# Patient Record
Sex: MALE | Race: BLACK OR AFRICAN AMERICAN | Employment: UNEMPLOYED | URBAN - METROPOLITAN AREA
[De-identification: names, ages, dates, MRNs, and addresses within clinical notes are randomized per-mention and may not be internally consistent; named-entity substitution may affect disease eponyms.]

---

## 2019-01-01 ENCOUNTER — APPOINTMENT (OUTPATIENT)
Dept: GENERAL RADIOLOGY | Age: 54
DRG: 201 | End: 2019-01-01
Attending: INTERNAL MEDICINE
Payer: MEDICAID

## 2019-01-01 ENCOUNTER — APPOINTMENT (OUTPATIENT)
Dept: GENERAL RADIOLOGY | Age: 54
DRG: 264 | End: 2019-01-01
Attending: INTERNAL MEDICINE
Payer: MEDICAID

## 2019-01-01 ENCOUNTER — HOSPICE ADMISSION (OUTPATIENT)
Dept: HOSPICE | Facility: HOSPICE | Age: 54
End: 2019-01-01

## 2019-01-01 ENCOUNTER — APPOINTMENT (OUTPATIENT)
Dept: GENERAL RADIOLOGY | Age: 54
DRG: 264 | End: 2019-01-01
Attending: EMERGENCY MEDICINE
Payer: MEDICAID

## 2019-01-01 ENCOUNTER — APPOINTMENT (OUTPATIENT)
Dept: GENERAL RADIOLOGY | Age: 54
End: 2019-01-01
Attending: EMERGENCY MEDICINE
Payer: MEDICAID

## 2019-01-01 ENCOUNTER — HOSPITAL ENCOUNTER (INPATIENT)
Age: 54
LOS: 5 days | Discharge: HOME | DRG: 264 | End: 2019-07-13
Attending: EMERGENCY MEDICINE | Admitting: INTERNAL MEDICINE
Payer: MEDICAID

## 2019-01-01 ENCOUNTER — APPOINTMENT (OUTPATIENT)
Dept: GENERAL RADIOLOGY | Age: 54
DRG: 201 | End: 2019-01-01
Attending: EMERGENCY MEDICINE
Payer: MEDICAID

## 2019-01-01 ENCOUNTER — APPOINTMENT (OUTPATIENT)
Dept: GENERAL RADIOLOGY | Age: 54
DRG: 201 | End: 2019-01-01
Attending: NURSE PRACTITIONER
Payer: MEDICAID

## 2019-01-01 ENCOUNTER — APPOINTMENT (OUTPATIENT)
Dept: CT IMAGING | Age: 54
End: 2019-01-01
Attending: EMERGENCY MEDICINE
Payer: MEDICAID

## 2019-01-01 ENCOUNTER — HOSPITAL ENCOUNTER (INPATIENT)
Age: 54
LOS: 16 days | DRG: 201 | End: 2019-08-06
Attending: EMERGENCY MEDICINE | Admitting: INTERNAL MEDICINE
Payer: MEDICAID

## 2019-01-01 ENCOUNTER — APPOINTMENT (OUTPATIENT)
Dept: CT IMAGING | Age: 54
DRG: 264 | End: 2019-01-01
Attending: EMERGENCY MEDICINE
Payer: MEDICAID

## 2019-01-01 ENCOUNTER — HOSPITAL ENCOUNTER (EMERGENCY)
Age: 54
Discharge: HOME | End: 2019-01-12
Attending: EMERGENCY MEDICINE
Payer: SUBSIDIZED

## 2019-01-01 ENCOUNTER — HOSPITAL ENCOUNTER (EMERGENCY)
Age: 54
Discharge: HOME | End: 2019-06-20
Attending: EMERGENCY MEDICINE
Payer: MEDICAID

## 2019-01-01 ENCOUNTER — APPOINTMENT (OUTPATIENT)
Dept: ULTRASOUND IMAGING | Age: 54
DRG: 201 | End: 2019-01-01
Attending: INTERNAL MEDICINE
Payer: MEDICAID

## 2019-01-01 ENCOUNTER — APPOINTMENT (OUTPATIENT)
Dept: GENERAL RADIOLOGY | Age: 54
End: 2019-01-01
Attending: EMERGENCY MEDICINE
Payer: SUBSIDIZED

## 2019-01-01 ENCOUNTER — ANESTHESIA EVENT (OUTPATIENT)
Dept: ENDOSCOPY | Age: 54
DRG: 264 | End: 2019-01-01
Payer: MEDICAID

## 2019-01-01 ENCOUNTER — ANESTHESIA (OUTPATIENT)
Dept: ENDOSCOPY | Age: 54
DRG: 264 | End: 2019-01-01
Payer: MEDICAID

## 2019-01-01 VITALS
RESPIRATION RATE: 20 BRPM | OXYGEN SATURATION: 82 % | TEMPERATURE: 99.8 F | SYSTOLIC BLOOD PRESSURE: 67 MMHG | HEIGHT: 77 IN | BODY MASS INDEX: 14.26 KG/M2 | WEIGHT: 120.81 LBS | DIASTOLIC BLOOD PRESSURE: 37 MMHG

## 2019-01-01 VITALS
WEIGHT: 170 LBS | SYSTOLIC BLOOD PRESSURE: 113 MMHG | HEIGHT: 74 IN | BODY MASS INDEX: 21.82 KG/M2 | HEART RATE: 62 BPM | DIASTOLIC BLOOD PRESSURE: 75 MMHG | RESPIRATION RATE: 18 BRPM | OXYGEN SATURATION: 97 % | TEMPERATURE: 98.3 F

## 2019-01-01 VITALS
HEIGHT: 77 IN | BODY MASS INDEX: 20.31 KG/M2 | HEART RATE: 78 BPM | SYSTOLIC BLOOD PRESSURE: 138 MMHG | RESPIRATION RATE: 18 BRPM | DIASTOLIC BLOOD PRESSURE: 91 MMHG | OXYGEN SATURATION: 98 % | WEIGHT: 172 LBS | TEMPERATURE: 98.3 F

## 2019-01-01 VITALS
OXYGEN SATURATION: 98 % | HEART RATE: 88 BPM | RESPIRATION RATE: 16 BRPM | HEIGHT: 74 IN | SYSTOLIC BLOOD PRESSURE: 112 MMHG | DIASTOLIC BLOOD PRESSURE: 76 MMHG | TEMPERATURE: 98 F | WEIGHT: 170 LBS | BODY MASS INDEX: 21.82 KG/M2

## 2019-01-01 DIAGNOSIS — I47.1 PAROXYSMAL SVT (SUPRAVENTRICULAR TACHYCARDIA) (HCC): ICD-10-CM

## 2019-01-01 DIAGNOSIS — E87.8 ELECTROLYTE ABNORMALITY: ICD-10-CM

## 2019-01-01 DIAGNOSIS — Z51.5 ENCOUNTER FOR PALLIATIVE CARE: ICD-10-CM

## 2019-01-01 DIAGNOSIS — E46 PROTEIN MALNUTRITION (HCC): Chronic | ICD-10-CM

## 2019-01-01 DIAGNOSIS — R53.83 FATIGUE, UNSPECIFIED TYPE: ICD-10-CM

## 2019-01-01 DIAGNOSIS — R59.0 MEDIASTINAL ADENOPATHY: ICD-10-CM

## 2019-01-01 DIAGNOSIS — F14.10 COCAINE ABUSE (HCC): ICD-10-CM

## 2019-01-01 DIAGNOSIS — R91.8 LUNG MASS: ICD-10-CM

## 2019-01-01 DIAGNOSIS — F14.10 COCAINE ABUSE (HCC): Chronic | ICD-10-CM

## 2019-01-01 DIAGNOSIS — J96.01 ACUTE RESPIRATORY FAILURE WITH HYPOXIA (HCC): ICD-10-CM

## 2019-01-01 DIAGNOSIS — R10.13 PAIN, ABDOMINAL, EPIGASTRIC: ICD-10-CM

## 2019-01-01 DIAGNOSIS — K86.89 PANCREATIC MASS: ICD-10-CM

## 2019-01-01 DIAGNOSIS — J43.2 CENTRILOBULAR EMPHYSEMA (HCC): ICD-10-CM

## 2019-01-01 DIAGNOSIS — R52 PAIN, GENERALIZED: ICD-10-CM

## 2019-01-01 DIAGNOSIS — J96.01 ACUTE RESPIRATORY FAILURE WITH HYPOXEMIA (HCC): ICD-10-CM

## 2019-01-01 DIAGNOSIS — R53.81 DEBILITY: ICD-10-CM

## 2019-01-01 DIAGNOSIS — Z72.0 TOBACCO ABUSE: ICD-10-CM

## 2019-01-01 DIAGNOSIS — I48.91 ATRIAL FIBRILLATION WITH RVR (HCC): ICD-10-CM

## 2019-01-01 DIAGNOSIS — R54 FRAILTY: ICD-10-CM

## 2019-01-01 DIAGNOSIS — R91.8 BILATERAL PULMONARY INFILTRATES ON CHEST X-RAY: Primary | ICD-10-CM

## 2019-01-01 DIAGNOSIS — R59.0 VIRCHOW'S NODE: ICD-10-CM

## 2019-01-01 DIAGNOSIS — I26.99 OTHER ACUTE PULMONARY EMBOLISM WITHOUT ACUTE COR PULMONALE (HCC): ICD-10-CM

## 2019-01-01 DIAGNOSIS — I26.99 ACUTE PULMONARY EMBOLISM WITHOUT ACUTE COR PULMONALE, UNSPECIFIED PULMONARY EMBOLISM TYPE (HCC): Primary | ICD-10-CM

## 2019-01-01 DIAGNOSIS — E46 PROTEIN MALNUTRITION (HCC): ICD-10-CM

## 2019-01-01 DIAGNOSIS — I47.1 SVT (SUPRAVENTRICULAR TACHYCARDIA) (HCC): ICD-10-CM

## 2019-01-01 DIAGNOSIS — C25.9 PRIMARY PANCREATIC CANCER WITH METASTASIS TO OTHER SITE (HCC): ICD-10-CM

## 2019-01-01 DIAGNOSIS — R62.7 FAILURE TO THRIVE IN ADULT: ICD-10-CM

## 2019-01-01 DIAGNOSIS — S83.92XA SPRAIN OF LEFT KNEE, UNSPECIFIED LIGAMENT, INITIAL ENCOUNTER: Primary | ICD-10-CM

## 2019-01-01 DIAGNOSIS — I82.A13 ACUTE DEEP VEIN THROMBOSIS (DVT) OF AXILLARY VEIN OF BOTH UPPER EXTREMITIES (HCC): ICD-10-CM

## 2019-01-01 DIAGNOSIS — R55 SYNCOPE AND COLLAPSE: Primary | ICD-10-CM

## 2019-01-01 DIAGNOSIS — K85.90 ACUTE PANCREATITIS, UNSPECIFIED COMPLICATION STATUS, UNSPECIFIED PANCREATITIS TYPE: ICD-10-CM

## 2019-01-01 DIAGNOSIS — R10.13 EPIGASTRIC PAIN: ICD-10-CM

## 2019-01-01 DIAGNOSIS — R06.6 HICCUPS: ICD-10-CM

## 2019-01-01 DIAGNOSIS — I82.623 ACUTE DEEP VEIN THROMBOSIS (DVT) OF BOTH UPPER EXTREMITIES, UNSPECIFIED VEIN (HCC): ICD-10-CM

## 2019-01-01 DIAGNOSIS — R11.0 NAUSEA: ICD-10-CM

## 2019-01-01 DIAGNOSIS — Z71.89 ADVANCED CARE PLANNING/COUNSELING DISCUSSION: ICD-10-CM

## 2019-01-01 DIAGNOSIS — J43.9 PULMONARY EMPHYSEMA, UNSPECIFIED EMPHYSEMA TYPE (HCC): Chronic | ICD-10-CM

## 2019-01-01 DIAGNOSIS — Z71.0 DISCUSSION ABOUT ADVANCE CARE PLANNING HELD WITH FAMILY MEMBER: ICD-10-CM

## 2019-01-01 LAB
ALBUMIN SERPL-MCNC: 1.6 G/DL (ref 3.5–5)
ALBUMIN SERPL-MCNC: 1.6 G/DL (ref 3.5–5)
ALBUMIN SERPL-MCNC: 1.7 G/DL (ref 3.5–5)
ALBUMIN SERPL-MCNC: 1.8 G/DL (ref 3.5–5)
ALBUMIN SERPL-MCNC: 1.9 G/DL (ref 3.5–5)
ALBUMIN SERPL-MCNC: 2.1 G/DL (ref 3.5–5)
ALBUMIN SERPL-MCNC: 2.3 G/DL (ref 3.5–5)
ALBUMIN SERPL-MCNC: 2.6 G/DL (ref 3.5–5)
ALBUMIN SERPL-MCNC: 2.6 G/DL (ref 3.5–5)
ALBUMIN SERPL-MCNC: 2.8 G/DL (ref 3.5–5)
ALBUMIN SERPL-MCNC: 3.1 G/DL (ref 3.5–5)
ALBUMIN SERPL-MCNC: 3.1 G/DL (ref 3.5–5)
ALBUMIN SERPL-MCNC: 3.4 G/DL (ref 3.5–5)
ALBUMIN SERPL-MCNC: 3.4 G/DL (ref 3.5–5)
ALBUMIN SERPL-MCNC: 3.6 G/DL (ref 3.5–5)
ALBUMIN SERPL-MCNC: 3.6 G/DL (ref 3.5–5)
ALBUMIN/GLOB SERPL: 0.4 {RATIO} (ref 1.2–3.5)
ALBUMIN/GLOB SERPL: 0.5 {RATIO} (ref 1.2–3.5)
ALBUMIN/GLOB SERPL: 0.6 {RATIO} (ref 1.2–3.5)
ALBUMIN/GLOB SERPL: 0.7 {RATIO} (ref 1.2–3.5)
ALBUMIN/GLOB SERPL: 0.8 {RATIO} (ref 1.2–3.5)
ALBUMIN/GLOB SERPL: 0.9 {RATIO} (ref 1.2–3.5)
ALBUMIN/GLOB SERPL: 1.1 {RATIO} (ref 1.2–3.5)
ALBUMIN/GLOB SERPL: 1.1 {RATIO} (ref 1.2–3.5)
ALBUMIN/GLOB SERPL: 1.3 {RATIO} (ref 1.2–3.5)
ALP SERPL-CCNC: 101 U/L (ref 50–136)
ALP SERPL-CCNC: 103 U/L (ref 50–136)
ALP SERPL-CCNC: 104 U/L (ref 50–136)
ALP SERPL-CCNC: 112 U/L (ref 50–136)
ALP SERPL-CCNC: 120 U/L (ref 50–136)
ALP SERPL-CCNC: 141 U/L (ref 50–136)
ALP SERPL-CCNC: 143 U/L (ref 50–136)
ALP SERPL-CCNC: 158 U/L (ref 50–136)
ALP SERPL-CCNC: 196 U/L (ref 50–136)
ALP SERPL-CCNC: 291 U/L (ref 50–136)
ALP SERPL-CCNC: 336 U/L (ref 50–136)
ALP SERPL-CCNC: 60 U/L (ref 50–136)
ALP SERPL-CCNC: 73 U/L (ref 50–136)
ALP SERPL-CCNC: 77 U/L (ref 50–136)
ALP SERPL-CCNC: 85 U/L (ref 50–136)
ALP SERPL-CCNC: 86 U/L (ref 50–136)
ALP SERPL-CCNC: 91 U/L (ref 50–136)
ALP SERPL-CCNC: 95 U/L (ref 50–136)
ALT SERPL-CCNC: 12 U/L (ref 12–65)
ALT SERPL-CCNC: 15 U/L (ref 12–65)
ALT SERPL-CCNC: 15 U/L (ref 12–65)
ALT SERPL-CCNC: 18 U/L (ref 12–65)
ALT SERPL-CCNC: 183 U/L (ref 12–65)
ALT SERPL-CCNC: 19 U/L (ref 12–65)
ALT SERPL-CCNC: 227 U/L (ref 12–65)
ALT SERPL-CCNC: 23 U/L (ref 12–65)
ALT SERPL-CCNC: 23 U/L (ref 12–65)
ALT SERPL-CCNC: 25 U/L (ref 12–65)
ALT SERPL-CCNC: 26 U/L (ref 12–65)
ALT SERPL-CCNC: 27 U/L (ref 12–65)
ALT SERPL-CCNC: 28 U/L (ref 12–65)
ALT SERPL-CCNC: 31 U/L (ref 12–65)
ALT SERPL-CCNC: 31 U/L (ref 12–65)
ALT SERPL-CCNC: 32 U/L (ref 12–65)
ALT SERPL-CCNC: 8 U/L (ref 12–65)
ALT SERPL-CCNC: 90 U/L (ref 12–65)
AMPHET UR QL SCN: NEGATIVE
AMPHET UR QL SCN: NEGATIVE
ANION GAP SERPL CALC-SCNC: 10 MMOL/L (ref 7–16)
ANION GAP SERPL CALC-SCNC: 11 MMOL/L (ref 7–16)
ANION GAP SERPL CALC-SCNC: 15 MMOL/L (ref 7–16)
ANION GAP SERPL CALC-SCNC: 3 MMOL/L (ref 7–16)
ANION GAP SERPL CALC-SCNC: 5 MMOL/L (ref 7–16)
ANION GAP SERPL CALC-SCNC: 6 MMOL/L (ref 7–16)
ANION GAP SERPL CALC-SCNC: 7 MMOL/L (ref 7–16)
ANION GAP SERPL CALC-SCNC: 8 MMOL/L (ref 7–16)
ANION GAP SERPL CALC-SCNC: 9 MMOL/L (ref 7–16)
APTT PPP: 100.9 SEC (ref 24.7–39.8)
APTT PPP: 109 SEC (ref 24.7–39.8)
APTT PPP: 112.4 SEC (ref 24.7–39.8)
APTT PPP: 122.2 SEC (ref 24.7–39.8)
APTT PPP: 125.7 SEC (ref 24.7–39.8)
APTT PPP: 141.1 SEC (ref 24.7–39.8)
APTT PPP: 156.8 SEC (ref 24.7–39.8)
APTT PPP: 30.9 SEC (ref 24.7–39.8)
APTT PPP: 38.4 SEC (ref 24.7–39.8)
APTT PPP: 42.1 SEC (ref 24.7–39.8)
APTT PPP: 44.2 SEC (ref 24.7–39.8)
APTT PPP: 51.4 SEC (ref 24.7–39.8)
APTT PPP: 53.7 SEC (ref 24.7–39.8)
APTT PPP: 58 SEC (ref 24.7–39.8)
APTT PPP: 59.2 SEC (ref 24.7–39.8)
APTT PPP: 63 SEC (ref 24.7–39.8)
APTT PPP: 64.8 SEC (ref 24.7–39.8)
APTT PPP: 69.2 SEC (ref 24.7–39.8)
APTT PPP: 76.1 SEC (ref 24.7–39.8)
APTT PPP: 77 SEC (ref 24.7–39.8)
APTT PPP: 78.1 SEC (ref 24.7–39.8)
APTT PPP: 80.3 SEC (ref 24.7–39.8)
APTT PPP: 81.8 SEC (ref 24.7–39.8)
APTT PPP: 82.5 SEC (ref 24.7–39.8)
APTT PPP: 83.7 SEC (ref 24.7–39.8)
APTT PPP: 88.6 SEC (ref 24.7–39.8)
APTT PPP: 88.8 SEC (ref 24.7–39.8)
APTT PPP: 92.5 SEC (ref 24.7–39.8)
APTT PPP: 94.3 SEC (ref 24.7–39.8)
APTT PPP: 94.3 SEC (ref 24.7–39.8)
APTT PPP: 95 SEC (ref 24.7–39.8)
APTT PPP: 97.4 SEC (ref 24.7–39.8)
APTT PPP: 98.5 SEC (ref 24.7–39.8)
ARTERIAL PATENCY WRIST A: YES
AST SERPL-CCNC: 16 U/L (ref 15–37)
AST SERPL-CCNC: 17 U/L (ref 15–37)
AST SERPL-CCNC: 22 U/L (ref 15–37)
AST SERPL-CCNC: 25 U/L (ref 15–37)
AST SERPL-CCNC: 27 U/L (ref 15–37)
AST SERPL-CCNC: 27 U/L (ref 15–37)
AST SERPL-CCNC: 30 U/L (ref 15–37)
AST SERPL-CCNC: 30 U/L (ref 15–37)
AST SERPL-CCNC: 35 U/L (ref 15–37)
AST SERPL-CCNC: 36 U/L (ref 15–37)
AST SERPL-CCNC: 36 U/L (ref 15–37)
AST SERPL-CCNC: 41 U/L (ref 15–37)
AST SERPL-CCNC: 48 U/L (ref 15–37)
AST SERPL-CCNC: 50 U/L (ref 15–37)
AST SERPL-CCNC: 52 U/L (ref 15–37)
AST SERPL-CCNC: 54 U/L (ref 15–37)
AST SERPL-CCNC: 55 U/L (ref 15–37)
AST SERPL-CCNC: 74 U/L (ref 15–37)
ATRIAL RATE: 145 BPM
ATRIAL RATE: 182 BPM
ATRIAL RATE: 192 BPM
ATRIAL RATE: 292 BPM
ATRIAL RATE: 61 BPM
ATRIAL RATE: 68 BPM
ATRIAL RATE: 72 BPM
BACTERIA SPEC CULT: ABNORMAL
BACTERIA SPEC CULT: ABNORMAL
BACTERIA SPEC CULT: NORMAL
BARBITURATES UR QL SCN: NEGATIVE
BARBITURATES UR QL SCN: NEGATIVE
BASE DEFICIT BLD-SCNC: 1 MMOL/L
BASE DEFICIT BLD-SCNC: 3 MMOL/L
BASE EXCESS BLD CALC-SCNC: 0 MMOL/L
BASE EXCESS BLD CALC-SCNC: 0 MMOL/L
BASE EXCESS BLD CALC-SCNC: 10 MMOL/L
BASE EXCESS BLD CALC-SCNC: 12 MMOL/L
BASE EXCESS BLD CALC-SCNC: 13 MMOL/L
BASE EXCESS BLD CALC-SCNC: 14 MMOL/L
BASE EXCESS BLD CALC-SCNC: 14 MMOL/L
BASE EXCESS BLD CALC-SCNC: 15 MMOL/L
BASE EXCESS BLD CALC-SCNC: 2 MMOL/L
BASE EXCESS BLD CALC-SCNC: 5 MMOL/L
BASE EXCESS BLD CALC-SCNC: 5 MMOL/L
BASE EXCESS BLD CALC-SCNC: 6 MMOL/L
BASE EXCESS BLD CALC-SCNC: 7 MMOL/L
BASE EXCESS BLD CALC-SCNC: 8 MMOL/L
BASE EXCESS BLD CALC-SCNC: 9 MMOL/L
BASOPHILS # BLD: 0 K/UL (ref 0–0.2)
BASOPHILS # BLD: 0.1 K/UL (ref 0–0.2)
BASOPHILS NFR BLD: 0 % (ref 0–2)
BASOPHILS NFR BLD: 1 % (ref 0–2)
BDY SITE: ABNORMAL
BENZODIAZ UR QL: NEGATIVE
BENZODIAZ UR QL: NEGATIVE
BILIRUB DIRECT SERPL-MCNC: 0.2 MG/DL
BILIRUB DIRECT SERPL-MCNC: 0.2 MG/DL
BILIRUB INDIRECT SERPL-MCNC: 0.2 MG/DL (ref 0–1.1)
BILIRUB SERPL-MCNC: 0.3 MG/DL (ref 0.2–1.1)
BILIRUB SERPL-MCNC: 0.4 MG/DL (ref 0.2–1.1)
BILIRUB SERPL-MCNC: 0.5 MG/DL (ref 0.2–1.1)
BILIRUB SERPL-MCNC: 0.6 MG/DL (ref 0.2–1.1)
BILIRUB SERPL-MCNC: 0.7 MG/DL (ref 0.2–1.1)
BILIRUB SERPL-MCNC: 0.7 MG/DL (ref 0.2–1.1)
BILIRUB SERPL-MCNC: 0.8 MG/DL (ref 0.2–1.1)
BILIRUB SERPL-MCNC: 1.3 MG/DL (ref 0.2–1.1)
BILIRUB SERPL-MCNC: 2.6 MG/DL (ref 0.2–1.1)
BNP SERPL-MCNC: 14 PG/ML
BNP SERPL-MCNC: 36 PG/ML
BNP SERPL-MCNC: 59 PG/ML
BODY TEMPERATURE: 100.4
BODY TEMPERATURE: 97.8
BODY TEMPERATURE: 98.6
BUN SERPL-MCNC: 10 MG/DL (ref 6–23)
BUN SERPL-MCNC: 112 MG/DL (ref 6–23)
BUN SERPL-MCNC: 12 MG/DL (ref 6–23)
BUN SERPL-MCNC: 12 MG/DL (ref 6–23)
BUN SERPL-MCNC: 13 MG/DL (ref 6–23)
BUN SERPL-MCNC: 139 MG/DL (ref 6–23)
BUN SERPL-MCNC: 15 MG/DL (ref 6–23)
BUN SERPL-MCNC: 17 MG/DL (ref 6–23)
BUN SERPL-MCNC: 19 MG/DL (ref 6–23)
BUN SERPL-MCNC: 21 MG/DL (ref 6–23)
BUN SERPL-MCNC: 24 MG/DL (ref 6–23)
BUN SERPL-MCNC: 25 MG/DL (ref 6–23)
BUN SERPL-MCNC: 25 MG/DL (ref 6–23)
BUN SERPL-MCNC: 27 MG/DL (ref 6–23)
BUN SERPL-MCNC: 45 MG/DL (ref 6–23)
BUN SERPL-MCNC: 5 MG/DL (ref 6–23)
BUN SERPL-MCNC: 50 MG/DL (ref 6–23)
BUN SERPL-MCNC: 51 MG/DL (ref 6–23)
BUN SERPL-MCNC: 53 MG/DL (ref 6–23)
BUN SERPL-MCNC: 57 MG/DL (ref 6–23)
BUN SERPL-MCNC: 6 MG/DL (ref 6–23)
BUN SERPL-MCNC: 8 MG/DL (ref 6–23)
BUN SERPL-MCNC: 81 MG/DL (ref 6–23)
CALCIUM SERPL-MCNC: 7.6 MG/DL (ref 8.3–10.4)
CALCIUM SERPL-MCNC: 7.7 MG/DL (ref 8.3–10.4)
CALCIUM SERPL-MCNC: 7.8 MG/DL (ref 8.3–10.4)
CALCIUM SERPL-MCNC: 7.8 MG/DL (ref 8.3–10.4)
CALCIUM SERPL-MCNC: 7.9 MG/DL (ref 8.3–10.4)
CALCIUM SERPL-MCNC: 8 MG/DL (ref 8.3–10.4)
CALCIUM SERPL-MCNC: 8 MG/DL (ref 8.3–10.4)
CALCIUM SERPL-MCNC: 8.1 MG/DL (ref 8.3–10.4)
CALCIUM SERPL-MCNC: 8.1 MG/DL (ref 8.3–10.4)
CALCIUM SERPL-MCNC: 8.2 MG/DL (ref 8.3–10.4)
CALCIUM SERPL-MCNC: 8.2 MG/DL (ref 8.3–10.4)
CALCIUM SERPL-MCNC: 8.3 MG/DL (ref 8.3–10.4)
CALCIUM SERPL-MCNC: 8.4 MG/DL (ref 8.3–10.4)
CALCIUM SERPL-MCNC: 8.5 MG/DL (ref 8.3–10.4)
CALCIUM SERPL-MCNC: 8.6 MG/DL (ref 8.3–10.4)
CALCIUM SERPL-MCNC: 8.6 MG/DL (ref 8.3–10.4)
CALCIUM SERPL-MCNC: 8.9 MG/DL (ref 8.3–10.4)
CALCULATED P AXIS, ECG09: -83 DEGREES
CALCULATED P AXIS, ECG09: -88 DEGREES
CALCULATED P AXIS, ECG09: 0 DEGREES
CALCULATED P AXIS, ECG09: 63 DEGREES
CALCULATED P AXIS, ECG09: 75 DEGREES
CALCULATED P AXIS, ECG09: 77 DEGREES
CALCULATED P AXIS, ECG09: 87 DEGREES
CALCULATED R AXIS, ECG10: 65 DEGREES
CALCULATED R AXIS, ECG10: 69 DEGREES
CALCULATED R AXIS, ECG10: 75 DEGREES
CALCULATED R AXIS, ECG10: 75 DEGREES
CALCULATED R AXIS, ECG10: 76 DEGREES
CALCULATED R AXIS, ECG10: 78 DEGREES
CALCULATED R AXIS, ECG10: 80 DEGREES
CALCULATED T AXIS, ECG11: -24 DEGREES
CALCULATED T AXIS, ECG11: -52 DEGREES
CALCULATED T AXIS, ECG11: -83 DEGREES
CALCULATED T AXIS, ECG11: -86 DEGREES
CALCULATED T AXIS, ECG11: -90 DEGREES
CALCULATED T AXIS, ECG11: -93 DEGREES
CALCULATED T AXIS, ECG11: 38 DEGREES
CANNABINOIDS UR QL SCN: NEGATIVE
CANNABINOIDS UR QL SCN: NEGATIVE
CHLORIDE SERPL-SCNC: 100 MMOL/L (ref 98–107)
CHLORIDE SERPL-SCNC: 101 MMOL/L (ref 98–107)
CHLORIDE SERPL-SCNC: 101 MMOL/L (ref 98–107)
CHLORIDE SERPL-SCNC: 102 MMOL/L (ref 98–107)
CHLORIDE SERPL-SCNC: 103 MMOL/L (ref 98–107)
CHLORIDE SERPL-SCNC: 104 MMOL/L (ref 98–107)
CHLORIDE SERPL-SCNC: 94 MMOL/L (ref 98–107)
CHLORIDE SERPL-SCNC: 95 MMOL/L (ref 98–107)
CHLORIDE SERPL-SCNC: 96 MMOL/L (ref 98–107)
CHLORIDE SERPL-SCNC: 97 MMOL/L (ref 98–107)
CHLORIDE SERPL-SCNC: 98 MMOL/L (ref 98–107)
CHLORIDE SERPL-SCNC: 99 MMOL/L (ref 98–107)
CK SERPL-CCNC: 54 U/L (ref 21–215)
CO2 BLD-SCNC: 26 MMOL/L
CO2 BLD-SCNC: 28 MMOL/L
CO2 BLD-SCNC: 30 MMOL/L
CO2 BLD-SCNC: 33 MMOL/L
CO2 BLD-SCNC: 34 MMOL/L
CO2 BLD-SCNC: 35 MMOL/L
CO2 BLD-SCNC: 36 MMOL/L
CO2 BLD-SCNC: 37 MMOL/L
CO2 BLD-SCNC: 38 MMOL/L
CO2 BLD-SCNC: 38 MMOL/L
CO2 BLD-SCNC: 40 MMOL/L
CO2 BLD-SCNC: 41 MMOL/L
CO2 BLD-SCNC: 42 MMOL/L
CO2 BLD-SCNC: 42 MMOL/L
CO2 BLD-SCNC: 46 MMOL/L
CO2 SERPL-SCNC: 25 MMOL/L (ref 21–32)
CO2 SERPL-SCNC: 26 MMOL/L (ref 21–32)
CO2 SERPL-SCNC: 27 MMOL/L (ref 21–32)
CO2 SERPL-SCNC: 27 MMOL/L (ref 21–32)
CO2 SERPL-SCNC: 29 MMOL/L (ref 21–32)
CO2 SERPL-SCNC: 29 MMOL/L (ref 21–32)
CO2 SERPL-SCNC: 30 MMOL/L (ref 21–32)
CO2 SERPL-SCNC: 32 MMOL/L (ref 21–32)
CO2 SERPL-SCNC: 32 MMOL/L (ref 21–32)
CO2 SERPL-SCNC: 33 MMOL/L (ref 21–32)
CO2 SERPL-SCNC: 33 MMOL/L (ref 21–32)
CO2 SERPL-SCNC: 34 MMOL/L (ref 21–32)
CO2 SERPL-SCNC: 37 MMOL/L (ref 21–32)
CO2 SERPL-SCNC: 38 MMOL/L (ref 21–32)
CO2 SERPL-SCNC: 38 MMOL/L (ref 21–32)
CO2 SERPL-SCNC: 40 MMOL/L (ref 21–32)
COCAINE UR QL SCN: NEGATIVE
COCAINE UR QL SCN: POSITIVE
COLLECT TIME,HTIME: 1032
COLLECT TIME,HTIME: 2020
COLLECT TIME,HTIME: 210
COLLECT TIME,HTIME: 2105
COLLECT TIME,HTIME: 302
COLLECT TIME,HTIME: 312
COLLECT TIME,HTIME: 315
COLLECT TIME,HTIME: 315
COLLECT TIME,HTIME: 320
COLLECT TIME,HTIME: 320
COLLECT TIME,HTIME: 325
COLLECT TIME,HTIME: 328
COLLECT TIME,HTIME: 342
COLLECT TIME,HTIME: 344
COLLECT TIME,HTIME: 435
COLLECT TIME,HTIME: 506
COLLECT TIME,HTIME: 530
CREAT SERPL-MCNC: 0.65 MG/DL (ref 0.8–1.5)
CREAT SERPL-MCNC: 0.69 MG/DL (ref 0.8–1.5)
CREAT SERPL-MCNC: 0.7 MG/DL (ref 0.8–1.5)
CREAT SERPL-MCNC: 0.71 MG/DL (ref 0.8–1.5)
CREAT SERPL-MCNC: 0.72 MG/DL (ref 0.8–1.5)
CREAT SERPL-MCNC: 0.76 MG/DL (ref 0.8–1.5)
CREAT SERPL-MCNC: 0.76 MG/DL (ref 0.8–1.5)
CREAT SERPL-MCNC: 0.77 MG/DL (ref 0.8–1.5)
CREAT SERPL-MCNC: 0.79 MG/DL (ref 0.8–1.5)
CREAT SERPL-MCNC: 0.89 MG/DL (ref 0.8–1.5)
CREAT SERPL-MCNC: 0.92 MG/DL (ref 0.8–1.5)
CREAT SERPL-MCNC: 1.01 MG/DL (ref 0.8–1.5)
CREAT SERPL-MCNC: 1.07 MG/DL (ref 0.8–1.5)
CREAT SERPL-MCNC: 1.07 MG/DL (ref 0.8–1.5)
CREAT SERPL-MCNC: 1.32 MG/DL (ref 0.8–1.5)
CREAT SERPL-MCNC: 1.32 MG/DL (ref 0.8–1.5)
CREAT SERPL-MCNC: 1.33 MG/DL (ref 0.8–1.5)
CREAT SERPL-MCNC: 1.36 MG/DL (ref 0.8–1.5)
CREAT SERPL-MCNC: 1.7 MG/DL (ref 0.8–1.5)
CREAT SERPL-MCNC: 1.98 MG/DL (ref 0.8–1.5)
CREAT SERPL-MCNC: 2.66 MG/DL (ref 0.8–1.5)
CREAT SERPL-MCNC: 3.48 MG/DL (ref 0.8–1.5)
CREAT SERPL-MCNC: 4.99 MG/DL (ref 0.8–1.5)
CRP SERPL HS-MCNC: >190 MG/L
DIAGNOSIS, 93000: NORMAL
DIFFERENTIAL METHOD BLD: ABNORMAL
EOSINOPHIL # BLD: 0 K/UL (ref 0–0.8)
EOSINOPHIL # BLD: 0.1 K/UL (ref 0–0.8)
EOSINOPHIL # BLD: 0.2 K/UL (ref 0–0.8)
EOSINOPHIL # BLD: 0.3 K/UL (ref 0–0.8)
EOSINOPHIL NFR BLD: 0 % (ref 0.5–7.8)
EOSINOPHIL NFR BLD: 1 % (ref 0.5–7.8)
EOSINOPHIL NFR BLD: 2 % (ref 0.5–7.8)
EOSINOPHIL NFR BLD: 3 % (ref 0.5–7.8)
ERYTHROCYTE [DISTWIDTH] IN BLOOD BY AUTOMATED COUNT: 10.9 % (ref 11.9–14.6)
ERYTHROCYTE [DISTWIDTH] IN BLOOD BY AUTOMATED COUNT: 11.1 % (ref 11.9–14.6)
ERYTHROCYTE [DISTWIDTH] IN BLOOD BY AUTOMATED COUNT: 11.2 % (ref 11.9–14.6)
ERYTHROCYTE [DISTWIDTH] IN BLOOD BY AUTOMATED COUNT: 11.3 % (ref 11.9–14.6)
ERYTHROCYTE [DISTWIDTH] IN BLOOD BY AUTOMATED COUNT: 11.3 % (ref 11.9–14.6)
ERYTHROCYTE [DISTWIDTH] IN BLOOD BY AUTOMATED COUNT: 11.4 % (ref 11.9–14.6)
ERYTHROCYTE [DISTWIDTH] IN BLOOD BY AUTOMATED COUNT: 11.5 % (ref 11.9–14.6)
ERYTHROCYTE [DISTWIDTH] IN BLOOD BY AUTOMATED COUNT: 11.7 % (ref 11.9–14.6)
ERYTHROCYTE [DISTWIDTH] IN BLOOD BY AUTOMATED COUNT: 11.7 % (ref 11.9–14.6)
ERYTHROCYTE [DISTWIDTH] IN BLOOD BY AUTOMATED COUNT: 11.9 % (ref 11.9–14.6)
ERYTHROCYTE [DISTWIDTH] IN BLOOD BY AUTOMATED COUNT: 12.6 % (ref 11.9–14.6)
ERYTHROCYTE [DISTWIDTH] IN BLOOD BY AUTOMATED COUNT: 13 % (ref 11.9–14.6)
ERYTHROCYTE [DISTWIDTH] IN BLOOD BY AUTOMATED COUNT: 13.2 % (ref 11.9–14.6)
ERYTHROCYTE [DISTWIDTH] IN BLOOD BY AUTOMATED COUNT: 13.2 % (ref 11.9–14.6)
ERYTHROCYTE [DISTWIDTH] IN BLOOD BY AUTOMATED COUNT: 13.8 % (ref 11.9–14.6)
ERYTHROCYTE [DISTWIDTH] IN BLOOD BY AUTOMATED COUNT: 14.4 % (ref 11.9–14.6)
ERYTHROCYTE [DISTWIDTH] IN BLOOD BY AUTOMATED COUNT: 14.7 % (ref 11.9–14.6)
ERYTHROCYTE [DISTWIDTH] IN BLOOD BY AUTOMATED COUNT: 16.7 % (ref 11.9–14.6)
ETHANOL SERPL-MCNC: <3 MG/DL
EXHALED MINUTE VOLUME, VE: 1 L/MIN
EXHALED MINUTE VOLUME, VE: 10 L/MIN
EXHALED MINUTE VOLUME, VE: 10 L/MIN
EXHALED MINUTE VOLUME, VE: 10.1 L/MIN
EXHALED MINUTE VOLUME, VE: 10.3 L/MIN
EXHALED MINUTE VOLUME, VE: 10.9 L/MIN
EXHALED MINUTE VOLUME, VE: 15 L/MIN
EXHALED MINUTE VOLUME, VE: 15 L/MIN
EXHALED MINUTE VOLUME, VE: 15.1 L/MIN
EXHALED MINUTE VOLUME, VE: 15.4 L/MIN
EXHALED MINUTE VOLUME, VE: 15.7 L/MIN
EXHALED MINUTE VOLUME, VE: 17.4 L/MIN
EXHALED MINUTE VOLUME, VE: 6.1 L/MIN
EXHALED MINUTE VOLUME, VE: 6.8 L/MIN
EXHALED MINUTE VOLUME, VE: 7.8 L/MIN
EXHALED MINUTE VOLUME, VE: 8 L/MIN
EXHALED MINUTE VOLUME, VE: 9 L/MIN
FERRITIN SERPL-MCNC: 419 NG/ML (ref 8–388)
FOLATE SERPL-MCNC: 6.4 NG/ML (ref 3.1–17.5)
GAS FLOW.O2 O2 DELIVERY SYS: ABNORMAL L/MIN
GAS FLOW.O2 SETTING OXYMISER: 12 BPM
GAS FLOW.O2 SETTING OXYMISER: 15 BPM
GAS FLOW.O2 SETTING OXYMISER: 16 BPM
GAS FLOW.O2 SETTING OXYMISER: 17 BPM
GAS FLOW.O2 SETTING OXYMISER: 18 BPM
GAS FLOW.O2 SETTING OXYMISER: 20 BPM
GAS FLOW.O2 SETTING OXYMISER: 30 BPM
GAS FLOW.O2 SETTING OXYMISER: 8 BPM
GAS FLOW.O2 SETTING OXYMISER: 8 BPM
GLOBULIN SER CALC-MCNC: 2.7 G/DL (ref 2.3–3.5)
GLOBULIN SER CALC-MCNC: 3 G/DL (ref 2.3–3.5)
GLOBULIN SER CALC-MCNC: 3.1 G/DL (ref 2.3–3.5)
GLOBULIN SER CALC-MCNC: 3.3 G/DL (ref 2.3–3.5)
GLOBULIN SER CALC-MCNC: 3.5 G/DL (ref 2.3–3.5)
GLOBULIN SER CALC-MCNC: 3.9 G/DL (ref 2.3–3.5)
GLOBULIN SER CALC-MCNC: 3.9 G/DL (ref 2.3–3.5)
GLOBULIN SER CALC-MCNC: 4 G/DL (ref 2.3–3.5)
GLOBULIN SER CALC-MCNC: 4 G/DL (ref 2.3–3.5)
GLOBULIN SER CALC-MCNC: 4.2 G/DL (ref 2.3–3.5)
GLOBULIN SER CALC-MCNC: 4.3 G/DL (ref 2.3–3.5)
GLOBULIN SER CALC-MCNC: 4.5 G/DL (ref 2.3–3.5)
GLOBULIN SER CALC-MCNC: 4.6 G/DL (ref 2.3–3.5)
GLOBULIN SER CALC-MCNC: 4.9 G/DL (ref 2.3–3.5)
GLOBULIN SER CALC-MCNC: 5 G/DL (ref 2.3–3.5)
GLOBULIN SER CALC-MCNC: 5.6 G/DL (ref 2.3–3.5)
GLUCOSE BLD STRIP.AUTO-MCNC: 111 MG/DL (ref 65–100)
GLUCOSE BLD STRIP.AUTO-MCNC: 152 MG/DL (ref 65–100)
GLUCOSE BLD STRIP.AUTO-MCNC: 95 MG/DL (ref 65–100)
GLUCOSE SERPL-MCNC: 101 MG/DL (ref 65–100)
GLUCOSE SERPL-MCNC: 103 MG/DL (ref 65–100)
GLUCOSE SERPL-MCNC: 105 MG/DL (ref 65–100)
GLUCOSE SERPL-MCNC: 105 MG/DL (ref 65–100)
GLUCOSE SERPL-MCNC: 106 MG/DL (ref 65–100)
GLUCOSE SERPL-MCNC: 107 MG/DL (ref 65–100)
GLUCOSE SERPL-MCNC: 112 MG/DL (ref 65–100)
GLUCOSE SERPL-MCNC: 117 MG/DL (ref 65–100)
GLUCOSE SERPL-MCNC: 118 MG/DL (ref 65–100)
GLUCOSE SERPL-MCNC: 119 MG/DL (ref 65–100)
GLUCOSE SERPL-MCNC: 119 MG/DL (ref 65–100)
GLUCOSE SERPL-MCNC: 123 MG/DL (ref 65–100)
GLUCOSE SERPL-MCNC: 126 MG/DL (ref 65–100)
GLUCOSE SERPL-MCNC: 144 MG/DL (ref 65–100)
GLUCOSE SERPL-MCNC: 86 MG/DL (ref 65–100)
GLUCOSE SERPL-MCNC: 88 MG/DL (ref 65–100)
GLUCOSE SERPL-MCNC: 90 MG/DL (ref 65–100)
GLUCOSE SERPL-MCNC: 91 MG/DL (ref 65–100)
GLUCOSE SERPL-MCNC: 94 MG/DL (ref 65–100)
GLUCOSE SERPL-MCNC: 95 MG/DL (ref 65–100)
GLUCOSE SERPL-MCNC: 96 MG/DL (ref 65–100)
GLUCOSE SERPL-MCNC: 98 MG/DL (ref 65–100)
GLUCOSE SERPL-MCNC: 98 MG/DL (ref 65–100)
GRAM STN SPEC: ABNORMAL
HAPTOGLOB SERPL-MCNC: 209 MG/DL (ref 30–200)
HAV IGM SERPL QL IA: NEGATIVE
HBV CORE IGM SERPL QL IA: NEGATIVE
HBV SURFACE AG SERPL QL IA: NEGATIVE
HCO3 BLD-SCNC: 24.5 MMOL/L (ref 22–26)
HCO3 BLD-SCNC: 25 MMOL/L (ref 22–26)
HCO3 BLD-SCNC: 25.1 MMOL/L (ref 22–26)
HCO3 BLD-SCNC: 26.7 MMOL/L (ref 22–26)
HCO3 BLD-SCNC: 28.1 MMOL/L (ref 22–26)
HCO3 BLD-SCNC: 31.6 MMOL/L (ref 22–26)
HCO3 BLD-SCNC: 32.8 MMOL/L (ref 22–26)
HCO3 BLD-SCNC: 32.9 MMOL/L (ref 22–26)
HCO3 BLD-SCNC: 34.3 MMOL/L (ref 22–26)
HCO3 BLD-SCNC: 34.5 MMOL/L (ref 22–26)
HCO3 BLD-SCNC: 36.1 MMOL/L (ref 22–26)
HCO3 BLD-SCNC: 36.7 MMOL/L (ref 22–26)
HCO3 BLD-SCNC: 37.9 MMOL/L (ref 22–26)
HCO3 BLD-SCNC: 39.4 MMOL/L (ref 22–26)
HCO3 BLD-SCNC: 39.8 MMOL/L (ref 22–26)
HCO3 BLD-SCNC: 40.4 MMOL/L (ref 22–26)
HCO3 BLD-SCNC: 42.9 MMOL/L (ref 22–26)
HCT VFR BLD AUTO: 22.2 % (ref 41.1–50.3)
HCT VFR BLD AUTO: 22.6 % (ref 41.1–50.3)
HCT VFR BLD AUTO: 22.8 % (ref 41.1–50.3)
HCT VFR BLD AUTO: 22.9 % (ref 41.1–50.3)
HCT VFR BLD AUTO: 23.7 % (ref 41.1–50.3)
HCT VFR BLD AUTO: 24.1 % (ref 41.1–50.3)
HCT VFR BLD AUTO: 24.3 % (ref 41.1–50.3)
HCT VFR BLD AUTO: 24.5 % (ref 41.1–50.3)
HCT VFR BLD AUTO: 24.6 % (ref 41.1–50.3)
HCT VFR BLD AUTO: 24.7 % (ref 41.1–50.3)
HCT VFR BLD AUTO: 24.7 % (ref 41.1–50.3)
HCT VFR BLD AUTO: 24.9 % (ref 41.1–50.3)
HCT VFR BLD AUTO: 26.2 % (ref 41.1–50.3)
HCT VFR BLD AUTO: 26.6 % (ref 41.1–50.3)
HCT VFR BLD AUTO: 26.7 % (ref 41.1–50.3)
HCT VFR BLD AUTO: 28.2 % (ref 41.1–50.3)
HCT VFR BLD AUTO: 28.6 % (ref 41.1–50.3)
HCT VFR BLD AUTO: 28.8 % (ref 41.1–50.3)
HCT VFR BLD AUTO: 29.2 % (ref 41.1–50.3)
HCT VFR BLD AUTO: 29.4 % (ref 41.1–50.3)
HCT VFR BLD AUTO: 31.3 % (ref 41.1–50.3)
HCT VFR BLD AUTO: 31.8 % (ref 41.1–50.3)
HCT VFR BLD AUTO: 33.4 % (ref 41.1–50.3)
HCT VFR BLD AUTO: 34.1 % (ref 41.1–50.3)
HCT VFR BLD AUTO: 36.8 % (ref 41.1–50.3)
HCT VFR BLD AUTO: 39 % (ref 41.1–50.3)
HCV AB S/CO SERPL IA: 0.2 S/CO RATIO (ref 0–0.9)
HEMOCCULT STL QL: POSITIVE
HGB BLD-MCNC: 10.3 G/DL (ref 13.6–17.2)
HGB BLD-MCNC: 10.7 G/DL (ref 13.6–17.2)
HGB BLD-MCNC: 11.1 G/DL (ref 13.6–17.2)
HGB BLD-MCNC: 11.3 G/DL (ref 13.6–17.2)
HGB BLD-MCNC: 12.2 G/DL (ref 13.6–17.2)
HGB BLD-MCNC: 12.5 G/DL (ref 13.6–17.2)
HGB BLD-MCNC: 6.3 G/DL (ref 13.6–17.2)
HGB BLD-MCNC: 6.8 G/DL (ref 13.6–17.2)
HGB BLD-MCNC: 6.9 G/DL (ref 13.6–17.2)
HGB BLD-MCNC: 7 G/DL (ref 13.6–17.2)
HGB BLD-MCNC: 7.1 G/DL (ref 13.6–17.2)
HGB BLD-MCNC: 7.1 G/DL (ref 13.6–17.2)
HGB BLD-MCNC: 7.2 G/DL (ref 13.6–17.2)
HGB BLD-MCNC: 7.3 G/DL (ref 13.6–17.2)
HGB BLD-MCNC: 7.5 G/DL (ref 13.6–17.2)
HGB BLD-MCNC: 7.7 G/DL (ref 13.6–17.2)
HGB BLD-MCNC: 8 G/DL (ref 13.6–17.2)
HGB BLD-MCNC: 8.3 G/DL (ref 13.6–17.2)
HGB BLD-MCNC: 8.7 G/DL (ref 13.6–17.2)
HGB BLD-MCNC: 9.3 G/DL (ref 13.6–17.2)
HGB BLD-MCNC: 9.4 G/DL (ref 13.6–17.2)
HGB BLD-MCNC: 9.4 G/DL (ref 13.6–17.2)
HGB BLD-MCNC: 9.7 G/DL (ref 13.6–17.2)
HGB BLD-MCNC: 9.9 G/DL (ref 13.6–17.2)
HGB RETIC QN AUTO: 33 PG (ref 29–35)
HIV 1+2 AB+HIV1 P24 AG SERPL QL IA: NON REACTIVE
IMM GRANULOCYTES # BLD AUTO: 0 K/UL (ref 0–0.5)
IMM GRANULOCYTES # BLD AUTO: 0.1 K/UL (ref 0–0.5)
IMM GRANULOCYTES # BLD AUTO: 0.2 K/UL (ref 0–0.5)
IMM GRANULOCYTES NFR BLD AUTO: 0 % (ref 0–5)
IMM GRANULOCYTES NFR BLD AUTO: 1 % (ref 0–5)
IMM GRANULOCYTES NFR BLD AUTO: 2 % (ref 0–5)
IMM GRANULOCYTES NFR BLD AUTO: 3 % (ref 0–5)
IMM RETICS NFR: 13.6 % (ref 2.3–13.4)
INR PPP: 1.1
INR PPP: 1.3
INSPIRATION.DURATION SETTING TIME VENT: 0.8 SEC
INSPIRATION.DURATION SETTING TIME VENT: 0.9 SEC
INSPIRATION.DURATION SETTING TIME VENT: 1 SEC
IRON SATN MFR SERPL: 7 %
IRON SERPL-MCNC: 20 UG/DL (ref 35–150)
LACTATE BLD-SCNC: 1.31 MMOL/L (ref 0.5–1.9)
LACTATE BLD-SCNC: 1.82 MMOL/L (ref 0.5–1.9)
LDH SERPL L TO P-CCNC: 854 U/L (ref 100–190)
LIPASE SERPL-CCNC: 1229 U/L (ref 73–393)
LIPASE SERPL-CCNC: 1312 U/L (ref 73–393)
LIPASE SERPL-CCNC: 5932 U/L (ref 73–393)
LYMPHOCYTES # BLD: 0.3 K/UL (ref 0.5–4.6)
LYMPHOCYTES # BLD: 0.4 K/UL (ref 0.5–4.6)
LYMPHOCYTES # BLD: 0.5 K/UL (ref 0.5–4.6)
LYMPHOCYTES # BLD: 0.6 K/UL (ref 0.5–4.6)
LYMPHOCYTES # BLD: 0.6 K/UL (ref 0.5–4.6)
LYMPHOCYTES # BLD: 0.8 K/UL (ref 0.5–4.6)
LYMPHOCYTES # BLD: 0.9 K/UL (ref 0.5–4.6)
LYMPHOCYTES # BLD: 0.9 K/UL (ref 0.5–4.6)
LYMPHOCYTES # BLD: 1 K/UL (ref 0.5–4.6)
LYMPHOCYTES # BLD: 1 K/UL (ref 0.5–4.6)
LYMPHOCYTES # BLD: 1.1 K/UL (ref 0.5–4.6)
LYMPHOCYTES # BLD: 1.4 K/UL (ref 0.5–4.6)
LYMPHOCYTES NFR BLD: 10 % (ref 13–44)
LYMPHOCYTES NFR BLD: 10 % (ref 13–44)
LYMPHOCYTES NFR BLD: 12 % (ref 13–44)
LYMPHOCYTES NFR BLD: 12 % (ref 13–44)
LYMPHOCYTES NFR BLD: 15 % (ref 13–44)
LYMPHOCYTES NFR BLD: 17 % (ref 13–44)
LYMPHOCYTES NFR BLD: 17 % (ref 13–44)
LYMPHOCYTES NFR BLD: 24 % (ref 13–44)
LYMPHOCYTES NFR BLD: 3 % (ref 13–44)
LYMPHOCYTES NFR BLD: 4 % (ref 13–44)
LYMPHOCYTES NFR BLD: 5 % (ref 13–44)
LYMPHOCYTES NFR BLD: 6 % (ref 13–44)
LYMPHOCYTES NFR BLD: 7 % (ref 13–44)
LYMPHOCYTES NFR BLD: 7 % (ref 13–44)
LYMPHOCYTES NFR BLD: 8 % (ref 13–44)
LYMPHOCYTES NFR BLD: 8 % (ref 13–44)
LYMPHOCYTES NFR BLD: 9 % (ref 13–44)
MAGNESIUM SERPL-MCNC: 1.8 MG/DL (ref 1.8–2.4)
MAGNESIUM SERPL-MCNC: 1.9 MG/DL (ref 1.8–2.4)
MAGNESIUM SERPL-MCNC: 2 MG/DL (ref 1.8–2.4)
MAGNESIUM SERPL-MCNC: 2.2 MG/DL (ref 1.8–2.4)
MAGNESIUM SERPL-MCNC: 2.3 MG/DL (ref 1.8–2.4)
MAGNESIUM SERPL-MCNC: 2.3 MG/DL (ref 1.8–2.4)
MAGNESIUM SERPL-MCNC: 2.4 MG/DL (ref 1.8–2.4)
MAGNESIUM SERPL-MCNC: 2.4 MG/DL (ref 1.8–2.4)
MAGNESIUM SERPL-MCNC: 2.5 MG/DL (ref 1.8–2.4)
MAGNESIUM SERPL-MCNC: 2.7 MG/DL (ref 1.8–2.4)
MAGNESIUM SERPL-MCNC: 2.8 MG/DL (ref 1.8–2.4)
MAGNESIUM SERPL-MCNC: 2.9 MG/DL (ref 1.8–2.4)
MCH RBC QN AUTO: 28.3 PG (ref 26.1–32.9)
MCH RBC QN AUTO: 28.3 PG (ref 26.1–32.9)
MCH RBC QN AUTO: 28.6 PG (ref 26.1–32.9)
MCH RBC QN AUTO: 28.8 PG (ref 26.1–32.9)
MCH RBC QN AUTO: 28.9 PG (ref 26.1–32.9)
MCH RBC QN AUTO: 29 PG (ref 26.1–32.9)
MCH RBC QN AUTO: 29.2 PG (ref 26.1–32.9)
MCH RBC QN AUTO: 29.3 PG (ref 26.1–32.9)
MCH RBC QN AUTO: 29.4 PG (ref 26.1–32.9)
MCH RBC QN AUTO: 29.4 PG (ref 26.1–32.9)
MCH RBC QN AUTO: 29.5 PG (ref 26.1–32.9)
MCH RBC QN AUTO: 29.6 PG (ref 26.1–32.9)
MCH RBC QN AUTO: 29.8 PG (ref 26.1–32.9)
MCH RBC QN AUTO: 30 PG (ref 26.1–32.9)
MCH RBC QN AUTO: 30.3 PG (ref 26.1–32.9)
MCHC RBC AUTO-ENTMCNC: 28.3 G/DL (ref 31.4–35)
MCHC RBC AUTO-ENTMCNC: 28.4 G/DL (ref 31.4–35)
MCHC RBC AUTO-ENTMCNC: 28.6 G/DL (ref 31.4–35)
MCHC RBC AUTO-ENTMCNC: 28.7 G/DL (ref 31.4–35)
MCHC RBC AUTO-ENTMCNC: 28.8 G/DL (ref 31.4–35)
MCHC RBC AUTO-ENTMCNC: 29.4 G/DL (ref 31.4–35)
MCHC RBC AUTO-ENTMCNC: 30.4 G/DL (ref 31.4–35)
MCHC RBC AUTO-ENTMCNC: 30.5 G/DL (ref 31.4–35)
MCHC RBC AUTO-ENTMCNC: 31 G/DL (ref 31.4–35)
MCHC RBC AUTO-ENTMCNC: 31.7 G/DL (ref 31.4–35)
MCHC RBC AUTO-ENTMCNC: 32 G/DL (ref 31.4–35)
MCHC RBC AUTO-ENTMCNC: 32.1 G/DL (ref 31.4–35)
MCHC RBC AUTO-ENTMCNC: 32.1 G/DL (ref 31.4–35)
MCHC RBC AUTO-ENTMCNC: 32.6 G/DL (ref 31.4–35)
MCHC RBC AUTO-ENTMCNC: 32.6 G/DL (ref 31.4–35)
MCHC RBC AUTO-ENTMCNC: 32.9 G/DL (ref 31.4–35)
MCHC RBC AUTO-ENTMCNC: 32.9 G/DL (ref 31.4–35)
MCHC RBC AUTO-ENTMCNC: 33 G/DL (ref 31.4–35)
MCHC RBC AUTO-ENTMCNC: 33 G/DL (ref 31.4–35)
MCHC RBC AUTO-ENTMCNC: 33.1 G/DL (ref 31.4–35)
MCHC RBC AUTO-ENTMCNC: 33.2 G/DL (ref 31.4–35)
MCHC RBC AUTO-ENTMCNC: 33.2 G/DL (ref 31.4–35)
MCHC RBC AUTO-ENTMCNC: 33.9 G/DL (ref 31.4–35)
MCV RBC AUTO: 100 FL (ref 79.6–97.8)
MCV RBC AUTO: 100.9 FL (ref 79.6–97.8)
MCV RBC AUTO: 102.2 FL (ref 79.6–97.8)
MCV RBC AUTO: 103.3 FL (ref 79.6–97.8)
MCV RBC AUTO: 87.2 FL (ref 79.6–97.8)
MCV RBC AUTO: 88 FL (ref 79.6–97.8)
MCV RBC AUTO: 88.1 FL (ref 79.6–97.8)
MCV RBC AUTO: 88.3 FL (ref 79.6–97.8)
MCV RBC AUTO: 88.7 FL (ref 79.6–97.8)
MCV RBC AUTO: 89.2 FL (ref 79.6–97.8)
MCV RBC AUTO: 89.3 FL (ref 79.6–97.8)
MCV RBC AUTO: 90 FL (ref 79.6–97.8)
MCV RBC AUTO: 90.1 FL (ref 79.6–97.8)
MCV RBC AUTO: 90.2 FL (ref 79.6–97.8)
MCV RBC AUTO: 90.5 FL (ref 79.6–97.8)
MCV RBC AUTO: 91.6 FL (ref 79.6–97.8)
MCV RBC AUTO: 91.9 FL (ref 79.6–97.8)
MCV RBC AUTO: 94.2 FL (ref 79.6–97.8)
MCV RBC AUTO: 94.6 FL (ref 79.6–97.8)
MCV RBC AUTO: 94.7 FL (ref 79.6–97.8)
MCV RBC AUTO: 95.4 FL (ref 79.6–97.8)
MCV RBC AUTO: 96.5 FL (ref 79.6–97.8)
MCV RBC AUTO: 98.8 FL (ref 79.6–97.8)
METHADONE UR QL: NEGATIVE
METHADONE UR QL: NEGATIVE
MM INDURATION POC: 0 MM (ref 0–5)
MM INDURATION POC: NORMAL MM (ref 0–5)
MM INDURATION POC: NORMAL MM (ref 0–5)
MONOCYTES # BLD: 0.4 K/UL (ref 0.1–1.3)
MONOCYTES # BLD: 0.5 K/UL (ref 0.1–1.3)
MONOCYTES # BLD: 0.6 K/UL (ref 0.1–1.3)
MONOCYTES # BLD: 0.7 K/UL (ref 0.1–1.3)
MONOCYTES # BLD: 0.9 K/UL (ref 0.1–1.3)
MONOCYTES # BLD: 0.9 K/UL (ref 0.1–1.3)
MONOCYTES # BLD: 1 K/UL (ref 0.1–1.3)
MONOCYTES # BLD: 1.2 K/UL (ref 0.1–1.3)
MONOCYTES NFR BLD: 10 % (ref 4–12)
MONOCYTES NFR BLD: 11 % (ref 4–12)
MONOCYTES NFR BLD: 13 % (ref 4–12)
MONOCYTES NFR BLD: 14 % (ref 4–12)
MONOCYTES NFR BLD: 19 % (ref 4–12)
MONOCYTES NFR BLD: 3 % (ref 4–12)
MONOCYTES NFR BLD: 5 % (ref 4–12)
MONOCYTES NFR BLD: 6 % (ref 4–12)
MONOCYTES NFR BLD: 6 % (ref 4–12)
MONOCYTES NFR BLD: 8 % (ref 4–12)
MONOCYTES NFR BLD: 8 % (ref 4–12)
MONOCYTES NFR BLD: 9 % (ref 4–12)
NEUTS SEG # BLD: 10 K/UL (ref 1.7–8.2)
NEUTS SEG # BLD: 12 K/UL (ref 1.7–8.2)
NEUTS SEG # BLD: 2.8 K/UL (ref 1.7–8.2)
NEUTS SEG # BLD: 3 K/UL (ref 1.7–8.2)
NEUTS SEG # BLD: 3.3 K/UL (ref 1.7–8.2)
NEUTS SEG # BLD: 3.8 K/UL (ref 1.7–8.2)
NEUTS SEG # BLD: 3.8 K/UL (ref 1.7–8.2)
NEUTS SEG # BLD: 3.9 K/UL (ref 1.7–8.2)
NEUTS SEG # BLD: 3.9 K/UL (ref 1.7–8.2)
NEUTS SEG # BLD: 5.2 K/UL (ref 1.7–8.2)
NEUTS SEG # BLD: 5.4 K/UL (ref 1.7–8.2)
NEUTS SEG # BLD: 6.5 K/UL (ref 1.7–8.2)
NEUTS SEG # BLD: 7.1 K/UL (ref 1.7–8.2)
NEUTS SEG # BLD: 7.7 K/UL (ref 1.7–8.2)
NEUTS SEG # BLD: 7.8 K/UL (ref 1.7–8.2)
NEUTS SEG # BLD: 8.2 K/UL (ref 1.7–8.2)
NEUTS SEG # BLD: 8.5 K/UL (ref 1.7–8.2)
NEUTS SEG # BLD: 8.6 K/UL (ref 1.7–8.2)
NEUTS SEG # BLD: 9 K/UL (ref 1.7–8.2)
NEUTS SEG # BLD: 9.1 K/UL (ref 1.7–8.2)
NEUTS SEG # BLD: 9.5 K/UL (ref 1.7–8.2)
NEUTS SEG NFR BLD: 62 % (ref 43–78)
NEUTS SEG NFR BLD: 64 % (ref 43–78)
NEUTS SEG NFR BLD: 69 % (ref 43–78)
NEUTS SEG NFR BLD: 70 % (ref 43–78)
NEUTS SEG NFR BLD: 72 % (ref 43–78)
NEUTS SEG NFR BLD: 72 % (ref 43–78)
NEUTS SEG NFR BLD: 74 % (ref 43–78)
NEUTS SEG NFR BLD: 74 % (ref 43–78)
NEUTS SEG NFR BLD: 78 % (ref 43–78)
NEUTS SEG NFR BLD: 79 % (ref 43–78)
NEUTS SEG NFR BLD: 79 % (ref 43–78)
NEUTS SEG NFR BLD: 82 % (ref 43–78)
NEUTS SEG NFR BLD: 83 % (ref 43–78)
NEUTS SEG NFR BLD: 84 % (ref 43–78)
NEUTS SEG NFR BLD: 86 % (ref 43–78)
NEUTS SEG NFR BLD: 86 % (ref 43–78)
NEUTS SEG NFR BLD: 91 % (ref 43–78)
NEUTS SEG NFR BLD: 92 % (ref 43–78)
NRBC # BLD: 0 K/UL (ref 0–0.2)
NRBC # BLD: 0.02 K/UL (ref 0–0.2)
NRBC # BLD: 0.02 K/UL (ref 0–0.2)
NRBC # BLD: 0.05 K/UL (ref 0–0.2)
NRBC # BLD: 0.05 K/UL (ref 0–0.2)
NRBC # BLD: 0.07 K/UL (ref 0–0.2)
NRBC # BLD: 0.08 K/UL (ref 0–0.2)
NRBC # BLD: 0.09 K/UL (ref 0–0.2)
NRBC # BLD: 0.09 K/UL (ref 0–0.2)
NRBC # BLD: 0.1 K/UL (ref 0–0.2)
NRBC # BLD: 0.14 K/UL (ref 0–0.2)
NRBC # BLD: 0.17 K/UL (ref 0–0.2)
NRBC # BLD: 0.23 K/UL (ref 0–0.2)
O2/TOTAL GAS SETTING VFR VENT: 100 %
O2/TOTAL GAS SETTING VFR VENT: 40 %
O2/TOTAL GAS SETTING VFR VENT: 40 %
O2/TOTAL GAS SETTING VFR VENT: 50 %
O2/TOTAL GAS SETTING VFR VENT: 70 %
O2/TOTAL GAS SETTING VFR VENT: 75 %
O2/TOTAL GAS SETTING VFR VENT: 90 %
OPIATES UR QL: POSITIVE
OPIATES UR QL: POSITIVE
P-R INTERVAL, ECG05: 128 MS
P-R INTERVAL, ECG05: 128 MS
P-R INTERVAL, ECG05: 136 MS
P-R INTERVAL, ECG05: 160 MS
P-R INTERVAL, ECG05: 162 MS
PCO2 BLD: 41.5 MMHG (ref 35–45)
PCO2 BLD: 43.6 MMHG (ref 35–45)
PCO2 BLD: 47.3 MMHG (ref 35–45)
PCO2 BLD: 50.4 MMHG (ref 35–45)
PCO2 BLD: 50.5 MMHG (ref 35–45)
PCO2 BLD: 54.7 MMHG (ref 35–45)
PCO2 BLD: 55.5 MMHG (ref 35–45)
PCO2 BLD: 61.1 MMHG (ref 35–45)
PCO2 BLD: 61.6 MMHG (ref 35–45)
PCO2 BLD: 61.7 MMHG (ref 35–45)
PCO2 BLD: 62.8 MMHG (ref 35–45)
PCO2 BLD: 63.4 MMHG (ref 35–45)
PCO2 BLD: 65.1 MMHG (ref 35–45)
PCO2 BLD: 69.2 MMHG (ref 35–45)
PCO2 BLD: 86.1 MMHG (ref 35–45)
PCO2 BLD: 90.1 MMHG (ref 35–45)
PCO2 BLD: 90.3 MMHG (ref 35–45)
PCP UR QL: NEGATIVE
PCP UR QL: NEGATIVE
PEEP RESPIRATORY: 10 CMH2O
PEEP RESPIRATORY: 12 CMH2O
PEEP RESPIRATORY: 8 CMH2O
PEEP RESPIRATORY: 8 CMH2O
PH BLD: 7.19 [PH] (ref 7.35–7.45)
PH BLD: 7.24 [PH] (ref 7.35–7.45)
PH BLD: 7.26 [PH] (ref 7.35–7.45)
PH BLD: 7.26 [PH] (ref 7.35–7.45)
PH BLD: 7.29 [PH] (ref 7.35–7.45)
PH BLD: 7.33 [PH] (ref 7.35–7.45)
PH BLD: 7.34 [PH] (ref 7.35–7.45)
PH BLD: 7.36 [PH] (ref 7.35–7.45)
PH BLD: 7.39 [PH] (ref 7.35–7.45)
PH BLD: 7.39 [PH] (ref 7.35–7.45)
PH BLD: 7.4 [PH] (ref 7.35–7.45)
PH BLD: 7.42 [PH] (ref 7.35–7.45)
PH BLD: 7.42 [PH] (ref 7.35–7.45)
PH BLD: 7.43 [PH] (ref 7.35–7.45)
PH BLD: 7.47 [PH] (ref 7.35–7.45)
PHOSPHATE SERPL-MCNC: 3 MG/DL (ref 2.5–4.5)
PHOSPHATE SERPL-MCNC: 3 MG/DL (ref 2.5–4.5)
PHOSPHATE SERPL-MCNC: 3.3 MG/DL (ref 2.5–4.5)
PHOSPHATE SERPL-MCNC: 3.5 MG/DL (ref 2.5–4.5)
PHOSPHATE SERPL-MCNC: 3.5 MG/DL (ref 2.5–4.5)
PHOSPHATE SERPL-MCNC: 3.7 MG/DL (ref 2.5–4.5)
PHOSPHATE SERPL-MCNC: 3.8 MG/DL (ref 2.5–4.5)
PHOSPHATE SERPL-MCNC: 4.5 MG/DL (ref 2.5–4.5)
PHOSPHATE SERPL-MCNC: 5.1 MG/DL (ref 2.5–4.5)
PHOSPHATE SERPL-MCNC: 6.1 MG/DL (ref 2.5–4.5)
PHOSPHATE SERPL-MCNC: 7.3 MG/DL (ref 2.5–4.5)
PHOSPHATE SERPL-MCNC: 7.4 MG/DL (ref 2.5–4.5)
PHOSPHATE SERPL-MCNC: 8.6 MG/DL (ref 2.5–4.5)
PIP ISTAT,IPIP: 14
PIP ISTAT,IPIP: 16
PLATELET # BLD AUTO: 113 K/UL (ref 150–450)
PLATELET # BLD AUTO: 114 K/UL (ref 150–450)
PLATELET # BLD AUTO: 123 K/UL (ref 150–450)
PLATELET # BLD AUTO: 147 K/UL (ref 150–450)
PLATELET # BLD AUTO: 154 K/UL (ref 150–450)
PLATELET # BLD AUTO: 161 K/UL (ref 150–450)
PLATELET # BLD AUTO: 164 K/UL (ref 150–450)
PLATELET # BLD AUTO: 173 K/UL (ref 150–450)
PLATELET # BLD AUTO: 184 K/UL (ref 150–450)
PLATELET # BLD AUTO: 191 K/UL (ref 150–450)
PLATELET # BLD AUTO: 194 K/UL (ref 150–450)
PLATELET # BLD AUTO: 194 K/UL (ref 150–450)
PLATELET # BLD AUTO: 198 K/UL (ref 150–450)
PLATELET # BLD AUTO: 200 K/UL (ref 150–450)
PLATELET # BLD AUTO: 203 K/UL (ref 150–450)
PLATELET # BLD AUTO: 204 K/UL (ref 150–450)
PLATELET # BLD AUTO: 210 K/UL (ref 150–450)
PLATELET # BLD AUTO: 211 K/UL (ref 150–450)
PLATELET # BLD AUTO: 235 K/UL (ref 150–450)
PLATELET # BLD AUTO: 244 K/UL (ref 150–450)
PLATELET # BLD AUTO: 259 K/UL (ref 150–450)
PLATELET # BLD AUTO: 274 K/UL (ref 150–450)
PLATELET # BLD AUTO: 296 K/UL (ref 150–450)
PMV BLD AUTO: 10 FL (ref 9.4–12.3)
PMV BLD AUTO: 10 FL (ref 9.4–12.3)
PMV BLD AUTO: 10.2 FL (ref 9.4–12.3)
PMV BLD AUTO: 10.2 FL (ref 9.4–12.3)
PMV BLD AUTO: 10.3 FL (ref 9.4–12.3)
PMV BLD AUTO: 10.4 FL (ref 9.4–12.3)
PMV BLD AUTO: 10.6 FL (ref 9.4–12.3)
PMV BLD AUTO: 10.6 FL (ref 9.4–12.3)
PMV BLD AUTO: 10.8 FL (ref 9.4–12.3)
PMV BLD AUTO: 10.9 FL (ref 9.4–12.3)
PMV BLD AUTO: 10.9 FL (ref 9.4–12.3)
PMV BLD AUTO: 11.2 FL (ref 9.4–12.3)
PMV BLD AUTO: 9.3 FL (ref 9.4–12.3)
PMV BLD AUTO: 9.4 FL (ref 9.4–12.3)
PMV BLD AUTO: 9.5 FL (ref 9.4–12.3)
PMV BLD AUTO: 9.5 FL (ref 9.4–12.3)
PMV BLD AUTO: 9.6 FL (ref 9.4–12.3)
PMV BLD AUTO: 9.7 FL (ref 9.4–12.3)
PMV BLD AUTO: 9.8 FL (ref 9.4–12.3)
PO2 BLD: 108 MMHG (ref 75–100)
PO2 BLD: 51 MMHG (ref 75–100)
PO2 BLD: 56 MMHG (ref 75–100)
PO2 BLD: 60 MMHG (ref 75–100)
PO2 BLD: 63 MMHG (ref 75–100)
PO2 BLD: 63 MMHG (ref 75–100)
PO2 BLD: 64 MMHG (ref 75–100)
PO2 BLD: 69 MMHG (ref 75–100)
PO2 BLD: 69 MMHG (ref 75–100)
PO2 BLD: 73 MMHG (ref 75–100)
PO2 BLD: 79 MMHG (ref 75–100)
PO2 BLD: 82 MMHG (ref 75–100)
PO2 BLD: 83 MMHG (ref 75–100)
PO2 BLD: 91 MMHG (ref 75–100)
PO2 BLD: 94 MMHG (ref 75–100)
PO2 BLD: 96 MMHG (ref 75–100)
PO2 BLD: 96 MMHG (ref 75–100)
POTASSIUM SERPL-SCNC: 3.3 MMOL/L (ref 3.5–5.1)
POTASSIUM SERPL-SCNC: 3.5 MMOL/L (ref 3.5–5.1)
POTASSIUM SERPL-SCNC: 3.5 MMOL/L (ref 3.5–5.1)
POTASSIUM SERPL-SCNC: 3.6 MMOL/L (ref 3.5–5.1)
POTASSIUM SERPL-SCNC: 3.7 MMOL/L (ref 3.5–5.1)
POTASSIUM SERPL-SCNC: 3.7 MMOL/L (ref 3.5–5.1)
POTASSIUM SERPL-SCNC: 3.8 MMOL/L (ref 3.5–5.1)
POTASSIUM SERPL-SCNC: 3.9 MMOL/L (ref 3.5–5.1)
POTASSIUM SERPL-SCNC: 4 MMOL/L (ref 3.5–5.1)
POTASSIUM SERPL-SCNC: 4.1 MMOL/L (ref 3.5–5.1)
POTASSIUM SERPL-SCNC: 4.1 MMOL/L (ref 3.5–5.1)
POTASSIUM SERPL-SCNC: 4.2 MMOL/L (ref 3.5–5.1)
POTASSIUM SERPL-SCNC: 4.5 MMOL/L (ref 3.5–5.1)
POTASSIUM SERPL-SCNC: 4.5 MMOL/L (ref 3.5–5.1)
POTASSIUM SERPL-SCNC: 4.6 MMOL/L (ref 3.5–5.1)
POTASSIUM SERPL-SCNC: 4.7 MMOL/L (ref 3.5–5.1)
POTASSIUM SERPL-SCNC: 4.7 MMOL/L (ref 3.5–5.1)
POTASSIUM SERPL-SCNC: 5 MMOL/L (ref 3.5–5.1)
POTASSIUM SERPL-SCNC: 5 MMOL/L (ref 3.5–5.1)
PPD POC: NEGATIVE NEGATIVE
PPD POC: NORMAL NEGATIVE
PPD POC: NORMAL NEGATIVE
PRESSURE CONTROL, IPC: 18
PRESSURE SUPPORT SETTING VENT: 10 CMH2O
PRESSURE SUPPORT SETTING VENT: 10 CMH2O
PRESSURE SUPPORT SETTING VENT: 6 CMH2O
PRESSURE SUPPORT SETTING VENT: 6 CMH2O
PROCALCITONIN SERPL-MCNC: 0.3 NG/ML
PROCALCITONIN SERPL-MCNC: 0.7 NG/ML
PROT SERPL-MCNC: 5.6 G/DL (ref 6.3–8.2)
PROT SERPL-MCNC: 5.7 G/DL (ref 6.3–8.2)
PROT SERPL-MCNC: 5.9 G/DL (ref 6.3–8.2)
PROT SERPL-MCNC: 6.1 G/DL (ref 6.3–8.2)
PROT SERPL-MCNC: 6.2 G/DL (ref 6.3–8.2)
PROT SERPL-MCNC: 6.3 G/DL (ref 6.3–8.2)
PROT SERPL-MCNC: 6.3 G/DL (ref 6.3–8.2)
PROT SERPL-MCNC: 6.4 G/DL (ref 6.3–8.2)
PROT SERPL-MCNC: 6.5 G/DL (ref 6.3–8.2)
PROT SERPL-MCNC: 6.5 G/DL (ref 6.3–8.2)
PROT SERPL-MCNC: 7.2 G/DL (ref 6.3–8.2)
PROT SERPL-MCNC: 7.6 G/DL (ref 6.3–8.2)
PROT SERPL-MCNC: 8.5 G/DL (ref 6.3–8.2)
PROT SERPL-MCNC: 8.7 G/DL (ref 6.3–8.2)
PROTHROMBIN TIME: 13.9 SEC (ref 11.7–14.5)
PROTHROMBIN TIME: 16.1 SEC (ref 11.7–14.5)
Q-T INTERVAL, ECG07: 222 MS
Q-T INTERVAL, ECG07: 280 MS
Q-T INTERVAL, ECG07: 296 MS
Q-T INTERVAL, ECG07: 322 MS
Q-T INTERVAL, ECG07: 366 MS
Q-T INTERVAL, ECG07: 430 MS
Q-T INTERVAL, ECG07: 468 MS
QRS DURATION, ECG06: 100 MS
QRS DURATION, ECG06: 74 MS
QRS DURATION, ECG06: 84 MS
QRS DURATION, ECG06: 84 MS
QRS DURATION, ECG06: 86 MS
QRS DURATION, ECG06: 90 MS
QRS DURATION, ECG06: 92 MS
QTC CALCULATION (BEZET), ECG08: 389 MS
QTC CALCULATION (BEZET), ECG08: 397 MS
QTC CALCULATION (BEZET), ECG08: 432 MS
QTC CALCULATION (BEZET), ECG08: 434 MS
QTC CALCULATION (BEZET), ECG08: 461 MS
QTC CALCULATION (BEZET), ECG08: 480 MS
QTC CALCULATION (BEZET), ECG08: 512 MS
RBC # BLD AUTO: 2.2 M/UL (ref 4.23–5.6)
RBC # BLD AUTO: 2.32 M/UL (ref 4.23–5.6)
RBC # BLD AUTO: 2.39 M/UL (ref 4.23–5.6)
RBC # BLD AUTO: 2.4 M/UL (ref 4.23–5.6)
RBC # BLD AUTO: 2.42 M/UL (ref 4.23–5.6)
RBC # BLD AUTO: 2.43 M/UL (ref 4.23–5.6)
RBC # BLD AUTO: 2.44 M/UL (ref 4.23–5.6)
RBC # BLD AUTO: 2.53 M/UL (ref 4.23–5.6)
RBC # BLD AUTO: 2.54 M/UL (ref 4.23–5.6)
RBC # BLD AUTO: 2.59 M/UL (ref 4.23–5.6)
RBC # BLD AUTO: 2.71 M/UL (ref 4.23–5.6)
RBC # BLD AUTO: 2.86 M/UL (ref 4.23–5.6)
RBC # BLD AUTO: 2.96 M/UL (ref 4.23–5.6)
RBC # BLD AUTO: 3.18 M/UL (ref 4.23–5.6)
RBC # BLD AUTO: 3.2 M/UL (ref 4.23–5.6)
RBC # BLD AUTO: 3.24 M/UL (ref 4.23–5.6)
RBC # BLD AUTO: 3.32 M/UL (ref 4.23–5.6)
RBC # BLD AUTO: 3.37 M/UL (ref 4.23–5.6)
RBC # BLD AUTO: 3.51 M/UL (ref 4.23–5.6)
RBC # BLD AUTO: 3.77 M/UL (ref 4.23–5.6)
RBC # BLD AUTO: 3.79 M/UL (ref 4.23–5.6)
RBC # BLD AUTO: 4.12 M/UL (ref 4.23–5.6)
RBC # BLD AUTO: 4.12 M/UL (ref 4.23–5.6)
RETICS # AUTO: 0.07 M/UL (ref 0.03–0.1)
RETICS/RBC NFR AUTO: 2.2 % (ref 0.3–2)
SAO2 % BLD: 85 % (ref 95–98)
SAO2 % BLD: 87 % (ref 95–98)
SAO2 % BLD: 89 % (ref 95–98)
SAO2 % BLD: 90 % (ref 95–98)
SAO2 % BLD: 91 % (ref 95–98)
SAO2 % BLD: 92 % (ref 95–98)
SAO2 % BLD: 93 % (ref 95–98)
SAO2 % BLD: 94 % (ref 95–98)
SAO2 % BLD: 94 % (ref 95–98)
SAO2 % BLD: 96 % (ref 95–98)
SAO2 % BLD: 97 % (ref 95–98)
SAO2 % BLD: 98 % (ref 95–98)
SERVICE CMNT-IMP: ABNORMAL
SERVICE CMNT-IMP: NORMAL
SODIUM SERPL-SCNC: 130 MMOL/L (ref 136–145)
SODIUM SERPL-SCNC: 131 MMOL/L (ref 136–145)
SODIUM SERPL-SCNC: 132 MMOL/L (ref 136–145)
SODIUM SERPL-SCNC: 133 MMOL/L (ref 136–145)
SODIUM SERPL-SCNC: 135 MMOL/L (ref 136–145)
SODIUM SERPL-SCNC: 136 MMOL/L (ref 136–145)
SODIUM SERPL-SCNC: 137 MMOL/L (ref 136–145)
SODIUM SERPL-SCNC: 138 MMOL/L (ref 136–145)
SODIUM SERPL-SCNC: 140 MMOL/L (ref 136–145)
SODIUM SERPL-SCNC: 142 MMOL/L (ref 136–145)
SODIUM SERPL-SCNC: 144 MMOL/L (ref 136–145)
SODIUM SERPL-SCNC: 145 MMOL/L (ref 136–145)
SODIUM SERPL-SCNC: 145 MMOL/L (ref 136–145)
SODIUM SERPL-SCNC: 146 MMOL/L (ref 136–145)
SODIUM SERPL-SCNC: 147 MMOL/L (ref 136–145)
SPECIMEN TYPE: ABNORMAL
T4 FREE SERPL-MCNC: 1.2 NG/DL (ref 0.78–1.46)
TIBC SERPL-MCNC: 271 UG/DL (ref 250–450)
TOTAL RESP. RATE, ITRR: 16
TOTAL RESP. RATE, ITRR: 9
TROPONIN I SERPL-MCNC: 0.06 NG/ML (ref 0.02–0.05)
TROPONIN I SERPL-MCNC: 0.07 NG/ML (ref 0.02–0.05)
TROPONIN I SERPL-MCNC: <0.02 NG/ML (ref 0.02–0.05)
TROPONIN I SERPL-MCNC: <0.02 NG/ML (ref 0.02–0.05)
TSH SERPL DL<=0.005 MIU/L-ACNC: 4.05 UIU/ML (ref 0.36–3.74)
VANCOMYCIN SERPL-MCNC: 19.9 UG/ML
VANCOMYCIN TROUGH SERPL-MCNC: 17 UG/ML (ref 5–20)
VANCOMYCIN TROUGH SERPL-MCNC: 21.3 UG/ML (ref 5–20)
VANCOMYCIN TROUGH SERPL-MCNC: 3.9 UG/ML (ref 5–20)
VENTILATION MODE VENT: ABNORMAL
VENTRICULAR RATE, ECG03: 134 BPM
VENTRICULAR RATE, ECG03: 145 BPM
VENTRICULAR RATE, ECG03: 146 BPM
VENTRICULAR RATE, ECG03: 192 BPM
VENTRICULAR RATE, ECG03: 61 BPM
VENTRICULAR RATE, ECG03: 68 BPM
VENTRICULAR RATE, ECG03: 72 BPM
VIT B12 SERPL-MCNC: 364 PG/ML (ref 193–986)
VT SETTING VENT: 450 ML
VT SETTING VENT: 460 ML
VT SETTING VENT: 500 ML
VT SETTING VENT: 550 ML
VT SETTING VENT: 659 ML
WBC # BLD AUTO: 10 K/UL (ref 4.3–11.1)
WBC # BLD AUTO: 10.6 K/UL (ref 4.3–11.1)
WBC # BLD AUTO: 10.9 K/UL (ref 4.3–11.1)
WBC # BLD AUTO: 11.6 K/UL (ref 4.3–11.1)
WBC # BLD AUTO: 12.1 K/UL (ref 4.3–11.1)
WBC # BLD AUTO: 13 K/UL (ref 4.3–11.1)
WBC # BLD AUTO: 4 K/UL (ref 4.3–11.1)
WBC # BLD AUTO: 4.4 K/UL (ref 4.3–11.1)
WBC # BLD AUTO: 4.6 K/UL (ref 4.3–11.1)
WBC # BLD AUTO: 5.1 K/UL (ref 4.3–11.1)
WBC # BLD AUTO: 5.4 K/UL (ref 4.3–11.1)
WBC # BLD AUTO: 5.4 K/UL (ref 4.3–11.1)
WBC # BLD AUTO: 5.7 K/UL (ref 4.3–11.1)
WBC # BLD AUTO: 6.1 K/UL (ref 4.3–11.1)
WBC # BLD AUTO: 6.2 K/UL (ref 4.3–11.1)
WBC # BLD AUTO: 6.8 K/UL (ref 4.3–11.1)
WBC # BLD AUTO: 8.3 K/UL (ref 4.3–11.1)
WBC # BLD AUTO: 8.6 K/UL (ref 4.3–11.1)
WBC # BLD AUTO: 8.9 K/UL (ref 4.3–11.1)
WBC # BLD AUTO: 9.3 K/UL (ref 4.3–11.1)
WBC # BLD AUTO: 9.9 K/UL (ref 4.3–11.1)

## 2019-01-01 PROCEDURE — 77030020263 HC SOL INJ SOD CL0.9% LFCR 1000ML

## 2019-01-01 PROCEDURE — 74011250636 HC RX REV CODE- 250/636: Performed by: NURSE PRACTITIONER

## 2019-01-01 PROCEDURE — 84132 ASSAY OF SERUM POTASSIUM: CPT

## 2019-01-01 PROCEDURE — 74011250636 HC RX REV CODE- 250/636: Performed by: INTERNAL MEDICINE

## 2019-01-01 PROCEDURE — 74011000250 HC RX REV CODE- 250: Performed by: INTERNAL MEDICINE

## 2019-01-01 PROCEDURE — 86900 BLOOD TYPING SEROLOGIC ABO: CPT

## 2019-01-01 PROCEDURE — 84100 ASSAY OF PHOSPHORUS: CPT

## 2019-01-01 PROCEDURE — 94003 VENT MGMT INPAT SUBQ DAY: CPT

## 2019-01-01 PROCEDURE — 74011250637 HC RX REV CODE- 250/637: Performed by: INTERNAL MEDICINE

## 2019-01-01 PROCEDURE — 85027 COMPLETE CBC AUTOMATED: CPT

## 2019-01-01 PROCEDURE — 83735 ASSAY OF MAGNESIUM: CPT

## 2019-01-01 PROCEDURE — 83690 ASSAY OF LIPASE: CPT

## 2019-01-01 PROCEDURE — 36430 TRANSFUSION BLD/BLD COMPNT: CPT

## 2019-01-01 PROCEDURE — 74011250637 HC RX REV CODE- 250/637: Performed by: NURSE PRACTITIONER

## 2019-01-01 PROCEDURE — C8929 TTE W OR WO FOL WCON,DOPPLER: HCPCS

## 2019-01-01 PROCEDURE — 80202 ASSAY OF VANCOMYCIN: CPT

## 2019-01-01 PROCEDURE — 71045 X-RAY EXAM CHEST 1 VIEW: CPT

## 2019-01-01 PROCEDURE — 85025 COMPLETE CBC W/AUTO DIFF WBC: CPT

## 2019-01-01 PROCEDURE — 77030018798 HC PMP KT ENTRL FED COVD -A

## 2019-01-01 PROCEDURE — 77010033678 HC OXYGEN DAILY

## 2019-01-01 PROCEDURE — P9016 RBC LEUKOCYTES REDUCED: HCPCS

## 2019-01-01 PROCEDURE — 74018 RADEX ABDOMEN 1 VIEW: CPT

## 2019-01-01 PROCEDURE — 82607 VITAMIN B-12: CPT

## 2019-01-01 PROCEDURE — 82803 BLOOD GASES ANY COMBINATION: CPT

## 2019-01-01 PROCEDURE — 36600 WITHDRAWAL OF ARTERIAL BLOOD: CPT

## 2019-01-01 PROCEDURE — 94640 AIRWAY INHALATION TREATMENT: CPT

## 2019-01-01 PROCEDURE — 99233 SBSQ HOSP IP/OBS HIGH 50: CPT | Performed by: INTERNAL MEDICINE

## 2019-01-01 PROCEDURE — 85730 THROMBOPLASTIN TIME PARTIAL: CPT

## 2019-01-01 PROCEDURE — 85018 HEMOGLOBIN: CPT

## 2019-01-01 PROCEDURE — 74011000258 HC RX REV CODE- 258: Performed by: INTERNAL MEDICINE

## 2019-01-01 PROCEDURE — 87086 URINE CULTURE/COLONY COUNT: CPT

## 2019-01-01 PROCEDURE — 80053 COMPREHEN METABOLIC PANEL: CPT

## 2019-01-01 PROCEDURE — 99231 SBSQ HOSP IP/OBS SF/LOW 25: CPT | Performed by: NURSE PRACTITIONER

## 2019-01-01 PROCEDURE — 77030008771 HC TU NG SALEM SUMP -A

## 2019-01-01 PROCEDURE — 71046 X-RAY EXAM CHEST 2 VIEWS: CPT

## 2019-01-01 PROCEDURE — 84145 PROCALCITONIN (PCT): CPT

## 2019-01-01 PROCEDURE — 65610000001 HC ROOM ICU GENERAL

## 2019-01-01 PROCEDURE — 80307 DRUG TEST PRSMV CHEM ANLYZR: CPT

## 2019-01-01 PROCEDURE — 77030039270 HC TU BLD FLTR CARD -A

## 2019-01-01 PROCEDURE — 36592 COLLECT BLOOD FROM PICC: CPT

## 2019-01-01 PROCEDURE — 77030032490 HC SLV COMPR SCD KNE COVD -B: Performed by: INTERNAL MEDICINE

## 2019-01-01 PROCEDURE — 74011250636 HC RX REV CODE- 250/636: Performed by: ANESTHESIOLOGY

## 2019-01-01 PROCEDURE — 99223 1ST HOSP IP/OBS HIGH 75: CPT | Performed by: INTERNAL MEDICINE

## 2019-01-01 PROCEDURE — 99222 1ST HOSP IP/OBS MODERATE 55: CPT | Performed by: INTERNAL MEDICINE

## 2019-01-01 PROCEDURE — 74011000250 HC RX REV CODE- 250

## 2019-01-01 PROCEDURE — 96374 THER/PROPH/DIAG INJ IV PUSH: CPT | Performed by: EMERGENCY MEDICINE

## 2019-01-01 PROCEDURE — 99233 SBSQ HOSP IP/OBS HIGH 50: CPT | Performed by: NURSE PRACTITIONER

## 2019-01-01 PROCEDURE — 74011250636 HC RX REV CODE- 250/636: Performed by: EMERGENCY MEDICINE

## 2019-01-01 PROCEDURE — 93005 ELECTROCARDIOGRAM TRACING: CPT | Performed by: EMERGENCY MEDICINE

## 2019-01-01 PROCEDURE — 83880 ASSAY OF NATRIURETIC PEPTIDE: CPT

## 2019-01-01 PROCEDURE — 36573 INSJ PICC RS&I 5 YR+: CPT | Performed by: INTERNAL MEDICINE

## 2019-01-01 PROCEDURE — 74011000250 HC RX REV CODE- 250: Performed by: NURSE PRACTITIONER

## 2019-01-01 PROCEDURE — 84439 ASSAY OF FREE THYROXINE: CPT

## 2019-01-01 PROCEDURE — 30233N1 TRANSFUSION OF NONAUTOLOGOUS RED BLOOD CELLS INTO PERIPHERAL VEIN, PERCUTANEOUS APPROACH: ICD-10-PCS | Performed by: INTERNAL MEDICINE

## 2019-01-01 PROCEDURE — 36415 COLL VENOUS BLD VENIPUNCTURE: CPT

## 2019-01-01 PROCEDURE — 99232 SBSQ HOSP IP/OBS MODERATE 35: CPT | Performed by: INTERNAL MEDICINE

## 2019-01-01 PROCEDURE — 84443 ASSAY THYROID STIM HORMONE: CPT

## 2019-01-01 PROCEDURE — P9047 ALBUMIN (HUMAN), 25%, 50ML: HCPCS | Performed by: INTERNAL MEDICINE

## 2019-01-01 PROCEDURE — 80048 BASIC METABOLIC PNL TOTAL CA: CPT

## 2019-01-01 PROCEDURE — 87116 MYCOBACTERIA CULTURE: CPT

## 2019-01-01 PROCEDURE — 87040 BLOOD CULTURE FOR BACTERIA: CPT

## 2019-01-01 PROCEDURE — 94762 N-INVAS EAR/PLS OXIMTRY CONT: CPT

## 2019-01-01 PROCEDURE — 99222 1ST HOSP IP/OBS MODERATE 55: CPT | Performed by: NURSE PRACTITIONER

## 2019-01-01 PROCEDURE — 71260 CT THORAX DX C+: CPT

## 2019-01-01 PROCEDURE — 77030019605

## 2019-01-01 PROCEDURE — 77030007288 HC DEV LOK BILI BSC -A: Performed by: INTERNAL MEDICINE

## 2019-01-01 PROCEDURE — 07DD8ZX EXTRACTION OF AORTIC LYMPHATIC, VIA NATURAL OR ARTIFICIAL OPENING ENDOSCOPIC, DIAGNOSTIC: ICD-10-PCS | Performed by: INTERNAL MEDICINE

## 2019-01-01 PROCEDURE — 5A1955Z RESPIRATORY VENTILATION, GREATER THAN 96 CONSECUTIVE HOURS: ICD-10-PCS | Performed by: ANESTHESIOLOGY

## 2019-01-01 PROCEDURE — 86580 TB INTRADERMAL TEST: CPT | Performed by: INTERNAL MEDICINE

## 2019-01-01 PROCEDURE — 77030008969: Performed by: INTERNAL MEDICINE

## 2019-01-01 PROCEDURE — 99232 SBSQ HOSP IP/OBS MODERATE 35: CPT | Performed by: NURSE PRACTITIONER

## 2019-01-01 PROCEDURE — 83605 ASSAY OF LACTIC ACID: CPT

## 2019-01-01 PROCEDURE — 86141 C-REACTIVE PROTEIN HS: CPT

## 2019-01-01 PROCEDURE — 80074 ACUTE HEPATITIS PANEL: CPT

## 2019-01-01 PROCEDURE — 74011000302 HC RX REV CODE- 302: Performed by: INTERNAL MEDICINE

## 2019-01-01 PROCEDURE — 77030031642 HC BOOT FT ROOKE HFS OSBM -B

## 2019-01-01 PROCEDURE — 99283 EMERGENCY DEPT VISIT LOW MDM: CPT | Performed by: EMERGENCY MEDICINE

## 2019-01-01 PROCEDURE — 74011636320 HC RX REV CODE- 636/320: Performed by: EMERGENCY MEDICINE

## 2019-01-01 PROCEDURE — 74011250637 HC RX REV CODE- 250/637: Performed by: EMERGENCY MEDICINE

## 2019-01-01 PROCEDURE — 87070 CULTURE OTHR SPECIMN AEROBIC: CPT

## 2019-01-01 PROCEDURE — 94660 CPAP INITIATION&MGMT: CPT

## 2019-01-01 PROCEDURE — 99231 SBSQ HOSP IP/OBS SF/LOW 25: CPT | Performed by: INTERNAL MEDICINE

## 2019-01-01 PROCEDURE — 0FD98ZX EXTRACTION OF COMMON BILE DUCT, VIA NATURAL OR ARTIFICIAL OPENING ENDOSCOPIC, DIAGNOSTIC: ICD-10-PCS | Performed by: INTERNAL MEDICINE

## 2019-01-01 PROCEDURE — 77010033711 HC HIGH FLOW OXYGEN

## 2019-01-01 PROCEDURE — 99285 EMERGENCY DEPT VISIT HI MDM: CPT | Performed by: EMERGENCY MEDICINE

## 2019-01-01 PROCEDURE — 94760 N-INVAS EAR/PLS OXIMETRY 1: CPT

## 2019-01-01 PROCEDURE — 5A1955Z RESPIRATORY VENTILATION, GREATER THAN 96 CONSECUTIVE HOURS: ICD-10-PCS | Performed by: INTERNAL MEDICINE

## 2019-01-01 PROCEDURE — 93970 EXTREMITY STUDY: CPT

## 2019-01-01 PROCEDURE — 0F798DZ DILATION OF COMMON BILE DUCT WITH INTRALUMINAL DEVICE, VIA NATURAL OR ARTIFICIAL OPENING ENDOSCOPIC: ICD-10-PCS | Performed by: INTERNAL MEDICINE

## 2019-01-01 PROCEDURE — 99291 CRITICAL CARE FIRST HOUR: CPT | Performed by: INTERNAL MEDICINE

## 2019-01-01 PROCEDURE — 65660000000 HC RM CCU STEPDOWN

## 2019-01-01 PROCEDURE — 77030009038 HC CATH BILI STN RTVR BSC -C: Performed by: INTERNAL MEDICINE

## 2019-01-01 PROCEDURE — 77030039425 HC BLD LARYNG TRULITE DISP TELE -A: Performed by: ANESTHESIOLOGY

## 2019-01-01 PROCEDURE — 02HV33Z INSERTION OF INFUSION DEVICE INTO SUPERIOR VENA CAVA, PERCUTANEOUS APPROACH: ICD-10-PCS | Performed by: INTERNAL MEDICINE

## 2019-01-01 PROCEDURE — 77030036933 HC BRSH RX CYTO WREGD BSC -C: Performed by: INTERNAL MEDICINE

## 2019-01-01 PROCEDURE — 99253 IP/OBS CNSLTJ NEW/EST LOW 45: CPT | Performed by: INTERNAL MEDICINE

## 2019-01-01 PROCEDURE — 82962 GLUCOSE BLOOD TEST: CPT

## 2019-01-01 PROCEDURE — 82272 OCCULT BLD FECES 1-3 TESTS: CPT

## 2019-01-01 PROCEDURE — 76060000035 HC ANESTHESIA 2 TO 2.5 HR: Performed by: INTERNAL MEDICINE

## 2019-01-01 PROCEDURE — 94002 VENT MGMT INPAT INIT DAY: CPT

## 2019-01-01 PROCEDURE — 82550 ASSAY OF CK (CPK): CPT

## 2019-01-01 PROCEDURE — 0FBG8ZX EXCISION OF PANCREAS, VIA NATURAL OR ARTIFICIAL OPENING ENDOSCOPIC, DIAGNOSTIC: ICD-10-PCS | Performed by: INTERNAL MEDICINE

## 2019-01-01 PROCEDURE — 77030028690 HC NDL ASPIR ULTRSND BSC -E: Performed by: INTERNAL MEDICINE

## 2019-01-01 PROCEDURE — 84484 ASSAY OF TROPONIN QUANT: CPT

## 2019-01-01 PROCEDURE — 76450000000

## 2019-01-01 PROCEDURE — 93005 ELECTROCARDIOGRAM TRACING: CPT | Performed by: INTERNAL MEDICINE

## 2019-01-01 PROCEDURE — 77030031476 HC EXCH HEAT MOISTW FLTR HALY -A

## 2019-01-01 PROCEDURE — 36556 INSERT NON-TUNNEL CV CATH: CPT

## 2019-01-01 PROCEDURE — 82728 ASSAY OF FERRITIN: CPT

## 2019-01-01 PROCEDURE — 85046 RETICYTE/HGB CONCENTRATE: CPT

## 2019-01-01 PROCEDURE — 74011636320 HC RX REV CODE- 636/320: Performed by: INTERNAL MEDICINE

## 2019-01-01 PROCEDURE — 31500 INSERT EMERGENCY AIRWAY: CPT | Performed by: INTERNAL MEDICINE

## 2019-01-01 PROCEDURE — BF101ZZ FLUOROSCOPY OF BILE DUCTS USING LOW OSMOLAR CONTRAST: ICD-10-PCS | Performed by: INTERNAL MEDICINE

## 2019-01-01 PROCEDURE — 83010 ASSAY OF HAPTOGLOBIN QUANT: CPT

## 2019-01-01 PROCEDURE — 86923 COMPATIBILITY TEST ELECTRIC: CPT

## 2019-01-01 PROCEDURE — C1751 CATH, INF, PER/CENT/MIDLINE: HCPCS

## 2019-01-01 PROCEDURE — 99239 HOSP IP/OBS DSCHRG MGMT >30: CPT | Performed by: INTERNAL MEDICINE

## 2019-01-01 PROCEDURE — 82746 ASSAY OF FOLIC ACID SERUM: CPT

## 2019-01-01 PROCEDURE — 77030021907 HC KT URIN FOL O&M -A

## 2019-01-01 PROCEDURE — 87389 HIV-1 AG W/HIV-1&-2 AB AG IA: CPT

## 2019-01-01 PROCEDURE — C1874 STENT, COATED/COV W/DEL SYS: HCPCS | Performed by: INTERNAL MEDICINE

## 2019-01-01 PROCEDURE — 77030037088 HC TUBE ENDOTRACH ORAL NSL COVD-A: Performed by: ANESTHESIOLOGY

## 2019-01-01 PROCEDURE — 96375 TX/PRO/DX INJ NEW DRUG ADDON: CPT | Performed by: EMERGENCY MEDICINE

## 2019-01-01 PROCEDURE — 85610 PROTHROMBIN TIME: CPT

## 2019-01-01 PROCEDURE — 82248 BILIRUBIN DIRECT: CPT

## 2019-01-01 PROCEDURE — 73562 X-RAY EXAM OF KNEE 3: CPT

## 2019-01-01 PROCEDURE — 36556 INSERT NON-TUNNEL CV CATH: CPT | Performed by: INTERNAL MEDICINE

## 2019-01-01 PROCEDURE — 89220 SPUTUM SPECIMEN COLLECTION: CPT

## 2019-01-01 PROCEDURE — 74011000258 HC RX REV CODE- 258: Performed by: EMERGENCY MEDICINE

## 2019-01-01 PROCEDURE — 05HY33Z INSERTION OF INFUSION DEVICE INTO UPPER VEIN, PERCUTANEOUS APPROACH: ICD-10-PCS | Performed by: INTERNAL MEDICINE

## 2019-01-01 PROCEDURE — 77030032490 HC SLV COMPR SCD KNE COVD -B

## 2019-01-01 PROCEDURE — 0FC98ZZ EXTIRPATION OF MATTER FROM COMMON BILE DUCT, VIA NATURAL OR ARTIFICIAL OPENING ENDOSCOPIC: ICD-10-PCS | Performed by: INTERNAL MEDICINE

## 2019-01-01 PROCEDURE — 83540 ASSAY OF IRON: CPT

## 2019-01-01 PROCEDURE — 77030013140 HC MSK NEB VYRM -A

## 2019-01-01 PROCEDURE — 74011250637 HC RX REV CODE- 250/637: Performed by: HOSPITALIST

## 2019-01-01 PROCEDURE — 88112 CYTOPATH CELL ENHANCE TECH: CPT

## 2019-01-01 PROCEDURE — 99284 EMERGENCY DEPT VISIT MOD MDM: CPT | Performed by: EMERGENCY MEDICINE

## 2019-01-01 PROCEDURE — 88305 TISSUE EXAM BY PATHOLOGIST: CPT

## 2019-01-01 PROCEDURE — 74011250636 HC RX REV CODE- 250/636

## 2019-01-01 PROCEDURE — 77030012595 HC SPHNTOM BILI BSC -D: Performed by: INTERNAL MEDICINE

## 2019-01-01 PROCEDURE — C1769 GUIDE WIRE: HCPCS | Performed by: INTERNAL MEDICINE

## 2019-01-01 PROCEDURE — 07D78ZX EXTRACTION OF THORAX LYMPHATIC, VIA NATURAL OR ARTIFICIAL OPENING ENDOSCOPIC, DIAGNOSTIC: ICD-10-PCS | Performed by: INTERNAL MEDICINE

## 2019-01-01 PROCEDURE — 80076 HEPATIC FUNCTION PANEL: CPT

## 2019-01-01 PROCEDURE — 0BH18EZ INSERTION OF ENDOTRACHEAL AIRWAY INTO TRACHEA, VIA NATURAL OR ARTIFICIAL OPENING ENDOSCOPIC: ICD-10-PCS | Performed by: ANESTHESIOLOGY

## 2019-01-01 PROCEDURE — 83615 LACTATE (LD) (LDH) ENZYME: CPT

## 2019-01-01 PROCEDURE — 87106 FUNGI IDENTIFICATION YEAST: CPT

## 2019-01-01 PROCEDURE — 77030034849

## 2019-01-01 PROCEDURE — 76040000028: Performed by: INTERNAL MEDICINE

## 2019-01-01 PROCEDURE — 0BH18EZ INSERTION OF ENDOTRACHEAL AIRWAY INTO TRACHEA, VIA NATURAL OR ARTIFICIAL OPENING ENDOSCOPIC: ICD-10-PCS | Performed by: INTERNAL MEDICINE

## 2019-01-01 PROCEDURE — 77030018846 HC SOL IRR STRL H20 ICUM -A

## 2019-01-01 PROCEDURE — 74330 X-RAY BILE/PANC ENDOSCOPY: CPT

## 2019-01-01 PROCEDURE — 70450 CT HEAD/BRAIN W/O DYE: CPT

## 2019-01-01 DEVICE — STENT SYSTEM RMV
Type: IMPLANTABLE DEVICE | Site: BILE DUCT | Status: FUNCTIONAL
Brand: WALLFLEX BILIARY

## 2019-01-01 RX ORDER — ATRACURIUM BESYLATE 10 MG/ML
INJECTION, SOLUTION INTRAVENOUS
Status: COMPLETED
Start: 2019-01-01 | End: 2019-01-01

## 2019-01-01 RX ORDER — HEPARIN SODIUM 5000 [USP'U]/ML
35 INJECTION, SOLUTION INTRAVENOUS; SUBCUTANEOUS ONCE
Status: COMPLETED | OUTPATIENT
Start: 2019-01-01 | End: 2019-01-01

## 2019-01-01 RX ORDER — HYDROCODONE BITARTRATE AND ACETAMINOPHEN 5; 325 MG/1; MG/1
1 TABLET ORAL AS NEEDED
Status: DISCONTINUED | OUTPATIENT
Start: 2019-01-01 | End: 2019-01-01 | Stop reason: HOSPADM

## 2019-01-01 RX ORDER — ETOMIDATE 2 MG/ML
INJECTION INTRAVENOUS
Status: COMPLETED | OUTPATIENT
Start: 2019-01-01 | End: 2019-01-01

## 2019-01-01 RX ORDER — OXYCODONE HYDROCHLORIDE 5 MG/1
5 TABLET ORAL
Status: DISCONTINUED | OUTPATIENT
Start: 2019-01-01 | End: 2019-01-01 | Stop reason: HOSPADM

## 2019-01-01 RX ORDER — SODIUM CHLORIDE, SODIUM LACTATE, POTASSIUM CHLORIDE, CALCIUM CHLORIDE 600; 310; 30; 20 MG/100ML; MG/100ML; MG/100ML; MG/100ML
100 INJECTION, SOLUTION INTRAVENOUS CONTINUOUS
Status: DISCONTINUED | OUTPATIENT
Start: 2019-01-01 | End: 2019-01-01 | Stop reason: HOSPADM

## 2019-01-01 RX ORDER — HYDROMORPHONE HYDROCHLORIDE 2 MG/ML
0.5 INJECTION, SOLUTION INTRAMUSCULAR; INTRAVENOUS; SUBCUTANEOUS
Status: DISCONTINUED | OUTPATIENT
Start: 2019-01-01 | End: 2019-01-01 | Stop reason: HOSPADM

## 2019-01-01 RX ORDER — FENTANYL CITRATE 50 UG/ML
50 INJECTION, SOLUTION INTRAMUSCULAR; INTRAVENOUS ONCE
Status: COMPLETED | OUTPATIENT
Start: 2019-01-01 | End: 2019-01-01

## 2019-01-01 RX ORDER — FUROSEMIDE 10 MG/ML
40 INJECTION INTRAMUSCULAR; INTRAVENOUS ONCE
Status: COMPLETED | OUTPATIENT
Start: 2019-01-01 | End: 2019-01-01

## 2019-01-01 RX ORDER — FAMOTIDINE 20 MG/1
40 TABLET, FILM COATED ORAL DAILY
Status: DISCONTINUED | OUTPATIENT
Start: 2019-01-01 | End: 2019-01-01

## 2019-01-01 RX ORDER — HYDRALAZINE HYDROCHLORIDE 20 MG/ML
20 INJECTION INTRAMUSCULAR; INTRAVENOUS
Status: DISCONTINUED | OUTPATIENT
Start: 2019-01-01 | End: 2019-01-01 | Stop reason: HOSPADM

## 2019-01-01 RX ORDER — MIDAZOLAM HYDROCHLORIDE 1 MG/ML
4 INJECTION, SOLUTION INTRAMUSCULAR; INTRAVENOUS ONCE
Status: COMPLETED | OUTPATIENT
Start: 2019-01-01 | End: 2019-01-01

## 2019-01-01 RX ORDER — ONDANSETRON 2 MG/ML
4 INJECTION INTRAMUSCULAR; INTRAVENOUS
Status: COMPLETED | OUTPATIENT
Start: 2019-01-01 | End: 2019-01-01

## 2019-01-01 RX ORDER — HYDROCODONE BITARTRATE AND ACETAMINOPHEN 5; 325 MG/1; MG/1
1 TABLET ORAL
Status: DISCONTINUED | OUTPATIENT
Start: 2019-01-01 | End: 2019-01-01 | Stop reason: HOSPADM

## 2019-01-01 RX ORDER — PROPOFOL 10 MG/ML
0-50 VIAL (ML) INTRAVENOUS
Status: DISCONTINUED | OUTPATIENT
Start: 2019-01-01 | End: 2019-01-01

## 2019-01-01 RX ORDER — AMOXICILLIN 250 MG
2 CAPSULE ORAL DAILY
Status: DISCONTINUED | OUTPATIENT
Start: 2019-01-01 | End: 2019-01-01

## 2019-01-01 RX ORDER — ONDANSETRON 4 MG/1
8 TABLET, ORALLY DISINTEGRATING ORAL 3 TIMES DAILY
Status: DISCONTINUED | OUTPATIENT
Start: 2019-01-01 | End: 2019-01-01

## 2019-01-01 RX ORDER — IBUPROFEN 800 MG/1
800 TABLET ORAL
Status: COMPLETED | OUTPATIENT
Start: 2019-01-01 | End: 2019-01-01

## 2019-01-01 RX ORDER — SODIUM CHLORIDE 9 MG/ML
150 INJECTION, SOLUTION INTRAVENOUS CONTINUOUS
Status: DISPENSED | OUTPATIENT
Start: 2019-01-01 | End: 2019-01-01

## 2019-01-01 RX ORDER — IBUPROFEN 200 MG
1 TABLET ORAL EVERY 24 HOURS
Status: DISCONTINUED | OUTPATIENT
Start: 2019-01-01 | End: 2019-01-01 | Stop reason: HOSPADM

## 2019-01-01 RX ORDER — AMIODARONE HYDROCHLORIDE 200 MG/1
400 TABLET ORAL 2 TIMES DAILY
Status: DISCONTINUED | OUTPATIENT
Start: 2019-01-01 | End: 2019-01-01

## 2019-01-01 RX ORDER — MORPHINE SULFATE 15 MG/1
30 TABLET, FILM COATED, EXTENDED RELEASE ORAL EVERY 12 HOURS
Status: DISCONTINUED | OUTPATIENT
Start: 2019-01-01 | End: 2019-01-01

## 2019-01-01 RX ORDER — HEPARIN SODIUM 5000 [USP'U]/100ML
18-36 INJECTION, SOLUTION INTRAVENOUS
Status: DISPENSED | OUTPATIENT
Start: 2019-01-01 | End: 2019-01-01

## 2019-01-01 RX ORDER — LORAZEPAM 0.5 MG/1
.5-1 TABLET ORAL
Status: DISCONTINUED | OUTPATIENT
Start: 2019-01-01 | End: 2019-01-01

## 2019-01-01 RX ORDER — ROCURONIUM BROMIDE 10 MG/ML
INJECTION, SOLUTION INTRAVENOUS AS NEEDED
Status: DISCONTINUED | OUTPATIENT
Start: 2019-01-01 | End: 2019-01-01 | Stop reason: HOSPADM

## 2019-01-01 RX ORDER — MIDAZOLAM HYDROCHLORIDE 1 MG/ML
INJECTION, SOLUTION INTRAMUSCULAR; INTRAVENOUS
Status: COMPLETED
Start: 2019-01-01 | End: 2019-01-01

## 2019-01-01 RX ORDER — MORPHINE SULFATE 15 MG/1
15 TABLET, FILM COATED, EXTENDED RELEASE ORAL EVERY 12 HOURS
Status: DISCONTINUED | OUTPATIENT
Start: 2019-01-01 | End: 2019-01-01

## 2019-01-01 RX ORDER — MORPHINE SULFATE 4 MG/ML
4 INJECTION INTRAVENOUS
Status: COMPLETED | OUTPATIENT
Start: 2019-01-01 | End: 2019-01-01

## 2019-01-01 RX ORDER — SODIUM CHLORIDE 9 MG/ML
50 INJECTION, SOLUTION INTRAVENOUS CONTINUOUS
Status: DISCONTINUED | OUTPATIENT
Start: 2019-01-01 | End: 2019-01-01 | Stop reason: HOSPADM

## 2019-01-01 RX ORDER — SODIUM CHLORIDE 9 MG/ML
250 INJECTION, SOLUTION INTRAVENOUS AS NEEDED
Status: DISCONTINUED | OUTPATIENT
Start: 2019-01-01 | End: 2019-01-01 | Stop reason: HOSPADM

## 2019-01-01 RX ORDER — ACETAMINOPHEN 325 MG/1
650 TABLET ORAL
Status: DISCONTINUED | OUTPATIENT
Start: 2019-01-01 | End: 2019-01-01

## 2019-01-01 RX ORDER — POTASSIUM CHLORIDE 14.9 MG/ML
20 INJECTION INTRAVENOUS
Status: COMPLETED | OUTPATIENT
Start: 2019-01-01 | End: 2019-01-01

## 2019-01-01 RX ORDER — METOCLOPRAMIDE HYDROCHLORIDE 5 MG/ML
5 INJECTION INTRAMUSCULAR; INTRAVENOUS EVERY 6 HOURS
Status: COMPLETED | OUTPATIENT
Start: 2019-01-01 | End: 2019-01-01

## 2019-01-01 RX ORDER — AMIODARONE HYDROCHLORIDE 200 MG/1
400 TABLET ORAL EVERY 12 HOURS
Status: DISCONTINUED | OUTPATIENT
Start: 2019-01-01 | End: 2019-01-01 | Stop reason: HOSPADM

## 2019-01-01 RX ORDER — HEPARIN SODIUM 5000 [USP'U]/100ML
18-36 INJECTION, SOLUTION INTRAVENOUS
Status: DISCONTINUED | OUTPATIENT
Start: 2019-01-01 | End: 2019-01-01 | Stop reason: HOSPADM

## 2019-01-01 RX ORDER — GABAPENTIN 100 MG/1
100 CAPSULE ORAL 2 TIMES DAILY
Status: DISCONTINUED | OUTPATIENT
Start: 2019-01-01 | End: 2019-01-01 | Stop reason: HOSPADM

## 2019-01-01 RX ORDER — ZOLPIDEM TARTRATE 5 MG/1
5 TABLET ORAL
Status: DISCONTINUED | OUTPATIENT
Start: 2019-01-01 | End: 2019-01-01

## 2019-01-01 RX ORDER — FENTANYL CITRATE 50 UG/ML
50 INJECTION, SOLUTION INTRAMUSCULAR; INTRAVENOUS
Status: DISCONTINUED | OUTPATIENT
Start: 2019-01-01 | End: 2019-01-01 | Stop reason: HOSPADM

## 2019-01-01 RX ORDER — HYDROCODONE BITARTRATE AND ACETAMINOPHEN 5; 325 MG/1; MG/1
1 TABLET ORAL
Status: DISCONTINUED | OUTPATIENT
Start: 2019-01-01 | End: 2019-01-01

## 2019-01-01 RX ORDER — ATRACURIUM BESYLATE 10 MG/ML
0.4 INJECTION, SOLUTION INTRAVENOUS ONCE
Status: COMPLETED | OUTPATIENT
Start: 2019-01-01 | End: 2019-01-01

## 2019-01-01 RX ORDER — ZOLPIDEM TARTRATE 5 MG/1
5 TABLET ORAL
Status: DISCONTINUED | OUTPATIENT
Start: 2019-01-01 | End: 2019-01-01 | Stop reason: HOSPADM

## 2019-01-01 RX ORDER — AMOXICILLIN 250 MG
1 CAPSULE ORAL DAILY
Status: DISCONTINUED | OUTPATIENT
Start: 2019-01-01 | End: 2019-01-01 | Stop reason: HOSPADM

## 2019-01-01 RX ORDER — METOCLOPRAMIDE HYDROCHLORIDE 5 MG/ML
5 INJECTION INTRAMUSCULAR; INTRAVENOUS EVERY 6 HOURS
Status: DISCONTINUED | OUTPATIENT
Start: 2019-01-01 | End: 2019-01-01 | Stop reason: HOSPADM

## 2019-01-01 RX ORDER — FENTANYL CITRATE 50 UG/ML
50 INJECTION, SOLUTION INTRAMUSCULAR; INTRAVENOUS
Status: DISCONTINUED | OUTPATIENT
Start: 2019-01-01 | End: 2019-01-01

## 2019-01-01 RX ORDER — SUCCINYLCHOLINE CHLORIDE 20 MG/ML INJECTION SOLUTION
1 SOLUTION
Status: COMPLETED | OUTPATIENT
Start: 2019-01-01 | End: 2019-01-01

## 2019-01-01 RX ORDER — SUCCINYLCHOLINE CHLORIDE 20 MG/ML INJECTION SOLUTION
SOLUTION
Status: ACTIVE
Start: 2019-01-01 | End: 2019-01-01

## 2019-01-01 RX ORDER — PROPOFOL 10 MG/ML
INJECTION, EMULSION INTRAVENOUS AS NEEDED
Status: DISCONTINUED | OUTPATIENT
Start: 2019-01-01 | End: 2019-01-01 | Stop reason: HOSPADM

## 2019-01-01 RX ORDER — ONDANSETRON 2 MG/ML
INJECTION INTRAMUSCULAR; INTRAVENOUS AS NEEDED
Status: DISCONTINUED | OUTPATIENT
Start: 2019-01-01 | End: 2019-01-01 | Stop reason: HOSPADM

## 2019-01-01 RX ORDER — CALCIUM CARBONATE 200(500)MG
400 TABLET,CHEWABLE ORAL
Status: DISCONTINUED | OUTPATIENT
Start: 2019-01-01 | End: 2019-01-01 | Stop reason: HOSPADM

## 2019-01-01 RX ORDER — AMIODARONE HYDROCHLORIDE 200 MG/1
200 TABLET ORAL EVERY 12 HOURS
Status: DISCONTINUED | OUTPATIENT
Start: 2019-01-01 | End: 2019-01-01

## 2019-01-01 RX ORDER — SODIUM CHLORIDE 9 MG/ML
1000 INJECTION, SOLUTION INTRAVENOUS ONCE
Status: COMPLETED | OUTPATIENT
Start: 2019-01-01 | End: 2019-01-01

## 2019-01-01 RX ORDER — ALBUTEROL SULFATE 0.83 MG/ML
2.5 SOLUTION RESPIRATORY (INHALATION)
Status: DISCONTINUED | OUTPATIENT
Start: 2019-01-01 | End: 2019-01-01 | Stop reason: HOSPADM

## 2019-01-01 RX ORDER — FAMOTIDINE 20 MG/1
20 TABLET, FILM COATED ORAL AS NEEDED
Status: DISCONTINUED | OUTPATIENT
Start: 2019-01-01 | End: 2019-01-01 | Stop reason: HOSPADM

## 2019-01-01 RX ORDER — HYDROCODONE BITARTRATE AND ACETAMINOPHEN 10; 325 MG/1; MG/1
1 TABLET ORAL
Status: DISCONTINUED | OUTPATIENT
Start: 2019-01-01 | End: 2019-01-01 | Stop reason: HOSPADM

## 2019-01-01 RX ORDER — DILTIAZEM HYDROCHLORIDE 5 MG/ML
20 INJECTION INTRAVENOUS ONCE
Status: COMPLETED | OUTPATIENT
Start: 2019-01-01 | End: 2019-01-01

## 2019-01-01 RX ORDER — FENTANYL CITRATE 50 UG/ML
INJECTION, SOLUTION INTRAMUSCULAR; INTRAVENOUS
Status: ACTIVE
Start: 2019-01-01 | End: 2019-01-01

## 2019-01-01 RX ORDER — SUCCINYLCHOLINE CHLORIDE 20 MG/ML
INJECTION INTRAMUSCULAR; INTRAVENOUS AS NEEDED
Status: DISCONTINUED | OUTPATIENT
Start: 2019-01-01 | End: 2019-01-01 | Stop reason: HOSPADM

## 2019-01-01 RX ORDER — POLYETHYLENE GLYCOL 3350 17 G/17G
17 POWDER, FOR SOLUTION ORAL DAILY
Status: DISCONTINUED | OUTPATIENT
Start: 2019-01-01 | End: 2019-01-01 | Stop reason: HOSPADM

## 2019-01-01 RX ORDER — ETOMIDATE 2 MG/ML
INJECTION INTRAVENOUS
Status: ACTIVE
Start: 2019-01-01 | End: 2019-01-01

## 2019-01-01 RX ORDER — FENTANYL CITRATE 50 UG/ML
INJECTION, SOLUTION INTRAMUSCULAR; INTRAVENOUS
Status: COMPLETED | OUTPATIENT
Start: 2019-01-01 | End: 2019-01-01

## 2019-01-01 RX ORDER — POTASSIUM CHLORIDE 20 MEQ/1
40 TABLET, EXTENDED RELEASE ORAL
Status: COMPLETED | OUTPATIENT
Start: 2019-01-01 | End: 2019-01-01

## 2019-01-01 RX ORDER — VANCOMYCIN/0.9 % SOD CHLORIDE 750 MG/250
750 PLASTIC BAG, INJECTION (ML) INTRAVENOUS EVERY 8 HOURS
Status: DISCONTINUED | OUTPATIENT
Start: 2019-01-01 | End: 2019-01-01

## 2019-01-01 RX ORDER — PROPOFOL 10 MG/ML
INJECTION, EMULSION INTRAVENOUS
Status: COMPLETED | OUTPATIENT
Start: 2019-01-01 | End: 2019-01-01

## 2019-01-01 RX ORDER — HEPARIN SODIUM 5000 [USP'U]/100ML
18-36 INJECTION, SOLUTION INTRAVENOUS
Status: DISCONTINUED | OUTPATIENT
Start: 2019-01-01 | End: 2019-01-01

## 2019-01-01 RX ORDER — MORPHINE SULFATE 2 MG/ML
2 INJECTION, SOLUTION INTRAMUSCULAR; INTRAVENOUS
Status: DISCONTINUED | OUTPATIENT
Start: 2019-01-01 | End: 2019-01-01 | Stop reason: HOSPADM

## 2019-01-01 RX ORDER — NALOXONE HYDROCHLORIDE 0.4 MG/ML
0.4 INJECTION, SOLUTION INTRAMUSCULAR; INTRAVENOUS; SUBCUTANEOUS AS NEEDED
Status: DISCONTINUED | OUTPATIENT
Start: 2019-01-01 | End: 2019-01-01 | Stop reason: HOSPADM

## 2019-01-01 RX ORDER — FAMOTIDINE 40 MG/1
40 TABLET, FILM COATED ORAL DAILY
Qty: 30 TAB | Refills: 2 | Status: SHIPPED | OUTPATIENT
Start: 2019-01-01 | End: 2019-08-12

## 2019-01-01 RX ORDER — PROPOFOL 10 MG/ML
INJECTION, EMULSION INTRAVENOUS
Status: ACTIVE
Start: 2019-01-01 | End: 2019-01-01

## 2019-01-01 RX ORDER — SODIUM CHLORIDE 0.9 % (FLUSH) 0.9 %
5-40 SYRINGE (ML) INJECTION EVERY 8 HOURS
Status: DISCONTINUED | OUTPATIENT
Start: 2019-01-01 | End: 2019-01-01 | Stop reason: HOSPADM

## 2019-01-01 RX ORDER — ONDANSETRON 2 MG/ML
4 INJECTION INTRAMUSCULAR; INTRAVENOUS
Status: DISCONTINUED | OUTPATIENT
Start: 2019-01-01 | End: 2019-01-01 | Stop reason: HOSPADM

## 2019-01-01 RX ORDER — VANCOMYCIN/0.9 % SOD CHLORIDE 750 MG/250
750 PLASTIC BAG, INJECTION (ML) INTRAVENOUS SEE ADMIN INSTRUCTIONS
Status: DISCONTINUED | OUTPATIENT
Start: 2019-01-01 | End: 2019-01-01

## 2019-01-01 RX ORDER — LORAZEPAM 0.5 MG/1
0.5 TABLET ORAL
Status: DISCONTINUED | OUTPATIENT
Start: 2019-01-01 | End: 2019-01-01

## 2019-01-01 RX ORDER — SODIUM CHLORIDE 0.9 % (FLUSH) 0.9 %
5-40 SYRINGE (ML) INJECTION AS NEEDED
Status: DISCONTINUED | OUTPATIENT
Start: 2019-01-01 | End: 2019-01-01 | Stop reason: HOSPADM

## 2019-01-01 RX ORDER — TRAMADOL HYDROCHLORIDE 50 MG/1
50 TABLET ORAL
Qty: 8 TAB | Refills: 0 | Status: ON HOLD | OUTPATIENT
Start: 2019-01-01 | End: 2019-01-01

## 2019-01-01 RX ORDER — ACETAMINOPHEN 325 MG/1
650 TABLET ORAL
Status: DISCONTINUED | OUTPATIENT
Start: 2019-01-01 | End: 2019-01-01 | Stop reason: HOSPADM

## 2019-01-01 RX ORDER — MIDAZOLAM HYDROCHLORIDE 1 MG/ML
4 INJECTION, SOLUTION INTRAMUSCULAR; INTRAVENOUS ONCE
Status: DISCONTINUED | OUTPATIENT
Start: 2019-01-01 | End: 2019-01-01

## 2019-01-01 RX ORDER — LORAZEPAM 0.5 MG/1
0.5 TABLET ORAL
Status: DISCONTINUED | OUTPATIENT
Start: 2019-01-01 | End: 2019-01-01 | Stop reason: HOSPADM

## 2019-01-01 RX ORDER — LIDOCAINE HYDROCHLORIDE 20 MG/ML
INJECTION, SOLUTION EPIDURAL; INFILTRATION; INTRACAUDAL; PERINEURAL AS NEEDED
Status: DISCONTINUED | OUTPATIENT
Start: 2019-01-01 | End: 2019-01-01 | Stop reason: HOSPADM

## 2019-01-01 RX ORDER — SODIUM CHLORIDE 9 MG/ML
50 INJECTION, SOLUTION INTRAVENOUS CONTINUOUS
Status: DISCONTINUED | OUTPATIENT
Start: 2019-01-01 | End: 2019-01-01

## 2019-01-01 RX ORDER — GUAIFENESIN 600 MG/1
1200 TABLET, EXTENDED RELEASE ORAL EVERY 12 HOURS
Status: DISCONTINUED | OUTPATIENT
Start: 2019-01-01 | End: 2019-01-01

## 2019-01-01 RX ORDER — SUCCINYLCHOLINE CHLORIDE 20 MG/ML
INJECTION INTRAMUSCULAR; INTRAVENOUS
Status: COMPLETED | OUTPATIENT
Start: 2019-01-01 | End: 2019-01-01

## 2019-01-01 RX ORDER — AMOXICILLIN 250 MG
2 CAPSULE ORAL
Status: DISCONTINUED | OUTPATIENT
Start: 2019-01-01 | End: 2019-01-01 | Stop reason: HOSPADM

## 2019-01-01 RX ORDER — HEPARIN SODIUM 5000 [USP'U]/ML
80 INJECTION, SOLUTION INTRAVENOUS; SUBCUTANEOUS ONCE
Status: COMPLETED | OUTPATIENT
Start: 2019-01-01 | End: 2019-01-01

## 2019-01-01 RX ORDER — SODIUM CHLORIDE, SODIUM LACTATE, POTASSIUM CHLORIDE, CALCIUM CHLORIDE 600; 310; 30; 20 MG/100ML; MG/100ML; MG/100ML; MG/100ML
150 INJECTION, SOLUTION INTRAVENOUS CONTINUOUS
Status: DISCONTINUED | OUTPATIENT
Start: 2019-01-01 | End: 2019-01-01 | Stop reason: HOSPADM

## 2019-01-01 RX ORDER — SODIUM FERRIC GLUCONATE COMPLEX IN SUCROSE 12.5 MG/ML
125 INJECTION INTRAVENOUS ONCE
Status: DISCONTINUED | OUTPATIENT
Start: 2019-01-01 | End: 2019-01-01 | Stop reason: SDUPTHER

## 2019-01-01 RX ORDER — VANCOMYCIN/0.9 % SOD CHLORIDE 750 MG/250
750 PLASTIC BAG, INJECTION (ML) INTRAVENOUS EVERY 24 HOURS
Status: DISCONTINUED | OUTPATIENT
Start: 2019-01-01 | End: 2019-01-01

## 2019-01-01 RX ORDER — SODIUM CHLORIDE 0.9 % (FLUSH) 0.9 %
10 SYRINGE (ML) INJECTION
Status: COMPLETED | OUTPATIENT
Start: 2019-01-01 | End: 2019-01-01

## 2019-01-01 RX ORDER — NOREPINEPHRINE BIT/0.9 % NACL 4MG/250ML
0-30 PLASTIC BAG, INJECTION (ML) INTRAVENOUS
Status: DISCONTINUED | OUTPATIENT
Start: 2019-01-01 | End: 2019-01-01 | Stop reason: HOSPADM

## 2019-01-01 RX ORDER — IBUPROFEN 800 MG/1
800 TABLET ORAL
Qty: 20 TAB | Refills: 0 | Status: SHIPPED | OUTPATIENT
Start: 2019-01-01 | End: 2019-01-01

## 2019-01-01 RX ORDER — ACETAMINOPHEN 500 MG
1000 TABLET ORAL
Status: DISCONTINUED | OUTPATIENT
Start: 2019-01-01 | End: 2019-01-01 | Stop reason: HOSPADM

## 2019-01-01 RX ORDER — GABAPENTIN 100 MG/1
100 CAPSULE ORAL 2 TIMES DAILY
Status: DISCONTINUED | OUTPATIENT
Start: 2019-01-01 | End: 2019-01-01

## 2019-01-01 RX ORDER — MIDAZOLAM HYDROCHLORIDE 1 MG/ML
1 INJECTION, SOLUTION INTRAMUSCULAR; INTRAVENOUS ONCE
Status: COMPLETED | OUTPATIENT
Start: 2019-01-01 | End: 2019-01-01

## 2019-01-01 RX ORDER — FENTANYL CITRATE-0.9 % NACL/PF 25 MCG/ML
0-200 PLASTIC BAG, INJECTION (ML) INJECTION
Status: DISCONTINUED | OUTPATIENT
Start: 2019-01-01 | End: 2019-01-01 | Stop reason: HOSPADM

## 2019-01-01 RX ORDER — FUROSEMIDE 10 MG/ML
40 INJECTION INTRAMUSCULAR; INTRAVENOUS EVERY 12 HOURS
Status: DISCONTINUED | OUTPATIENT
Start: 2019-01-01 | End: 2019-01-01 | Stop reason: HOSPADM

## 2019-01-01 RX ORDER — AZITHROMYCIN 250 MG/1
500 TABLET, FILM COATED ORAL DAILY
Status: COMPLETED | OUTPATIENT
Start: 2019-01-01 | End: 2019-01-01

## 2019-01-01 RX ORDER — PROPOFOL 10 MG/ML
INJECTION, EMULSION INTRAVENOUS
Status: COMPLETED
Start: 2019-01-01 | End: 2019-01-01

## 2019-01-01 RX ORDER — AMOXICILLIN 250 MG
2 CAPSULE ORAL
Status: DISCONTINUED | OUTPATIENT
Start: 2019-01-01 | End: 2019-01-01

## 2019-01-01 RX ORDER — HEPARIN SODIUM 5000 [USP'U]/100ML
18-36 INJECTION, SOLUTION INTRAVENOUS
Status: DISCONTINUED | OUTPATIENT
Start: 2019-01-01 | End: 2019-01-01 | Stop reason: SDUPTHER

## 2019-01-01 RX ORDER — MIDAZOLAM HYDROCHLORIDE 1 MG/ML
INJECTION, SOLUTION INTRAMUSCULAR; INTRAVENOUS
Status: DISCONTINUED
Start: 2019-01-01 | End: 2019-01-01 | Stop reason: WASHOUT

## 2019-01-01 RX ORDER — POLYETHYLENE GLYCOL 3350 17 G/17G
17 POWDER, FOR SOLUTION ORAL DAILY
Status: DISCONTINUED | OUTPATIENT
Start: 2019-01-01 | End: 2019-01-01

## 2019-01-01 RX ORDER — ADENOSINE 3 MG/ML
INJECTION, SOLUTION INTRAVENOUS
Status: DISCONTINUED
Start: 2019-01-01 | End: 2019-01-01 | Stop reason: WASHOUT

## 2019-01-01 RX ORDER — ALBUMIN HUMAN 250 G/1000ML
25 SOLUTION INTRAVENOUS EVERY 6 HOURS
Status: DISCONTINUED | OUTPATIENT
Start: 2019-01-01 | End: 2019-01-01 | Stop reason: HOSPADM

## 2019-01-01 RX ORDER — LORAZEPAM 2 MG/ML
2 INJECTION INTRAMUSCULAR
Status: DISCONTINUED | OUTPATIENT
Start: 2019-01-01 | End: 2019-01-01 | Stop reason: HOSPADM

## 2019-01-01 RX ORDER — VANCOMYCIN/0.9 % SOD CHLORIDE 1.5G/250ML
1500 PLASTIC BAG, INJECTION (ML) INTRAVENOUS ONCE
Status: COMPLETED | OUTPATIENT
Start: 2019-01-01 | End: 2019-01-01

## 2019-01-01 RX ORDER — HYDROCODONE BITARTRATE AND ACETAMINOPHEN 10; 325 MG/1; MG/1
1 TABLET ORAL
Status: DISCONTINUED | OUTPATIENT
Start: 2019-01-01 | End: 2019-01-01

## 2019-01-01 RX ORDER — SODIUM CHLORIDE 9 MG/ML
75 INJECTION, SOLUTION INTRAVENOUS CONTINUOUS
Status: DISCONTINUED | OUTPATIENT
Start: 2019-01-01 | End: 2019-01-01

## 2019-01-01 RX ORDER — HEPARIN SODIUM 5000 [USP'U]/ML
80 INJECTION, SOLUTION INTRAVENOUS; SUBCUTANEOUS
Status: COMPLETED | OUTPATIENT
Start: 2019-01-01 | End: 2019-01-01

## 2019-01-01 RX ORDER — DILTIAZEM HYDROCHLORIDE 5 MG/ML
INJECTION INTRAVENOUS
Status: COMPLETED
Start: 2019-01-01 | End: 2019-01-01

## 2019-01-01 RX ORDER — HEPARIN SODIUM 5000 [USP'U]/ML
3000 INJECTION, SOLUTION INTRAVENOUS; SUBCUTANEOUS ONCE
Status: COMPLETED | OUTPATIENT
Start: 2019-01-01 | End: 2019-01-01

## 2019-01-01 RX ORDER — SODIUM CHLORIDE 0.9 % (FLUSH) 0.9 %
5-40 SYRINGE (ML) INJECTION EVERY 8 HOURS
Status: DISCONTINUED | OUTPATIENT
Start: 2019-01-01 | End: 2019-01-01 | Stop reason: SDUPTHER

## 2019-01-01 RX ADMIN — ALBUTEROL SULFATE 2.5 MG: 2.5 SOLUTION RESPIRATORY (INHALATION) at 20:51

## 2019-01-01 RX ADMIN — ALBUMIN (HUMAN) 25 G: 0.25 INJECTION, SOLUTION INTRAVENOUS at 13:00

## 2019-01-01 RX ADMIN — ONDANSETRON 8 MG: 4 TABLET, ORALLY DISINTEGRATING ORAL at 08:51

## 2019-01-01 RX ADMIN — HEPARIN SODIUM 1950 UNITS: 5000 INJECTION INTRAVENOUS; SUBCUTANEOUS at 01:44

## 2019-01-01 RX ADMIN — METOCLOPRAMIDE 5 MG: 5 INJECTION, SOLUTION INTRAMUSCULAR; INTRAVENOUS at 17:45

## 2019-01-01 RX ADMIN — PROMETHAZINE HYDROCHLORIDE 25 MG: 25 INJECTION INTRAMUSCULAR; INTRAVENOUS at 00:57

## 2019-01-01 RX ADMIN — Medication 10 ML: at 14:09

## 2019-01-01 RX ADMIN — ACETAMINOPHEN 650 MG: 325 TABLET, FILM COATED ORAL at 23:39

## 2019-01-01 RX ADMIN — LIDOCAINE HYDROCHLORIDE 90 MG: 20 INJECTION, SOLUTION EPIDURAL; INFILTRATION; INTRACAUDAL; PERINEURAL at 16:17

## 2019-01-01 RX ADMIN — ALBUMIN (HUMAN) 25 G: 0.25 INJECTION, SOLUTION INTRAVENOUS at 23:20

## 2019-01-01 RX ADMIN — HEPARIN SODIUM 20 UNITS/KG/HR: 5000 INJECTION, SOLUTION INTRAVENOUS at 14:43

## 2019-01-01 RX ADMIN — ALBUMIN (HUMAN) 25 G: 0.25 INJECTION, SOLUTION INTRAVENOUS at 11:35

## 2019-01-01 RX ADMIN — FAMOTIDINE 20 MG: 10 INJECTION INTRAVENOUS at 22:11

## 2019-01-01 RX ADMIN — VANCOMYCIN HYDROCHLORIDE 1000 MG: 1 INJECTION, POWDER, LYOPHILIZED, FOR SOLUTION INTRAVENOUS at 10:06

## 2019-01-01 RX ADMIN — INDOMETHACIN 100 MG: 50 SUPPOSITORY RECTAL at 22:10

## 2019-01-01 RX ADMIN — ONDANSETRON 8 MG: 4 TABLET, ORALLY DISINTEGRATING ORAL at 21:25

## 2019-01-01 RX ADMIN — LORAZEPAM 2 MG: 2 INJECTION INTRAMUSCULAR; INTRAVENOUS at 09:00

## 2019-01-01 RX ADMIN — VANCOMYCIN HYDROCHLORIDE 1500 MG: 10 INJECTION, POWDER, LYOPHILIZED, FOR SOLUTION INTRAVENOUS at 21:51

## 2019-01-01 RX ADMIN — Medication 10 ML: at 02:10

## 2019-01-01 RX ADMIN — ALBUMIN (HUMAN) 25 G: 0.25 INJECTION, SOLUTION INTRAVENOUS at 05:24

## 2019-01-01 RX ADMIN — GABAPENTIN 100 MG: 100 CAPSULE ORAL at 17:04

## 2019-01-01 RX ADMIN — SODIUM CHLORIDE 125 MG: 900 INJECTION, SOLUTION INTRAVENOUS at 13:50

## 2019-01-01 RX ADMIN — FUROSEMIDE 40 MG: 10 INJECTION, SOLUTION INTRAMUSCULAR; INTRAVENOUS at 21:20

## 2019-01-01 RX ADMIN — ALBUTEROL SULFATE 2.5 MG: 2.5 SOLUTION RESPIRATORY (INHALATION) at 16:48

## 2019-01-01 RX ADMIN — FENTANYL CITRATE 50 MCG: 50 INJECTION INTRAMUSCULAR; INTRAVENOUS at 09:03

## 2019-01-01 RX ADMIN — ALBUMIN (HUMAN) 25 G: 0.25 INJECTION, SOLUTION INTRAVENOUS at 12:25

## 2019-01-01 RX ADMIN — PROPOFOL 15 MCG/KG/MIN: 10 INJECTION, EMULSION INTRAVENOUS at 19:19

## 2019-01-01 RX ADMIN — GABAPENTIN 100 MG: 100 CAPSULE ORAL at 08:31

## 2019-01-01 RX ADMIN — MORPHINE SULFATE 15 MG: 15 TABLET, FILM COATED, EXTENDED RELEASE ORAL at 21:29

## 2019-01-01 RX ADMIN — FENTANYL CITRATE 50 MCG: 50 INJECTION INTRAMUSCULAR; INTRAVENOUS at 20:48

## 2019-01-01 RX ADMIN — Medication 10 ML: at 16:13

## 2019-01-01 RX ADMIN — PIPERACILLIN, TAZOBACTAM 4.5 G: 4; .5 INJECTION, POWDER, LYOPHILIZED, FOR SOLUTION INTRAVENOUS at 05:12

## 2019-01-01 RX ADMIN — METOCLOPRAMIDE 5 MG: 5 INJECTION, SOLUTION INTRAMUSCULAR; INTRAVENOUS at 17:13

## 2019-01-01 RX ADMIN — PROPOFOL 40 MCG/KG/MIN: 10 INJECTION, EMULSION INTRAVENOUS at 00:40

## 2019-01-01 RX ADMIN — FUROSEMIDE 40 MG: 10 INJECTION, SOLUTION INTRAMUSCULAR; INTRAVENOUS at 10:07

## 2019-01-01 RX ADMIN — Medication 100 MCG/HR: at 21:57

## 2019-01-01 RX ADMIN — TUBERCULIN PURIFIED PROTEIN DERIVATIVE 5 UNITS: 5 INJECTION, SOLUTION INTRADERMAL at 07:43

## 2019-01-01 RX ADMIN — FAMOTIDINE 20 MG: 10 INJECTION, SOLUTION INTRAVENOUS at 22:06

## 2019-01-01 RX ADMIN — METOCLOPRAMIDE 5 MG: 5 INJECTION, SOLUTION INTRAMUSCULAR; INTRAVENOUS at 06:57

## 2019-01-01 RX ADMIN — GABAPENTIN 100 MG: 100 CAPSULE ORAL at 18:19

## 2019-01-01 RX ADMIN — SUCCINYLCHOLINE CHLORIDE 120 MG: 20 INJECTION INTRAMUSCULAR; INTRAVENOUS at 16:17

## 2019-01-01 RX ADMIN — HEPARIN SODIUM 24 UNITS/KG/HR: 5000 INJECTION, SOLUTION INTRAVENOUS at 22:23

## 2019-01-01 RX ADMIN — RIVAROXABAN 15 MG: 15 TABLET, FILM COATED ORAL at 17:22

## 2019-01-01 RX ADMIN — AMIODARONE HYDROCHLORIDE 400 MG: 200 TABLET ORAL at 09:01

## 2019-01-01 RX ADMIN — FAMOTIDINE 20 MG: 10 INJECTION, SOLUTION INTRAVENOUS at 09:13

## 2019-01-01 RX ADMIN — CALCIUM CARBONATE (ANTACID) CHEW TAB 500 MG 400 MG: 500 CHEW TAB at 23:06

## 2019-01-01 RX ADMIN — GABAPENTIN 100 MG: 100 CAPSULE ORAL at 09:00

## 2019-01-01 RX ADMIN — ALBUMIN (HUMAN) 25 G: 0.25 INJECTION, SOLUTION INTRAVENOUS at 01:08

## 2019-01-01 RX ADMIN — Medication 10 ML: at 13:57

## 2019-01-01 RX ADMIN — HEPARIN SODIUM 20 UNITS/KG/HR: 5000 INJECTION, SOLUTION INTRAVENOUS at 06:02

## 2019-01-01 RX ADMIN — AMIODARONE HYDROCHLORIDE 400 MG: 200 TABLET ORAL at 21:08

## 2019-01-01 RX ADMIN — HEPARIN SODIUM 1950 UNITS: 5000 INJECTION INTRAVENOUS; SUBCUTANEOUS at 15:35

## 2019-01-01 RX ADMIN — Medication 10 ML: at 22:06

## 2019-01-01 RX ADMIN — PIPERACILLIN, TAZOBACTAM 4.5 G: 4; .5 INJECTION, POWDER, LYOPHILIZED, FOR SOLUTION INTRAVENOUS at 05:13

## 2019-01-01 RX ADMIN — FUROSEMIDE 40 MG: 10 INJECTION, SOLUTION INTRAMUSCULAR; INTRAVENOUS at 11:36

## 2019-01-01 RX ADMIN — LORAZEPAM 2 MG: 2 INJECTION INTRAMUSCULAR; INTRAVENOUS at 08:15

## 2019-01-01 RX ADMIN — AMIODARONE HYDROCHLORIDE 1 MG/MIN: 50 INJECTION, SOLUTION INTRAVENOUS at 08:35

## 2019-01-01 RX ADMIN — SODIUM CHLORIDE 100 ML/HR: 900 INJECTION, SOLUTION INTRAVENOUS at 13:21

## 2019-01-01 RX ADMIN — IOPAMIDOL 6 ML: 755 INJECTION, SOLUTION INTRAVENOUS at 18:06

## 2019-01-01 RX ADMIN — FAMOTIDINE 20 MG: 10 INJECTION INTRAVENOUS at 08:32

## 2019-01-01 RX ADMIN — AMIODARONE HYDROCHLORIDE 400 MG: 200 TABLET ORAL at 11:33

## 2019-01-01 RX ADMIN — ALBUTEROL SULFATE 2.5 MG: 2.5 SOLUTION RESPIRATORY (INHALATION) at 17:38

## 2019-01-01 RX ADMIN — METOCLOPRAMIDE 5 MG: 5 INJECTION, SOLUTION INTRAMUSCULAR; INTRAVENOUS at 05:25

## 2019-01-01 RX ADMIN — SODIUM CHLORIDE 1000 ML/HR: 900 INJECTION, SOLUTION INTRAVENOUS at 09:53

## 2019-01-01 RX ADMIN — AMIODARONE HYDROCHLORIDE 400 MG: 200 TABLET ORAL at 20:38

## 2019-01-01 RX ADMIN — AZITHROMYCIN 500 MG: 250 TABLET, FILM COATED ORAL at 08:08

## 2019-01-01 RX ADMIN — FAMOTIDINE 20 MG: 10 INJECTION, SOLUTION INTRAVENOUS at 08:31

## 2019-01-01 RX ADMIN — PROPOFOL 15 MCG/KG/MIN: 10 INJECTION, EMULSION INTRAVENOUS at 16:22

## 2019-01-01 RX ADMIN — Medication 10 ML: at 21:20

## 2019-01-01 RX ADMIN — PROMETHAZINE HYDROCHLORIDE 25 MG: 25 INJECTION INTRAMUSCULAR; INTRAVENOUS at 01:12

## 2019-01-01 RX ADMIN — VANCOMYCIN HYDROCHLORIDE 1000 MG: 1 INJECTION, POWDER, LYOPHILIZED, FOR SOLUTION INTRAVENOUS at 16:04

## 2019-01-01 RX ADMIN — SODIUM CHLORIDE 50 ML/HR: 900 INJECTION, SOLUTION INTRAVENOUS at 16:05

## 2019-01-01 RX ADMIN — AMIODARONE HYDROCHLORIDE 1 MG/MIN: 50 INJECTION, SOLUTION INTRAVENOUS at 11:22

## 2019-01-01 RX ADMIN — POLYETHYLENE GLYCOL 3350 17 G: 17 POWDER, FOR SOLUTION ORAL at 08:54

## 2019-01-01 RX ADMIN — AMIODARONE HYDROCHLORIDE 400 MG: 200 TABLET ORAL at 21:29

## 2019-01-01 RX ADMIN — METOCLOPRAMIDE 5 MG: 5 INJECTION, SOLUTION INTRAMUSCULAR; INTRAVENOUS at 20:38

## 2019-01-01 RX ADMIN — Medication 50 MCG/HR: at 13:29

## 2019-01-01 RX ADMIN — SODIUM CHLORIDE 1000 ML: 900 INJECTION, SOLUTION INTRAVENOUS at 04:25

## 2019-01-01 RX ADMIN — FAMOTIDINE 20 MG: 10 INJECTION, SOLUTION INTRAVENOUS at 09:04

## 2019-01-01 RX ADMIN — LORAZEPAM 2 MG: 2 INJECTION INTRAMUSCULAR; INTRAVENOUS at 06:51

## 2019-01-01 RX ADMIN — VANCOMYCIN HYDROCHLORIDE 750 MG: 10 INJECTION, POWDER, LYOPHILIZED, FOR SOLUTION INTRAVENOUS at 08:44

## 2019-01-01 RX ADMIN — AMIODARONE HYDROCHLORIDE 1 MG/MIN: 50 INJECTION, SOLUTION INTRAVENOUS at 17:37

## 2019-01-01 RX ADMIN — PERFLUTREN 1 ML: 6.52 INJECTION, SUSPENSION INTRAVENOUS at 10:00

## 2019-01-01 RX ADMIN — VANCOMYCIN HYDROCHLORIDE 1000 MG: 1 INJECTION, POWDER, LYOPHILIZED, FOR SOLUTION INTRAVENOUS at 21:49

## 2019-01-01 RX ADMIN — ALBUTEROL SULFATE 2.5 MG: 2.5 SOLUTION RESPIRATORY (INHALATION) at 07:34

## 2019-01-01 RX ADMIN — ALBUTEROL SULFATE 2.5 MG: 2.5 SOLUTION RESPIRATORY (INHALATION) at 20:24

## 2019-01-01 RX ADMIN — MORPHINE SULFATE 2 MG: 2 INJECTION, SOLUTION INTRAMUSCULAR; INTRAVENOUS at 05:18

## 2019-01-01 RX ADMIN — HEPARIN SODIUM 18 UNITS/KG/HR: 5000 INJECTION, SOLUTION INTRAVENOUS at 14:26

## 2019-01-01 RX ADMIN — OXYCODONE HYDROCHLORIDE 5 MG: 5 TABLET ORAL at 18:23

## 2019-01-01 RX ADMIN — METHYLPREDNISOLONE SODIUM SUCCINATE 80 MG: 125 INJECTION, POWDER, FOR SOLUTION INTRAMUSCULAR; INTRAVENOUS at 21:40

## 2019-01-01 RX ADMIN — DILTIAZEM HYDROCHLORIDE 20 MG: 5 INJECTION INTRAVENOUS at 08:58

## 2019-01-01 RX ADMIN — Medication 5 ML: at 15:37

## 2019-01-01 RX ADMIN — AMIODARONE HYDROCHLORIDE 400 MG: 200 TABLET ORAL at 09:53

## 2019-01-01 RX ADMIN — Medication 10 ML: at 21:40

## 2019-01-01 RX ADMIN — FAMOTIDINE 40 MG: 20 TABLET ORAL at 08:09

## 2019-01-01 RX ADMIN — GABAPENTIN 100 MG: 100 CAPSULE ORAL at 17:35

## 2019-01-01 RX ADMIN — GABAPENTIN 100 MG: 100 CAPSULE ORAL at 08:09

## 2019-01-01 RX ADMIN — PROMETHAZINE HYDROCHLORIDE 25 MG: 25 INJECTION INTRAMUSCULAR; INTRAVENOUS at 05:44

## 2019-01-01 RX ADMIN — BUMETANIDE 1 MG/HR: 0.25 INJECTION INTRAMUSCULAR; INTRAVENOUS at 09:08

## 2019-01-01 RX ADMIN — PROMETHAZINE HYDROCHLORIDE 25 MG: 25 INJECTION INTRAMUSCULAR; INTRAVENOUS at 11:11

## 2019-01-01 RX ADMIN — VANCOMYCIN HYDROCHLORIDE 1000 MG: 1 INJECTION, POWDER, LYOPHILIZED, FOR SOLUTION INTRAVENOUS at 22:07

## 2019-01-01 RX ADMIN — POLYETHYLENE GLYCOL 3350 17 G: 17 POWDER, FOR SOLUTION ORAL at 09:00

## 2019-01-01 RX ADMIN — Medication 20 ML: at 14:04

## 2019-01-01 RX ADMIN — NOREPINEPHRINE BITARTRATE 30 MCG/MIN: 1 INJECTION INTRAVENOUS at 07:15

## 2019-01-01 RX ADMIN — MORPHINE SULFATE 15 MG: 15 TABLET, FILM COATED, EXTENDED RELEASE ORAL at 22:09

## 2019-01-01 RX ADMIN — Medication 5 ML: at 06:10

## 2019-01-01 RX ADMIN — POLYETHYLENE GLYCOL 3350 17 G: 17 POWDER, FOR SOLUTION ORAL at 09:06

## 2019-01-01 RX ADMIN — Medication 10 ML: at 05:30

## 2019-01-01 RX ADMIN — ALBUMIN (HUMAN) 25 G: 0.25 INJECTION, SOLUTION INTRAVENOUS at 07:18

## 2019-01-01 RX ADMIN — Medication 50 MCG/HR: at 08:52

## 2019-01-01 RX ADMIN — PROPOFOL 40 MCG/KG/MIN: 10 INJECTION, EMULSION INTRAVENOUS at 05:56

## 2019-01-01 RX ADMIN — Medication 10 ML: at 05:31

## 2019-01-01 RX ADMIN — HEPARIN SODIUM 20 UNITS/KG/HR: 5000 INJECTION, SOLUTION INTRAVENOUS at 08:50

## 2019-01-01 RX ADMIN — HEPARIN SODIUM 24 UNITS/KG/HR: 5000 INJECTION, SOLUTION INTRAVENOUS at 15:38

## 2019-01-01 RX ADMIN — Medication 10 ML: at 05:14

## 2019-01-01 RX ADMIN — SODIUM CHLORIDE 100 ML/HR: 900 INJECTION, SOLUTION INTRAVENOUS at 05:48

## 2019-01-01 RX ADMIN — FUROSEMIDE 40 MG: 10 INJECTION, SOLUTION INTRAMUSCULAR; INTRAVENOUS at 22:14

## 2019-01-01 RX ADMIN — GABAPENTIN 100 MG: 100 CAPSULE ORAL at 17:36

## 2019-01-01 RX ADMIN — PROPOFOL 30 MCG/KG/MIN: 10 INJECTION, EMULSION INTRAVENOUS at 04:16

## 2019-01-01 RX ADMIN — ONDANSETRON 8 MG: 4 TABLET, ORALLY DISINTEGRATING ORAL at 21:29

## 2019-01-01 RX ADMIN — GABAPENTIN 100 MG: 100 CAPSULE ORAL at 09:07

## 2019-01-01 RX ADMIN — ACETAMINOPHEN 650 MG: 325 TABLET, FILM COATED ORAL at 08:06

## 2019-01-01 RX ADMIN — Medication 10 ML: at 05:22

## 2019-01-01 RX ADMIN — PROPOFOL 15 MCG/KG/MIN: 10 INJECTION, EMULSION INTRAVENOUS at 16:05

## 2019-01-01 RX ADMIN — PROPOFOL 30 MCG/KG/MIN: 10 INJECTION, EMULSION INTRAVENOUS at 16:07

## 2019-01-01 RX ADMIN — RIVAROXABAN 15 MG: 15 TABLET, FILM COATED ORAL at 08:07

## 2019-01-01 RX ADMIN — FENTANYL CITRATE 50 MCG: 50 INJECTION, SOLUTION INTRAMUSCULAR; INTRAVENOUS at 10:00

## 2019-01-01 RX ADMIN — ALBUTEROL SULFATE 2.5 MG: 2.5 SOLUTION RESPIRATORY (INHALATION) at 23:05

## 2019-01-01 RX ADMIN — POTASSIUM CHLORIDE 20 MEQ: 200 INJECTION, SOLUTION INTRAVENOUS at 05:27

## 2019-01-01 RX ADMIN — PIPERACILLIN, TAZOBACTAM 4.5 G: 4; .5 INJECTION, POWDER, LYOPHILIZED, FOR SOLUTION INTRAVENOUS at 21:27

## 2019-01-01 RX ADMIN — SODIUM CHLORIDE 100 ML/HR: 900 INJECTION, SOLUTION INTRAVENOUS at 12:50

## 2019-01-01 RX ADMIN — MIDAZOLAM HYDROCHLORIDE 1 MG: 1 INJECTION, SOLUTION INTRAMUSCULAR; INTRAVENOUS at 06:24

## 2019-01-01 RX ADMIN — SODIUM CHLORIDE 50 ML/HR: 900 INJECTION, SOLUTION INTRAVENOUS at 04:05

## 2019-01-01 RX ADMIN — Medication 10 ML: at 21:10

## 2019-01-01 RX ADMIN — SODIUM CHLORIDE 1000 ML: 900 INJECTION, SOLUTION INTRAVENOUS at 00:00

## 2019-01-01 RX ADMIN — GABAPENTIN 100 MG: 100 CAPSULE ORAL at 17:13

## 2019-01-01 RX ADMIN — DIATRIZOATE MEGLUMINE AND DIATRIZOATE SODIUM 15 ML: 660; 100 LIQUID ORAL; RECTAL at 00:22

## 2019-01-01 RX ADMIN — RIVAROXABAN 15 MG: 15 TABLET, FILM COATED ORAL at 08:51

## 2019-01-01 RX ADMIN — FAMOTIDINE 20 MG: 10 INJECTION INTRAVENOUS at 08:37

## 2019-01-01 RX ADMIN — ALBUTEROL SULFATE 2.5 MG: 2.5 SOLUTION RESPIRATORY (INHALATION) at 23:35

## 2019-01-01 RX ADMIN — NOREPINEPHRINE BITARTRATE 30 MCG/MIN: 1 INJECTION INTRAVENOUS at 09:35

## 2019-01-01 RX ADMIN — SODIUM CHLORIDE 100 ML/HR: 900 INJECTION, SOLUTION INTRAVENOUS at 20:54

## 2019-01-01 RX ADMIN — FUROSEMIDE 40 MG: 10 INJECTION, SOLUTION INTRAMUSCULAR; INTRAVENOUS at 09:13

## 2019-01-01 RX ADMIN — ACETAMINOPHEN 650 MG: 325 TABLET, FILM COATED ORAL at 18:40

## 2019-01-01 RX ADMIN — GABAPENTIN 100 MG: 100 CAPSULE ORAL at 08:41

## 2019-01-01 RX ADMIN — CEFTRIAXONE SODIUM 1 G: 1 INJECTION, POWDER, FOR SOLUTION INTRAMUSCULAR; INTRAVENOUS at 05:26

## 2019-01-01 RX ADMIN — FAMOTIDINE 20 MG: 10 INJECTION INTRAVENOUS at 09:10

## 2019-01-01 RX ADMIN — AMIODARONE HYDROCHLORIDE 200 MG: 200 TABLET ORAL at 12:48

## 2019-01-01 RX ADMIN — PROMETHAZINE HYDROCHLORIDE 25 MG: 25 INJECTION INTRAMUSCULAR; INTRAVENOUS at 05:27

## 2019-01-01 RX ADMIN — FAMOTIDINE 40 MG: 20 TABLET ORAL at 08:41

## 2019-01-01 RX ADMIN — ONDANSETRON 8 MG: 4 TABLET, ORALLY DISINTEGRATING ORAL at 08:07

## 2019-01-01 RX ADMIN — SENNOSIDES, DOCUSATE SODIUM 2 TABLET: 50; 8.6 TABLET, FILM COATED ORAL at 09:01

## 2019-01-01 RX ADMIN — IOPAMIDOL 100 ML: 755 INJECTION, SOLUTION INTRAVENOUS at 02:10

## 2019-01-01 RX ADMIN — HYDROCODONE BITARTRATE AND ACETAMINOPHEN 1 TABLET: 10; 325 TABLET ORAL at 23:26

## 2019-01-01 RX ADMIN — ALBUMIN (HUMAN) 25 G: 0.25 INJECTION, SOLUTION INTRAVENOUS at 05:44

## 2019-01-01 RX ADMIN — POLYETHYLENE GLYCOL 3350 17 G: 17 POWDER, FOR SOLUTION ORAL at 08:31

## 2019-01-01 RX ADMIN — SODIUM CHLORIDE 500 ML: 900 INJECTION, SOLUTION INTRAVENOUS at 18:27

## 2019-01-01 RX ADMIN — GABAPENTIN 100 MG: 100 CAPSULE ORAL at 11:34

## 2019-01-01 RX ADMIN — SODIUM CHLORIDE 75 ML/HR: 900 INJECTION, SOLUTION INTRAVENOUS at 11:01

## 2019-01-01 RX ADMIN — VANCOMYCIN HYDROCHLORIDE 1000 MG: 1 INJECTION, POWDER, LYOPHILIZED, FOR SOLUTION INTRAVENOUS at 14:37

## 2019-01-01 RX ADMIN — HEPARIN SODIUM 2700 UNITS: 5000 INJECTION INTRAVENOUS; SUBCUTANEOUS at 21:18

## 2019-01-01 RX ADMIN — ALBUTEROL SULFATE 2.5 MG: 2.5 SOLUTION RESPIRATORY (INHALATION) at 15:15

## 2019-01-01 RX ADMIN — ONDANSETRON 8 MG: 4 TABLET, ORALLY DISINTEGRATING ORAL at 15:08

## 2019-01-01 RX ADMIN — ALBUMIN (HUMAN) 25 G: 0.25 INJECTION, SOLUTION INTRAVENOUS at 23:29

## 2019-01-01 RX ADMIN — PROPOFOL 30 MCG/KG/MIN: 10 INJECTION, EMULSION INTRAVENOUS at 22:46

## 2019-01-01 RX ADMIN — LORAZEPAM 2 MG: 2 INJECTION INTRAMUSCULAR; INTRAVENOUS at 02:40

## 2019-01-01 RX ADMIN — CEFTRIAXONE SODIUM 1 G: 1 INJECTION, POWDER, FOR SOLUTION INTRAMUSCULAR; INTRAVENOUS at 06:20

## 2019-01-01 RX ADMIN — AMIODARONE HYDROCHLORIDE 1 MG/MIN: 50 INJECTION, SOLUTION INTRAVENOUS at 01:45

## 2019-01-01 RX ADMIN — HEPARIN SODIUM 20 UNITS/KG/HR: 5000 INJECTION, SOLUTION INTRAVENOUS at 20:16

## 2019-01-01 RX ADMIN — Medication 10 ML: at 21:34

## 2019-01-01 RX ADMIN — POLYETHYLENE GLYCOL 3350 17 G: 17 POWDER, FOR SOLUTION ORAL at 11:34

## 2019-01-01 RX ADMIN — CEFTRIAXONE SODIUM 1 G: 1 INJECTION, POWDER, FOR SOLUTION INTRAMUSCULAR; INTRAVENOUS at 05:43

## 2019-01-01 RX ADMIN — FAMOTIDINE 20 MG: 10 INJECTION, SOLUTION INTRAVENOUS at 09:01

## 2019-01-01 RX ADMIN — FAMOTIDINE 40 MG: 20 TABLET ORAL at 08:07

## 2019-01-01 RX ADMIN — FENTANYL CITRATE 100 MCG: 50 INJECTION INTRAMUSCULAR; INTRAVENOUS at 18:43

## 2019-01-01 RX ADMIN — METOCLOPRAMIDE 5 MG: 5 INJECTION, SOLUTION INTRAMUSCULAR; INTRAVENOUS at 23:29

## 2019-01-01 RX ADMIN — ROCURONIUM BROMIDE 5 MG: 10 INJECTION, SOLUTION INTRAVENOUS at 16:17

## 2019-01-01 RX ADMIN — HEPARIN SODIUM 20 UNITS/KG/HR: 5000 INJECTION, SOLUTION INTRAVENOUS at 15:36

## 2019-01-01 RX ADMIN — AMIODARONE HYDROCHLORIDE 400 MG: 200 TABLET ORAL at 09:13

## 2019-01-01 RX ADMIN — PROMETHAZINE HYDROCHLORIDE 25 MG: 25 INJECTION INTRAMUSCULAR; INTRAVENOUS at 17:28

## 2019-01-01 RX ADMIN — METOCLOPRAMIDE 5 MG: 5 INJECTION, SOLUTION INTRAMUSCULAR; INTRAVENOUS at 06:16

## 2019-01-01 RX ADMIN — LORAZEPAM 2 MG: 2 INJECTION INTRAMUSCULAR; INTRAVENOUS at 21:31

## 2019-01-01 RX ADMIN — GABAPENTIN 100 MG: 100 CAPSULE ORAL at 17:25

## 2019-01-01 RX ADMIN — HEPARIN SODIUM 20 UNITS/KG/HR: 5000 INJECTION, SOLUTION INTRAVENOUS at 08:49

## 2019-01-01 RX ADMIN — Medication 10 ML: at 22:15

## 2019-01-01 RX ADMIN — Medication 10 ML: at 06:20

## 2019-01-01 RX ADMIN — SODIUM CHLORIDE 150 ML/HR: 900 INJECTION, SOLUTION INTRAVENOUS at 14:05

## 2019-01-01 RX ADMIN — PIPERACILLIN, TAZOBACTAM 4.5 G: 4; .5 INJECTION, POWDER, LYOPHILIZED, FOR SOLUTION INTRAVENOUS at 05:21

## 2019-01-01 RX ADMIN — ONDANSETRON 4 MG: 2 INJECTION INTRAMUSCULAR; INTRAVENOUS at 07:21

## 2019-01-01 RX ADMIN — Medication 10 ML: at 13:59

## 2019-01-01 RX ADMIN — ONDANSETRON 8 MG: 4 TABLET, ORALLY DISINTEGRATING ORAL at 08:08

## 2019-01-01 RX ADMIN — OXYCODONE HYDROCHLORIDE 5 MG: 5 TABLET ORAL at 08:32

## 2019-01-01 RX ADMIN — SENNOSIDES AND DOCUSATE SODIUM 1 TABLET: 8.6; 5 TABLET ORAL at 10:10

## 2019-01-01 RX ADMIN — POLYETHYLENE GLYCOL 3350 17 G: 17 POWDER, FOR SOLUTION ORAL at 08:41

## 2019-01-01 RX ADMIN — FUROSEMIDE 40 MG: 10 INJECTION, SOLUTION INTRAMUSCULAR; INTRAVENOUS at 21:29

## 2019-01-01 RX ADMIN — NOREPINEPHRINE BITARTRATE 4 MCG/MIN: 1 INJECTION INTRAVENOUS at 02:48

## 2019-01-01 RX ADMIN — OXYCODONE HYDROCHLORIDE 5 MG: 5 TABLET ORAL at 00:33

## 2019-01-01 RX ADMIN — HEPARIN SODIUM 18 UNITS/KG/HR: 5000 INJECTION, SOLUTION INTRAVENOUS at 04:56

## 2019-01-01 RX ADMIN — ONDANSETRON 8 MG: 4 TABLET, ORALLY DISINTEGRATING ORAL at 08:41

## 2019-01-01 RX ADMIN — POLYETHYLENE GLYCOL 3350 17 G: 17 POWDER, FOR SOLUTION ORAL at 09:53

## 2019-01-01 RX ADMIN — AMIODARONE HYDROCHLORIDE 400 MG: 200 TABLET ORAL at 21:31

## 2019-01-01 RX ADMIN — OXYCODONE HYDROCHLORIDE 5 MG: 5 TABLET ORAL at 02:30

## 2019-01-01 RX ADMIN — GABAPENTIN 100 MG: 100 CAPSULE ORAL at 08:07

## 2019-01-01 RX ADMIN — HEPARIN SODIUM 2400 UNITS: 5000 INJECTION INTRAVENOUS; SUBCUTANEOUS at 06:04

## 2019-01-01 RX ADMIN — PIPERACILLIN, TAZOBACTAM 4.5 G: 4; .5 INJECTION, POWDER, LYOPHILIZED, FOR SOLUTION INTRAVENOUS at 13:59

## 2019-01-01 RX ADMIN — PROMETHAZINE HYDROCHLORIDE 25 MG: 25 INJECTION INTRAMUSCULAR; INTRAVENOUS at 23:44

## 2019-01-01 RX ADMIN — PIPERACILLIN, TAZOBACTAM 4.5 G: 4; .5 INJECTION, POWDER, LYOPHILIZED, FOR SOLUTION INTRAVENOUS at 16:04

## 2019-01-01 RX ADMIN — POLYETHYLENE GLYCOL 3350 17 G: 17 POWDER, FOR SOLUTION ORAL at 09:04

## 2019-01-01 RX ADMIN — SODIUM CHLORIDE, SODIUM LACTATE, POTASSIUM CHLORIDE, AND CALCIUM CHLORIDE 100 ML/HR: 600; 310; 30; 20 INJECTION, SOLUTION INTRAVENOUS at 14:37

## 2019-01-01 RX ADMIN — Medication 10 ML: at 14:15

## 2019-01-01 RX ADMIN — LORAZEPAM 2 MG: 2 INJECTION INTRAMUSCULAR; INTRAVENOUS at 14:31

## 2019-01-01 RX ADMIN — Medication 10 ML: at 13:42

## 2019-01-01 RX ADMIN — ALBUMIN (HUMAN) 25 G: 0.25 INJECTION, SOLUTION INTRAVENOUS at 17:44

## 2019-01-01 RX ADMIN — Medication 25 MCG/HR: at 05:48

## 2019-01-01 RX ADMIN — Medication 100 MCG/HR: at 22:47

## 2019-01-01 RX ADMIN — ALBUMIN (HUMAN) 25 G: 0.25 INJECTION, SOLUTION INTRAVENOUS at 12:02

## 2019-01-01 RX ADMIN — PIPERACILLIN, TAZOBACTAM 4.5 G: 4; .5 INJECTION, POWDER, LYOPHILIZED, FOR SOLUTION INTRAVENOUS at 14:15

## 2019-01-01 RX ADMIN — GUAIFENESIN 1200 MG: 600 TABLET, EXTENDED RELEASE ORAL at 20:31

## 2019-01-01 RX ADMIN — OXYCODONE HYDROCHLORIDE 5 MG: 5 TABLET ORAL at 22:41

## 2019-01-01 RX ADMIN — HYDROCODONE BITARTRATE AND ACETAMINOPHEN 1 TABLET: 10; 325 TABLET ORAL at 10:12

## 2019-01-01 RX ADMIN — FAMOTIDINE 20 MG: 10 INJECTION INTRAVENOUS at 09:19

## 2019-01-01 RX ADMIN — SODIUM CHLORIDE 100 ML/HR: 900 INJECTION, SOLUTION INTRAVENOUS at 20:14

## 2019-01-01 RX ADMIN — SENNOSIDES AND DOCUSATE SODIUM 1 TABLET: 8.6; 5 TABLET ORAL at 08:48

## 2019-01-01 RX ADMIN — METOCLOPRAMIDE 5 MG: 5 INJECTION, SOLUTION INTRAMUSCULAR; INTRAVENOUS at 09:06

## 2019-01-01 RX ADMIN — VANCOMYCIN HYDROCHLORIDE 750 MG: 10 INJECTION, POWDER, LYOPHILIZED, FOR SOLUTION INTRAVENOUS at 05:30

## 2019-01-01 RX ADMIN — SODIUM CHLORIDE 75 ML/HR: 900 INJECTION, SOLUTION INTRAVENOUS at 00:08

## 2019-01-01 RX ADMIN — Medication 10 ML: at 14:00

## 2019-01-01 RX ADMIN — LORAZEPAM 2 MG: 2 INJECTION INTRAMUSCULAR; INTRAVENOUS at 19:19

## 2019-01-01 RX ADMIN — ATRACURIUM BESYLATE 21.9 MG: 10 INJECTION, SOLUTION INTRAVENOUS at 06:21

## 2019-01-01 RX ADMIN — SODIUM CHLORIDE 150 ML/HR: 900 INJECTION, SOLUTION INTRAVENOUS at 07:32

## 2019-01-01 RX ADMIN — PIPERACILLIN, TAZOBACTAM 4.5 G: 4; .5 INJECTION, POWDER, LYOPHILIZED, FOR SOLUTION INTRAVENOUS at 14:04

## 2019-01-01 RX ADMIN — ALBUTEROL SULFATE 2.5 MG: 2.5 SOLUTION RESPIRATORY (INHALATION) at 11:17

## 2019-01-01 RX ADMIN — FAMOTIDINE 20 MG: 10 INJECTION, SOLUTION INTRAVENOUS at 21:55

## 2019-01-01 RX ADMIN — METOCLOPRAMIDE 5 MG: 5 INJECTION, SOLUTION INTRAMUSCULAR; INTRAVENOUS at 23:20

## 2019-01-01 RX ADMIN — FAMOTIDINE 20 MG: 10 INJECTION, SOLUTION INTRAVENOUS at 21:19

## 2019-01-01 RX ADMIN — METOCLOPRAMIDE 5 MG: 5 INJECTION, SOLUTION INTRAMUSCULAR; INTRAVENOUS at 17:52

## 2019-01-01 RX ADMIN — PROPOFOL 45 MCG/KG/MIN: 10 INJECTION, EMULSION INTRAVENOUS at 09:38

## 2019-01-01 RX ADMIN — PIPERACILLIN, TAZOBACTAM 4.5 G: 4; .5 INJECTION, POWDER, LYOPHILIZED, FOR SOLUTION INTRAVENOUS at 14:33

## 2019-01-01 RX ADMIN — LORAZEPAM 2 MG: 2 INJECTION INTRAMUSCULAR; INTRAVENOUS at 21:15

## 2019-01-01 RX ADMIN — FAMOTIDINE 20 MG: 10 INJECTION, SOLUTION INTRAVENOUS at 09:06

## 2019-01-01 RX ADMIN — METOCLOPRAMIDE 5 MG: 5 INJECTION, SOLUTION INTRAMUSCULAR; INTRAVENOUS at 07:19

## 2019-01-01 RX ADMIN — GUAIFENESIN 1200 MG: 600 TABLET, EXTENDED RELEASE ORAL at 08:50

## 2019-01-01 RX ADMIN — HEPARIN SODIUM 24 UNITS/KG/HR: 5000 INJECTION, SOLUTION INTRAVENOUS at 03:42

## 2019-01-01 RX ADMIN — Medication 10 ML: at 14:38

## 2019-01-01 RX ADMIN — ONDANSETRON 8 MG: 4 TABLET, ORALLY DISINTEGRATING ORAL at 15:33

## 2019-01-01 RX ADMIN — Medication 10 ML: at 13:50

## 2019-01-01 RX ADMIN — FAMOTIDINE 20 MG: 10 INJECTION, SOLUTION INTRAVENOUS at 11:37

## 2019-01-01 RX ADMIN — PIPERACILLIN, TAZOBACTAM 4.5 G: 4; .5 INJECTION, POWDER, LYOPHILIZED, FOR SOLUTION INTRAVENOUS at 13:30

## 2019-01-01 RX ADMIN — SODIUM CHLORIDE 100 ML/HR: 900 INJECTION, SOLUTION INTRAVENOUS at 06:00

## 2019-01-01 RX ADMIN — PROMETHAZINE HYDROCHLORIDE 25 MG: 25 INJECTION INTRAMUSCULAR; INTRAVENOUS at 10:18

## 2019-01-01 RX ADMIN — HEPARIN SODIUM 24 UNITS/KG/HR: 5000 INJECTION, SOLUTION INTRAVENOUS at 10:39

## 2019-01-01 RX ADMIN — ALBUTEROL SULFATE 2.5 MG: 2.5 SOLUTION RESPIRATORY (INHALATION) at 03:09

## 2019-01-01 RX ADMIN — Medication 10 ML: at 05:16

## 2019-01-01 RX ADMIN — FAMOTIDINE 20 MG: 10 INJECTION, SOLUTION INTRAVENOUS at 21:27

## 2019-01-01 RX ADMIN — POTASSIUM CHLORIDE 20 MEQ: 200 INJECTION, SOLUTION INTRAVENOUS at 07:38

## 2019-01-01 RX ADMIN — HYDROCODONE BITARTRATE AND ACETAMINOPHEN 1 TABLET: 10; 325 TABLET ORAL at 01:36

## 2019-01-01 RX ADMIN — OXYCODONE HYDROCHLORIDE 5 MG: 5 TABLET ORAL at 09:11

## 2019-01-01 RX ADMIN — ALBUMIN (HUMAN) 25 G: 0.25 INJECTION, SOLUTION INTRAVENOUS at 17:12

## 2019-01-01 RX ADMIN — NOREPINEPHRINE BITARTRATE 5 MCG/MIN: 1 INJECTION INTRAVENOUS at 06:38

## 2019-01-01 RX ADMIN — ALBUMIN (HUMAN) 25 G: 0.25 INJECTION, SOLUTION INTRAVENOUS at 17:25

## 2019-01-01 RX ADMIN — ONDANSETRON 8 MG: 4 TABLET, ORALLY DISINTEGRATING ORAL at 22:08

## 2019-01-01 RX ADMIN — METOCLOPRAMIDE 5 MG: 5 INJECTION, SOLUTION INTRAMUSCULAR; INTRAVENOUS at 00:00

## 2019-01-01 RX ADMIN — SODIUM CHLORIDE, SODIUM LACTATE, POTASSIUM CHLORIDE, AND CALCIUM CHLORIDE: 600; 310; 30; 20 INJECTION, SOLUTION INTRAVENOUS at 18:01

## 2019-01-01 RX ADMIN — Medication 10 ML: at 22:08

## 2019-01-01 RX ADMIN — PERFLUTREN 1 ML: 6.52 INJECTION, SUSPENSION INTRAVENOUS at 14:00

## 2019-01-01 RX ADMIN — MORPHINE SULFATE 2 MG: 2 INJECTION, SOLUTION INTRAMUSCULAR; INTRAVENOUS at 17:41

## 2019-01-01 RX ADMIN — Medication 10 ML: at 22:13

## 2019-01-01 RX ADMIN — AMIODARONE HYDROCHLORIDE 400 MG: 200 TABLET ORAL at 08:31

## 2019-01-01 RX ADMIN — SODIUM CHLORIDE 1000 ML: 900 INJECTION, SOLUTION INTRAVENOUS at 07:11

## 2019-01-01 RX ADMIN — METOCLOPRAMIDE 5 MG: 5 INJECTION, SOLUTION INTRAMUSCULAR; INTRAVENOUS at 13:41

## 2019-01-01 RX ADMIN — MORPHINE SULFATE 2 MG: 2 INJECTION, SOLUTION INTRAMUSCULAR; INTRAVENOUS at 22:27

## 2019-01-01 RX ADMIN — FAMOTIDINE 20 MG: 10 INJECTION INTRAVENOUS at 21:59

## 2019-01-01 RX ADMIN — LORAZEPAM 2 MG: 2 INJECTION INTRAMUSCULAR; INTRAVENOUS at 00:24

## 2019-01-01 RX ADMIN — ALBUMIN (HUMAN) 25 G: 0.25 INJECTION, SOLUTION INTRAVENOUS at 12:30

## 2019-01-01 RX ADMIN — SODIUM CHLORIDE 100 ML: 900 INJECTION, SOLUTION INTRAVENOUS at 02:10

## 2019-01-01 RX ADMIN — GUAIFENESIN 1200 MG: 600 TABLET, EXTENDED RELEASE ORAL at 08:41

## 2019-01-01 RX ADMIN — ALBUTEROL SULFATE 2.5 MG: 2.5 SOLUTION RESPIRATORY (INHALATION) at 07:53

## 2019-01-01 RX ADMIN — FAMOTIDINE 20 MG: 10 INJECTION INTRAVENOUS at 21:18

## 2019-01-01 RX ADMIN — FAMOTIDINE 20 MG: 10 INJECTION, SOLUTION INTRAVENOUS at 08:07

## 2019-01-01 RX ADMIN — GABAPENTIN 100 MG: 100 CAPSULE ORAL at 11:40

## 2019-01-01 RX ADMIN — FAMOTIDINE 20 MG: 10 INJECTION INTRAVENOUS at 08:47

## 2019-01-01 RX ADMIN — AMIODARONE HYDROCHLORIDE 400 MG: 200 TABLET ORAL at 09:06

## 2019-01-01 RX ADMIN — RIVAROXABAN 15 MG: 15 TABLET, FILM COATED ORAL at 09:11

## 2019-01-01 RX ADMIN — METOCLOPRAMIDE 5 MG: 5 INJECTION, SOLUTION INTRAMUSCULAR; INTRAVENOUS at 01:08

## 2019-01-01 RX ADMIN — GUAIFENESIN 1200 MG: 600 TABLET, EXTENDED RELEASE ORAL at 08:07

## 2019-01-01 RX ADMIN — HEPARIN SODIUM 24 UNITS/KG/HR: 5000 INJECTION, SOLUTION INTRAVENOUS at 12:54

## 2019-01-01 RX ADMIN — PROMETHAZINE HYDROCHLORIDE 25 MG: 25 INJECTION INTRAMUSCULAR; INTRAVENOUS at 11:07

## 2019-01-01 RX ADMIN — METOCLOPRAMIDE 5 MG: 5 INJECTION, SOLUTION INTRAMUSCULAR; INTRAVENOUS at 13:59

## 2019-01-01 RX ADMIN — ALBUTEROL SULFATE 2.5 MG: 2.5 SOLUTION RESPIRATORY (INHALATION) at 20:05

## 2019-01-01 RX ADMIN — MORPHINE SULFATE 15 MG: 15 TABLET, FILM COATED, EXTENDED RELEASE ORAL at 08:50

## 2019-01-01 RX ADMIN — VANCOMYCIN HYDROCHLORIDE 1000 MG: 1 INJECTION, POWDER, LYOPHILIZED, FOR SOLUTION INTRAVENOUS at 21:55

## 2019-01-01 RX ADMIN — MORPHINE SULFATE 2 MG: 2 INJECTION, SOLUTION INTRAMUSCULAR; INTRAVENOUS at 10:44

## 2019-01-01 RX ADMIN — MORPHINE SULFATE 4 MG: 4 INJECTION INTRAVENOUS at 00:22

## 2019-01-01 RX ADMIN — GUAIFENESIN 1200 MG: 600 TABLET, EXTENDED RELEASE ORAL at 15:32

## 2019-01-01 RX ADMIN — FAMOTIDINE 20 MG: 10 INJECTION INTRAVENOUS at 10:10

## 2019-01-01 RX ADMIN — Medication 40 ML: at 22:53

## 2019-01-01 RX ADMIN — GABAPENTIN 100 MG: 100 CAPSULE ORAL at 08:44

## 2019-01-01 RX ADMIN — ALBUTEROL SULFATE 2.5 MG: 2.5 SOLUTION RESPIRATORY (INHALATION) at 19:42

## 2019-01-01 RX ADMIN — MORPHINE SULFATE 4 MG: 4 INJECTION INTRAVENOUS at 07:41

## 2019-01-01 RX ADMIN — HEPARIN SODIUM 20 UNITS/KG/HR: 5000 INJECTION, SOLUTION INTRAVENOUS at 01:58

## 2019-01-01 RX ADMIN — Medication 100 MCG/HR: at 23:19

## 2019-01-01 RX ADMIN — PROPOFOL 10 MCG/KG/MIN: 10 INJECTION, EMULSION INTRAVENOUS at 04:11

## 2019-01-01 RX ADMIN — HEPARIN SODIUM 18 UNITS/KG/HR: 5000 INJECTION, SOLUTION INTRAVENOUS at 22:17

## 2019-01-01 RX ADMIN — METOCLOPRAMIDE 5 MG: 5 INJECTION, SOLUTION INTRAMUSCULAR; INTRAVENOUS at 12:27

## 2019-01-01 RX ADMIN — Medication 56 MG: at 09:04

## 2019-01-01 RX ADMIN — VANCOMYCIN HYDROCHLORIDE 1000 MG: 1 INJECTION, POWDER, LYOPHILIZED, FOR SOLUTION INTRAVENOUS at 05:04

## 2019-01-01 RX ADMIN — AMIODARONE HYDROCHLORIDE 400 MG: 200 TABLET ORAL at 17:04

## 2019-01-01 RX ADMIN — PIPERACILLIN, TAZOBACTAM 4.5 G: 4; .5 INJECTION, POWDER, LYOPHILIZED, FOR SOLUTION INTRAVENOUS at 05:27

## 2019-01-01 RX ADMIN — ALBUMIN (HUMAN) 25 G: 0.25 INJECTION, SOLUTION INTRAVENOUS at 06:57

## 2019-01-01 RX ADMIN — PROMETHAZINE HYDROCHLORIDE 25 MG: 25 INJECTION INTRAMUSCULAR; INTRAVENOUS at 16:20

## 2019-01-01 RX ADMIN — ONDANSETRON 8 MG: 4 TABLET, ORALLY DISINTEGRATING ORAL at 15:42

## 2019-01-01 RX ADMIN — ALBUMIN (HUMAN) 25 G: 0.25 INJECTION, SOLUTION INTRAVENOUS at 06:17

## 2019-01-01 RX ADMIN — HEPARIN SODIUM AND DEXTROSE 18 UNITS/KG/HR: 5000; 5 INJECTION INTRAVENOUS at 15:11

## 2019-01-01 RX ADMIN — SODIUM CHLORIDE 50 ML/HR: 900 INJECTION, SOLUTION INTRAVENOUS at 22:07

## 2019-01-01 RX ADMIN — GABAPENTIN 100 MG: 100 CAPSULE ORAL at 09:53

## 2019-01-01 RX ADMIN — SUCCINYLCHOLINE CHLORIDE 100 MG: 20 INJECTION, SOLUTION INTRAMUSCULAR; INTRAVENOUS at 18:47

## 2019-01-01 RX ADMIN — AMIODARONE HYDROCHLORIDE 200 MG: 200 TABLET ORAL at 22:10

## 2019-01-01 RX ADMIN — Medication 100 MCG/HR: at 21:49

## 2019-01-01 RX ADMIN — GUAIFENESIN 1200 MG: 600 TABLET, EXTENDED RELEASE ORAL at 21:29

## 2019-01-01 RX ADMIN — HEPARIN SODIUM 26 UNITS/KG/HR: 5000 INJECTION, SOLUTION INTRAVENOUS at 19:10

## 2019-01-01 RX ADMIN — SODIUM CHLORIDE 1000 ML: 900 INJECTION, SOLUTION INTRAVENOUS at 19:51

## 2019-01-01 RX ADMIN — VANCOMYCIN HYDROCHLORIDE 1000 MG: 1 INJECTION, POWDER, LYOPHILIZED, FOR SOLUTION INTRAVENOUS at 21:19

## 2019-01-01 RX ADMIN — CALCIUM CARBONATE (ANTACID) CHEW TAB 500 MG 400 MG: 500 CHEW TAB at 01:02

## 2019-01-01 RX ADMIN — HEPARIN SODIUM 3000 UNITS: 5000 INJECTION INTRAVENOUS; SUBCUTANEOUS at 14:24

## 2019-01-01 RX ADMIN — POTASSIUM CHLORIDE 40 MEQ: 20 TABLET, EXTENDED RELEASE ORAL at 08:08

## 2019-01-01 RX ADMIN — PROMETHAZINE HYDROCHLORIDE 25 MG: 25 INJECTION INTRAMUSCULAR; INTRAVENOUS at 22:30

## 2019-01-01 RX ADMIN — ALBUMIN (HUMAN) 25 G: 0.25 INJECTION, SOLUTION INTRAVENOUS at 18:53

## 2019-01-01 RX ADMIN — HEPARIN SODIUM 20 UNITS/KG/HR: 5000 INJECTION, SOLUTION INTRAVENOUS at 02:15

## 2019-01-01 RX ADMIN — POLYETHYLENE GLYCOL 3350 17 G: 17 POWDER, FOR SOLUTION ORAL at 08:08

## 2019-01-01 RX ADMIN — PROPOFOL 180 MG: 10 INJECTION, EMULSION INTRAVENOUS at 16:17

## 2019-01-01 RX ADMIN — METHYLPREDNISOLONE SODIUM SUCCINATE 80 MG: 125 INJECTION, POWDER, FOR SOLUTION INTRAMUSCULAR; INTRAVENOUS at 09:40

## 2019-01-01 RX ADMIN — ALBUTEROL SULFATE 2.5 MG: 2.5 SOLUTION RESPIRATORY (INHALATION) at 07:55

## 2019-01-01 RX ADMIN — VANCOMYCIN HYDROCHLORIDE 1000 MG: 1 INJECTION, POWDER, LYOPHILIZED, FOR SOLUTION INTRAVENOUS at 09:40

## 2019-01-01 RX ADMIN — GUAIFENESIN 1200 MG: 600 TABLET, EXTENDED RELEASE ORAL at 22:10

## 2019-01-01 RX ADMIN — GUAIFENESIN 1200 MG: 600 TABLET, EXTENDED RELEASE ORAL at 08:08

## 2019-01-01 RX ADMIN — FAMOTIDINE 40 MG: 20 TABLET ORAL at 08:50

## 2019-01-01 RX ADMIN — MORPHINE SULFATE 15 MG: 15 TABLET, FILM COATED, EXTENDED RELEASE ORAL at 08:07

## 2019-01-01 RX ADMIN — GABAPENTIN 100 MG: 100 CAPSULE ORAL at 18:53

## 2019-01-01 RX ADMIN — ALBUTEROL SULFATE 2.5 MG: 2.5 SOLUTION RESPIRATORY (INHALATION) at 15:58

## 2019-01-01 RX ADMIN — METOCLOPRAMIDE 5 MG: 5 INJECTION, SOLUTION INTRAMUSCULAR; INTRAVENOUS at 11:35

## 2019-01-01 RX ADMIN — ZOLPIDEM TARTRATE 5 MG: 5 TABLET ORAL at 21:43

## 2019-01-01 RX ADMIN — SENNOSIDES, DOCUSATE SODIUM 2 TABLET: 50; 8.6 TABLET, FILM COATED ORAL at 09:53

## 2019-01-01 RX ADMIN — ALBUMIN (HUMAN) 25 G: 0.25 INJECTION, SOLUTION INTRAVENOUS at 05:30

## 2019-01-01 RX ADMIN — HEPARIN SODIUM 2700 UNITS: 5000 INJECTION, SOLUTION INTRAVENOUS; SUBCUTANEOUS at 12:33

## 2019-01-01 RX ADMIN — PROMETHAZINE HYDROCHLORIDE 25 MG: 25 INJECTION INTRAMUSCULAR; INTRAVENOUS at 05:30

## 2019-01-01 RX ADMIN — ALBUMIN (HUMAN) 25 G: 0.25 INJECTION, SOLUTION INTRAVENOUS at 00:00

## 2019-01-01 RX ADMIN — PIPERACILLIN, TAZOBACTAM 4.5 G: 4; .5 INJECTION, POWDER, LYOPHILIZED, FOR SOLUTION INTRAVENOUS at 05:03

## 2019-01-01 RX ADMIN — VANCOMYCIN HYDROCHLORIDE 1000 MG: 1 INJECTION, POWDER, LYOPHILIZED, FOR SOLUTION INTRAVENOUS at 14:04

## 2019-01-01 RX ADMIN — AMIODARONE HYDROCHLORIDE 1 MG/MIN: 50 INJECTION, SOLUTION INTRAVENOUS at 22:24

## 2019-01-01 RX ADMIN — Medication 10 ML: at 18:57

## 2019-01-01 RX ADMIN — RIVAROXABAN 15 MG: 15 TABLET, FILM COATED ORAL at 12:49

## 2019-01-01 RX ADMIN — Medication 10 ML: at 06:00

## 2019-01-01 RX ADMIN — ALBUMIN (HUMAN) 25 G: 0.25 INJECTION, SOLUTION INTRAVENOUS at 17:52

## 2019-01-01 RX ADMIN — HEPARIN SODIUM 1950 UNITS: 5000 INJECTION INTRAVENOUS; SUBCUTANEOUS at 11:08

## 2019-01-01 RX ADMIN — HEPARIN SODIUM 20 UNITS/KG/HR: 5000 INJECTION, SOLUTION INTRAVENOUS at 22:30

## 2019-01-01 RX ADMIN — AMIODARONE HYDROCHLORIDE 400 MG: 200 TABLET ORAL at 13:50

## 2019-01-01 RX ADMIN — METHYLPREDNISOLONE SODIUM SUCCINATE 80 MG: 125 INJECTION, POWDER, FOR SOLUTION INTRAMUSCULAR; INTRAVENOUS at 21:19

## 2019-01-01 RX ADMIN — PROMETHAZINE HYDROCHLORIDE 25 MG: 25 INJECTION INTRAMUSCULAR; INTRAVENOUS at 18:19

## 2019-01-01 RX ADMIN — AMIODARONE HYDROCHLORIDE 200 MG: 200 TABLET ORAL at 20:31

## 2019-01-01 RX ADMIN — ZOLPIDEM TARTRATE 5 MG: 5 TABLET ORAL at 20:38

## 2019-01-01 RX ADMIN — FAMOTIDINE 20 MG: 10 INJECTION, SOLUTION INTRAVENOUS at 11:33

## 2019-01-01 RX ADMIN — LORAZEPAM 2 MG: 2 INJECTION INTRAMUSCULAR; INTRAVENOUS at 11:37

## 2019-01-01 RX ADMIN — AZITHROMYCIN 500 MG: 250 TABLET, FILM COATED ORAL at 08:06

## 2019-01-01 RX ADMIN — AMIODARONE HYDROCHLORIDE 400 MG: 200 TABLET ORAL at 21:00

## 2019-01-01 RX ADMIN — FAMOTIDINE 20 MG: 10 INJECTION, SOLUTION INTRAVENOUS at 10:06

## 2019-01-01 RX ADMIN — FUROSEMIDE 40 MG: 10 INJECTION, SOLUTION INTRAMUSCULAR; INTRAVENOUS at 21:31

## 2019-01-01 RX ADMIN — FAMOTIDINE 20 MG: 10 INJECTION INTRAVENOUS at 19:46

## 2019-01-01 RX ADMIN — SODIUM CHLORIDE 100 ML/HR: 900 INJECTION, SOLUTION INTRAVENOUS at 06:11

## 2019-01-01 RX ADMIN — LORAZEPAM 2 MG: 2 INJECTION INTRAMUSCULAR; INTRAVENOUS at 12:15

## 2019-01-01 RX ADMIN — GABAPENTIN 100 MG: 100 CAPSULE ORAL at 17:28

## 2019-01-01 RX ADMIN — ALBUTEROL SULFATE 2.5 MG: 2.5 SOLUTION RESPIRATORY (INHALATION) at 11:13

## 2019-01-01 RX ADMIN — PIPERACILLIN, TAZOBACTAM 4.5 G: 4; .5 INJECTION, POWDER, LYOPHILIZED, FOR SOLUTION INTRAVENOUS at 21:19

## 2019-01-01 RX ADMIN — METHYLPREDNISOLONE SODIUM SUCCINATE 80 MG: 125 INJECTION, POWDER, FOR SOLUTION INTRAMUSCULAR; INTRAVENOUS at 14:29

## 2019-01-01 RX ADMIN — PIPERACILLIN, TAZOBACTAM 4.5 G: 4; .5 INJECTION, POWDER, LYOPHILIZED, FOR SOLUTION INTRAVENOUS at 05:30

## 2019-01-01 RX ADMIN — PIPERACILLIN, TAZOBACTAM 4.5 G: 4; .5 INJECTION, POWDER, LYOPHILIZED, FOR SOLUTION INTRAVENOUS at 22:07

## 2019-01-01 RX ADMIN — PIPERACILLIN, TAZOBACTAM 4.5 G: 4; .5 INJECTION, POWDER, LYOPHILIZED, FOR SOLUTION INTRAVENOUS at 05:44

## 2019-01-01 RX ADMIN — GUAIFENESIN 1200 MG: 600 TABLET, EXTENDED RELEASE ORAL at 21:25

## 2019-01-01 RX ADMIN — HEPARIN SODIUM 6150 UNITS: 5000 INJECTION, SOLUTION INTRAVENOUS; SUBCUTANEOUS at 03:45

## 2019-01-01 RX ADMIN — Medication 10 ML: at 13:30

## 2019-01-01 RX ADMIN — OXYCODONE HYDROCHLORIDE 5 MG: 5 TABLET ORAL at 19:45

## 2019-01-01 RX ADMIN — ALBUTEROL SULFATE 2.5 MG: 2.5 SOLUTION RESPIRATORY (INHALATION) at 23:43

## 2019-01-01 RX ADMIN — HEPARIN SODIUM 1900 UNITS: 5000 INJECTION INTRAVENOUS; SUBCUTANEOUS at 06:13

## 2019-01-01 RX ADMIN — BUMETANIDE 1 MG/HR: 0.25 INJECTION INTRAMUSCULAR; INTRAVENOUS at 11:35

## 2019-01-01 RX ADMIN — AMIODARONE HYDROCHLORIDE 0.5 MG/MIN: 50 INJECTION, SOLUTION INTRAVENOUS at 08:45

## 2019-01-01 RX ADMIN — PROPOFOL 15 MCG/KG/MIN: 10 INJECTION, EMULSION INTRAVENOUS at 02:57

## 2019-01-01 RX ADMIN — PIPERACILLIN, TAZOBACTAM 4.5 G: 4; .5 INJECTION, POWDER, LYOPHILIZED, FOR SOLUTION INTRAVENOUS at 21:20

## 2019-01-01 RX ADMIN — PIPERACILLIN, TAZOBACTAM 4.5 G: 4; .5 INJECTION, POWDER, LYOPHILIZED, FOR SOLUTION INTRAVENOUS at 21:28

## 2019-01-01 RX ADMIN — HEPARIN SODIUM 18 UNITS/KG/HR: 5000 INJECTION, SOLUTION INTRAVENOUS at 04:21

## 2019-01-01 RX ADMIN — MORPHINE SULFATE 15 MG: 15 TABLET, FILM COATED, EXTENDED RELEASE ORAL at 08:41

## 2019-01-01 RX ADMIN — GABAPENTIN 100 MG: 100 CAPSULE ORAL at 09:04

## 2019-01-01 RX ADMIN — LORAZEPAM 2 MG: 2 INJECTION INTRAMUSCULAR; INTRAVENOUS at 16:55

## 2019-01-01 RX ADMIN — Medication 10 ML: at 21:26

## 2019-01-01 RX ADMIN — Medication 10 ML: at 05:05

## 2019-01-01 RX ADMIN — AMIODARONE HYDROCHLORIDE 400 MG: 200 TABLET ORAL at 21:27

## 2019-01-01 RX ADMIN — FAMOTIDINE 20 MG: 10 INJECTION INTRAVENOUS at 20:29

## 2019-01-01 RX ADMIN — METOCLOPRAMIDE 5 MG: 5 INJECTION, SOLUTION INTRAMUSCULAR; INTRAVENOUS at 02:08

## 2019-01-01 RX ADMIN — PIPERACILLIN, TAZOBACTAM 4.5 G: 4; .5 INJECTION, POWDER, LYOPHILIZED, FOR SOLUTION INTRAVENOUS at 21:40

## 2019-01-01 RX ADMIN — DEXMEDETOMIDINE 0.4 MCG/KG/HR: 100 INJECTION, SOLUTION, CONCENTRATE INTRAVENOUS at 09:23

## 2019-01-01 RX ADMIN — NOREPINEPHRINE BITARTRATE 6 MCG/MIN: 1 INJECTION INTRAVENOUS at 15:10

## 2019-01-01 RX ADMIN — AZITHROMYCIN MONOHYDRATE 500 MG: 500 INJECTION, POWDER, LYOPHILIZED, FOR SOLUTION INTRAVENOUS at 08:39

## 2019-01-01 RX ADMIN — POTASSIUM CHLORIDE 20 MEQ: 200 INJECTION, SOLUTION INTRAVENOUS at 06:40

## 2019-01-01 RX ADMIN — ALBUTEROL SULFATE 2.5 MG: 2.5 SOLUTION RESPIRATORY (INHALATION) at 08:22

## 2019-01-01 RX ADMIN — AZITHROMYCIN 500 MG: 250 TABLET, FILM COATED ORAL at 09:01

## 2019-01-01 RX ADMIN — METOCLOPRAMIDE 5 MG: 5 INJECTION, SOLUTION INTRAMUSCULAR; INTRAVENOUS at 17:25

## 2019-01-01 RX ADMIN — METOCLOPRAMIDE 5 MG: 5 INJECTION, SOLUTION INTRAMUSCULAR; INTRAVENOUS at 05:31

## 2019-01-01 RX ADMIN — MORPHINE SULFATE 15 MG: 15 TABLET, FILM COATED, EXTENDED RELEASE ORAL at 21:25

## 2019-01-01 RX ADMIN — ALBUTEROL SULFATE 2.5 MG: 2.5 SOLUTION RESPIRATORY (INHALATION) at 16:15

## 2019-01-01 RX ADMIN — METOCLOPRAMIDE 5 MG: 5 INJECTION, SOLUTION INTRAMUSCULAR; INTRAVENOUS at 12:00

## 2019-01-01 RX ADMIN — AZITHROMYCIN 500 MG: 250 TABLET, FILM COATED ORAL at 08:41

## 2019-01-01 RX ADMIN — ONDANSETRON 4 MG: 2 INJECTION INTRAMUSCULAR; INTRAVENOUS at 00:22

## 2019-01-01 RX ADMIN — HEPARIN SODIUM 1950 UNITS: 5000 INJECTION INTRAVENOUS; SUBCUTANEOUS at 16:54

## 2019-01-01 RX ADMIN — ONDANSETRON 8 MG: 4 TABLET, ORALLY DISINTEGRATING ORAL at 15:41

## 2019-01-01 RX ADMIN — METOCLOPRAMIDE 5 MG: 5 INJECTION, SOLUTION INTRAMUSCULAR; INTRAVENOUS at 18:54

## 2019-01-01 RX ADMIN — ALBUTEROL SULFATE 2.5 MG: 2.5 SOLUTION RESPIRATORY (INHALATION) at 19:32

## 2019-01-01 RX ADMIN — ALBUTEROL SULFATE 2.5 MG: 2.5 SOLUTION RESPIRATORY (INHALATION) at 23:48

## 2019-01-01 RX ADMIN — GABAPENTIN 100 MG: 100 CAPSULE ORAL at 17:45

## 2019-01-01 RX ADMIN — SENNOSIDES AND DOCUSATE SODIUM 1 TABLET: 8.6; 5 TABLET ORAL at 08:32

## 2019-01-01 RX ADMIN — POTASSIUM CHLORIDE 40 MEQ: 20 TABLET, EXTENDED RELEASE ORAL at 08:07

## 2019-01-01 RX ADMIN — FUROSEMIDE 40 MG: 10 INJECTION, SOLUTION INTRAMUSCULAR; INTRAVENOUS at 08:31

## 2019-01-01 RX ADMIN — ALBUMIN (HUMAN) 25 G: 0.25 INJECTION, SOLUTION INTRAVENOUS at 23:44

## 2019-01-01 RX ADMIN — ALBUTEROL SULFATE 2.5 MG: 2.5 SOLUTION RESPIRATORY (INHALATION) at 03:32

## 2019-01-01 RX ADMIN — LORAZEPAM 2 MG: 2 INJECTION INTRAMUSCULAR; INTRAVENOUS at 03:01

## 2019-01-01 RX ADMIN — ALBUTEROL SULFATE 2.5 MG: 2.5 SOLUTION RESPIRATORY (INHALATION) at 03:08

## 2019-01-01 RX ADMIN — SENNOSIDES, DOCUSATE SODIUM 2 TABLET: 50; 8.6 TABLET, FILM COATED ORAL at 09:07

## 2019-01-01 RX ADMIN — POTASSIUM CHLORIDE 20 MEQ: 200 INJECTION, SOLUTION INTRAVENOUS at 04:46

## 2019-01-01 RX ADMIN — ALBUTEROL SULFATE 2.5 MG: 2.5 SOLUTION RESPIRATORY (INHALATION) at 03:12

## 2019-01-01 RX ADMIN — ALBUMIN (HUMAN) 25 G: 0.25 INJECTION, SOLUTION INTRAVENOUS at 00:24

## 2019-01-01 RX ADMIN — HYDROCODONE BITARTRATE AND ACETAMINOPHEN 1 TABLET: 10; 325 TABLET ORAL at 23:22

## 2019-01-01 RX ADMIN — SENNOSIDES AND DOCUSATE SODIUM 1 TABLET: 8.6; 5 TABLET ORAL at 09:10

## 2019-01-01 RX ADMIN — ALBUTEROL SULFATE 2.5 MG: 2.5 SOLUTION RESPIRATORY (INHALATION) at 20:19

## 2019-01-01 RX ADMIN — Medication 10 ML: at 21:29

## 2019-01-01 RX ADMIN — PIPERACILLIN, TAZOBACTAM 4.5 G: 4; .5 INJECTION, POWDER, LYOPHILIZED, FOR SOLUTION INTRAVENOUS at 21:55

## 2019-01-01 RX ADMIN — HEPARIN SODIUM 4750 UNITS: 5000 INJECTION INTRAVENOUS; SUBCUTANEOUS at 22:19

## 2019-01-01 RX ADMIN — MIDAZOLAM HYDROCHLORIDE 4 MG: 1 INJECTION, SOLUTION INTRAMUSCULAR; INTRAVENOUS at 09:03

## 2019-01-01 RX ADMIN — SODIUM CHLORIDE 150 ML/HR: 900 INJECTION, SOLUTION INTRAVENOUS at 22:26

## 2019-01-01 RX ADMIN — GABAPENTIN 100 MG: 100 CAPSULE ORAL at 09:13

## 2019-01-01 RX ADMIN — PROPOFOL 50 MG: 10 INJECTION, EMULSION INTRAVENOUS at 18:44

## 2019-01-01 RX ADMIN — MORPHINE SULFATE 15 MG: 15 TABLET, FILM COATED, EXTENDED RELEASE ORAL at 08:09

## 2019-01-01 RX ADMIN — POLYETHYLENE GLYCOL 3350 17 G: 17 POWDER, FOR SOLUTION ORAL at 08:17

## 2019-01-01 RX ADMIN — AMIODARONE HYDROCHLORIDE 150 MG: 50 INJECTION, SOLUTION INTRAVENOUS at 08:35

## 2019-01-01 RX ADMIN — BUMETANIDE 1 MG/HR: 0.25 INJECTION INTRAMUSCULAR; INTRAVENOUS at 19:56

## 2019-01-01 RX ADMIN — METOCLOPRAMIDE 5 MG: 5 INJECTION, SOLUTION INTRAMUSCULAR; INTRAVENOUS at 12:30

## 2019-01-01 RX ADMIN — ETOMIDATE 20 MG: 2 INJECTION, SOLUTION INTRAVENOUS at 18:44

## 2019-01-01 RX ADMIN — VANCOMYCIN HYDROCHLORIDE 1000 MG: 1 INJECTION, POWDER, LYOPHILIZED, FOR SOLUTION INTRAVENOUS at 05:20

## 2019-01-01 RX ADMIN — VANCOMYCIN HYDROCHLORIDE 1000 MG: 1 INJECTION, POWDER, LYOPHILIZED, FOR SOLUTION INTRAVENOUS at 05:28

## 2019-01-01 RX ADMIN — CEFTRIAXONE SODIUM 1 G: 1 INJECTION, POWDER, FOR SOLUTION INTRAMUSCULAR; INTRAVENOUS at 06:08

## 2019-01-01 RX ADMIN — Medication 10 ML: at 05:45

## 2019-01-01 RX ADMIN — FUROSEMIDE 40 MG: 10 INJECTION, SOLUTION INTRAMUSCULAR; INTRAVENOUS at 21:00

## 2019-01-01 RX ADMIN — FUROSEMIDE 40 MG: 10 INJECTION, SOLUTION INTRAMUSCULAR; INTRAVENOUS at 08:07

## 2019-01-01 RX ADMIN — ONDANSETRON 8 MG: 4 TABLET, ORALLY DISINTEGRATING ORAL at 17:00

## 2019-01-01 RX ADMIN — AMIODARONE HYDROCHLORIDE 400 MG: 200 TABLET ORAL at 08:07

## 2019-01-01 RX ADMIN — PROMETHAZINE HYDROCHLORIDE 25 MG: 25 INJECTION INTRAMUSCULAR; INTRAVENOUS at 17:05

## 2019-01-01 RX ADMIN — Medication 50 MCG/HR: at 05:32

## 2019-01-01 RX ADMIN — Medication 10 ML: at 21:28

## 2019-01-01 RX ADMIN — CEFTRIAXONE 1 G: 1 INJECTION, POWDER, FOR SOLUTION INTRAMUSCULAR; INTRAVENOUS at 08:39

## 2019-01-01 RX ADMIN — ALBUTEROL SULFATE 2.5 MG: 2.5 SOLUTION RESPIRATORY (INHALATION) at 11:36

## 2019-01-01 RX ADMIN — ONDANSETRON 4 MG: 2 INJECTION INTRAMUSCULAR; INTRAVENOUS at 16:31

## 2019-01-01 RX ADMIN — MORPHINE SULFATE 15 MG: 15 TABLET, FILM COATED, EXTENDED RELEASE ORAL at 15:33

## 2019-01-01 RX ADMIN — MORPHINE SULFATE 2 MG: 2 INJECTION, SOLUTION INTRAMUSCULAR; INTRAVENOUS at 22:11

## 2019-01-01 RX ADMIN — SODIUM CHLORIDE, SODIUM LACTATE, POTASSIUM CHLORIDE, AND CALCIUM CHLORIDE: 600; 310; 30; 20 INJECTION, SOLUTION INTRAVENOUS at 16:13

## 2019-01-01 RX ADMIN — IBUPROFEN 800 MG: 800 TABLET, FILM COATED ORAL at 02:06

## 2019-01-01 RX ADMIN — FAMOTIDINE 40 MG: 20 TABLET ORAL at 12:48

## 2019-01-01 RX ADMIN — AMIODARONE HYDROCHLORIDE 400 MG: 200 TABLET ORAL at 08:06

## 2019-01-01 RX ADMIN — Medication 40 ML: at 21:56

## 2019-01-01 RX ADMIN — POLYETHYLENE GLYCOL 3350 17 G: 17 POWDER, FOR SOLUTION ORAL at 08:07

## 2019-01-01 RX ADMIN — MORPHINE SULFATE 2 MG: 2 INJECTION, SOLUTION INTRAMUSCULAR; INTRAVENOUS at 17:52

## 2019-01-01 RX ADMIN — AMIODARONE HYDROCHLORIDE 200 MG: 200 TABLET ORAL at 08:51

## 2019-01-01 RX ADMIN — AMIODARONE HYDROCHLORIDE 400 MG: 200 TABLET ORAL at 22:14

## 2019-01-01 RX ADMIN — HEPARIN SODIUM 20 UNITS/KG/HR: 5000 INJECTION, SOLUTION INTRAVENOUS at 18:48

## 2019-01-01 RX ADMIN — MORPHINE SULFATE 2 MG: 2 INJECTION, SOLUTION INTRAMUSCULAR; INTRAVENOUS at 03:52

## 2019-01-01 RX ADMIN — Medication 40 ML: at 06:58

## 2019-01-01 RX ADMIN — SENNOSIDES, DOCUSATE SODIUM 2 TABLET: 50; 8.6 TABLET, FILM COATED ORAL at 09:04

## 2019-01-01 RX ADMIN — GABAPENTIN 100 MG: 100 CAPSULE ORAL at 17:52

## 2019-01-01 RX ADMIN — PIPERACILLIN, TAZOBACTAM 4.5 G: 4; .5 INJECTION, POWDER, LYOPHILIZED, FOR SOLUTION INTRAVENOUS at 14:09

## 2019-01-12 NOTE — DISCHARGE INSTRUCTIONS
Follow-up instructions. Follow up with orthopedics if not improved in 5-7 days.   Return if any other emergencies

## 2019-01-12 NOTE — ED NOTES
I have reviewed discharge instructions with the patient. The patient verbalized understanding. Patient left ED via Discharge Method: ambulatory to Home with [employer]. Opportunity for questions and clarification provided. Patient given 2 scripts. Pt in no acute distress at time of discharge. To continue your aftercare when you leave the hospital, you may receive an automated call from our care team to check in on how you are doing. This is a free service and part of our promise to provide the best care and service to meet your aftercare needs.  If you have questions, or wish to unsubscribe from this service please call [phone]. Thank you for Choosing our [facility] Emergency Department.

## 2019-01-12 NOTE — ED NOTES
Pt resting in bed playing on phone,resp easy,VSS, belongs in reach. Offered restroom and change in position.
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
## 2019-06-20 NOTE — ED PROVIDER NOTES
Patient is a 62-year-old male who comes to the ER stating that he passed out several times yesterday. He states he was walking in his home and he had a syncopal spell and fell with her to the floor. He states he felt weak and dizzy. This happened 2 or 3 times yesterday. He reports that he knocked some teeth out and that his lower lip is swollen. He denies any current chest pain or dyspnea The history is provided by the patient. Syncope This is a new problem. The current episode started yesterday. The problem has not changed since onset. He lost consciousness for a period of less than one minute. The problem is associated with normal activity. Associated symptoms include dizziness, light-headedness and weakness. Pertinent negatives include no visual change, no chest pain, no palpitations, no fever, no abdominal pain, no bowel incontinence, no nausea, no bladder incontinence, no congestion, no back pain, no seizures and no melena. He has tried nothing for the symptoms. His past medical history is significant for syncope. His past medical history does not include no CVA, no TIA, no CAD, no DM, no HTN or no seizures. Past Medical History:  
Diagnosis Date  Arthritis  Other ill-defined conditions   
 chronic L leg pain Past Surgical History:  
Procedure Laterality Date  HX ORTHOPAEDIC No family history on file. Social History Socioeconomic History  Marital status: SINGLE Spouse name: Not on file  Number of children: Not on file  Years of education: Not on file  Highest education level: Not on file Occupational History  Not on file Social Needs  Financial resource strain: Not on file  Food insecurity:  
  Worry: Not on file Inability: Not on file  Transportation needs:  
  Medical: Not on file Non-medical: Not on file Tobacco Use  Smoking status: Current Every Day Smoker Packs/day: 1.00 Substance and Sexual Activity  Alcohol use: Yes Comment: occasionally  Drug use: No  
 Sexual activity: Not on file Lifestyle  Physical activity:  
  Days per week: Not on file Minutes per session: Not on file  Stress: Not on file Relationships  Social connections:  
  Talks on phone: Not on file Gets together: Not on file Attends [employer] service: Not on file Active member of club or organization: Not on file Attends meetings of clubs or organizations: Not on file Relationship status: Not on file  Intimate partner violence:  
  Fear of current or ex partner: Not on file Emotionally abused: Not on file Physically abused: Not on file Forced sexual activity: Not on file Other Topics Concern  Not on file Social History Narrative  Not on file ALLERGIES: Patient has no known allergies. Review of Systems Constitutional: Negative for chills, fatigue and fever. HENT: Negative for congestion, rhinorrhea and sore throat. Eyes: Negative for pain, discharge and visual disturbance. Respiratory: Negative for cough and shortness of breath. Cardiovascular: Positive for syncope. Negative for chest pain and palpitations. Gastrointestinal: Negative for abdominal pain, bowel incontinence, diarrhea, melena and nausea. Endocrine: Negative for polydipsia and polyuria. Genitourinary: Negative for bladder incontinence, dysuria, frequency and urgency. Musculoskeletal: Negative for back pain and neck pain. Skin: Negative for rash. Neurological: Positive for dizziness, syncope, weakness and light-headedness. Negative for seizures. Hematological: Negative. Vitals:  
 06/20/19 6411 BP: 108/71 Pulse: 93 Resp: 18 Temp: 98.6 °F (37 °C) SpO2: 99% Weight: 77.1 kg (170 lb) Height: 6' 2\" (1.88 m) Physical Exam  
Constitutional: He is oriented to person, place, and time. He appears well-developed and well-nourished.   
HENT:  
 Head: Normocephalic and atraumatic. Marked swelling to the lower lip with a laceration inside the lip that is partially healed and scabbed over Eyes: Pupils are equal, round, and reactive to light. Conjunctivae and EOM are normal.  
Neck: Normal range of motion. Neck supple. No midline cervical spine tenderness Cardiovascular: Normal rate, regular rhythm and intact distal pulses. Pulmonary/Chest: Effort normal and breath sounds normal.  
Abdominal: Soft. There is no tenderness. There is no rebound and no guarding. Musculoskeletal: Normal range of motion. He exhibits no edema or tenderness. Lymphadenopathy:  
  He has no cervical adenopathy. Neurological: He is alert and oriented to person, place, and time. He has normal strength. No cranial nerve deficit or sensory deficit. GCS eye subscore is 4. GCS verbal subscore is 5. GCS motor subscore is 6. Skin: Skin is warm and dry. No rash noted. Nursing note and vitals reviewed. MDM Number of Diagnoses or Management Options Diagnosis management comments: 2:32 PM 
EKG shows normal sinus rhythm at a rate of 61 with no acute ischemia chest x-ray negative CAT scan had negative blood work unremarkable neurologic exam has remained normal here patient thinks he wishes dehydrated from not eating and drinking enough and that caused his syncopal spell. Voice dictation software was used during the making of this note. This software is not perfect and grammatical and other typographical errors may be present. This note has been proofread, but may still contain errors. Aimee Rudolph MD; 6/20/2019 @2:32 PM  
=================================================================== Amount and/or Complexity of Data Reviewed Clinical lab tests: ordered and reviewed Tests in the radiology section of CPT®: ordered and reviewed Review and summarize past medical records: yes Independent visualization of images, tracings, or specimens: yes Risk of Complications, Morbidity, and/or Mortality Presenting problems: moderate Diagnostic procedures: low Management options: low Patient Progress Patient progress: stable Procedures

## 2019-06-20 NOTE — ED TRIAGE NOTES
Patient reports falling three times in 2 days. Swelling to lower lip and knocked 3 teeth out. Neck, bilateral shoulder and bottom pain. Denies LOC or hitting head.

## 2019-06-20 NOTE — ED NOTES
I have reviewed discharge instructions with the patient. The patient verbalized understanding. Patient left ED via Discharge Method: ambulatory to Home with friend. Opportunity for questions and clarification provided. Patient given 0 scripts. To continue your aftercare when you leave the hospital, you may receive an automated call from our care team to check in on how you are doing. This is a free service and part of our promise to provide the best care and service to meet your aftercare needs.  If you have questions, or wish to unsubscribe from this service please call [phone]. Thank you for Choosing our [facility] Emergency Department.

## 2019-07-08 PROBLEM — K85.90 PANCREATITIS: Status: ACTIVE | Noted: 2019-01-01

## 2019-07-08 PROBLEM — K86.89 PANCREATIC MASS: Status: ACTIVE | Noted: 2019-01-01

## 2019-07-08 PROBLEM — I26.99 PULMONARY EMBOLI (HCC): Status: ACTIVE | Noted: 2019-01-01

## 2019-07-08 PROBLEM — R59.0 MEDIASTINAL ADENOPATHY: Status: ACTIVE | Noted: 2019-01-01

## 2019-07-08 PROBLEM — R91.8 LUNG MASS: Status: ACTIVE | Noted: 2019-01-01

## 2019-07-08 NOTE — CONSULTS
PULMONARY/CCM CONSULT :  7/8/2019 Date of Admission:  7/7/2019 The patient's chart has been reviewed and the chart has been discussed with nursing staff. Subjective: This patient has been seen and evaluated at the request of Dr. Saurabh Munoz for NEW lung mass. Patient is a 53 y.o. male presents with weight loss and abdominal pain. His lipase was elevated and CT abdomen and chest was obtained. CT reported a right lower lobe segmental pulmonary embolism, mediastinal lymphadenopathy, likely metastasis; a 4-cm pleural-based cavitary mass in the left apex, an approximately 2-cm hypoenhancing pancreatic head mass obstructing pancreatic and common bile duct, associated biliary duct dilatation and gallbladder distention, and suggestion of gastric wall thickening. He is currently on heparin for PTE. On RA with oxygen saturation of 98%. We were asked to see him for mediastinal adenopathy and PTE. He reports 4 day duration of chest pain with inspiration. Troponin was negative, EKG showed inverted T-waves. UDS positive for multiple substances. He is a very poor historian. Past Medical History:  
Diagnosis Date  Arthritis  Other ill-defined conditions(799.89) chronic L leg pain Past Surgical History:  
Procedure Laterality Date  HX ORTHOPAEDIC Social History Tobacco Use  Smoking status: Current Every Day Smoker Packs/day: 1.00 Substance Use Topics  Alcohol use: Yes Comment: occasionally History reviewed. No pertinent family history. No Known Allergies Prior to Admission Medications Prescriptions Last Dose Informant Patient Reported? Taking?  
traMADol (ULTRAM) 50 mg tablet   No No  
Sig: Take 1 Tab by mouth every eight (8) hours as needed for Pain. Max Daily Amount: 150 mg. Facility-Administered Medications: None MEDS SCHEDULED:   
Current Facility-Administered Medications Medication Dose Route Frequency  0.9% sodium chloride infusion  150 mL/hr IntraVENous CONTINUOUS  
 famotidine (PF) (PEPCID) 20 mg in sodium chloride 0.9% 10 mL injection  20 mg IntraVENous Q12H  
 ondansetron (ZOFRAN) injection 4 mg  4 mg IntraVENous Q6H PRN  
 acetaminophen (TYLENOL) tablet 650 mg  650 mg Oral Q6H PRN  
 oxyCODONE IR (ROXICODONE) tablet 5 mg  5 mg Oral Q6H PRN  
 morphine injection 2 mg  2 mg IntraVENous Q4H PRN  
 albuterol (PROVENTIL VENTOLIN) nebulizer solution 2.5 mg  2.5 mg Nebulization Q6H PRN  
 nicotine (NICODERM CQ) 21 mg/24 hr patch 1 Patch  1 Patch TransDERmal Q24H  
 [Held by provider] heparin 25,000 units in dextrose 500 mL infusion  18-36 Units/kg/hr IntraVENous TITRATE  tuberculin injection 5 Units  5 Units IntraDERMal ONCE  
 lactated Ringers infusion  100 mL/hr IntraVENous CONTINUOUS  
 famotidine (PF) (PEPCID) 20 mg in sodium chloride 0.9% 10 mL injection  20 mg IntraVENous ONCE PRN  
 famotidine (PEPCID) tablet 20 mg  20 mg Oral PRN Review of Systems Constitutional: negative for fevers Respiratory: positive for cough or dyspnea on exertion Cardiovascular: positive for chest pressure/discomfort, dyspnea, paroxysmal nocturnal dyspnea Gastrointestinal: positive for abdominal pain Objective:  
 
Vitals:  
 07/08/19 0458 07/08/19 0726 07/08/19 1152 07/08/19 1428 BP: 113/67 105/63 110/68 109/69 Pulse: 64 64 67 66 Resp: 18 17 18 17 Temp: 98.5 °F (36.9 °C) 98.3 °F (36.8 °C) 98.1 °F (36.7 °C) SpO2: 97% 95% 98% 97% Weight:      
Height:      
 
No intake/output data recorded. 07/06 1901 - 07/08 0700 In: 1100 [I.V.:1100] Out: - PHYSICAL EXAM  
 
Physical Exam:  
General:  Alert, cooperative, no acute distress, appears stated age. Thin Eyes:  Conjunctivae/corneas clear. Nose: Nares patent and moist.   
Mouth/Throat: Lips, mucosa, and tongue pink and intact.    
Neck: Supple, symmetrical.  
Respiratory:   Decreased bases to auscultation bilaterally on RA, nonproductive cough Cardiovascular:  Regular rate and rhythm, S1, S2, no murmur, click, rub or gallop. GI:   Abdomen soft. Bowel sounds active X 4 Q Musculoskeletal: Extremities symmetrical, atraumatic, no cyanosis,no edema. Pulses: 2+ and symmetric all extremities. Skin: Skin color, texture, turgor normal.   
   
Neurologic: Alert and oriented. Activity: limited Nutrition: sips clear fluids CHEST CT 
 
 
 
CULTURES: N/A 
 
LABS Recent Labs  
  07/08/19 
0615 07/07/19 
2317 WBC 5.1 6.1 HGB 11.3* 12.2* HCT 34.1* 36.8*  
 296 Recent Labs  
  07/08/19 
0615 07/07/19 
2317  138  
K 4.2 3.8  103 * 105* CO2 26 25 BUN 17 19 CREA 1.01 1.33  
MG 2.2 2.3  
TROIQ  --  <0.02* Assessment:  
 
Hospital Problems  Date Reviewed: 7/8/2019 Codes Class Noted POA Pancreatitis ICD-10-CM: K85.90 ICD-9-CM: 671.3  7/8/2019 Yes Lung mass ICD-10-CM: R91.8 ICD-9-CM: 786.6  7/8/2019 Yes Pulmonary emboli [facility]) ICD-10-CM: I26.99 
ICD-9-CM: 415.19  7/8/2019 Yes Pancreatic mass ICD-10-CM: K86.9 ICD-9-CM: 577.9  7/8/2019 Yes Mediastinal adenopathy ICD-10-CM: R59.0 ICD-9-CM: 785.6  7/8/2019 Yes Plan:  
 
-- GI suspects pancreatitis is from obstructing pancreatic head neoplasm. Plans for  ERCP for biliary decompression. -- may need an EBUS for malignant appearing mediastinal lymphadenopathy if unable to obtain biopsy today. Heparin will need to be held for at least 6 hours and NPO for the procedure tentatively on Wednesday 2 pm per Dr Leonila Alanis. Estella Vieira, NP-C Lungs:  clear Heart:  RRR with no Murmur/Rubs/Gallops Additional Comments:   disscused with GI  ( Dr. Curtistine Meckel ) and he indicated possible EOS nod BX along with ERCP. Agree with heparin drip for now pending ongoing diagnostic procedures I have spoken with and examined the patient. I agree with the above assessment and plan as documented. Hayder Dale MD 
 
 
More than 50% of time documented was spent in face-to-face contact with the patient and in the care of the patient on the floor/unit where the patient is located.

## 2019-07-08 NOTE — PERIOP NOTES
TRANSFER - OUT REPORT: 
 
Verbal report given to Priyanka CARRILLO(name) on [facility]  being transferred to room 625(unit) for routine post - op Report consisted of patients Situation, Background, Assessment and  
Recommendations(SBAR). Information from the following report(s) SBAR, Kardex, Procedure Summary, Intake/Output and MAR was reviewed with the receiving nurse. Lines:  
Peripheral IV 07/07/19 Right Antecubital (Active) Site Assessment Clean, dry, & intact 7/8/2019 11:52 AM  
Phlebitis Assessment 0 7/8/2019 11:52 AM  
Infiltration Assessment 0 7/8/2019 11:52 AM  
Dressing Status Clean, dry, & intact 7/8/2019 11:52 AM  
Dressing Type Tape;Transparent 7/8/2019 11:52 AM  
Hub Color/Line Status Patent; Flushed 7/8/2019 11:52 AM  
Alcohol Cap Used No 7/8/2019  5:14 AM  
  
 
Opportunity for questions and clarification was provided. Patient transported with: VTE prophylaxis orders have been written for [facility]. Patient and family given floor number and nurses name. Family updated re: pt status after security code verified.

## 2019-07-08 NOTE — ED NOTES
Patient returns from CT. Patient restful in bed with eyes closed, even non-labored respirations. No needs expressed or apparent,.

## 2019-07-08 NOTE — INTERVAL H&P NOTE
H&P Update: 
Letty Flores was seen and examined. History and physical has been reviewed. Significant clinical changes have occurred as noted:  none

## 2019-07-08 NOTE — PROGRESS NOTES
Problem: Pulmonary Embolism Care Plan (Adult) Goal: *Improvement of existing pulmonary embolism Outcome: Progressing Towards Goal 
Goal: *Absence of bleeding Outcome: Progressing Towards Goal 
Goal: *Labs within defined limits Outcome: Progressing Towards Goal 
  
Problem: Patient Education: Go to Patient Education Activity Goal: Patient/Family Education Outcome: Progressing Towards Goal

## 2019-07-08 NOTE — CONSULTS
[facility] Cardiology Consult Date of  Admission: 2019 11:18 PM  
 
Primary Care Physician: Unknown, Provider Primary Cardiologist: None Referring Physician: Anaesthesia Consulting Physician: Dr Michelle Sosa 
 
CC/Reason for consult: Chest pain Glynn Massey is a 53 y.o. male with hx of arthritus, and chronic leg pain. He came into the hospital on 2019 for c/o several months of progressive weight loss and 3 days of progressive severe abd pain. Initial workup in the ED showed CT that was positive for PE, Cavitary lesion of the left lung, pancreatic mass with obstruction to the common bile duct, and pancreatic duct. Initial labs of note include Hgb of 12.2, Bilirubin 2.6, Albumin 3.1, Globulin 5.6, , AST 74, Alk Phosphate 336, Lipase 5932, and Trop I that was negative. UDS showed positive with cocaine, and opiates. When talking with the patient he has had CP/Epigastric pain for 4 days that increase with position changes and deep breathing. He has no decrease in capacity working and does not get SOB while walking. He has never had a cardiac workup in the past and states he avoids doctors. His family hx includes a mother who has had bypass surgery who is now  of unknown causes and a father with unknown medical hx. He denies fever, night sweats, vomiting, or recent illness. He has a noted 2 episodes of syncopes 2 weeks ago initially attributed to alcohol and dehydration with working and he was not seen by a provider. Recent Cardiac Synopsis w/ Labs LHC/NST: None Echo: Pending EKG: NSR ST segment changes in the inferior leads similar to previous EKG in the past but now of greater ampullated . No STEMI noted. Lab Results Component Value Date  2019  
 K 4.2 2019 MG 2.2 2019 BUN 17 2019 CREA 1.01 2019 WBC 5.1 2019 HGB 11.3 (L) 2019  2019 TROIQ <0.02 (L) 2019 TROIQ <0.02 (L) 06/20/2019 Patient Active Problem List  
Diagnosis Code  Pancreatitis K85.90  
 Lung mass R91.8  Pulmonary emboli (HCC) I26.99  
 Pancreatic mass K86.9 Past Medical History:  
Diagnosis Date  Arthritis  Other ill-defined conditions(799.89) chronic L leg pain Past Surgical History:  
Procedure Laterality Date  HX ORTHOPAEDIC No Known Allergies History reviewed. No pertinent family history. Social History Socioeconomic History  Marital status: SINGLE Spouse name: Not on file  Number of children: Not on file  Years of education: Not on file  Highest education level: Not on file Occupational History  Not on file Social Needs  Financial resource strain: Not on file  Food insecurity:  
  Worry: Not on file Inability: Not on file  Transportation needs:  
  Medical: Not on file Non-medical: Not on file Tobacco Use  Smoking status: Current Every Day Smoker Packs/day: 1.00 Substance and Sexual Activity  Alcohol use: Yes Comment: occasionally  Drug use: No  
 Sexual activity: Not on file Lifestyle  Physical activity:  
  Days per week: Not on file Minutes per session: Not on file  Stress: Not on file Relationships  Social connections:  
  Talks on phone: Not on file Gets together: Not on file Attends Qu[employer] service: Not on file Active member of club or organization: Not on file Attends meetings of clubs or organizations: Not on file Relationship status: Not on file  Intimate partner violence:  
  Fear of current or ex partner: Not on file Emotionally abused: Not on file Physically abused: Not on file Forced sexual activity: Not on file Other Topics Concern  Not on file Social History Narrative  Not on file Current Facility-Administered Medications Medication Dose Route Frequency  0.9% sodium chloride infusion  150 mL/hr IntraVENous CONTINUOUS  
 famotidine (PF) (PEPCID) 20 mg in sodium chloride 0.9% 10 mL injection  20 mg IntraVENous Q12H  
 ondansetron (ZOFRAN) injection 4 mg  4 mg IntraVENous Q6H PRN  
 acetaminophen (TYLENOL) tablet 650 mg  650 mg Oral Q6H PRN  
 oxyCODONE IR (ROXICODONE) tablet 5 mg  5 mg Oral Q6H PRN  
 morphine injection 2 mg  2 mg IntraVENous Q4H PRN  
 albuterol (PROVENTIL VENTOLIN) nebulizer solution 2.5 mg  2.5 mg Nebulization Q6H PRN  
 nicotine (NICODERM CQ) 21 mg/24 hr patch 1 Patch  1 Patch TransDERmal Q24H  
 [Held by provider] heparin 25,000 units in dextrose 500 mL infusion  18-36 Units/kg/hr IntraVENous TITRATE  tuberculin injection 5 Units  5 Units IntraDERMal ONCE  
 lactated Ringers infusion  100 mL/hr IntraVENous CONTINUOUS  
 famotidine (PF) (PEPCID) 20 mg in sodium chloride 0.9% 10 mL injection  20 mg IntraVENous ONCE PRN  
 famotidine (PEPCID) tablet 20 mg  20 mg Oral PRN  
 
 
ROS Physical Exam 
Vitals:  
 07/08/19 0458 07/08/19 0726 07/08/19 1152 07/08/19 1428 BP: 113/67 105/63 110/68 109/69 Pulse: 64 64 67 66 Resp: 18 17 18 17 Temp: 98.5 °F (36.9 °C) 98.3 °F (36.8 °C) 98.1 °F (36.7 °C) SpO2: 97% 95% 98% 97% Weight:      
Height:      
 
 
Physical Exam: 
General: Well Developed, Well Nourished, No Acute Distress HEENT: pupils equal and round, no abnormalities noted Neck: supple, no JVD, no carotid bruits Heart: S1S2 with RRR without murmurs or gallops pain on palpation to the checks and epigastric area Lungs: Clear auscultation in apex slightly decrease bases bilaterally without adventitious sounds,  
Abd: soft, nontender, nondistended, with good bowel sounds Ext: warm, no edema, calves supple/nontender, pulses 2+ bilaterally Skin: warm and dry Psychiatric: Normal mood and affect Neurologic: Alert and oriented X 3 Labs:  
Recent Labs  
  07/08/19 
0615 07/07/19 
1277  138  
K 4.2 3.8 MG 2.2 2.3 BUN 17 19 CREA 1.01 1.33  
* 105* WBC 5.1 6.1 HGB 11.3* 12.2* HCT 34.1* 36.8*  
 296 Echo Results  (Last 48 hours) None Ct Chest Abd Pelv W Cont Result Date: 7/8/2019 IMPRESSION: 1. Right lower lobe segmental pulmonary embolism. Telephoned to ED physician, Dr. Madeline Cardenas, at 3:00 AM on exam date. 2. Mediastinal lymphadenopathy, likely metastasis. Lymphoma is considered less likely. Negative for liver lesion or enlarged lymph node in the abdomen. 3. A 4 cm pleurally base cavitary mass in the left apex. 4. An approximately 2 cm hypoenhancing pancreatic head mass, obstructing pancreatic and common bile duct. Associated biliary duct dilatation and gallbladder distention. 5.  Suggestion of gastric wall thickening. [facility] Assessment/Plan: 
 
 Assessment:  
  
Active Problems: 
  Pancreatitis (7/8/2019) ERCP scheduled for today per GI suspected pancreatitis from obstructed pancreatic head by neoplasm. Lung mass (7/8/2019) Needs further eval by Hematology/Oncology Pulmonary emboli ([address] (7/8/2019) Per Pulmonary, off Heparin for procedure will need anticoagulation while in patient and long term. Hold for procedures. Pancreatic mass (7/8/2019) Per Primary Team 
 
Note: Given patients HPI the patient has no active cardiac chest pain with no elevated Trop and Similar EKG to the past.  He has not had a cardiac workup as an outpatient. Will get an echo. With the probability of multiple procedures in the patients future will defer invasive cardiac workup at this time. Thank you very much for this referral. We appreciate the opportunity to participate in this patient's care. We will follow along with above stated plan. Jean Sams, ESAU Consulting MD: Dr Kurtis Hall

## 2019-07-08 NOTE — PROCEDURES
ERCP: Endoscopic Retrograde Cholangiopancreatogram 
 
DATE of PROCEDURE: 7/8/2019 MEDICATIONS ADMINISTERED:   
1. General anesthesia INDICATIONS: 
1. CBD dilation 2. CBD stone/sludge on EUS 3. CBD stricture INSTRUMENT: 
 
PROCEDURE:  After obtaining informed consent, the patient was placed in prone position on the fluoroscopy table. The patient was then sedated. The endoscope was passed under indirect technique to the oropharynx and gently passed into the esophagus. The endoscope was passed to the ampulla. Only partial views of the stomach and duodenum were obtained. After the procedure, the patient was taken to the recovery area in stable condition. FINDINGS: 
AMPULLA: normal 
 
BILE DUCT: cannulated with some difficulty using a 39 sphincterotome over a 0.025 wire. Double wire technique used. Initial cholangiogram revealed CBD stricture of 4.5 cm, normal filling cystic duct and dilated CBD and extrahepatic ducts. A moderate sized sphincterotomy was performed along the long axis of the intraduodenal papilla, taking care to avoid the pancreatic orifice, which was identifiable by wire presence. A 15 mm balloon was swept through the duct from above the stricture. It was partially decompressed within the stricture so it could traverse the area. Stone material and sludge removed. The stricture was then brushed for 1 minute with a biliary brush. A 10 mm x 60 mm covered biliary stent was placed across the stricture, with good drainage upon deployment. An expandable covered stent was chosen due to the sphincterotomy and the need for anticoagulation due to his PE. The expandable stent will provide more tamponade of the sphincterotomy site. PANCREATIC DUCT: wire intially went into the pancreatic duct, which was normal to the distal body by the wire. The head was normal by contrast. 
 
ASSESSMENT/PLAN: 
1. Return to floor 2. Clear liquids 3.  Indomethacin 100 mg HI 
 4. If the lesion is determined to be a potentially resectable cancer, or not cancer at all, the stent will likely need to be removed in a few weeks, as it is long enough that there may not be an adequate biliary stump to avoid hepaticojejunostomy if it is removed at the time of surgery Nuno Quintanilla MD 
[facility], [place]

## 2019-07-08 NOTE — ANESTHESIA PREPROCEDURE EVALUATION
Relevant Problems No relevant active problems Anesthetic History No history of anesthetic complications Review of Systems / Medical History Patient summary reviewed and pertinent labs reviewed Pulmonary COPD: moderate Shortness of breath and smoker Comments: pe 
 Neuro/Psych Within defined limits Comments: H/o syncope Cardiovascular Exercise tolerance: <4 METS 
  
GI/Hepatic/Renal 
Within defined limits Comments: pancreatic head mass Endo/Other Within defined limits Other Findings Physical Exam 
 
Airway Mallampati: II 
TM Distance: 4 - 6 cm Neck ROM: normal range of motion Mouth opening: Normal 
 
 Cardiovascular Regular rate and rhythm,  S1 and S2 normal,  no murmur, click, rub, or gallop Rhythm: regular Rate: normal 
 
 
 
 Dental 
 
Dentition: Loose teeth and Poor dentition Pulmonary Prolonged expiration Abdominal 
 
 
 
 Other Findings Anesthetic Plan ASA: 4 Anesthesia type: general 
 
 
 
 
Induction: Intravenous Anesthetic plan and risks discussed with: Patient Patient is sob with chest pain and has flipped t waves inferior and laterally, will consult cardiology for recc.

## 2019-07-08 NOTE — ED PROVIDER NOTES
Presents with a multitude of complaints including abdominal pain and chest pain dizziness myalgias and swelling in his neck. Has been there for a few days. He reports no alcohol for the past few days. Her reports regular tobacco use. Feels hot and cold. States he just doesn't feel well. No N/V/D. The history is provided by the patient. Abdominal Pain This is a new problem. The problem occurs constantly. The problem has been gradually worsening. The pain is located in the generalized abdominal region, epigastric region and chest. The pain is moderate. Pertinent negatives include no fever, no diarrhea, no nausea, no vomiting, no constipation, no dysuria and no frequency. Nothing worsens the pain. The pain is relieved by nothing. The patient's surgical history non-contributory. Past Medical History:  
Diagnosis Date  Arthritis  Other ill-defined conditions   
 chronic L leg pain Past Surgical History:  
Procedure Laterality Date  HX ORTHOPAEDIC No family history on file. Social History Socioeconomic History  Marital status: SINGLE Spouse name: Not on file  Number of children: Not on file  Years of education: Not on file  Highest education level: Not on file Occupational History  Not on file Social Needs  Financial resource strain: Not on file  Food insecurity:  
  Worry: Not on file Inability: Not on file  Transportation needs:  
  Medical: Not on file Non-medical: Not on file Tobacco Use  Smoking status: Current Every Day Smoker Packs/day: 1.00 Substance and Sexual Activity  Alcohol use: Yes Comment: occasionally  Drug use: No  
 Sexual activity: Not on file Lifestyle  Physical activity:  
  Days per week: Not on file Minutes per session: Not on file  Stress: Not on file Relationships  Social connections:  
  Talks on phone: Not on file Gets together: Not on file Attends [employer] service: Not on file Active member of club or organization: Not on file Attends meetings of clubs or organizations: Not on file Relationship status: Not on file  Intimate partner violence:  
  Fear of current or ex partner: Not on file Emotionally abused: Not on file Physically abused: Not on file Forced sexual activity: Not on file Other Topics Concern  Not on file Social History Narrative  Not on file ALLERGIES: Patient has no known allergies. Review of Systems Constitutional: Negative for fever. Gastrointestinal: Positive for abdominal pain. Negative for constipation, diarrhea, nausea and vomiting. Genitourinary: Negative for dysuria and frequency. All other systems reviewed and are negative. Vitals:  
 07/07/19 2258 BP: 114/78 Pulse: 93 Resp: 20 Temp: 98.2 °F (36.8 °C) SpO2: 100% Weight: 77.1 kg (170 lb) Height: 6' 1.5\" (1.867 m) Physical Exam  
Constitutional: He is oriented to person, place, and time. He appears well-developed and well-nourished. He does not appear ill. No distress. Thin HENT:  
Head: Normocephalic and atraumatic. Poor dentition Eyes: Conjunctivae are normal. Right eye exhibits no discharge. Left eye exhibits no discharge. Neck: Normal range of motion. Neck supple. Cardiovascular: Normal rate and regular rhythm. Pulmonary/Chest: Effort normal and breath sounds normal. No respiratory distress. Abdominal: Soft. He exhibits no distension. There is generalized tenderness. Musculoskeletal: Normal range of motion. He exhibits no edema. Neurological: He is alert and oriented to person, place, and time. No cranial nerve deficit. Skin: Skin is warm and dry. Capillary refill takes less than 2 seconds. He is not diaphoretic. Psychiatric: He has a normal mood and affect. His behavior is normal.  
Nursing note and vitals reviewed.  
  
 
NEDA 
 Number of Diagnoses or Management Options Acute pancreatitis, unspecified complication status, unspecified pancreatitis type: Acute pulmonary embolism without acute cor pulmonale, unspecified pulmonary embolism type [facility]):  
Lung mass:  
Virchow's node:  
Diagnosis management comments: Seen 6/20 for syncope Pt with diffuse metastasis, PE. Started heparin gtt. Admit to hospitalist.   
 
  
Amount and/or Complexity of Data Reviewed Clinical lab tests: ordered and reviewed Review and summarize past medical records: yes Discuss the patient with other providers: yes Independent visualization of images, tracings, or specimens: yes (nsr no ST elevation ) Risk of Complications, Morbidity, and/or Mortality Presenting problems: high Diagnostic procedures: moderate Management options: high Patient Progress Patient progress: stable Procedures

## 2019-07-08 NOTE — PROGRESS NOTES
Hourly rounding completed on this shift. All needs met. Pt is currently off the floor for scheduled procedures today. Will give report to oncoming nurse.

## 2019-07-08 NOTE — ED TRIAGE NOTES
Pt arrives with multiple complaints. C/o generalized abdominal pain, bilateral shoulder pain, \"knot\" to left side neck. Denies n/v/d. Reports attempting stool softener earlier today however states last BM yesterday. Reports \"sharp\" pain to left side chest \"when I turn certain way or when I breathe\"

## 2019-07-08 NOTE — PROGRESS NOTES
Hourly rounds completed. All needs met. Pt alert and oriented x 3. No complaints stated. Hep gtt infusing at 18 unit/kg/hr. Next ptt is scheduled for 1015. Pt is lying in a low, locked position with the side rails up x 2, call light within reach. Gave report to the oncoming day shift nurse.

## 2019-07-08 NOTE — PROGRESS NOTES
07/08/19 4249 Dual Skin Pressure Injury Assessment Dual Skin Pressure Injury Assessment WDL Second Care Provider (Based on [address] [place] [address]) Jericho Saleem. rn  
Skin Integumentary Skin Integumentary (WDL) WDL Completed dual skin assessment with Jericho Saleem RN. No skin breakdown was noted. Skin intact.

## 2019-07-08 NOTE — ED NOTES
TRANSFER - OUT REPORT: 
 
Verbal report given to GERARDO Waller(name) on [employer]  being transferred to [facility](unit) for routine progression of care Report consisted of patients Situation, Background, Assessment and  
Recommendations(SBAR). Information from the following report(s) SBAR and ED Summary was reviewed with the receiving nurse. Lines:  
Peripheral IV 07/07/19 Right Antecubital (Active) Site Assessment Clean, dry, & intact 7/7/2019 11:20 PM  
Phlebitis Assessment 0 7/7/2019 11:20 PM  
Infiltration Assessment 0 7/7/2019 11:20 PM  
Dressing Status Clean, dry, & intact 7/7/2019 11:20 PM  
  
 
Opportunity for questions and clarification was provided. Patient transported with: 
 Registered Nurse

## 2019-07-08 NOTE — CONSULTS
Gastroenterology Consultation Note Date of consultation:  7/8/2019 Consulting physician:  Dena Sheppard. Loan White M.D. Chief complaint:  Abdominal pain History of Present Illness:  Patient is a 53 y.o. gentleman presenting with 3 day history of abdominal pain. He describes insidious onset of aching epigastric pain without radiation. Over the past several weeks she has noted at least 10 pound weight loss. He has also experienced nausea but denies any vomiting, overt rectal bleeding, fevers or chills. He has experience chest discomfort and dyspnea on exertion. In the ER he was noted to have elevated total bilirubin 2.6,  and lipase 6000. CT demonstrated right sided pulmonary embolism, mediastinal lymphadenopathy concerning for metastatic disease, 4 cm left long cavitary lesion, 2 cm pancreatic head mass with associated biliary obstruction and gallbladder distention. He was started on a heparin drip. He remains NPO. PMH: 
Past Medical History:  
Diagnosis Date  Arthritis  Other ill-defined conditions   
 chronic L leg pain PSH: 
Past Surgical History:  
Procedure Laterality Date  HX ORTHOPAEDIC Allergies: 
No Known Allergies Home Medications: 
Prior to Admission medications Medication Sig Start Date End Date Taking? Authorizing Provider  
traMADol (ULTRAM) 50 mg tablet Take 1 Tab by mouth every eight (8) hours as needed for Pain. Max Daily Amount: 150 mg. 1/12/19   Iona Landeros MD  
 
 
[facility] Medications: 
Current Facility-Administered Medications Medication Dose Route Frequency  0.9% sodium chloride infusion  150 mL/hr IntraVENous CONTINUOUS  
 famotidine (PF) (PEPCID) 20 mg in sodium chloride 0.9% 10 mL injection  20 mg IntraVENous Q12H  
 ondansetron (ZOFRAN) injection 4 mg  4 mg IntraVENous Q6H PRN  
 acetaminophen (TYLENOL) tablet 650 mg  650 mg Oral Q6H PRN  
 oxyCODONE IR (ROXICODONE) tablet 5 mg  5 mg Oral Q6H PRN  
  morphine injection 2 mg  2 mg IntraVENous Q4H PRN  
 albuterol (PROVENTIL VENTOLIN) nebulizer solution 2.5 mg  2.5 mg Nebulization Q6H PRN  
 nicotine (NICODERM CQ) 21 mg/24 hr patch 1 Patch  1 Patch TransDERmal Q24H  
 heparin 25,000 units in dextrose 500 mL infusion  18-36 Units/kg/hr IntraVENous TITRATE  tuberculin injection 5 Units  5 Units IntraDERMal ONCE Social History: 
Social History Tobacco Use  Smoking status: Current Every Day Smoker Packs/day: 1.00 Substance Use Topics  Alcohol use: Yes Comment: occasionally Pt denies any history of IV drug use, blood transfusions, tattoos, prophylactic antibiotics prior to dental work, cardiac stents, pacemaker placement, or vaccinations for Hep A or B. Family History: No family history on file. Review of Systems: A detailed 10 system ROS is obtained, with pertinent positives as listed above. All others are negative. Physical Exam:  
Vitals: 
Visit Vitals /63 (BP 1 Location: Left arm, BP Patient Position: At rest) Pulse 64 Temp 98.3 °F (36.8 °C) Resp 17 Ht 6' 1.5\" (1.867 m) Wt 77.1 kg (170 lb) SpO2 95% BMI 22.12 kg/m² HEENT:  Trace scleral icterus, no oral ulcers Heart:  Regular rate and rhythm, no murmurs Lungs:  Clear to auscultation bilaterally, no accessory muscle use Abdomen: Soft, mild epigastric tenderness without rebound or guarding Rectal:  Deferred Extremities:  No cyanosis or leg edema Skin:  No rashes, no spider angiomas over the anterior chest wall Neuro:  Awake, alert and oriented to person, place, and time Lymphatic:  No cervical or supraclavicular adenopathy 
 
Data:   
Recent Results (from the past 24 hour(s)) EKG, 12 LEAD, INITIAL Collection Time: 07/07/19 11:00 PM  
Result Value Ref Range Ventricular Rate 68 BPM  
 Atrial Rate 68 BPM  
 P-R Interval 136 ms QRS Duration 84 ms Q-T Interval 366 ms  
 QTC Calculation (Bezet) 389 ms Calculated P Axis 87 degrees Calculated R Axis 80 degrees Calculated T Axis -86 degrees Diagnosis Normal sinus rhythm Right atrial enlargement Left ventricular hypertrophy with repolarization abnormality Abnormal ECG When compared with ECG of 20-JUN-2019 12:43, Inverted T waves have replaced nonspecific T wave abnormality in Inferior  
leads Confirmed by DAIANA MORENO (), Olivia Quintero ([id number]) on 7/8/2019 7:48:16 AM 
  
GLUCOSE, POC Collection Time: 07/07/19 11:15 PM  
Result Value Ref Range Glucose (POC) 111 (H) 65 - 100 mg/dL CBC WITH AUTOMATED DIFF Collection Time: 07/07/19 11:17 PM  
Result Value Ref Range WBC 6.1 4.3 - 11.1 K/uL  
 RBC 4.12 (L) 4.23 - 5.6 M/uL  
 HGB 12.2 (L) 13.6 - 17.2 g/dL HCT 36.8 (L) 41.1 - 50.3 % MCV 89.3 79.6 - 97.8 FL  
 MCH 29.6 26.1 - 32.9 PG  
 MCHC 33.2 31.4 - 35.0 g/dL  
 RDW 11.2 (L) 11.9 - 14.6 % PLATELET 273 426 - 192 K/uL MPV 9.3 (L) 9.4 - 12.3 FL ABSOLUTE NRBC 0.00 0.0 - 0.2 K/uL  
 DF AUTOMATED NEUTROPHILS 62 43 - 78 % LYMPHOCYTES 24 13 - 44 % MONOCYTES 11 4.0 - 12.0 % EOSINOPHILS 3 0.5 - 7.8 % BASOPHILS 1 0.0 - 2.0 % IMMATURE GRANULOCYTES 0 0.0 - 5.0 %  
 ABS. NEUTROPHILS 3.8 1.7 - 8.2 K/UL  
 ABS. LYMPHOCYTES 1.4 0.5 - 4.6 K/UL  
 ABS. MONOCYTES 0.7 0.1 - 1.3 K/UL  
 ABS. EOSINOPHILS 0.2 0.0 - 0.8 K/UL  
 ABS. BASOPHILS 0.0 0.0 - 0.2 K/UL  
 ABS. IMM. GRANS. 0.0 0.0 - 0.5 K/UL METABOLIC PANEL, COMPREHENSIVE Collection Time: 07/07/19 11:17 PM  
Result Value Ref Range Sodium 138 136 - 145 mmol/L Potassium 3.8 3.5 - 5.1 mmol/L Chloride 103 98 - 107 mmol/L  
 CO2 25 21 - 32 mmol/L Anion gap 10 7 - 16 mmol/L Glucose 105 (H) 65 - 100 mg/dL BUN 19 6 - 23 MG/DL Creatinine 1.33 0.8 - 1.5 MG/DL  
 GFR est AA >60 >60 ml/min/1.73m2 GFR est non-AA 59 (L) >60 ml/min/1.73m2 Calcium 8.9 8.3 - 10.4 MG/DL  Bilirubin, total 2.6 (H) 0.2 - 1.1 MG/DL  
 ALT (SGPT) 227 (H) 12 - 65 U/L  
 AST (SGOT) 74 (H) 15 - 37 U/L Alk. phosphatase 336 (H) 50 - 136 U/L Protein, total 8.7 (H) 6.3 - 8.2 g/dL Albumin 3.1 (L) 3.5 - 5.0 g/dL Globulin 5.6 (H) 2.3 - 3.5 g/dL A-G Ratio 0.6 (L) 1.2 - 3.5    
TROPONIN I Collection Time: 07/07/19 11:17 PM  
Result Value Ref Range Troponin-I, Qt. <0.02 (L) 0.02 - 0.05 NG/ML  
ETHYL ALCOHOL Collection Time: 07/07/19 11:17 PM  
Result Value Ref Range ALCOHOL(ETHYL),SERUM <3 MG/DL PROCALCITONIN Collection Time: 07/07/19 11:17 PM  
Result Value Ref Range Procalcitonin 0.3 ng/mL LIPASE Collection Time: 07/07/19 11:17 PM  
Result Value Ref Range Lipase 5,932 (H) 73 - 393 U/L MAGNESIUM Collection Time: 07/07/19 11:17 PM  
Result Value Ref Range Magnesium 2.3 1.8 - 2.4 mg/dL CK Collection Time: 07/07/19 11:17 PM  
Result Value Ref Range CK 54 21 - 215 U/L  
POC LACTIC ACID Collection Time: 07/07/19 11:21 PM  
Result Value Ref Range Lactic Acid (POC) 1.31 0.5 - 1.9 mmol/L  
CBC W/O DIFF Collection Time: 07/08/19  6:15 AM  
Result Value Ref Range WBC 5.1 4.3 - 11.1 K/uL  
 RBC 3.77 (L) 4.23 - 5.6 M/uL  
 HGB 11.3 (L) 13.6 - 17.2 g/dL HCT 34.1 (L) 41.1 - 50.3 % MCV 90.5 79.6 - 97.8 FL  
 MCH 30.0 26.1 - 32.9 PG  
 MCHC 33.1 31.4 - 35.0 g/dL  
 RDW 11.2 (L) 11.9 - 14.6 % PLATELET 455 189 - 490 K/uL MPV 9.3 (L) 9.4 - 12.3 FL ABSOLUTE NRBC 0.00 0.0 - 0.2 K/uL METABOLIC PANEL, COMPREHENSIVE Collection Time: 07/08/19  6:15 AM  
Result Value Ref Range Sodium 137 136 - 145 mmol/L Potassium 4.2 3.5 - 5.1 mmol/L Chloride 104 98 - 107 mmol/L  
 CO2 26 21 - 32 mmol/L Anion gap 7 7 - 16 mmol/L Glucose 101 (H) 65 - 100 mg/dL BUN 17 6 - 23 MG/DL Creatinine 1.01 0.8 - 1.5 MG/DL  
 GFR est AA >60 >60 ml/min/1.73m2 GFR est non-AA >60 >60 ml/min/1.73m2 Calcium 8.4 8.3 - 10.4 MG/DL  Bilirubin, total 1.3 (H) 0.2 - 1.1 MG/DL  
 ALT (SGPT) 183 (H) 12 - 65 U/L  
 AST (SGOT) 52 (H) 15 - 37 U/L Alk. phosphatase 291 (H) 50 - 136 U/L Protein, total 7.6 6.3 - 8.2 g/dL Albumin 2.6 (L) 3.5 - 5.0 g/dL Globulin 5.0 (H) 2.3 - 3.5 g/dL A-G Ratio 0.5 (L) 1.2 - 3.5 MAGNESIUM Collection Time: 07/08/19  6:15 AM  
Result Value Ref Range Magnesium 2.2 1.8 - 2.4 mg/dL  
 
 
impression/Plan: 
  
 
69-year-old gentleman presenting with epigastric pain, dyspnea on exertion and weight loss. He was found to have pulmonary embolism, pulmonary cavitary lesion, acute pancreatitis, and pancreatic mass with associated biliary obstruction. Suspect acute pancreatitis due to obstructing pancreatic head neoplasm. It is unclear whether this represents a primary malignancy or metastatic disease. Further management will require ERCP for biliary decompression. Patient will also require EUS with FNA of pancreatic head mass and/or malignant appearing mediastinal lymphadenopathy. Keep NPO for procedure(s) today. Will require a 4 hour hold of heparin protocol.   
 
Signed: 
Elmira Rodriguez MD 
7/8/2019 
8:53 AM

## 2019-07-08 NOTE — INTERVAL H&P NOTE
H&P Update: 
Devorah Spencer was seen and examined. History and physical has been reviewed. Significant clinical changes have occurred as noted:  Discussed risk and benefits of procedure over telephone prior to sedation for EUS. Endoscopy and risks explained to the patient. Risks including reaction to sedation, pancreatitis, cardiopulmonary events, infection, bleeding, perforation, requirement for surgery if complications should occur, death were explained to patient who expressed understanding and agreed to proceed with endoscopy. EUS with dilated CBD, sludge vs stone and distal obstruction.  Proceed with ERCP

## 2019-07-08 NOTE — PROGRESS NOTES
Patient seen and examined by me. Agree with above note by physician extender. Key findings are:  Pt with pancreatic mass- needs ercp- cp lasts seconds - sharp left sided and non-exertinoal. Positional. Burton as well is positional 
CV- RRR without murmur Lungs- Clear bilaterally Ext- no edema 
ekg- inferolateral t inversions Plan: abnormal ekg- cp not anginal by hx- needs ercp with likely pancreatic cancer- echo- no plans for cath at this time with multiple co-morbidities including pulmonary embolism- increased cardiac risk discussed with patient

## 2019-07-08 NOTE — H&P (VIEW-ONLY)
Gastroenterology Consultation Note Date of consultation:  7/8/2019 Consulting physician:  Jose Karimi. Haris Cedillo M.D. Chief complaint:  Abdominal pain History of Present Illness:  Patient is a 53 y.o. gentleman presenting with 3 day history of abdominal pain. He describes insidious onset of aching epigastric pain without radiation. Over the past several weeks she has noted at least 10 pound weight loss. He has also experienced nausea but denies any vomiting, overt rectal bleeding, fevers or chills. He has experience chest discomfort and dyspnea on exertion. In the ER he was noted to have elevated total bilirubin 2.6,  and lipase 6000. CT demonstrated right sided pulmonary embolism, mediastinal lymphadenopathy concerning for metastatic disease, 4 cm left long cavitary lesion, 2 cm pancreatic head mass with associated biliary obstruction and gallbladder distention. He was started on a heparin drip. He remains NPO. PMH: 
Past Medical History:  
Diagnosis Date  Arthritis  Other ill-defined conditions   
 chronic L leg pain PSH: 
Past Surgical History:  
Procedure Laterality Date  HX ORTHOPAEDIC Allergies: 
No Known Allergies Home Medications: 
Prior to Admission medications Medication Sig Start Date End Date Taking? Authorizing Provider  
traMADol (ULTRAM) 50 mg tablet Take 1 Tab by mouth every eight (8) hours as needed for Pain. Max Daily Amount: 150 mg. 1/12/19   Romeo Landeros MD  
 
 
[facility] Medications: 
Current Facility-Administered Medications Medication Dose Route Frequency  0.9% sodium chloride infusion  150 mL/hr IntraVENous CONTINUOUS  
 famotidine (PF) (PEPCID) 20 mg in sodium chloride 0.9% 10 mL injection  20 mg IntraVENous Q12H  
 ondansetron (ZOFRAN) injection 4 mg  4 mg IntraVENous Q6H PRN  
 acetaminophen (TYLENOL) tablet 650 mg  650 mg Oral Q6H PRN  
 oxyCODONE IR (ROXICODONE) tablet 5 mg  5 mg Oral Q6H PRN  
  morphine injection 2 mg  2 mg IntraVENous Q4H PRN  
 albuterol (PROVENTIL VENTOLIN) nebulizer solution 2.5 mg  2.5 mg Nebulization Q6H PRN  
 nicotine (NICODERM CQ) 21 mg/24 hr patch 1 Patch  1 Patch TransDERmal Q24H  
 heparin 25,000 units in dextrose 500 mL infusion  18-36 Units/kg/hr IntraVENous TITRATE  tuberculin injection 5 Units  5 Units IntraDERMal ONCE Social History: 
Social History Tobacco Use  Smoking status: Current Every Day Smoker Packs/day: 1.00 Substance Use Topics  Alcohol use: Yes Comment: occasionally Pt denies any history of IV drug use, blood transfusions, tattoos, prophylactic antibiotics prior to dental work, cardiac stents, pacemaker placement, or vaccinations for Hep A or B. Family History: No family history on file. Review of Systems: A detailed 10 system ROS is obtained, with pertinent positives as listed above. All others are negative. Physical Exam:  
Vitals: 
Visit Vitals /63 (BP 1 Location: Left arm, BP Patient Position: At rest) Pulse 64 Temp 98.3 °F (36.8 °C) Resp 17 Ht 6' 1.5\" (1.867 m) Wt 77.1 kg (170 lb) SpO2 95% BMI 22.12 kg/m² HEENT:  Trace scleral icterus, no oral ulcers Heart:  Regular rate and rhythm, no murmurs Lungs:  Clear to auscultation bilaterally, no accessory muscle use Abdomen: Soft, mild epigastric tenderness without rebound or guarding Rectal:  Deferred Extremities:  No cyanosis or leg edema Skin:  No rashes, no spider angiomas over the anterior chest wall Neuro:  Awake, alert and oriented to person, place, and time Lymphatic:  No cervical or supraclavicular adenopathy 
 
Data:   
Recent Results (from the past 24 hour(s)) EKG, 12 LEAD, INITIAL Collection Time: 07/07/19 11:00 PM  
Result Value Ref Range Ventricular Rate 68 BPM  
 Atrial Rate 68 BPM  
 P-R Interval 136 ms QRS Duration 84 ms Q-T Interval 366 ms  
 QTC Calculation (Bezet) 389 ms Calculated P Axis 87 degrees Calculated R Axis 80 degrees Calculated T Axis -86 degrees Diagnosis Normal sinus rhythm Right atrial enlargement Left ventricular hypertrophy with repolarization abnormality Abnormal ECG When compared with ECG of 20-JUN-2019 12:43, Inverted T waves have replaced nonspecific T wave abnormality in Inferior  
leads Confirmed by DAIANA MORENO (), Smitha Wen ([id number]) on 7/8/2019 7:48:16 AM 
  
GLUCOSE, POC Collection Time: 07/07/19 11:15 PM  
Result Value Ref Range Glucose (POC) 111 (H) 65 - 100 mg/dL CBC WITH AUTOMATED DIFF Collection Time: 07/07/19 11:17 PM  
Result Value Ref Range WBC 6.1 4.3 - 11.1 K/uL  
 RBC 4.12 (L) 4.23 - 5.6 M/uL  
 HGB 12.2 (L) 13.6 - 17.2 g/dL HCT 36.8 (L) 41.1 - 50.3 % MCV 89.3 79.6 - 97.8 FL  
 MCH 29.6 26.1 - 32.9 PG  
 MCHC 33.2 31.4 - 35.0 g/dL  
 RDW 11.2 (L) 11.9 - 14.6 % PLATELET 145 257 - 553 K/uL MPV 9.3 (L) 9.4 - 12.3 FL ABSOLUTE NRBC 0.00 0.0 - 0.2 K/uL  
 DF AUTOMATED NEUTROPHILS 62 43 - 78 % LYMPHOCYTES 24 13 - 44 % MONOCYTES 11 4.0 - 12.0 % EOSINOPHILS 3 0.5 - 7.8 % BASOPHILS 1 0.0 - 2.0 % IMMATURE GRANULOCYTES 0 0.0 - 5.0 %  
 ABS. NEUTROPHILS 3.8 1.7 - 8.2 K/UL  
 ABS. LYMPHOCYTES 1.4 0.5 - 4.6 K/UL  
 ABS. MONOCYTES 0.7 0.1 - 1.3 K/UL  
 ABS. EOSINOPHILS 0.2 0.0 - 0.8 K/UL  
 ABS. BASOPHILS 0.0 0.0 - 0.2 K/UL  
 ABS. IMM. GRANS. 0.0 0.0 - 0.5 K/UL METABOLIC PANEL, COMPREHENSIVE Collection Time: 07/07/19 11:17 PM  
Result Value Ref Range Sodium 138 136 - 145 mmol/L Potassium 3.8 3.5 - 5.1 mmol/L Chloride 103 98 - 107 mmol/L  
 CO2 25 21 - 32 mmol/L Anion gap 10 7 - 16 mmol/L Glucose 105 (H) 65 - 100 mg/dL BUN 19 6 - 23 MG/DL Creatinine 1.33 0.8 - 1.5 MG/DL  
 GFR est AA >60 >60 ml/min/1.73m2 GFR est non-AA 59 (L) >60 ml/min/1.73m2 Calcium 8.9 8.3 - 10.4 MG/DL  Bilirubin, total 2.6 (H) 0.2 - 1.1 MG/DL  
 ALT (SGPT) 227 (H) 12 - 65 U/L  
 AST (SGOT) 74 (H) 15 - 37 U/L Alk. phosphatase 336 (H) 50 - 136 U/L Protein, total 8.7 (H) 6.3 - 8.2 g/dL Albumin 3.1 (L) 3.5 - 5.0 g/dL Globulin 5.6 (H) 2.3 - 3.5 g/dL A-G Ratio 0.6 (L) 1.2 - 3.5    
TROPONIN I Collection Time: 07/07/19 11:17 PM  
Result Value Ref Range Troponin-I, Qt. <0.02 (L) 0.02 - 0.05 NG/ML  
ETHYL ALCOHOL Collection Time: 07/07/19 11:17 PM  
Result Value Ref Range ALCOHOL(ETHYL),SERUM <3 MG/DL PROCALCITONIN Collection Time: 07/07/19 11:17 PM  
Result Value Ref Range Procalcitonin 0.3 ng/mL LIPASE Collection Time: 07/07/19 11:17 PM  
Result Value Ref Range Lipase 5,932 (H) 73 - 393 U/L MAGNESIUM Collection Time: 07/07/19 11:17 PM  
Result Value Ref Range Magnesium 2.3 1.8 - 2.4 mg/dL CK Collection Time: 07/07/19 11:17 PM  
Result Value Ref Range CK 54 21 - 215 U/L  
POC LACTIC ACID Collection Time: 07/07/19 11:21 PM  
Result Value Ref Range Lactic Acid (POC) 1.31 0.5 - 1.9 mmol/L  
CBC W/O DIFF Collection Time: 07/08/19  6:15 AM  
Result Value Ref Range WBC 5.1 4.3 - 11.1 K/uL  
 RBC 3.77 (L) 4.23 - 5.6 M/uL  
 HGB 11.3 (L) 13.6 - 17.2 g/dL HCT 34.1 (L) 41.1 - 50.3 % MCV 90.5 79.6 - 97.8 FL  
 MCH 30.0 26.1 - 32.9 PG  
 MCHC 33.1 31.4 - 35.0 g/dL  
 RDW 11.2 (L) 11.9 - 14.6 % PLATELET 321 780 - 699 K/uL MPV 9.3 (L) 9.4 - 12.3 FL ABSOLUTE NRBC 0.00 0.0 - 0.2 K/uL METABOLIC PANEL, COMPREHENSIVE Collection Time: 07/08/19  6:15 AM  
Result Value Ref Range Sodium 137 136 - 145 mmol/L Potassium 4.2 3.5 - 5.1 mmol/L Chloride 104 98 - 107 mmol/L  
 CO2 26 21 - 32 mmol/L Anion gap 7 7 - 16 mmol/L Glucose 101 (H) 65 - 100 mg/dL BUN 17 6 - 23 MG/DL Creatinine 1.01 0.8 - 1.5 MG/DL  
 GFR est AA >60 >60 ml/min/1.73m2 GFR est non-AA >60 >60 ml/min/1.73m2 Calcium 8.4 8.3 - 10.4 MG/DL  Bilirubin, total 1.3 (H) 0.2 - 1.1 MG/DL  
 ALT (SGPT) 183 (H) 12 - 65 U/L  
 AST (SGOT) 52 (H) 15 - 37 U/L Alk. phosphatase 291 (H) 50 - 136 U/L Protein, total 7.6 6.3 - 8.2 g/dL Albumin 2.6 (L) 3.5 - 5.0 g/dL Globulin 5.0 (H) 2.3 - 3.5 g/dL A-G Ratio 0.5 (L) 1.2 - 3.5 MAGNESIUM Collection Time: 07/08/19  6:15 AM  
Result Value Ref Range Magnesium 2.2 1.8 - 2.4 mg/dL  
 
 
impression/Plan: 
  
 
53-year-old gentleman presenting with epigastric pain, dyspnea on exertion and weight loss. He was found to have pulmonary embolism, pulmonary cavitary lesion, acute pancreatitis, and pancreatic mass with associated biliary obstruction. Suspect acute pancreatitis due to obstructing pancreatic head neoplasm. It is unclear whether this represents a primary malignancy or metastatic disease. Further management will require ERCP for biliary decompression. Patient will also require EUS with FNA of pancreatic head mass and/or malignant appearing mediastinal lymphadenopathy. Keep NPO for procedure(s) today. Will require a 4 hour hold of heparin protocol.   
 
Signed: 
Julio Curiel MD 
7/8/2019 
8:53 AM

## 2019-07-08 NOTE — PROGRESS NOTES
.. TRANSFER - IN REPORT: 
 
Verbal report received from [facility]  being received from ED for routine progression of care Report consisted of patients Situation, Background, Assessment and  
Recommendations(SBAR). Information from the following report(s) SBAR, Kardex and ED Summary was reviewed with the receiving nurse. Opportunity for questions and clarification was provided. Assessment completed upon patients arrival to unit and care assumed.

## 2019-07-08 NOTE — ANESTHESIA POSTPROCEDURE EVALUATION
Procedure(s): ENDOSCOPIC ULTRASOUND (EUS) room 625 ENDOSCOPIC RETROGRADE CHOLANGIOPANCREATOGRAPHY (ERCP) FINE NEEDLE ASPIRATION 
ENDOSCOPIC SPHINCTEROTOMY 
ENDOSCOPIC STONE EXTRACTION/BALLOON SWEEP 
ENDOSCOPIC BRUSHING 
ENDOSCOPY WITH PROSTHESIS OR STENT PLACEMENT. general 
 
Anesthesia Post Evaluation Multimodal analgesia: multimodal analgesia used between 6 hours prior to anesthesia start to PACU discharge Patient location during evaluation: PACU Patient participation: complete - patient participated Level of consciousness: awake Pain management: adequate Airway patency: patent Anesthetic complications: no 
Cardiovascular status: acceptable Respiratory status: acceptable Hydration status: acceptable Comments: Chipped teeth at the end of the procedure, and bleeding gums from suspected trauma from the bite block, terrible dentition preop and great care was taken to keep his dentition at baseline intraoperatively Post anesthesia nausea and vomiting:  none Vitals Value Taken Time /72 7/8/2019  6:52 PM  
Temp 36.2 °C (97.2 °F) 7/8/2019  6:25 PM  
Pulse 66 7/8/2019  7:00 PM  
Resp 34 7/8/2019  7:00 PM  
SpO2 96 % 7/8/2019  7:00 PM  
Vitals shown include unvalidated device data.

## 2019-07-08 NOTE — H&P
[address] [place] [address] 
HISTORY AND PHYSICAL Name:  Kian Dominique 
MR#:  [id number] :  1965 ACCOUNT #:  [de-identified] ADMIT DATE:  2019 CHIEF COMPLAINT:  Abdominal pain. HISTORY OF PRESENT ILLNESS:  This is a 76-year-old male patient who does not follow on a regular basis with any physician and who came into the emergency room reporting several months of progressive weight loss and approximately 3 days of progressive severe abdominal pain. He localizes the pain in the epigastric area and denies any radiation. He rates it as 10/10 in intensity and is associated with nausea but denies vomiting. He also denies diarrhea, fever, chills, or any other symptom. The patient also reported some chest discomfort, shortness of breath with exertion, and the sensation that he has a bump in the left supraclavicular area. When he arrived into the emergency room, his vital signs were blood pressure of 114/78, heart rate of 93, respiratory rate of 20, O2 saturation of 100% at room air. The patient was awake and alert. His physical exam showed a cachectic male with decreased breath sounds in both lung bases and epigastric tenderness. His initial blood workup reported a lactic acid of 1.3, normal white blood cell count, stable hemoglobin. His creatinine was 1.3 with a baseline of 1.07. He had a total bilirubin of 2.6, an ALT of 227, AST of 74, and a lipase of 6000. A CT scan of the chest, abdomen and pelvis was done and reported a right lower lobe segmental pulmonary embolism, mediastinal lymphadenopathy, likely metastasis; a 4-cm pleural-based cavitary mass in the left apex, an approximately 2-cm hypoenhancing pancreatic head mass obstructing pancreatic and common bile duct, associated biliary duct dilatation and gallbladder distention, and suggestion of gastric wall thickening. The patient was started on a heparin drip and he was presented to the Hospitalist team for admission. REVIEW OF SYSTEMS:  A review of 14 systems was performed and it was negative except for the findings that are reported in the HPI. PAST MEDICAL HISTORY:  Arthritis, chronic leg pain. PAST SURGICAL HISTORY:  None. SOCIAL HISTORY:  Everyday smoker, at least one pack per day. Drinks alcohol occasionally. Denies illicit drug use. FAMILY HISTORY:  Reviewed with the patient and considered unremarkable. ALLERGIES:  NO KNOWN DRUG ALLERGIES. PHYSICAL EXAMINATION: 
VITAL SIGNS:  Blood pressure 124/76, heart rate 63, respiratory rate of 18, O2 saturation of 95%. HEENT:  Eyes:  PERRLA. Ears, Nose, Mouth and Throat. There is no evidence of pharyngeal erythema, edema, or purulent exudates. RESPIRATORY:  Decreased breath sounds in both lung bases. CARDIOVASCULAR:  Regular rate and rhythm with no murmurs. GASTROINTESTINAL:  Abdomen is soft with exquisite tenderness in the epigastric area. GENITOURINARY:  Unremarkable. MUSCULOSKELETAL:  The patient is cachectic. No evidence of trauma. SKIN:  No evidence of active skin lesions. NEUROLOGIC:  The patient is alert and oriented with no evidence of focal weakness. PSYCHIATRIC:  Normal mood. HEMATOLOGIC/LYMPHATIC/IMMUNOLOGIC:  No evidence of active bleeding. No abnormal lymphadenopathies. LABORATORY AND IMAGING RESULTS:  White blood cell count 6.1, hemoglobin 12.2, platelet count 376. Sodium 138, potassium 3.8, chloride 103, glucose 105, creatinine 1.33, calcium 8.9. Total bilirubin 2.6, albumin 3.1, , AST 74, total bilirubin 2.6, lipase 6000. Troponin I less than 0.02. CK 54. CT scan of the chest, abdomen and pelvis as previously described. EKG:  Normal sinus rhythm with right atrial enlargement. He also has evidence of left ventricular hypertrophy. EKG was seen and analyzed by me.  
 
ASSESSMENT:  This is a 58-year-old male patient who was found to have an obstruction of the common bile duct and pancreatic duct secondary to a mass in the head of the pancreas. He also has a cavitary mass in the left lung apex, mediastinal lymph nodes, and pulmonary embolism. He is at high risk of further complications. He will be admitted to the hospital and his length of stay is calculated to be more than two midnights. PLAN: 
1. Cavitary lesion of the left lung apex. This patient has a history of chronic tobacco use and most likely this cavitary lesion represents malignancy. My suspicion for mycobacterial infection is extremely low and I do not think that he needs to be on isolation. He does not seem to be infected and he denies cough, hemoptysis, or sputum production. He has mediastinal lymph nodes which most likely represents metastatic disease. Pulmonary has been consulted. 2.  Pulmonary embolism. Most likely secondary to active malignancy. The patient has been started on a heparin drip. He will need to be kept on a heparin drip for now because most likely he will need several procedures to be done. 3.  Pancreatitis secondary to pancreatic mass with obstruction of the common bile duct and the pancreatic duct. The patient will be left n.p.o. He will receive aggressive hydration with IV fluids. Most likely, he needs an ERCP for stent placement. GI has been consulted. IV p.r.n. pain medication ordered. 4.  Chronic tobacco use. Nicotine patch has been ordered. 5.  DVT prophylaxis. The patient is already on a heparin drip. MD LUKE Pires/[id number] 
D:  07/08/2019 4:29 T:  07/08/2019 4:33 
JOB #:  [id number]

## 2019-07-09 PROBLEM — E46 PROTEIN MALNUTRITION (HCC): Status: ACTIVE | Noted: 2019-01-01

## 2019-07-09 PROBLEM — F14.10 COCAINE ABUSE (HCC): Status: ACTIVE | Noted: 2019-01-01

## 2019-07-09 NOTE — PROGRESS NOTES
Met with patient at bedside. Patient is alert and oriented. Patient states he currently lives with his nephew. States at discharge he will return with nephew. Prior to admission, patient was independent with adls, does not drive. Nephew transports as needed. Has no DME. Patient is self pay. States he's on any medications but when he needs them his nephew helps pay for them. Patient states he has no pcp. Awaiting response from [facility]. If patient does not qualify, Cm will setup appointment with new \[facility]\"" for follow up. Provided patient with Amanda dallas and discussed that patient needs to fill that out and mail in for medication assistance. Patient verbalized understanding. CM will continue to follow.

## 2019-07-09 NOTE — PROGRESS NOTES
Hourly rounds completed during shift. Heparin gtt restarted @ 18. NSR per mon room. All needs met, bed locked in low position and call light within reach. Will give report to oncoming RN.

## 2019-07-09 NOTE — PROGRESS NOTES
Per Deco note, patient will be discharging to his sister's house at [address] [facility] [address].

## 2019-07-09 NOTE — MED STUDENT NOTES
*ATTENTION:  This note has been created by a medical student for educational purposes only. Please do not refer to the content of this note for clinical decision-making, billing, or other purposes. Please see attending physicians note to obtain clinical information on this patient. * Progress Note Patient: Reyna Hogan MRN: [id number]  SSN: [id number] YOB: 1965  Age: 53 y.o. Sex: male Admit Date: 7/7/2019 LOS: 1 day Subjective:  
 
Patient is being followed for a pancreatic obstructive mass discovered after patient presented with 3 mo weight loss and 3 day history of severe abdominal pain. CT revealed pancreatic mass, PE, left lung cavitary lesion and mediastinal LAD. Today patient is tolerating diet and comfortable. Lab values including bili, liver enzymes and alk phos trending down following ERCP with stenting performed yesterday. Patient reports he is also feeling increased pain relief. He does still report SOB, but only with exertion. ROS findings as noted above. Objective:  
 
Vitals:  
 07/08/19 2053 07/09/19 0110 07/09/19 6557 07/09/19 3033 BP: 104/66 103/67 98/58 102/66 Pulse: (!) 58 69 63 62 Resp: 20 18 18 18 Temp: 97.5 °F (36.4 °C) 98 °F (36.7 °C) 98.2 °F (36.8 °C) 98.1 °F (36.7 °C) SpO2: 96% 95% 98% 98% Weight:      
Height:      
  
 
Intake and Output: 
Current Shift: 07/09 0701 - 07/09 1900 In: 250 [P.O.:250] Out: - Last three shifts: 07/07 1901 - 07/09 0700 In: 3150 [I.V.:3150] Out: 375 [Urine:300] Physical Exam:  
GENERAL: alert, cooperative, no distress, cachectic LUNG: clear to auscultation bilaterally HEART: regular rate and rhythm, S1, S2 normal, no murmur, click, rub or gallop ABDOMEN: soft, non-tender. Bowel sounds normal. No masses,  no organomegaly, abnormal findings:  tenderness moderate in the epigastrium and in the RUQ 
EXTREMITIES:  extremities normal, atraumatic, no cyanosis or edema SKIN: no rash or abnormalities, anicteric NEUROLOGIC: AOx3. Assessment:  
 
Active Problems: 
  Pancreatitis (7/8/2019) Lung mass (7/8/2019) Pulmonary emboli ([address] (7/8/2019) Pancreatic mass (7/8/2019) Mediastinal adenopathy (7/8/2019) Plan:  
 
Gastroenterology, Pulmonary and Heme/Onc consulted and following for above listed problems.  
- Continue to monitor labs and follow improvement. 
- Restart heparin after ERCP yesterday. Pain control Consider palliative care consult. Signed By: Trent SANTIAGO, VCOM-CC July 9, 2019

## 2019-07-09 NOTE — CONSULTS
Palliative Care Patient: Nico Valle MRN: [id number]  SSN: [id number] YOB: 1965  Age: 53 y.o. Sex: male Date of Request: 7/9/19 Date of Consult:  7/9/2019 Reason for Consult:  goals of care Requesting Physician: Dr. Jonas Wesley Assessment/Plan:  
 
Principal Diagnosis:   
Failure to Thrive  R62.7 Additional Diagnoses: · Advance Care Planning Counseling Z71.89 
· Frailty  R54 
· Pain, abdomen  R10.9 · Counseling, Encounter for Medical Advice  Z71.9 
· Encounter for Palliative Care  Z51.5 Palliative Performance Scale (PPS): PPS: 50 Medical Decision Making:  
Reviewed and summarized notes from admission to present. Discussed case with appropriate providers. Reviewed laboratory and x-ray data from admission to present. The pt is resting in bed, his stepmother and a brother at bedside. Mr Zaira Kennedy denies pain at the moment, but said that earlier he had severe epigastric pain. The placement of the biliary stent during the ERCP seems to have been effective in reducing the pain. We reviewed that morphine IV and oxycodone tablets are available for pain. The pt denies N/V, abdominal tenderness, difficulty with urination or BMs. The pt verbalized that he has cancer in the pancreas and lungs, and that it does not have a good prognosis. He said that he does want to meet with an oncologist, and would like treatments if they are available. He added that he would like to see his grandchildren grow up. Mr. Zaira Kennedy confirmed that he wishes to be full code at this time. Code status updated in the EMR. The pt confirmed that he would like to complete a HCPOA naming one or more of his relatives. Consult order placed to [facility] asking them to see the pt tomorrow to complete the HCPOA.   Mr. Zaira Kennedy said that he would want to be intubated and vented for a short period of time, but had not yet decided how he feels about ventilation and sedation for an extensive period. Will continue to follow for symptom management. Will discuss findings with members of the interdisciplinary team.   
 
Thank you for this referral.    
 
  
. 
 
Subjective:  
 
History obtained from:  Patient and Chart Chief Complaint: Diagnosis of cancer History of Present Illness:  Mr Leonela Knutson is a 54 yo M with a limited PMH as he does not regularly see a provider. He has hx of chronic tobacco use. He tests positive for cocaine. He presented to the ER on 7/8/19 with c/o several months of progressive weight loss and approximately 3 days of progressive severe abdominal pain. A CT showed an obstruction of the common bile duct and pancreatic duct secondary to a mass in the head of the pancreas, a cavitary mass in the left lung apex, mediastinal lymph nodes, and pulmonary embolism. GI consulted for a likely ERCP. ERCP, biopsies, and placement of pancreatic stent completed on 7/8/19. Advance Directive: No      
Code Status:  Full Code Health Care Power of : No - Patient does not have a [address]. Past Medical History:  
Diagnosis Date  Arthritis  Other ill-defined conditions(799.89) chronic L leg pain Past Surgical History:  
Procedure Laterality Date  HX ORTHOPAEDIC History reviewed. No pertinent family history. Social History Tobacco Use  Smoking status: Current Every Day Smoker Packs/day: 1.00 Substance Use Topics  Alcohol use: Yes Comment: occasionally Prior to Admission medications Medication Sig Start Date End Date Taking? Authorizing Provider  
traMADol (ULTRAM) 50 mg tablet Take 1 Tab by mouth every eight (8) hours as needed for Pain. Max Daily Amount: 150 mg. 1/12/19   Ariana Oconnor MD  
 
 
No Known Allergies Review of Systems: A comprehensive review of systems was negative except for: Ears, Nose, Mouth, Throat, and Face: Positive for poor condition of remaining teeth. Gastrointestinal: Positive for new biliary stent. Objective:  
 
Visit Vitals /71 (BP 1 Location: Left arm, BP Patient Position: At rest) Pulse (!) 59 Temp 98.5 °F (36.9 °C) Resp 20 Ht 6' 1.5\" (1.867 m) Wt 170 lb (77.1 kg) SpO2 96% BMI 22.12 kg/m² Physical Exam: 
 
General:  Cooperative. No acute distress. Eyes:  Conjunctivae/corneas clear Nose: Nares normal. Septum midline. Neck: Supple, symmetrical, trachea midline, no JVD Lungs:   Clear to auscultation bilaterally, unlabored Heart:  Regular rate and rhythm, no murmur Abdomen:   Soft, non-tender, non-distended Extremities: Normal, atraumatic, no cyanosis or edema Skin: Skin color, texture, turgor normal. No rash or lesions. Neurologic: Nonfocal  
Psych: Alert and oriented Assessment:  
 
Hospital Problems  Date Reviewed: 7/8/2019 Codes Class Noted POA Pancreatitis ICD-10-CM: K85.90 ICD-9-CM: 381.0  7/8/2019 Yes Lung mass ICD-10-CM: R91.8 ICD-9-CM: 786.6  7/8/2019 Yes Pulmonary emboli [facility]) ICD-10-CM: I26.99 
ICD-9-CM: 415.19  7/8/2019 Yes Pancreatic mass ICD-10-CM: K86.9 ICD-9-CM: 577.9  7/8/2019 Yes Mediastinal adenopathy ICD-10-CM: R59.0 ICD-9-CM: 785.6  7/8/2019 Yes Signed By: Antonio Snowden NP   
 July 9, 2019

## 2019-07-09 NOTE — PROGRESS NOTES
Suresh Richards Admission Date: 7/7/2019 Daily Progress Note: 7/9/2019 Patient is a 53 y.o. male presents with weight loss and abdominal pain. His lipase was elevated and CT abdomen and chest was obtained. CT reported a right lower lobe segmental pulmonary embolism, mediastinal lymphadenopathy, likely metastasis; a 4-cm pleural-based cavitary mass in the left apex, an approximately 2-cm hypoenhancing pancreatic head mass obstructing pancreatic and common bile duct, associated biliary duct dilatation and gallbladder distention, and suggestion of gastric wall thickening. 
   
He is currently on heparin for PTE. On RA with oxygen saturation of 98%. We were asked to see him for mediastinal adenopathy and PTE. He reports 4 day duration of chest pain with inspiration. Troponin was negative, EKG showed inverted T-waves. UDS positive for multiple substances. He is a very poor historian. 
  
 Pancreatic mass. This is compressing the common bile duct and pancreatic duct without vascular involvement. While this may represent adenocarcinoma, the differential diagnosis includes lymphoma or metastatic disease. An FNA of pancreatic mass performed on 7/8 by Dr. Kelly Luna. Periportal lymphadenopathy- FNA was performed. Left adrenal mass. Biliary obstruction with echogenic debris in the distal common duct. Mediastinal lymphadenopathy- FNA was performed of \"1 enlarged precarinal node\" 
  
 
Subjective: S/p ERCP and FNA of multiple sites including mediastinal lymphadenopathy (precarinal node) on 7/8 Heparin was not restarted for PTE, ordered. Spoke with RN and plans to restart once up. Review of Systems Respiratory: positive for cough Current Facility-Administered Medications Medication Dose Route Frequency  0.9% sodium chloride infusion  150 mL/hr IntraVENous CONTINUOUS  
 famotidine (PF) (PEPCID) 20 mg in sodium chloride 0.9% 10 mL injection  20 mg IntraVENous Q12H  ondansetron (ZOFRAN) injection 4 mg  4 mg IntraVENous Q6H PRN  
 acetaminophen (TYLENOL) tablet 650 mg  650 mg Oral Q6H PRN  
 oxyCODONE IR (ROXICODONE) tablet 5 mg  5 mg Oral Q6H PRN  
 morphine injection 2 mg  2 mg IntraVENous Q4H PRN  
 albuterol (PROVENTIL VENTOLIN) nebulizer solution 2.5 mg  2.5 mg Nebulization Q6H PRN  
 nicotine (NICODERM CQ) 21 mg/24 hr patch 1 Patch  1 Patch TransDERmal Q24H  
 heparin 25,000 units in dextrose 500 mL infusion  18-36 Units/kg/hr IntraVENous TITRATE Objective:  
 
Vitals:  
 07/09/19 0110 07/09/19 7875 07/09/19 0559 07/09/19 1127 BP: 103/67 98/58 102/66 111/71 Pulse: 69 63 62 (!) 59 Resp: 18 18 18 20 Temp: 98 °F (36.7 °C) 98.2 °F (36.8 °C) 98.1 °F (36.7 °C) 98.5 °F (36.9 °C) SpO2: 95% 98% 98% 96% Weight:      
Height:      
 
Intake and Output:  
07/07 1901 - 07/09 0700 In: 3150 [I.V.:3150] Out: 375 [Urine:300] 07/09 0701 - 07/09 1900 In: 250 [P.O.:250] Out: - Physical Exam:         
Constitutional: the patient is ill appearing HEENT: Sclera clear, pupils equal, poor dentition Lungs:  Clear, coughing but nonproductive Cardiovascular: RRR without M,G,R 
Abd/GI: soft and non-tender; with positive bowel sounds. Ext: warm without cyanosis. There is no lower leg edema. Musculoskeletal: moves all four extremities with equal strength Skin: no jaundice or rashes, no wounds Neuro: no gross neuro deficits Lines/Drains: IV 
Nutrition: clear liquid CT IMPRESSION: 
  
1. Right lower lobe segmental pulmonary embolism. Telephoned to ED physician, Dr. Adithya Valentine, at 3:00 AM on exam date. 
  
2. Mediastinal lymphadenopathy, likely metastasis. Lymphoma is considered less 
likely. Negative for liver lesion or enlarged lymph node in the abdomen. 
  
3. A 4 cm pleurally base cavitary mass in the left apex. 
  
4. An approximately 2 cm hypoenhancing pancreatic head mass, obstructing pancreatic and common bile duct. Associated biliary duct dilatation and 
gallbladder distention. 
  
5.  Suggestion of gastric wall thickening. 
  
 
LAB Recent Labs  
  07/08/19 
0615 07/07/19 
2317 WBC 5.1 6.1 HGB 11.3* 12.2* HCT 34.1* 36.8*  
 296 Recent Labs  
  07/08/19 
0615 07/07/19 
2317  138  
K 4.2 3.8  103 CO2 26 25 * 105* BUN 17 19 CREA 1.01 1.33  
MG 2.2 2.3  
TROIQ  --  <0.02* Assessment:  
 
Patient Active Problem List  
Diagnosis Code  Pancreatitis K85.90  
 Lung mass R91.8  Pulmonary emboli ([facility]) I26.99  
 Pancreatic mass K86.9  Mediastinal adenopathy R59.0 Pancreatitis (7/8/2019) S/P ERCP Lung mass (7/8/2019) With adenopathy, biopsy pending Pulmonary emboli ([address] (7/8/2019) With likely cancer Pancreatic mass (7/8/2019) Mediastinal adenopathy (7/8/2019) S/p biopsy PLAN: 
-- FNA on 7/8, await cytology -- start heparin for PTE (ordered to be restarted on 7/8 after procedure but not yet started), convert to oral med when able unless no further testing is needed. -- PC consulted YVONNE Crook I have spoken with and examined the patient. I agree with the above assessment and plan as documented. Gen:pleasant HEENT:  L neck cervical adenopathy palpable Lungs:  Decreased bilaterally Heart:  RRR with no Murmur/Rubs/Gallops Abd:+BS NT Ext:  No edema Will f/u previously done biopsies from 7/8. May still need biopsies performed in future depending upon results of prior biopsies to assess whether primary lung or metastatic pancreatic process. Resume anticoagulation. Would favor heparin drip in case further diagnostic evaluation is required Smoking cessation education ordered Jen Regan MD 
 
 
More than 50% of time documented was spent in face-to-face contact with the patient and in the care of the patient on the floor/unit where the patient is located.

## 2019-07-09 NOTE — ACP (ADVANCE CARE PLANNING)
Mr. Bill Albright confirmed that he wishes to be full code at this time. Code status updated in the EMR. The pt confirmed that he would like to complete a HCPOA naming one or more of his relatives. Consult order placed to [facility] asking them to see the pt tomorrow to complete the HCPOA. Mr. Bill Albright said that he would want to be intubated and vented for a short period of time, but had not yet decided how he feels about ventilation and sedation for an extensive period.

## 2019-07-09 NOTE — PROGRESS NOTES
[facility] Progress Note Admit Date:  7/7/2019 Today's Date:  7/9/2019 CC:  Pancreatic mass Subjective:  
 
Patient is laying in bed undergoing cardiac echo. He states, \"thank you, I feel so much better, \" when asked he is doing after procedures yesterday. He denies any abdominal pain this AM. Denies any nausea/vomiting. No acute events overnight. ERCP 7/8/19 With Dr. Jimena Luna: 
FINDINGS: 
AMPULLA: normal 
  
BILE DUCT: cannulated with some difficulty using a 39 sphincterotome over a 0.025 wire. Double wire technique used. Initial cholangiogram revealed CBD stricture of 4.5 cm, normal filling cystic duct and dilated CBD and extrahepatic ducts. A moderate sized sphincterotomy was performed along the long axis of the intraduodenal papilla, taking care to avoid the pancreatic orifice, which was identifiable by wire presence. 
  
A 15 mm balloon was swept through the duct from above the stricture. It was partially decompressed within the stricture so it could traverse the area. Stone material and sludge removed. The stricture was then brushed for 1 minute with a biliary brush. 
  
A 10 mm x 60 mm covered biliary stent was placed across the stricture, with good drainage upon deployment. An expandable covered stent was chosen due to the sphincterotomy and the need for anticoagulation due to his PE. The expandable stent will provide more tamponade of the sphincterotomy site. 
  
PANCREATIC DUCT: wire intially went into the pancreatic duct, which was normal to the distal body by the wire. The head was normal by contrast. 
  
EUS 7/8/19:  
 
Findings:  
  
ENDOSCOPIC FINDINGS:  Limited views of the mucosa with the echoendoscope reveals no gross abnormalities of the stomach or proximal duodenum. The ampulla is well-visualized endoscopically and appears normal. 
 
PANCREAS:  The pancreas is well-visualized from head to tail.  There is no evidence of chronic pancreatitis. In the head of the pancreas, there is a 24 x 21 mm, hypoechoic, homogenous mass. The mass compresses the common bile duct and main pancreatic duct. There was no vascular involvement. The pancreatic duct is compressed with upstream dilation to 3 mm maximally in the body and 2 mm and the tail. Fine needle aspiration of the mass was performed using a 25-gauge [employer] Acquire needle. Three passes were made, yielding core specimens. A 9 x 9 mm round, hypoechoic lymph node is seen adjacent to the body of the pancreas. A 16 x 10 mm hypoechoic, oval-shaped node with smooth borders is seen in the periportal area. This is an extrinsically compressing the common bile duct. Fine-needle aspiration was performed using a 22-gauge [employer] Acquire needle. Two passes were made, yielding core specimens. BILIARY TREE: The gallbladder is distended. The common bile duct is well-visualized from its insertion in the ampulla to the bifurcation of right and left hepatic ducts. It is dilated, measuring up to 14 mm maximally and remains dilated within the intrapancreatic portion to the level of previously the described mass. There is echogenic debris without associated acoustic shadowing in the distal duct. The ampulla appears normal endosonographically. OTHER ORGANS:  Views of the left lobe of the liver demonstrate intrahepatic biliary dilatation but no solid mass lesions. There is no ascites in the upper abdomen. The left adrenal gland contains a 23 x 80 mm homogeneous, hypoechoic mass. The major vascular structures including the portal vein, splenic vessels and celiac artery appear unremarkable. Multiple pathologically enlarged nodes her seen in the mediastinum. 1 enlarged precarinal node is oval shaped, hypoechoic, heterogeneous and with smooth margins. This measures 25 x 14 mm. Fine-needle aspiration was performed using a 22-gauge [employer] Acquire needle.  Two passes were made, yielding core specimens.  
         
Impression: 1. Pancreatic mass. This is compressing the common bile duct and pancreatic duct without vascular involvement. While this may represent adenocarcinoma, the differential diagnosis includes lymphoma or metastatic disease. FNA was performed. 2. Periportal lymphadenopathy. FNA was performed. 3. Left adrenal mass. 4. Biliary obstruction with echogenic debris in the distal common duct. 5. Mediastinal lymphadenopathy. FNA was performed.   
  
 
 
 
 
 
 
 
Medications:  
Current Facility-Administered Medications Medication Dose Route Frequency  0.9% sodium chloride infusion  150 mL/hr IntraVENous CONTINUOUS  
 famotidine (PF) (PEPCID) 20 mg in sodium chloride 0.9% 10 mL injection  20 mg IntraVENous Q12H  
 ondansetron (ZOFRAN) injection 4 mg  4 mg IntraVENous Q6H PRN  
 acetaminophen (TYLENOL) tablet 650 mg  650 mg Oral Q6H PRN  
 oxyCODONE IR (ROXICODONE) tablet 5 mg  5 mg Oral Q6H PRN  
 morphine injection 2 mg  2 mg IntraVENous Q4H PRN  
 albuterol (PROVENTIL VENTOLIN) nebulizer solution 2.5 mg  2.5 mg Nebulization Q6H PRN  
 nicotine (NICODERM CQ) 21 mg/24 hr patch 1 Patch  1 Patch TransDERmal Q24H  
 [Held by provider] heparin 25,000 units in dextrose 500 mL infusion  18-36 Units/kg/hr IntraVENous TITRATE Review of Systems: ROS was obtained, with pertinent positives as listed above. No chest pain or SOB. Diet:  Clear liquid Objective:  
Vitals: 
Visit Vitals /66 (BP 1 Location: Left arm, BP Patient Position: At rest) Pulse 62 Temp 98.1 °F (36.7 °C) Resp 18 Ht 6' 1.5\" (1.867 m) Wt 77.1 kg (170 lb) SpO2 98% BMI 22.12 kg/m² Intake/Output: 
07/09 0701 - 07/09 1900 In: 250 [P.O.:250] Out: -  
07/07 1901 - 07/09 0700 In: 3150 [I.V.:3150] Out: 375 [Urine:300] Exam: 
General appearance: alert, cooperative, no distress Extremities: extremities normal, atraumatic, no cyanosis or edema Neuro:  alert and oriented Data Review (Labs):   
Recent Labs  
  07/08/19 
0615 07/07/19 
2317 WBC 5.1 6.1 HGB 11.3* 12.2* HCT 34.1* 36.8*  
 296 MCV 90.5 89.3  138  
K 4.2 3.8  103 CO2 26 25 BUN 17 19 CREA 1.01 1.33  
CA 8.4 8.9 MG 2.2 2.3 * 105* * 336* SGOT 52* 74* * 227* TBILI 1.3* 2.6* ALB 2.6* 3.1* TP 7.6 8.7* LPSE  --  8,271* Assessment:  
 
Active Problems: 
  Pancreatitis (7/8/2019) Lung mass (7/8/2019) Pulmonary emboli ([address] (7/8/2019) Pancreatic mass (7/8/2019) Mediastinal adenopathy (7/8/2019) 62-year-old gentleman presenting with epigastric pain, dyspnea on exertion and weight loss. He was found to have pulmonary embolism, pulmonary cavitary lesion, acute pancreatitis, and pancreatic mass with associated biliary obstruction. S/P ERCP and EUS on 7/8/19 outlined above. 7/8/19: TB 2.6, AST 74, ,  
7/9/19: TB 1.3, AST 52, ,  Plan:  
 
Awaiting pathology If the lesion is determined to be a potentially resectable cancer, or not cancer at all, the stent will likely need to be removed in a few weeks, as it is long enough that there may not be an adequate biliary stump to avoid hepaticojejunostomy if it is removed at the time of surgery Pain control per primary team 
 
Carlitos Dallas NP Patient is seen and examined in collaboration with Dr. Kenrick Perdomo. Assessment and plan as per Dr. Kenrick Perdomo. ATTENDING NOTE:  I have seen the patient and agree with the above assessment and plan. Patient with acute pancreatitis, pulmonary mass, cavitary lung lesion  and mediastinal adenopathy. Will follow up results of pathology of pre-carinal node, portal caval node and pancreatic lesion. He is status post ERCP with sphincterotomy and balloon sweep yielding stone material and sludge.   
 
Marissa Mckeon MD

## 2019-07-09 NOTE — PROGRESS NOTES
Hourly rounds performed. All needs met. Pt complained of abdominal pain twice during shift. Pt received PRN pain med. Pt has been resting quietly, bed in low position, and call light within reach. Will continue to monitor and report to oncoming nurse.

## 2019-07-09 NOTE — PROGRESS NOTES
Heparin drip was not not going last night, because heparin drip was held by provider. Tuesday 12:00 pm is when heparin drip was unheld.

## 2019-07-09 NOTE — PROGRESS NOTES
Hospitalist Progress Note Admit Date:  2019 11:18 PM  
Name:  Kristofer Garcia Age:  53 y.o. 
:  1965 MRN:  [id number] PCP:  Unknown, Provider Treatment Team: Attending Provider: Marie Negro MD; Consulting Provider: Yasmani Chan MD; Consulting Provider: Nilsa Crook MD; Care Manager: Rika Rodriguez RN; Consulting Provider: Baron Benedicto MD 
 
Subjective:  
New Patient For Me Today. As per Prior Documentation: \"CHIEF COMPLAINT:  Abdominal pain. 
  
HISTORY OF PRESENT ILLNESS:  This is a 43-year-old male patient who does not follow on a regular basis with any physician and who came into the emergency room reporting several months of progressive weight loss and approximately 3 days of progressive severe abdominal pain. He localizes the pain in the epigastric area and denies any radiation. He rates it as 10/10 in intensity and is associated with nausea but denies vomiting. He also denies diarrhea, fever, chills, or any other symptom. The patient also reported some chest discomfort, shortness of breath with exertion, and the sensation that he has a bump in the left supraclavicular area. When he arrived into the emergency room, his vital signs were blood pressure of 114/78, heart rate of 93, respiratory rate of 20, O2 saturation of 100% at room air. The patient was awake and alert. His physical exam showed a cachectic male with decreased breath sounds in both lung bases and epigastric tenderness. His initial blood workup reported a lactic acid of 1.3, normal white blood cell count, stable hemoglobin. His creatinine was 1.3 with a baseline of 1.07. He had a total bilirubin of 2.6, an ALT of 227, AST of 74, and a lipase of 6000.   A CT scan of the chest, abdomen and pelvis was done and reported a right lower lobe segmental pulmonary embolism, mediastinal lymphadenopathy, likely metastasis; a 4-cm pleural-based cavitary mass in the left apex, an approximately 2-cm hypoenhancing pancreatic head mass obstructing pancreatic and common bile duct, associated biliary duct dilatation and gallbladder distention, and suggestion of gastric wall thickening. The patient was started on a heparin drip and he was presented to the Hospitalist team for admission. \" 
 
 
07/09/2019 - pt seen - no adverse overnight events had ercp yesterday. Sent back to floor. Heparin not re-started. Likely he has cancer. He is surprised to hear this. Objective:  
 
Patient Vitals for the past 24 hrs: 
 Temp Pulse Resp BP SpO2  
07/09/19 0734 98.1 °F (36.7 °C) 62 18 102/66 98 % 07/09/19 0621 98.2 °F (36.8 °C) 63 18 98/58 98 % 07/09/19 0110 98 °F (36.7 °C) 69 18 103/67 95 % 07/08/19 2053 97.5 °F (36.4 °C) (!) 58 20 104/66 96 % 07/08/19 1950  (!) 59   96 % 07/08/19 1915  (!) 59 23  97 % 07/08/19 1906 97.1 °F (36.2 °C) 60 18 111/58   
07/08/19 1904  65 14  97 % 07/08/19 1857  63 22  99 % 07/08/19 1852  60  119/72 100 % 07/08/19 1851  62 18  100 % 07/08/19 1847  65 15 120/76 100 % 07/08/19 1842  68  127/86   
07/08/19 1841  65 19  99 % 07/08/19 1836  68 16 132/88 100 % 07/08/19 1831  68 15 135/85 100 % 07/08/19 1826  73 14 136/89 100 % 07/08/19 1825 97.2 °F (36.2 °C) 73 18 132/85 100 % 07/08/19 1822  78 21  100 % 07/08/19 1821  80  132/85 100 % 07/08/19 1428  66 17 109/69 97 % 07/08/19 1152 98.1 °F (36.7 °C) 67 18 110/68 98 % Oxygen Therapy O2 Sat (%): 98 % (07/09/19 0734) Pulse via Oximetry: 59 beats per minute (07/08/19 1950) O2 Device: Room air (07/08/19 1906) O2 Flow Rate (L/min): 6 l/min (07/08/19 1825) Intake/Output Summary (Last 24 hours) at 7/9/2019 7000 Last data filed at 7/9/2019 9499 Gross per 24 hour Intake 2050 ml Output 375 ml Net 1675 ml General:    Thin and cachetic. Smells of cigarette smoke. Alert. CV:   RRR. No murmur, rub, or gallop. Lungs:   CTAB. No wheezing, rhonchi, or rales. Abdomen:   Soft, nontender, nondistended. Extremities: Warm and dry. No cyanosis or edema. Skin:     No rashes or jaundice. Data Review: 
I have reviewed all labs, meds, telemetry events, and studies from the last 24 hours. Recent Results (from the past 24 hour(s)) DRUG SCREEN, URINE Collection Time: 07/08/19  9:44 AM  
Result Value Ref Range PCP(PHENCYCLIDINE) NEGATIVE BENZODIAZEPINES NEGATIVE     
 COCAINE POSITIVE AMPHETAMINES NEGATIVE METHADONE NEGATIVE     
 THC (TH-CANNABINOL) NEGATIVE     
 OPIATES POSITIVE    
 BARBITURATES NEGATIVE All Micro Results Procedure Component Value Units Date/Time AFB CULTURE + SMEAR W/RFLX ID FROM CULTURE [[id number]] Order Status:  Sent AFB CULTURE + SMEAR W/RFLX ID FROM CULTURE [[id number]] Order Status:  Sent AFB CULTURE + SMEAR W/RFLX ID FROM CULTURE [[id number]] Order Status:  Sent Current Meds: 
Current Facility-Administered Medications Medication Dose Route Frequency  0.9% sodium chloride infusion  150 mL/hr IntraVENous CONTINUOUS  
 famotidine (PF) (PEPCID) 20 mg in sodium chloride 0.9% 10 mL injection  20 mg IntraVENous Q12H  
 ondansetron (ZOFRAN) injection 4 mg  4 mg IntraVENous Q6H PRN  
 acetaminophen (TYLENOL) tablet 650 mg  650 mg Oral Q6H PRN  
 oxyCODONE IR (ROXICODONE) tablet 5 mg  5 mg Oral Q6H PRN  
 morphine injection 2 mg  2 mg IntraVENous Q4H PRN  
 albuterol (PROVENTIL VENTOLIN) nebulizer solution 2.5 mg  2.5 mg Nebulization Q6H PRN  
 nicotine (NICODERM CQ) 21 mg/24 hr patch 1 Patch  1 Patch TransDERmal Q24H  
 [Held by provider] heparin 25,000 units in dextrose 500 mL infusion  18-36 Units/kg/hr IntraVENous TITRATE Other Studies (last 24 hours): Xr Ercp / Ercb Combined Result Date: 7/8/2019 ERCP images History: Fluoro for ERCP, 53 years Male ERCP, Dr. Elie Rivas Time: [id number], 49.83mGy Comparison: CT torso July 08, 2019 Findings:  ERCP was performed by gastroenterology service. Images are interpreted here. Please see dedicated report by GI service for complete details. ERCP images demonstrate a mildly dilated superior common bile duct, with long segment stricture of the inferior common bile duct noted. Papillotomy and stone extraction with brushings was performed. A biliary Wallstent was inserted. Impression: ERCP images as above. Fluoroscopy time: 4:22 min. Total number of fluoro images obtained: 6 Assessment and Plan:  
 
Hospital Problems as of 7/9/2019 Date Reviewed: 7/8/2019 Codes Class Noted - Resolved POA Pancreatitis ICD-10-CM: K85.90 ICD-9-CM: 774.0  7/8/2019 - Present Yes Lung mass ICD-10-CM: R91.8 ICD-9-CM: 786.6  7/8/2019 - Present Yes Pulmonary emboli [facility]) ICD-10-CM: I26.99 
ICD-9-CM: 415.19  7/8/2019 - Present Yes Pancreatic mass ICD-10-CM: K86.9 ICD-9-CM: 577.9  7/8/2019 - Present Yes Mediastinal adenopathy ICD-10-CM: R59.0 ICD-9-CM: 785.6  7/8/2019 - Present Yes PLAN:   
 
1) Cavitary lesion left lung apex- Acid fast tetsing in progress- but likely a mailgant lesion - pulm input appreciated 2) PE- likely secondary to cancer- will start heparin drip. No anticoagulation studies in the chart but he was apparently on heparin. Per other documentation orders placed to re-start but drip remained held. Will check a ptt and re-start it. 3) Pancreatitis secondary to pancreatic mass with obstruction of the common bile duct and pancreatic s/p ercp witj stent placement- pt feels better s/p ercp. - gi input appreciated Aj Dey 4) Protein malnutrition- nutrition consult 5) Chronic tobacco use. Nicotine patch has been ordered. 6) Cocaine abuse- avoid bb 7)  DVT prophylaxis. The patient is already on a heparin drip. 8) pc consult - to help establish  Goals of care 9) pain mgt DC planning/Dispo: Unclear; prognosis appears poor DVT ppx:  Start heparin drip per protocol once coag studies are back Signed: 
Alona Moon MD

## 2019-07-10 NOTE — PROGRESS NOTES
[facility] Admission Date: 7/7/2019 Daily Progress Note: 7/10/2019  
 
53 y.o. male presents with weight loss and abdominal pain. Lipase was 5932 and CT abdomen and chest reported a RLL segmental pulmonary embolism, mediastinal lymphadenopathy, likely metastasis; a 4-cm pleural-based cavitary mass in the left apex, an approximately 2-cm hypoenhancing pancreatic head mass obstructing pancreatic and common bile duct, associated biliary duct dilatation and gallbladder distention, and suggestion of gastric wall thickening. 
   
Was placed on heparin for PTE. RA with O2 sat 98%. Pulmonary was asked to see for mediastinal adenopathy and PTE. He reports 4 day duration of chest pain with inspiration. Troponin was negative, EKG showed inverted T-waves. UDS positive for multiple substances. He is a very poor historian. 
  
 Pancreatic mass. This is compressing the common bile duct and pancreatic duct without vascular involvement. While this may represent adenocarcinoma, the differential diagnosis includes lymphoma or metastatic disease. An FNA of pancreatic mass performed on 7/8 by Dr. Carson Brown. Periportal lymphadenopathy- FNA was performed. Left adrenal mass. Biliary obstruction with echogenic debris in the distal common duct. Mediastinal lymphadenopathy- FNA was performed of \"1 enlarged precarinal node\". ERCP performed 7/8 with papillotomy and stone extraction. ECHO with EF 50%. 
  
 
Subjective: S/p ERCP and FNA of multiple sites including mediastinal lymphadenopathy (precarinal node) on 7/8 Heparin was not restarted for PTE, ordered. Spoke with RN and plans to restart once up. REports no dyspnea Review of Systems Respiratory: positive for cough Current Facility-Administered Medications Medication Dose Route Frequency  heparin 25,000 units in dextrose 500 mL infusion  18-36 Units/kg/hr IntraVENous TITRATE  naloxone (NARCAN) injection 0.4 mg  0.4 mg IntraVENous PRN  
  senna-docusate (PERICOLACE) 8.6-50 mg per tablet 1 Tab  1 Tab Oral DAILY  calcium carbonate (TUMS) chewable tablet 400 mg [elemental]  400 mg Oral TID PRN  
 famotidine (PF) (PEPCID) 20 mg in sodium chloride 0.9% 10 mL injection  20 mg IntraVENous Q12H  
 ondansetron (ZOFRAN) injection 4 mg  4 mg IntraVENous Q6H PRN  
 acetaminophen (TYLENOL) tablet 650 mg  650 mg Oral Q6H PRN  
 oxyCODONE IR (ROXICODONE) tablet 5 mg  5 mg Oral Q6H PRN  
 morphine injection 2 mg  2 mg IntraVENous Q4H PRN  
 albuterol (PROVENTIL VENTOLIN) nebulizer solution 2.5 mg  2.5 mg Nebulization Q6H PRN  
 nicotine (NICODERM CQ) 21 mg/24 hr patch 1 Patch  1 Patch TransDERmal Q24H Objective:  
 
Vitals:  
 07/09/19 2342 07/10/19 0459 07/10/19 7819 07/10/19 1132 BP: 100/63 112/68 106/66 113/75 Pulse: 73 78 71 79 Resp: 18 18 18 20 Temp: 98 °F (36.7 °C) 98.2 °F (36.8 °C) 98.5 °F (36.9 °C) 98.6 °F (37 °C) SpO2: 98% 99% 100% 98% Weight:      
Height:      
 
Intake and Output:  
07/08 1901 - 07/10 0700 In: 250 [P.O.:250] Out: 1200 [Urine:1200] No intake/output data recorded. Physical Exam:         
Constitutional: the patient is ill appearing HEENT: Sclera clear, pupils equal, poor dentition. L firm SC node noted. Lungs:  Clear, coughing but nonproductive Cardiovascular: RRR without M,G,R 
Abd/GI: soft and non-tender; with positive bowel sounds. Ext: warm without cyanosis. There is no lower leg edema. Musculoskeletal: moves all four extremities with equal strength Skin: no jaundice or rashes, no wounds Neuro: no gross neuro deficits CT chest:   
7/8/19:   
1. Right lower lobe segmental pulmonary embolism.   
2. Mediastinal lymphadenopathy, likely metastasis. Lymphoma is considered less 
likely. Negative for liver lesion or enlarged lymph node in the abdomen. 3. A 4 cm pleurally base cavitary mass in the left apex.  
4. An approximately 2 cm hypoenhancing pancreatic head mass, obstructing pancreatic and common bile duct. Associated biliary duct dilatation and gallbladder distention. 5.  Suggestion of gastric wall thickening. LAB Recent Labs  
  07/10/19 
0318 07/08/19 
0615 07/07/19 
2317 WBC 5.4 5.1 6.1 HGB 9.9* 11.3* 12.2* HCT 29.2* 34.1* 36.8*  
 244 296 Recent Labs  
  07/10/19 
0318 07/09/19 
1844 07/08/19 
0615 07/07/19 
2317   --  137 138  
K 3.8  --  4.2 3.8   --  104 103 CO2 26  --  26 25 GLU 86  --  101* 105* BUN 6  --  17 19 CREA 0.71*  --  1.01 1.33  
MG  --   --  2.2 2.3  
TROIQ  --   --   --  <0.02* INR  --  1.1  --   --   
 
 
 
Assessment:  
 
Patient Active Problem List  
Diagnosis Code  Pancreatitis K85.90  
 Lung mass R91.8  Pulmonary emboli (HCC) I26.99  
 Pancreatic mass K86.9  Mediastinal adenopathy R59.0  Protein malnutrition ([facility] [address] E46  
 Cocaine abuse ([facility] [address] F14.10 Hospital Problems  Date Reviewed: 7/10/2019 Codes Class Noted POA Protein malnutrition ([facility] [address] ICD-10-CM: E46 
ICD-9-CM: 638  7/9/2019 Yes Cocaine abuse ([facility] [address] ICD-10-CM: F14.10 ICD-9-CM: 305.60  7/9/2019 Yes * (Principal) Pancreatitis ICD-10-CM: K85.90 ICD-9-CM: 574.7  7/8/2019 Yes Lung mass ICD-10-CM: R91.8 ICD-9-CM: 786.6  7/8/2019 Yes Pulmonary emboli [facility]) ICD-10-CM: I26.99 
ICD-9-CM: 415.19  7/8/2019 Yes Pancreatic mass ICD-10-CM: K86.9 ICD-9-CM: 577.9  7/8/2019 Yes Mediastinal adenopathy ICD-10-CM: R59.0 ICD-9-CM: 785.6  7/8/2019 Yes PLAN: 
-- FNA on 7/8, await cytology --Biliary brushing:  Rare atypical cells --Heparin drip for PTE. Convert to oral med when no further testing is needed. --Palliative Care following Will f/u previously done biopsies from 7/8. May still need biopsies performed in future depending upon results of pending path to assess whether coincident primary lung or metastatic pancreatic process.  
 
Fernando Vallejo, NP  
 I have spoken with and examined the patient. I agree with the above assessment and plan as documented. Gen: pleasant, no distress HEENT: poor dentition, L sc node palpable Lungs:  CTA Heart:  RRR with no Murmur/Rubs/Gallops Ext: no edema 
 
--as above. Await path, likely tomorrow or Friday. --will be available to determine if further staging or diagnostic procedures required. Heparin for VTE at least until path is back please.  
 
Kathleen Valencia MD

## 2019-07-10 NOTE — PROGRESS NOTES
Gastroenterology Progress Note Date of admission:  7/7/2019 Today's date:  7/10/2019 CC:      Abdominal pain HPI:   No events or new complaints. ROS:  
 Gen: No fevers, no chills CV:   No chest pain, no SOB Current Medications:  
 
Current Facility-Administered Medications Medication Dose Route Frequency  heparin 25,000 units in dextrose 500 mL infusion  18-36 Units/kg/hr IntraVENous TITRATE  naloxone (NARCAN) injection 0.4 mg  0.4 mg IntraVENous PRN  
 senna-docusate (PERICOLACE) 8.6-50 mg per tablet 1 Tab  1 Tab Oral DAILY  calcium carbonate (TUMS) chewable tablet 400 mg [elemental]  400 mg Oral TID PRN  
 famotidine (PF) (PEPCID) 20 mg in sodium chloride 0.9% 10 mL injection  20 mg IntraVENous Q12H  
 ondansetron (ZOFRAN) injection 4 mg  4 mg IntraVENous Q6H PRN  
 acetaminophen (TYLENOL) tablet 650 mg  650 mg Oral Q6H PRN  
 oxyCODONE IR (ROXICODONE) tablet 5 mg  5 mg Oral Q6H PRN  
 morphine injection 2 mg  2 mg IntraVENous Q4H PRN  
 albuterol (PROVENTIL VENTOLIN) nebulizer solution 2.5 mg  2.5 mg Nebulization Q6H PRN  
 nicotine (NICODERM CQ) 21 mg/24 hr patch 1 Patch  1 Patch TransDERmal Q24H Physical Exam:  
Vitals: 
Visit Vitals /66 (BP 1 Location: Left arm, BP Patient Position: At rest) Pulse 71 Temp 98.5 °F (36.9 °C) Resp 18 Ht 6' 1.5\" (1.867 m) Wt 77.1 kg (170 lb) SpO2 100% BMI 22.12 kg/m² Intake/Output: 
No intake/output data recorded. 07/08 1901 - 07/10 0700 In: 250 [P.O.:250] Out: 1200 [Urine:1200] HEENT:  No scleral icterus, no oral ulcers Abdomen: Soft, nontender, normoactive bowel sounds Extremities:  No cyanosis, no leg edema Neuro:  Alert and oriented to person, place, and time Data:  
 
Recent Results (from the past 24 hour(s)) PLEASE READ & DOCUMENT PPD TEST IN 24 HRS Collection Time: 07/09/19  8:38 AM  
Result Value Ref Range PPD Negative Negative  
 mm Induration 0 0 - 5 mm PTT Collection Time: 07/09/19 12:40 PM  
Result Value Ref Range aPTT 30.9 24.7 - 39.8 SEC PROTHROMBIN TIME + INR Collection Time: 07/09/19  6:44 PM  
Result Value Ref Range Prothrombin time 13.9 11.7 - 14.5 sec INR 1.1 PTT Collection Time: 07/09/19  6:44 PM  
Result Value Ref Range aPTT 53.7 (H) 24.7 - 39.8 SEC  
CBC WITH AUTOMATED DIFF Collection Time: 07/10/19  3:18 AM  
Result Value Ref Range WBC 5.4 4.3 - 11.1 K/uL  
 RBC 3.32 (L) 4.23 - 5.6 M/uL HGB 9.9 (L) 13.6 - 17.2 g/dL HCT 29.2 (L) 41.1 - 50.3 % MCV 88.0 79.6 - 97.8 FL  
 MCH 29.8 26.1 - 32.9 PG  
 MCHC 33.9 31.4 - 35.0 g/dL  
 RDW 10.9 (L) 11.9 - 14.6 % PLATELET 099 902 - 539 K/uL MPV 9.3 (L) 9.4 - 12.3 FL ABSOLUTE NRBC 0.00 0.0 - 0.2 K/uL  
 DF AUTOMATED NEUTROPHILS 72 43 - 78 % LYMPHOCYTES 15 13 - 44 % MONOCYTES 9 4.0 - 12.0 % EOSINOPHILS 3 0.5 - 7.8 % BASOPHILS 0 0.0 - 2.0 % IMMATURE GRANULOCYTES 0 0.0 - 5.0 %  
 ABS. NEUTROPHILS 3.9 1.7 - 8.2 K/UL  
 ABS. LYMPHOCYTES 0.8 0.5 - 4.6 K/UL  
 ABS. MONOCYTES 0.5 0.1 - 1.3 K/UL  
 ABS. EOSINOPHILS 0.2 0.0 - 0.8 K/UL  
 ABS. BASOPHILS 0.0 0.0 - 0.2 K/UL  
 ABS. IMM. GRANS. 0.0 0.0 - 0.5 K/UL METABOLIC PANEL, BASIC Collection Time: 07/10/19  3:18 AM  
Result Value Ref Range Sodium 136 136 - 145 mmol/L Potassium 3.8 3.5 - 5.1 mmol/L Chloride 103 98 - 107 mmol/L  
 CO2 26 21 - 32 mmol/L Anion gap 7 7 - 16 mmol/L Glucose 86 65 - 100 mg/dL BUN 6 6 - 23 MG/DL Creatinine 0.71 (L) 0.8 - 1.5 MG/DL  
 GFR est AA >60 >60 ml/min/1.73m2 GFR est non-AA >60 >60 ml/min/1.73m2 Calcium 7.8 (L) 8.3 - 10.4 MG/DL  
PTT Collection Time: 07/10/19  3:18 AM  
Result Value Ref Range aPTT 112.4 (H) 24.7 - 39.8 SEC Impression/Plan: 
  
 
53-year-old gentleman presenting with epigastric pain, dyspnea on exertion and weight loss.  He was found to have pulmonary embolism, pulmonary cavitary lesion, acute pancreatitis, and pancreatic mass with associated biliary obstruction. ERCP brushings revealed only atypical cells. EUS pathology is pending. 1. Will advance to soft diet today. 2. Will check LFTs today. 3. Follow up results of pathology. 4. Further recommendations based on results of pathology.   
 
Signed: 
Leta Conrad MD 
7/10/2019 
8:13 AM

## 2019-07-10 NOTE — MED STUDENT NOTES
*ATTENTION:  This note has been created by a medical student for educational purposes only. Please do not refer to the content of this note for clinical decision-making, billing, or other purposes. Please see attending physicians note to obtain clinical information on this patient. * Progress Note Patient: Glynn Massey MRN: [id number]  SSN: [id number] YOB: 1965  Age: 53 y.o. Sex: male Admit Date: 7/7/2019 LOS: 2 days Subjective:  
 
Patient being followed for pancreatic obstructive mass with 3 month history of weight loss and 3 day history of debilitating epigastric abdominal pain. , PE and left lung cavitary lesion and mediastinal lymphadenopathy. Patient is tolerating soft diet and comfortable at bedside. No SOB, fever, chills, nausea, or complaints at this time. Abdominal pain remains improved. Awaiting final pathology results from biopsies taken during ERCP. Palliative care on-board and coordinating HCPOA. Objective:  
 
Vitals:  
 07/09/19 2342 07/10/19 0459 07/10/19 8777 07/10/19 1132 BP: 100/63 112/68 106/66 113/75 Pulse: 73 78 71 79 Resp: 18 18 18 20 Temp: 98 °F (36.7 °C) 98.2 °F (36.8 °C) 98.5 °F (36.9 °C) 98.6 °F (37 °C) SpO2: 98% 99% 100% 98% Weight:      
Height:      
  
 
Intake and Output: 
Current Shift: No intake/output data recorded. Last three shifts: 07/08 1901 - 07/10 0700 In: 250 [P.O.:250] Out: 1200 [Urine:1200] Physical Exam:  
GENERAL: alert, cooperative, no distress, cachectic LUNG: clear to auscultation bilaterally HEART: regular rate and rhythm, S1, S2 normal, no murmur, click, rub or gallop ABDOMEN: soft, non-tender. Bowel sounds normal. No masses,  no organomegaly, abnormal findings:  tenderness moderate in the epigastrium and in the RUQ 
EXTREMITIES:  extremities normal, atraumatic, no cyanosis or edema SKIN: no rash or abnormalities, anicteric NEUROLOGIC: AOx3. Assessment: Principal Problem: 
  Pancreatitis (7/8/2019) Active Problems: 
  Lung mass (7/8/2019) Pulmonary emboli ([facility] (7/8/2019) Pancreatic mass (7/8/2019) Mediastinal adenopathy (7/8/2019) Protein malnutrition ([address] (7/9/2019) Cocaine abuse ([facility] (7/9/2019) Plan:  
 
Gastro and Pulmonary following for the above listed problems. 
-Anticipate Heme/Onc consult upon receipt of pathology results from biopsy. 
-Monitor labs and follow normalization s/p ERCP 
- Pain control Palliative care following DVT prophylaxis 
- Heparin drip Signed By: Sam ORR-LENIN, CLAUDIO-CC July 10, 2019

## 2019-07-10 NOTE — PROGRESS NOTES
Hourly rounds performed. All needs met. Pt A&O w/ no complaints at this time. Bed is in low position and call light within reach. Pt complained of abdominal pain twice during shift. Pt received PRN pain med. Heparin drip is still going at a rate of 20 and new bag has been placed. Will continue to monitor and report to oncoming nurse.

## 2019-07-10 NOTE — PROGRESS NOTES
Hospitalist Progress Note Admit Date:  2019 11:18 PM  
Name:  Roberto Love Age:  53 y.o. 
:  1965 MRN:  [id number] PCP:  Unknown, Provider Treatment Team: Attending Provider: Ila Stovall MD; Consulting Provider: Tyra Reyes MD; Consulting Provider: Dorota Barnhart MD; Care Manager: Felisha Potts RN; Consulting Provider: Lakshmi Arboleda NP Subjective: As per Prior Documentation: \"CHIEF COMPLAINT:  Abdominal pain. 
  
HISTORY OF PRESENT ILLNESS:  This is a 41-year-old male patient who does not follow on a regular basis with any physician and who came into the emergency room reporting several months of progressive weight loss and approximately 3 days of progressive severe abdominal pain. He localizes the pain in the epigastric area and denies any radiation. He rates it as 10/10 in intensity and is associated with nausea but denies vomiting. He also denies diarrhea, fever, chills, or any other symptom. The patient also reported some chest discomfort, shortness of breath with exertion, and the sensation that he has a bump in the left supraclavicular area. When he arrived into the emergency room, his vital signs were blood pressure of 114/78, heart rate of 93, respiratory rate of 20, O2 saturation of 100% at room air. The patient was awake and alert. His physical exam showed a cachectic male with decreased breath sounds in both lung bases and epigastric tenderness. His initial blood workup reported a lactic acid of 1.3, normal white blood cell count, stable hemoglobin. His creatinine was 1.3 with a baseline of 1.07. He had a total bilirubin of 2.6, an ALT of 227, AST of 74, and a lipase of 6000.   A CT scan of the chest, abdomen and pelvis was done and reported a right lower lobe segmental pulmonary embolism, mediastinal lymphadenopathy, likely metastasis; a 4-cm pleural-based cavitary mass in the left apex, an approximately 2-cm hypoenhancing pancreatic head mass obstructing pancreatic and common bile duct, associated biliary duct dilatation and gallbladder distention, and suggestion of gastric wall thickening. The patient was started on a heparin drip and he was presented to the Hospitalist team for admission. \" 
 
 
07/09/2019 - pt seen - no adverse overnight events had ercp yesterday. Sent back to floor. Heparin not re-started. Likely he has cancer. He is surprised to hear this. 
 
07/10/2019- pt seen - no adverse overnight - awaiting biopsy results Objective:  
 
Patient Vitals for the past 24 hrs: 
 Temp Pulse Resp BP SpO2  
07/10/19 1132 98.6 °F (37 °C) 79 20 113/75 98 % 07/10/19 0742 98.5 °F (36.9 °C) 71 18 106/66 100 % 07/10/19 0459 98.2 °F (36.8 °C) 78 18 112/68 99 % 07/09/19 2342 98 °F (36.7 °C) 73 18 100/63 98 % 07/09/19 2047 99.2 °F (37.3 °C) 69 18 127/64 97 % 07/09/19 1616 98.4 °F (36.9 °C) 60 18 125/80 97 % Oxygen Therapy O2 Sat (%): 98 % (07/10/19 1132) Pulse via Oximetry: 59 beats per minute (07/08/19 1950) O2 Device: Room air (07/08/19 1906) O2 Flow Rate (L/min): 6 l/min (07/08/19 1825) Intake/Output Summary (Last 24 hours) at 7/10/2019 1431 Last data filed at 7/9/2019 2047 Gross per 24 hour Intake  Output 300 ml Net -300 ml General:    Thin and cachetic. Smells of cigarette smoke. Alert. CV:   RRR. No murmur, rub, or gallop. Lungs:   CTAB. No wheezing, rhonchi, or rales. Abdomen:   Soft, nontender, nondistended. Extremities: Warm and dry. No cyanosis or edema. Skin:     No rashes or jaundice. Data Review: 
I have reviewed all labs, meds, telemetry events, and studies from the last 24 hours. Recent Results (from the past 24 hour(s)) PROTHROMBIN TIME + INR Collection Time: 07/09/19  6:44 PM  
Result Value Ref Range Prothrombin time 13.9 11.7 - 14.5 sec INR 1.1 PTT Collection Time: 07/09/19  6:44 PM  
Result Value Ref Range aPTT 53.7 (H) 24.7 - 39.8 SEC  
CBC WITH AUTOMATED DIFF Collection Time: 07/10/19  3:18 AM  
Result Value Ref Range WBC 5.4 4.3 - 11.1 K/uL  
 RBC 3.32 (L) 4.23 - 5.6 M/uL HGB 9.9 (L) 13.6 - 17.2 g/dL HCT 29.2 (L) 41.1 - 50.3 % MCV 88.0 79.6 - 97.8 FL  
 MCH 29.8 26.1 - 32.9 PG  
 MCHC 33.9 31.4 - 35.0 g/dL  
 RDW 10.9 (L) 11.9 - 14.6 % PLATELET 645 082 - 976 K/uL MPV 9.3 (L) 9.4 - 12.3 FL ABSOLUTE NRBC 0.00 0.0 - 0.2 K/uL  
 DF AUTOMATED NEUTROPHILS 72 43 - 78 % LYMPHOCYTES 15 13 - 44 % MONOCYTES 9 4.0 - 12.0 % EOSINOPHILS 3 0.5 - 7.8 % BASOPHILS 0 0.0 - 2.0 % IMMATURE GRANULOCYTES 0 0.0 - 5.0 %  
 ABS. NEUTROPHILS 3.9 1.7 - 8.2 K/UL  
 ABS. LYMPHOCYTES 0.8 0.5 - 4.6 K/UL  
 ABS. MONOCYTES 0.5 0.1 - 1.3 K/UL  
 ABS. EOSINOPHILS 0.2 0.0 - 0.8 K/UL  
 ABS. BASOPHILS 0.0 0.0 - 0.2 K/UL  
 ABS. IMM. GRANS. 0.0 0.0 - 0.5 K/UL METABOLIC PANEL, BASIC Collection Time: 07/10/19  3:18 AM  
Result Value Ref Range Sodium 136 136 - 145 mmol/L Potassium 3.8 3.5 - 5.1 mmol/L Chloride 103 98 - 107 mmol/L  
 CO2 26 21 - 32 mmol/L Anion gap 7 7 - 16 mmol/L Glucose 86 65 - 100 mg/dL BUN 6 6 - 23 MG/DL Creatinine 0.71 (L) 0.8 - 1.5 MG/DL  
 GFR est AA >60 >60 ml/min/1.73m2 GFR est non-AA >60 >60 ml/min/1.73m2 Calcium 7.8 (L) 8.3 - 10.4 MG/DL  
PTT Collection Time: 07/10/19  3:18 AM  
Result Value Ref Range aPTT 112.4 (H) 24.7 - 39.8 SEC  
HEPATIC FUNCTION PANEL Collection Time: 07/10/19  3:18 AM  
Result Value Ref Range Protein, total 6.2 (L) 6.3 - 8.2 g/dL Albumin 2.3 (L) 3.5 - 5.0 g/dL Globulin 3.9 (H) 2.3 - 3.5 g/dL A-G Ratio 0.6 (L) 1.2 - 3.5 Bilirubin, total 0.6 0.2 - 1.1 MG/DL Bilirubin, direct 0.2 <0.4 MG/DL Alk. phosphatase 196 (H) 50 - 136 U/L  
 AST (SGOT) 30 15 - 37 U/L  
 ALT (SGPT) 90 (H) 12 - 65 U/L  
PLEASE READ & DOCUMENT PPD TEST IN 48 HRS Collection Time: 07/10/19  8:48 AM  
Result Value Ref Range PPD  Negative  
 mm Induration  0 - 5 mm PTT Collection Time: 07/10/19 10:42 AM  
Result Value Ref Range aPTT 63.0 (H) 24.7 - 39.8 SEC All Micro Results Procedure Component Value Units Date/Time AFB CULTURE + SMEAR W/RFLX ID FROM CULTURE [[id number]] Collected:  07/09/19 0835 Order Status:  Completed Updated:  07/09/19 1123 AFB CULTURE + SMEAR W/RFLX ID FROM CULTURE [[id number]] Order Status:  Sent AFB CULTURE + SMEAR W/RFLX ID FROM CULTURE [[id number]] Order Status:  Sent Current Meds: 
Current Facility-Administered Medications Medication Dose Route Frequency  heparin 25,000 units in dextrose 500 mL infusion  18-36 Units/kg/hr IntraVENous TITRATE  naloxone (NARCAN) injection 0.4 mg  0.4 mg IntraVENous PRN  
 senna-docusate (PERICOLACE) 8.6-50 mg per tablet 1 Tab  1 Tab Oral DAILY  calcium carbonate (TUMS) chewable tablet 400 mg [elemental]  400 mg Oral TID PRN  
 famotidine (PF) (PEPCID) 20 mg in sodium chloride 0.9% 10 mL injection  20 mg IntraVENous Q12H  
 ondansetron (ZOFRAN) injection 4 mg  4 mg IntraVENous Q6H PRN  
 acetaminophen (TYLENOL) tablet 650 mg  650 mg Oral Q6H PRN  
 oxyCODONE IR (ROXICODONE) tablet 5 mg  5 mg Oral Q6H PRN  
 morphine injection 2 mg  2 mg IntraVENous Q4H PRN  
 albuterol (PROVENTIL VENTOLIN) nebulizer solution 2.5 mg  2.5 mg Nebulization Q6H PRN  
 nicotine (NICODERM CQ) 21 mg/24 hr patch 1 Patch  1 Patch TransDERmal Q24H Other Studies (last 24 hours): No results found. Assessment and Plan:  
 
Hospital Problems as of 7/10/2019 Date Reviewed: 7/8/2019 Codes Class Noted - Resolved POA Protein malnutrition ([facility] ICD-10-CM: E46 
ICD-9-CM: 281  7/9/2019 - Present Yes Cocaine abuse ([facility] ICD-10-CM: F14.10 ICD-9-CM: 305.60  7/9/2019 - Present Yes * (Principal) Pancreatitis ICD-10-CM: K85.90 ICD-9-CM: 304.3  7/8/2019 - Present Yes Lung mass ICD-10-CM: R91.8 ICD-9-CM: 786.6  7/8/2019 - Present Yes Pulmonary emboli [facility]) ICD-10-CM: I26.99 
ICD-9-CM: 415.19  7/8/2019 - Present Yes Pancreatic mass ICD-10-CM: K86.9 ICD-9-CM: 577.9  7/8/2019 - Present Yes Mediastinal adenopathy ICD-10-CM: R59.0 ICD-9-CM: 785.6  7/8/2019 - Present Yes PLAN:   
 
1) Cavitary lesion left lung apex- Acid fast tetsing in progress- but likely a mailgant lesion - pulm input appreciated 2) PE- likely secondary to cancer- continue heparin drip. Until we are sure if biopsy samples sufficient and no other tests or procedures needed 3) Pancreatitis secondary to pancreatic mass with obstruction of the common bile duct and pancreatic s/p ercp with stent placement- pt feels better s/p ercp. - gi input appreciated Nuno Richmond 4) Protein malnutrition- nutrition consult 5) Chronic tobacco use. Nicotine patch has been ordered. 6) Cocaine abuse- avoid bb 7)  DVT prophylaxis. The patient is already on a heparin drip. 8) pc consult - appreciated to help establish  Goals of care 9) pain mgt DC planning/Dispo: Unclear; prognosis appears poor DVT ppx:  Heparin drip Signed: 
Amilcar Ceja MD

## 2019-07-10 NOTE — PROGRESS NOTES
Power of [employer] for [employer] received request to offer assistance with [address]. Spoke with nurse to ensure that patient was sufficiently alert and oriented to proceed with consult. Reviewed information with Mr. Denise Rock. Patient completed an Advance Medical Directive naming his daughter Agent of hisHealth Care Power [employer]. Original returned to patient and copy provided to unit staff to have scanned into the medical record. Rev. Claude Lawman, MDiv, BS Board Certified [employer]

## 2019-07-10 NOTE — PROGRESS NOTES
Interdisciplinary Rounds completed 07/10/19. Nursing, Case Management, Physician and PT present. Plan of care reviewed and updated. Continue remote tele.

## 2019-07-10 NOTE — PROGRESS NOTES
Palliative Care Progress Note Patient: Frank Mills MRN: [id number]  SSN: [id number] YOB: 1965  Age: 53 y.o. Sex: male Assessment/Plan: Chief Complaint/Interval History: Abdominal pain much improved Principal Diagnosis:   
· Pain, abdomen  R10.9 Additional Diagnoses: · Frailty  R54 · Counseling, Encounter for Medical Advice  Z71.9 
· Encounter for Palliative Care  Z51.5 Palliative Performance Scale (PPS) PPS: 50 Medical Decision Making:  
Reviewed and summarized notes and events since last PC visit. Discussed case with appropriate providers. Reviewed laboratory and x-ray data: CMP, CBC Patient resting in bed, his sister and brother in law are at the bedside. Patient reports feeling better, enjoyed a breakfast after his diet was advanced. At first, he says he has no pain compared to what he has experienced in the previous days, but then admits he does have mild epigastric pain, rated 4/10. He has IV morphine and PO oxycodone available prn. Spent time talking about his medical condition. He says \"He's (God's) got my attention. He has a hold of me, I'm still here\". He talks about how this hospitalization has changed his perspective. He says his desire to smoke has stopped. He says he's going to make other lifestyle changes and \"do this right\". He is very appreciative of the excellent care he is receiving. EUS pathology still pending. Patient has one daughter, Salo Pierre. He says they have a close relationship and she is his world. He has four grandchildren, three girls and a boy. There is a spiritual care consult placed to assist patient with HCPOA documentation. See PC consult note from 7/9 regarding patient's wishes about heroic interventions and desire for treatment. Will continue to follow.  
 
Will discuss findings with members of the interdisciplinary team.   
 
  
More than 50% of this 35 minute visit was spent counseling and coordination of care as outlined above. Subjective:  
 
Review of Systems: A comprehensive review of systems was negative except for:  
Gastrointestinal: Positive for abdominal pain, 4/10. Objective:  
 
Visit Vitals /66 (BP 1 Location: Left arm, BP Patient Position: At rest) Pulse 71 Temp 98.5 °F (36.9 °C) Resp 18 Ht 6' 1.5\" (1.867 m) Wt 170 lb (77.1 kg) SpO2 100% BMI 22.12 kg/m² Physical Exam: 
 
General:  Cooperative. No acute distress. Eyes:  Conjunctivae/corneas clear. Nose: Nares normal. Septum midline. Neck: Supple, symmetrical, trachea midline. Lungs:   Clear to auscultation bilaterally, unlabored. Heart:  Regular rate and rhythm, no murmur. Abdomen:   Soft, non-distended, mildly tender over epigastric area. Extremities: Normal, atraumatic, no cyanosis or edema. Skin: Skin color, texture, turgor normal. No rash. Neurologic: Nonfocal.  
Psych: Alert and oriented. Signed By: León Lopez NP   
 July 10, 2019

## 2019-07-10 NOTE — ACP (ADVANCE CARE PLANNING)
Power of [employer] for [employer] [facility] received request to offer assistance with [address]. Spoke with nurse to ensure that patient was sufficiently alert and oriented to proceed with consult. Reviewed information with . Ben Restrepo. Patient completed an Advance Medical Directive naming his daughter Agent of hisHealth Care Power [employer]. Original returned to patient and copy provided to unit staff to have scanned into the medical record. . Jennifer Gonzales MDiv, BS Board Certified [employer]

## 2019-07-11 NOTE — MED STUDENT NOTES
*ATTENTION:  This note has been created by a medical student for educational purposes only. Please do not refer to the content of this note for clinical decision-making, billing, or other purposes. Please see attending physicians note to obtain clinical information on this patient. * Progress Note Patient: Barb Brand MRN: [id number]  SSN: [id number] YOB: 1965  Age: 53 y.o. Sex: male Admit Date: 7/7/2019 LOS: 3 days Subjective:  
 
Per previous note: 
 
Patient being followed for pancreatic obstructive mass with 3 month history of weight loss and 3 day history of debilitating epigastric abdominal pain. , PE and left lung cavitary lesion and mediastinal lymphadenopathy. GI interpretation of preliminary path relayed to the patient. Lung lesion does appear to be metastatic. Heme/Onc consulted. Patient is comfortable with minimal nausea, moderate epigastric tenderness and gas. No SOB, CP or LE edema. ROS as listed above or negative. Objective:  
 
Vitals:  
 07/11/19 0559 07/11/19 3488 07/11/19 1412 07/11/19 1723 BP: 113/71 116/70 135/70 122/65 Pulse: 73 78 78 78 Resp: 18 18 18 18 Temp: 98.5 °F (36.9 °C) 98.1 °F (36.7 °C) 98.8 °F (37.1 °C) 98.4 °F (36.9 °C) SpO2: 93% 95% 98% 98% Weight:      
Height:      
  
 
Physical Exam:  
GENERAL: alert, cooperative, no distress LYMPHATIC: Cervical, supraclavicular, and axillary nodes normal., Supraclavicular adenopathy: left sided. Virchow's node LUNG: clear to auscultation bilaterally HEART: regular rate and rhythm, S1, S2 normal, no murmur, click, rub or gallop ABDOMEN: soft, moderated tender in the epigastric area. Bowel sounds normal.  
EXTREMITIES:  extremities normal, atraumatic, no cyanosis or edema Lab/Data Review: 
Awaiting final path results. Assessment:  
 
Principal Problem: 
  Pancreatitis (7/8/2019) Active Problems: 
  Lung mass (7/8/2019) Pulmonary emboli ([facility] (7/8/2019) Pancreatic mass (7/8/2019) Mediastinal adenopathy (7/8/2019) Protein malnutrition ([facility] (7/9/2019) Cocaine abuse ([facility] (7/9/2019) Plan:  
 
Patient is being followed for the above listed problems by Pulm, GI, and Heme/Onc. Awaiting final path and plan for treatment. - Medically stable. - In need of outpatient plan for dental work/treatment access/drug counseling. 
- Consider d/c tomorrow if clinical picture stable. - Transition to oral anticoagulation. DVT ppx 
- Heparin onboard for DVT. Signed By: Joan ORR-IV, VCOM-CC July 11, 2019

## 2019-07-11 NOTE — PROGRESS NOTES
Arik Dacosta Admission Date: 7/7/2019 Daily Progress Note: 7/11/2019  
 
53 y.o. male presents with weight loss and abdominal pain. Lipase was 5932 and CT abdomen and chest reported a RLL segmental pulmonary embolism, mediastinal lymphadenopathy, likely metastasis; a 4-cm pleural-based cavitary mass in the left apex, an approximately 2-cm hypoenhancing pancreatic head mass obstructing pancreatic and common bile duct, associated biliary duct dilatation and gallbladder distention, and suggestion of gastric wall thickening. 
   
Was placed on heparin for PTE. RA with O2 sat 98%. Pulmonary was asked to see for mediastinal adenopathy and PTE. He reports 4 day duration of chest pain with inspiration. Troponin was negative, EKG showed inverted T-waves. UDS positive for multiple substances. He is a very poor historian. 
  
 Pancreatic mass. This is compressing the common bile duct and pancreatic duct without vascular involvement. While this may represent adenocarcinoma, the differential diagnosis includes lymphoma or metastatic disease. An FNA of pancreatic mass performed on 7/8 by Dr. Allyn Mehta. Periportal lymphadenopathy- FNA was performed. Left adrenal mass. Biliary obstruction with echogenic debris in the distal common duct. Mediastinal lymphadenopathy- FNA was performed of \"1 enlarged precarinal node\". ERCP performed 7/8 with papillotomy and stone extraction. ECHO with EF 50%. 
  
 
Subjective:  
 
Ready noted by GI today and appears The periportal and precarinal nodes reveal metastatic carcinoma preliminarily most suspicious for pancreatic or biliary etiology. Pancreas biopsies were suspicious for malignancy. The formal pathology report is pending. Review of Systems: 
-Fever +weigh loss (unexplained) -Headaches 
-Chest pain 
-Dyspnea, -wheezing,- cough 
-Abdominal pain,- constipation 
-Leg swelling All other organ systems grossly normal. 
 
 
 Current Facility-Administered Medications Medication Dose Route Frequency  heparin 25,000 units in dextrose 500 mL infusion  18-36 Units/kg/hr IntraVENous TITRATE  naloxone (NARCAN) injection 0.4 mg  0.4 mg IntraVENous PRN  
 senna-docusate (PERICOLACE) 8.6-50 mg per tablet 1 Tab  1 Tab Oral DAILY  calcium carbonate (TUMS) chewable tablet 400 mg [elemental]  400 mg Oral TID PRN  
 famotidine (PF) (PEPCID) 20 mg in sodium chloride 0.9% 10 mL injection  20 mg IntraVENous Q12H  
 ondansetron (ZOFRAN) injection 4 mg  4 mg IntraVENous Q6H PRN  
 acetaminophen (TYLENOL) tablet 650 mg  650 mg Oral Q6H PRN  
 oxyCODONE IR (ROXICODONE) tablet 5 mg  5 mg Oral Q6H PRN  
 morphine injection 2 mg  2 mg IntraVENous Q4H PRN  
 albuterol (PROVENTIL VENTOLIN) nebulizer solution 2.5 mg  2.5 mg Nebulization Q6H PRN  
 nicotine (NICODERM CQ) 21 mg/24 hr patch 1 Patch  1 Patch TransDERmal Q24H Objective:  
 
Vitals:  
 07/10/19 2019 07/11/19 0050 07/11/19 0559 07/11/19 0552 BP: 123/71 114/74 113/71 116/70 Pulse: 69 79 73 78 Resp: 18 18 18 18 Temp: 99.7 °F (37.6 °C) 99.6 °F (37.6 °C) 98.5 °F (36.9 °C) 98.1 °F (36.7 °C) SpO2: 93% 94% 93% 95% Weight:      
Height:      
 
Intake and Output:  
07/09 1901 - 07/11 0700 In: -  
Out: 2200 [Urine:2200] No intake/output data recorded. Physical Exam:         
Constitutional: the patient is  [place] and thin in fraile HEENT: Sclera clear, pupils equal, poor dentition. L firm SC node noted. Lungs:  Clear, coughing but nonproductive Cardiovascular: RRR without M,G,R 
Abd/GI: soft and non-tender; with positive bowel sounds. Ext: warm without cyanosis. There is no lower leg edema. Musculoskeletal: moves all four extremities with equal strength Skin: no jaundice or rashes, no wounds Neuro: no gross neuro deficits CT chest:   
7/8/19:   
1.  Right lower lobe segmental pulmonary embolism.   
 2. Mediastinal lymphadenopathy, likely metastasis. Lymphoma is considered less 
likely. Negative for liver lesion or enlarged lymph node in the abdomen. 3. A 4 cm pleurally base cavitary mass in the left apex. 4. An approximately 2 cm hypoenhancing pancreatic head mass, obstructing pancreatic and common bile duct. Associated biliary duct dilatation and gallbladder distention. 5.  Suggestion of gastric wall thickening. LAB Recent Labs  
  07/11/19 
0542 07/10/19 
1633 07/10/19 
5845 WBC 5.7  --  5.4 HGB 9.7* 10.7* 9.9*  
HCT 29.4* 31.8* 29.2*  
  --  235 Recent Labs  
  07/11/19 
0542 07/10/19 
0318 07/09/19 
1844  136  --   
K 3.3* 3.8  --   
 103  --   
CO2 29 26  --   
GLU 91 86  --   
BUN 5* 6  --   
CREA 0.70* 0.71*  --   
INR  --   --  1.1 Assessment:  
 
Patient Active Problem List  
Diagnosis Code  Pancreatitis K85.90  
 Lung mass R91.8  Pulmonary emboli (HCC) I26.99  
 Pancreatic mass K86.9  Mediastinal adenopathy R59.0  Protein malnutrition ([facility] E46  
 Cocaine abuse ([facility] [facility] F14.10 Hospital Problems  Date Reviewed: 7/10/2019 Codes Class Noted POA Protein malnutrition ([facility] ICD-10-CM: E46 
ICD-9-CM: 684  7/9/2019 Yes Cocaine abuse ([facility] ICD-10-CM: F14.10 ICD-9-CM: 305.60  7/9/2019 Yes * (Principal) Pancreatitis ICD-10-CM: K85.90 ICD-9-CM: 371.2  7/8/2019 Yes Lung mass ICD-10-CM: R91.8 ICD-9-CM: 786.6  7/8/2019 Yes Pulmonary emboli [facility]) ICD-10-CM: I26.99 
ICD-9-CM: 415.19  7/8/2019 Yes Pancreatic mass ICD-10-CM: K86.9 ICD-9-CM: 577.9  7/8/2019 Yes Mediastinal adenopathy ICD-10-CM: R59.0 ICD-9-CM: 785.6  7/8/2019 Yes PLAN: 
-- FNA with prelim CANCER -- as per above f/u final report.  I made him aware of the Preliminary results 
--awaiting oncology -- but oseas likely need all his teeth out given poor dentition and if started on Chemo will become a source for sepsis. May still need biopsies performed in future depending upon results of pending path to assess whether coincident primary lung or metastatic pancreatic process. --restart anticoagulation when ok with GI given they did biopies. --Palliative Care following  
--CT also noted with emphysema. Will get overnight oxygen and when able to ambulate will get formal ambulatory saturation to see if need oxygen with exertion. --continue remaining treatment.  
 
 
 
Abdoul Lopes MD

## 2019-07-11 NOTE — PROGRESS NOTES
Hourly rounds performed. All needs met. Pt is A&O w/ no complaints at this time. Pt bed is in low position, call light within reach. Pt complained of pain once during shift in which pt received Lisa. Pt heparin drip is still going at a rate of 30.8 and dose of 20. Will continue to monitor and report to oncoming nurse.

## 2019-07-11 NOTE — PROGRESS NOTES
I spoke with Dr. Raghu Nuno of pathology regarding EUS biopsies. The periportal and precarinal nodes reveal metastatic carcinoma preliminarily most suspicious for pancreatic or biliary etiology. Pancreas biopsies were suspicious for malignancy. The formal pathology report is pending. Will request oncology evaluation to direct the next step in management. As diseases inoperable, patient will likely benefit from Wallflex stent placement at next ERCP. We will sign off, but do not hesitate to contact us for any additional assistance. We will arrange for a GI follow-up office visit in 3-4 weeks. Patient will be contacted by our office.

## 2019-07-11 NOTE — MED STUDENT NOTES
*ATTENTION:  This note has been created by a medical student for educational purposes only. Please do not refer to the content of this note for clinical decision-making, billing, or other purposes. Please see attending physicians note to obtain clinical information on this patient. * Progress Note Patient: Kristofer Garcia MRN: [id number]  SSN: [id number] YOB: 1965  Age: 53 y.o. Sex: male Admit Date: 7/7/2019 LOS: 3 days Subjective:  
 
Per previous note: 
 
Patient being followed for pancreatic obstructive mass with 3 month history of weight loss and 3 day history of debilitating epigastric abdominal pain. , PE and left lung cavitary lesion and mediastinal lymphadenopathy. GI interpretation of preliminary path relayed to the patient. Lung lesion does appear to be metastatic. Heme/Onc consulted. Patient is comfortable with minimal nausea, moderate epigastric tenderness and gas. No SOB, CP or LE edema. ROS as listed above or negative. Objective:  
 
Vitals:  
 07/10/19 1605 07/10/19 2019 07/11/19 0050 07/11/19 0559 BP: 110/69 123/71 114/74 113/71 Pulse: 72 69 79 73 Resp: 18 18 18 18 Temp: 98.2 °F (36.8 °C) 99.7 °F (37.6 °C) 99.6 °F (37.6 °C) 98.5 °F (36.9 °C) SpO2: 100% 93% 94% 93% Weight:      
Height:      
  
 
Physical Exam:  
GENERAL: alert, cooperative, no distress LYMPHATIC: Cervical, supraclavicular, and axillary nodes normal., Supraclavicular adenopathy: left sided. Virchow's node LUNG: clear to auscultation bilaterally HEART: regular rate and rhythm, S1, S2 normal, no murmur, click, rub or gallop ABDOMEN: soft, moderated tender in the epigastric area. Bowel sounds normal.  
EXTREMITIES:  extremities normal, atraumatic, no cyanosis or edema Lab/Data Review: 
Awaiting final path results. Assessment:  
 
Principal Problem: 
  Pancreatitis (7/8/2019) Active Problems: 
  Lung mass (7/8/2019) Pulmonary emboli ([facility] (7/8/2019) Pancreatic mass (7/8/2019) Mediastinal adenopathy (7/8/2019) Protein malnutrition ([address] (7/9/2019) Cocaine abuse ([facility] [address] (7/9/2019) Plan:  
 
Patient is being followed for the above listed problems by Pulm, GI, and Heme/Onc. Awaiting final path and plan for treatment. - Medically stable. - In need of outpatient plan for dental work/treatment access/drug counseling. 
- Consider d/c tomorrow if clinical picture stable. Signed By: Amna Hendrix OMS-IV, VCOM-CC July 11, 2019

## 2019-07-11 NOTE — PROGRESS NOTES
Palliative Care Progress Note Patient: Devorah Spencer MRN: [id number]  SSN: [id number] YOB: 1965  Age: 53 y.o. Sex: male Assessment/Plan: Chief Complaint/Interval History: Pt says Bernabe Urbina very helpful for abdominal discomfort Principal Diagnosis:   
· Pain, abdomen  R10.9 Additional Diagnoses: · Frailty  R54 · Counseling, Encounter for Medical Advice  Z71.9 
· Encounter for Palliative Care  Z51.5 Palliative Performance Scale (PPS) PPS: 50 Medical Decision Making:  
Reviewed and summarized notes and events since last PC visit. Discussed case with appropriate providers: RN Corrinne Asper Reviewed laboratory and x-ray data Patient resting in bed, his sister at the bedside. Pt denies abdominal pain currently, saying Daysi Never has been quite effective. He endorses brief nausea yesterday; encouraged him to let his RN know if nausea returns. He denies SOB and other discomforts. Pt confirmed that he wishes to start oncology treatments. He said that he understands that he will need to have his teeth removed because they will be a likely source of infection during chemo. Mr. Emeka Harvey expressed relief at having his teeth removed, as he has been aware of the need to have them removed for some time. The pt said he appreciative of the good care he is receiving by the entire staff. See PC consult note from 7/9 regarding patient's wishes about heroic interventions and desire for treatment. Mr Emeka Harvey completed a HCPOA naming his sister Saad Meeks on 7/10/19. The document is in the pt's folder. Will continue to follow. Will discuss findings with members of the interdisciplinary team.   
 
  
More than 50% of this 35 minute visit was spent counseling and coordination of care as outlined above. Subjective:  
 
Review of Systems: A comprehensive review of systems was negative except for: Gastrointestinal: Positive for occasional abdominal pain, currently 4/10. Occasional nausea. Objective:  
 
Visit Vitals /70 Pulse 78 Temp 98.1 °F (36.7 °C) Resp 18 Ht 6' 1.5\" (1.867 m) Wt 170 lb (77.1 kg) SpO2 95% BMI 22.12 kg/m² Physical Exam: 
 
General:  Cooperative. No acute distress. Eyes:  Conjunctivae/corneas clear. Nose: Nares normal. Septum midline. Neck: Supple, symmetrical, trachea midline. Lungs:   Clear to auscultation bilaterally, unlabored. Heart:  Regular rate and rhythm, no murmur. Abdomen:   Soft, non-distended, mildly tender over epigastric area. Extremities: Normal, atraumatic, no cyanosis or edema. Skin: Skin color, texture, turgor normal. No rash. Neurologic: Nonfocal.  
Psych: Alert and oriented. Signed By: Arnulfo Bassett NP   
 July 11, 2019

## 2019-07-11 NOTE — PROGRESS NOTES
Nutrition Reason for assessment: Consult for general nutrition management and supplements Assessment:  
Food/Nutrition Patient History:  Patient states that he was eating well until right before he became sick. He states that usual intake was ~2 meals per day and occasional snacks. Recall reveals B- eggs and grits or pancakes, D- meat and Ramen noodles. Weight history: Patient states -180#, he endorses 5-10# weight loss PMH: unremarkable Labs: reviewed Physical: thin appearing, poor dentition DIET GI SOFT No options chosen  - states he has been eating well since diet advanced to GI soft. Recall reveals % meals. Anthropometrics:Height: 6' 1.5\" (186.7 cm),  Weight: 77.1 kg (170 lb),  , Body mass index is 22.12 kg/m². BMI class of normal weight. Macronutrient needs: 77.1kg listed weight EER:  9133-2702 kcal /day (25-30 kcal/kg) EPR:  77-92 grams protein/day (1.1.2 grams/kg) Intake/Comparative Standards: Patient reported meal(s): 88%. This potentially meets ~88% of kcal and ~92% of protein needs Nutrition Diagnosis: Inadequate protein energy intake related to nausea/vomiting prior to admission and intake of meals since admission as evidenced by diet recall, ~10# weight loss. Intervention: 
Meals and snacks: Continue current diet. Nutrition Supplement Therapy: Add Ensure Enlive x 1 per day Nutrition related medication recommendations: Recommended daily MVI Discharge Plan: Continue current diet at discharge, continue ONS at discharge [address] [facility] [address] [place] [address], [address], [address]

## 2019-07-11 NOTE — PROGRESS NOTES
Hospitalist Progress Note Admit Date:  2019 11:18 PM  
Name:  Kristofer Garcia Age:  53 y.o. 
:  1965 MRN:  [id number] PCP:  Unknown, Provider Treatment Team: Attending Provider: Ruiz Hearn MD; Consulting Provider: Yasmani Chan MD; Consulting Provider: Nilsa Crook MD; Care Manager: Rika Rodriguez, RN; Consulting Provider: Milady Day NP; Utilization Review: Ricardo Hanley RN; Consulting Provider: Jamie Butler MD 
 
Subjective: As per Prior Documentation: \"CHIEF COMPLAINT:  Abdominal pain. 
  
HISTORY OF PRESENT ILLNESS:  This is a 58-year-old male patient who does not follow on a regular basis with any physician and who came into the emergency room reporting several months of progressive weight loss and approximately 3 days of progressive severe abdominal pain. He localizes the pain in the epigastric area and denies any radiation. He rates it as 10/10 in intensity and is associated with nausea but denies vomiting. He also denies diarrhea, fever, chills, or any other symptom. The patient also reported some chest discomfort, shortness of breath with exertion, and the sensation that he has a bump in the left supraclavicular area. When he arrived into the emergency room, his vital signs were blood pressure of 114/78, heart rate of 93, respiratory rate of 20, O2 saturation of 100% at room air. The patient was awake and alert. His physical exam showed a cachectic male with decreased breath sounds in both lung bases and epigastric tenderness. His initial blood workup reported a lactic acid of 1.3, normal white blood cell count, stable hemoglobin. His creatinine was 1.3 with a baseline of 1.07. He had a total bilirubin of 2.6, an ALT of 227, AST of 74, and a lipase of 6000.   A CT scan of the chest, abdomen and pelvis was done and reported a right lower lobe segmental pulmonary embolism, mediastinal lymphadenopathy, likely metastasis; a 4-cm pleural-based cavitary mass in the left apex, an approximately 2-cm hypoenhancing pancreatic head mass obstructing pancreatic and common bile duct, associated biliary duct dilatation and gallbladder distention, and suggestion of gastric wall thickening. The patient was started on a heparin drip and he was presented to the Hospitalist team for admission. \" 
 
 
ERCP with biopsy done 07/08/2019. Biopsy results back today and concerning for malignancy and oncology consulted. 07/11/2019- pt seen - awaiting oncology to see him and discuss options Objective:  
 
Patient Vitals for the past 24 hrs: 
 Temp Pulse Resp BP SpO2  
07/11/19 0851 98.1 °F (36.7 °C) 78 18 116/70 95 % 07/11/19 0559 98.5 °F (36.9 °C) 73 18 113/71 93 % 07/11/19 0050 99.6 °F (37.6 °C) 79 18 114/74 94 % 07/10/19 2019 99.7 °F (37.6 °C) 69 18 123/71 93 % 07/10/19 1605 98.2 °F (36.8 °C) 72 18 110/69 100 % Oxygen Therapy O2 Sat (%): 95 % (07/11/19 0851) Pulse via Oximetry: 59 beats per minute (07/08/19 1950) O2 Device: Room air (07/08/19 1906) O2 Flow Rate (L/min): 6 l/min (07/08/19 1825) Intake/Output Summary (Last 24 hours) at 7/11/2019 1249 Last data filed at 7/11/2019 0600 Gross per 24 hour Intake  Output 1900 ml Net -1900 ml General:    Thin and cachetic. Smells of cigarette smoke. Alert. CV:   RRR. No murmur, rub, or gallop. Lungs:   CTAB. No wheezing, rhonchi, or rales. Abdomen:   Soft, nontender, nondistended. Extremities: Warm and dry. No cyanosis or edema. Skin:     No rashes or jaundice. Data Review: 
I have reviewed all labs, meds, telemetry events, and studies from the last 24 hours. Recent Results (from the past 24 hour(s)) PTT Collection Time: 07/10/19  4:33 PM  
Result Value Ref Range aPTT 141.1 (H) 24.7 - 39.8 SEC  
HGB & HCT Collection Time: 07/10/19  4:33 PM  
Result Value Ref Range HGB 10.7 (L) 13.6 - 17.2 g/dL HCT 31.8 (L) 41.1 - 50.3 % PTT Collection Time: 07/11/19 12:18 AM  
Result Value Ref Range aPTT 98.5 (H) 24.7 - 39.8 SEC  
CBC WITH AUTOMATED DIFF Collection Time: 07/11/19  5:42 AM  
Result Value Ref Range WBC 5.7 4.3 - 11.1 K/uL  
 RBC 3.37 (L) 4.23 - 5.6 M/uL HGB 9.7 (L) 13.6 - 17.2 g/dL HCT 29.4 (L) 41.1 - 50.3 % MCV 87.2 79.6 - 97.8 FL  
 MCH 28.8 26.1 - 32.9 PG  
 MCHC 33.0 31.4 - 35.0 g/dL  
 RDW 11.1 (L) 11.9 - 14.6 % PLATELET 659 449 - 715 K/uL MPV 9.5 9.4 - 12.3 FL ABSOLUTE NRBC 0.00 0.0 - 0.2 K/uL  
 DF AUTOMATED NEUTROPHILS 69 43 - 78 % LYMPHOCYTES 17 13 - 44 % MONOCYTES 10 4.0 - 12.0 % EOSINOPHILS 3 0.5 - 7.8 % BASOPHILS 0 0.0 - 2.0 % IMMATURE GRANULOCYTES 0 0.0 - 5.0 %  
 ABS. NEUTROPHILS 3.9 1.7 - 8.2 K/UL  
 ABS. LYMPHOCYTES 1.0 0.5 - 4.6 K/UL  
 ABS. MONOCYTES 0.6 0.1 - 1.3 K/UL  
 ABS. EOSINOPHILS 0.2 0.0 - 0.8 K/UL  
 ABS. BASOPHILS 0.0 0.0 - 0.2 K/UL  
 ABS. IMM. GRANS. 0.0 0.0 - 0.5 K/UL METABOLIC PANEL, BASIC Collection Time: 07/11/19  5:42 AM  
Result Value Ref Range Sodium 136 136 - 145 mmol/L Potassium 3.3 (L) 3.5 - 5.1 mmol/L Chloride 100 98 - 107 mmol/L  
 CO2 29 21 - 32 mmol/L Anion gap 7 7 - 16 mmol/L Glucose 91 65 - 100 mg/dL BUN 5 (L) 6 - 23 MG/DL Creatinine 0.70 (L) 0.8 - 1.5 MG/DL  
 GFR est AA >60 >60 ml/min/1.73m2 GFR est non-AA >60 >60 ml/min/1.73m2 Calcium 8.1 (L) 8.3 - 10.4 MG/DL  
PTT Collection Time: 07/11/19  9:21 AM  
Result Value Ref Range aPTT 109.0 (H) 24.7 - 39.8 SEC All Micro Results Procedure Component Value Units Date/Time AFB CULTURE + SMEAR W/RFLX ID FROM CULTURE [[id number]] Collected:  07/09/19 0835 Order Status:  Completed Specimen:  Miscellaneous sample Updated:  07/10/19 1836 Source SPUTUM     
  AFB Specimen processing Concentration Acid Fast Smear NEGATIVE Comment: (NOTE) Performed At: [facility] [address] [place], [place] [id number] Genoveva Miles MD IB:[id number] Acid Fast Culture PENDING  
 AFB CULTURE + SMEAR W/RFLX ID FROM CULTURE [[id number]] Order Status:  Sent AFB CULTURE + SMEAR W/RFLX ID FROM CULTURE [[id number]] Order Status:  Sent Current Meds: 
Current Facility-Administered Medications Medication Dose Route Frequency  heparin 25,000 units in dextrose 500 mL infusion  18-36 Units/kg/hr IntraVENous TITRATE  naloxone (NARCAN) injection 0.4 mg  0.4 mg IntraVENous PRN  
 senna-docusate (PERICOLACE) 8.6-50 mg per tablet 1 Tab  1 Tab Oral DAILY  calcium carbonate (TUMS) chewable tablet 400 mg [elemental]  400 mg Oral TID PRN  
 famotidine (PF) (PEPCID) 20 mg in sodium chloride 0.9% 10 mL injection  20 mg IntraVENous Q12H  
 ondansetron (ZOFRAN) injection 4 mg  4 mg IntraVENous Q6H PRN  
 acetaminophen (TYLENOL) tablet 650 mg  650 mg Oral Q6H PRN  
 oxyCODONE IR (ROXICODONE) tablet 5 mg  5 mg Oral Q6H PRN  
 morphine injection 2 mg  2 mg IntraVENous Q4H PRN  
 albuterol (PROVENTIL VENTOLIN) nebulizer solution 2.5 mg  2.5 mg Nebulization Q6H PRN  
 nicotine (NICODERM CQ) 21 mg/24 hr patch 1 Patch  1 Patch TransDERmal Q24H Other Studies (last 24 hours): No results found. Assessment and Plan:  
 
Hospital Problems as of 7/11/2019 Date Reviewed: 7/10/2019 Codes Class Noted - Resolved POA Protein malnutrition ([facility] ICD-10-CM: E46 
ICD-9-CM: 638  7/9/2019 - Present Yes Cocaine abuse ([facility] ICD-10-CM: F14.10 ICD-9-CM: 305.60  7/9/2019 - Present Yes * (Principal) Pancreatitis ICD-10-CM: K85.90 ICD-9-CM: 519.1  7/8/2019 - Present Yes Lung mass ICD-10-CM: R91.8 ICD-9-CM: 786.6  7/8/2019 - Present Yes Pulmonary emboli [facility]) ICD-10-CM: I26.99 
ICD-9-CM: 415.19  7/8/2019 - Present Yes Pancreatic mass ICD-10-CM: K86.9 ICD-9-CM: 577.9  7/8/2019 - Present Yes Mediastinal adenopathy ICD-10-CM: R59.0 ICD-9-CM: 785.6  7/8/2019 - Present Yes PLAN:   
 
1) Cavitary lesion left lung apex- Acid fast tetsing in progress- but likely a mailgant lesion - pulm input appreciated 2) PE- likely secondary to cancer- continue heparin drip. If no further procedures planned can transition to po meds for dc 3) Pancreatitis secondary to pancreatic mass with obstruction of the common bile duct and pancreatic s/p ercp with stent placement- pt feels better s/p ercp. - gi input appreciated Cristina Chapman 4) Protein malnutrition- nutrition consult 5) Chronic tobacco use. Nicotine patch has been ordered. 6) Cocaine abuse- avoid bb 7)  DVT prophylaxis. The patient is already on a heparin drip. 8) pc consult - appreciated to help establish  Goals of care 9) pain mgt DC planning/Dispo: Unclear; prognosis appears poor- follow up Oncology consult DVT ppx:  Heparin drip Signed: 
Frank Albright MD

## 2019-07-11 NOTE — PROGRESS NOTES
Interdisciplinary Rounds completed 07/11/19. Nursing, Case Management, Physician and PT present. Plan of care reviewed and updated.

## 2019-07-12 PROBLEM — E46 PROTEIN MALNUTRITION (HCC): Chronic | Status: ACTIVE | Noted: 2019-01-01

## 2019-07-12 PROBLEM — F14.10 COCAINE ABUSE (HCC): Chronic | Status: ACTIVE | Noted: 2019-01-01

## 2019-07-12 PROBLEM — J43.9 EMPHYSEMA LUNG (HCC): Status: ACTIVE | Noted: 2019-01-01

## 2019-07-12 PROBLEM — J43.9 EMPHYSEMA LUNG (HCC): Chronic | Status: ACTIVE | Noted: 2019-01-01

## 2019-07-12 NOTE — CONSULTS
[facility] Hematology & Oncology Inpatient Hematology / Oncology Consult NoteReason for Consult:  Pulmonary emboli [facility]) [I26.99] Pancreatic mass [K86.9] Pancreatitis [K85.90] Lung mass [R91.8] Referring Physician:  Keyla Coffey, Doug Betts,* History of Present Illness: Mr. Dorothy Sharpe is a 53 y.o. male admitted on 7/7/2019. His PMH includes chronic tobacco use. He presented with c/o progressive severe abd pain x 3 days as well as wt loss over the past several months. Bili 2.6, Lipase 6000. CT CAP with RLL segmental PE, mediastinal LAD, 4 cm pleurally based cavitary mass in L apex, 2cm pancreatic mass obstructing pancreatic and CBD with associated biliary duct dilation and GB distention, and gastric wall thickening. He is s/p ERCP with stent placement and FNA of pancreatic mass/periportal LAD on 7/8. Biliary brushing cytology +rare atypical cells. Prelim path on periportal node +metastatic carcinoma most suspicious for pancreatic or biliary origin. Prelim path on pancreatic mass suspicious for malignancy. Echo with EF 50%. We were consulted for suspected metastatic cancer. Review of Systems: 
Constitutional +wt loss. Denies fever, chills, fatigue, night sweats. HEENT Denies trauma, blurry vision, hearing loss, ear pain, nosebleeds, sore throat, neck pain and ear discharge. Skin Denies lesions or rashes. Lungs Denies dyspnea, cough, sputum production or hemoptysis. Cardiovascular Denies chest pain, palpitations, or lower extremity edema. Gastrointestinal Denies nausea, vomiting, changes in bowel habits, bloody or black stools, abdominal pain.  Denies dysuria, frequency or hesitancy of urination. Neuro Denies headaches, visual changes or ataxia. Denies dizziness, tingling, tremors, sensory change, speech change, focal weakness or headaches. Hematology Denies easy bruising or bleeding, denies gingival bleeding or epistaxis. Endo Denies heat/cold intolerance, denies diabetes or thyroid abnormalities. MSK Denies back pain, arthralgias, myalgias or frequent falls. Psychiatric/Behavioral Denies depression. The patient is not nervous/anxious. No Known Allergies Past Medical History:  
Diagnosis Date  Arthritis  Other ill-defined conditions(719.89) chronic L leg pain Past Surgical History:  
Procedure Laterality Date  HX ORTHOPAEDIC History reviewed. No pertinent family history. Social History Socioeconomic History  Marital status: SINGLE Spouse name: Not on file  Number of children: Not on file  Years of education: Not on file  Highest education level: Not on file Occupational History  Not on file Social Needs  Financial resource strain: Not on file  Food insecurity:  
  Worry: Not on file Inability: Not on file  Transportation needs:  
  Medical: Not on file Non-medical: Not on file Tobacco Use  Smoking status: Current Every Day Smoker Packs/day: 1.00 Substance and Sexual Activity  Alcohol use: Yes Comment: occasionally  Drug use: No  
 Sexual activity: Not on file Lifestyle  Physical activity:  
  Days per week: Not on file Minutes per session: Not on file  Stress: Not on file Relationships  Social connections:  
  Talks on phone: Not on file Gets together: Not on file Attends Sikhism service: Not on file Active member of club or organization: Not on file Attends meetings of clubs or organizations: Not on file Relationship status: Not on file  Intimate partner violence:  
  Fear of current or ex partner: Not on file Emotionally abused: Not on file Physically abused: Not on file Forced sexual activity: Not on file Other Topics Concern  Not on file Social History Narrative  Not on file Current Facility-Administered Medications Medication Dose Route Frequency Provider Last Rate Last Dose  heparin 25,000 units in dextrose 500 mL infusion  18-36 Units/kg/hr IntraVENous TITRATE Elizabeth Field MD 30.8 mL/hr at 19 0850 20 Units/kg/hr at 19 0850  
 naloxone (NARCAN) injection 0.4 mg  0.4 mg IntraVENous PRN Pasha Radford NP      
 senna-docusate (PERICOLACE) 8.6-50 mg per tablet 1 Tab  1 Tab Oral DAILY Pasha Radford NP   1 Tab at 19 1972  calcium carbonate (TUMS) chewable tablet 400 mg [elemental]  400 mg Oral TID PRN Ciesco, Isak, DO   400 mg at 19 0102  famotidine (PF) (PEPCID) 20 mg in sodium chloride 0.9% 10 mL injection  20 mg IntraVENous Q12H Claude Perry MD   20 mg at 19 4580  ondansetron (ZOFRAN) injection 4 mg  4 mg IntraVENous Q6H PRN Claude Perry MD      
 acetaminophen (TYLENOL) tablet 650 mg  650 mg Oral Q6H PRN Claude Perry MD      
 oxyCODONE IR (ROXICODONE) tablet 5 mg  5 mg Oral Q6H PRN Claude Perry MD   5 mg at 19 2277  morphine injection 2 mg  2 mg IntraVENous Q4H PRN Claude Perry MD   2 mg at 19 1752  albuterol (PROVENTIL VENTOLIN) nebulizer solution 2.5 mg  2.5 mg Nebulization Q6H PRN Claude Perry MD      
 nicotine (NICODERM CQ) 21 mg/24 hr patch 1 Patch  1 Patch TransDERmal Q24H Krupa Hdz MD   1 Patch at 19 9410 OBJECTIVE: 
Patient Vitals for the past 8 hrs: 
 BP Temp Pulse Resp SpO2  
19 0847 111/70 97.6 °F (36.4 °C) 69 16 100 % 19 0417   69    
19 0412 122/78 98.4 °F (36.9 °C) 67 18 92 % Temp (24hrs), Av.5 °F (36.9 °C), Min:97.6 °F (36.4 °C), Max:98.9 °F (37.2 °C) 
 
701 -  1900 In: 360 [P.O.:360] Out: 450 [Urine:450] Physical Exam: 
Constitutional: Cachetic male in no acute distress, sitting comfortably in the hospital bed. HEENT: Normocephalic and atraumatic. Oropharynx is clear, mucous membranes are moist. Poor dentition. Extraocular muscles are intact. Sclerae anicteric. Neck supple without JVD. No thyromegaly present. Skin Warm and dry. No bruising and no rash noted. No erythema. No pallor. Respiratory Lungs are clear to auscultation bilaterally without wheezes, rales or rhonchi, normal air exchange without accessory muscle use. CVS Normal rate, regular rhythm and normal S1 and S2. No murmurs, gallops, or rubs. Abdomen Soft, nontender and nondistended, normoactive bowel sounds. No palpable mass. No hepatosplenomegaly. Neuro Grossly nonfocal with no obvious sensory or motor deficits. MSK Normal range of motion in general.  No edema and no tenderness. Psych Appropriate mood and affect. Labs:   
Recent Results (from the past 24 hour(s)) PTT Collection Time: 07/12/19  5:50 AM  
Result Value Ref Range aPTT 88.8 (H) 24.7 - 39.8 SEC Imaging: XR ERCP / [facility] COMBINED [[id number]] Collected: 07/08/19 1823 Order Status: Completed Updated: 07/08/19 1827 Narrative:    
ERCP images History: Fluoro for ERCP, 53 years Male ERCP, Dr. Talib Londono Time: [id number], 
49.83mGy Comparison: CT torso July 08, 2019 Findings:  ERCP was performed by gastroenterology service. Eligah Groveton are 
interpreted here. [facility] see dedicated report by GI service for complete 
details.  ERCP images demonstrate a mildly dilated superior common bile duct, 
with long segment stricture of the inferior common bile duct noted.  Papillotomy 
and stone extraction with brushings was performed.  A biliary Wallstent was 
inserted. Impression:    
Impression: ERCP images as above. Fluoroscopy time: 4:22 min. Total number of fluoro images obtained: 6 CT CHEST ABD PELV W CONT [[id number]] Collected: 07/08/19 0256 Order Status: Completed Updated: 07/08/19 9012 Narrative:    
 CT of the chest, abdomen, and pelvis with contrast 
 
INDICATION: Chest pain and abdominal pain. Virchow lymph node. Technique:  CT imaging was performed of the chest, abdomen, and pelvis following 
the uncomplicated administration of intravenous contrast (Isovue 370, 100 mL). Oral contrast was given. Radiation dose reduction techniques were used for this 
study:  Our CT scanners use one or all of the following: Automated exposure 
control, adjustment of the mA and/or kVp according to patient's size, iterative 
reconstruction. FINDINGS: 
 
CHEST: 
 
There is pulmonary embolus in the right lower lobe segmental pulmonary artery 
(series 2A, image 79). There is infiltrating mass/lymph node in the anterior/superior mediastinum. Lymph node in the right precarinal region measures up to 2.6 cm. Anterior 
mediastinal lymph node measures up to 3.0 x 5 cm. There is a 4 cm pleurally base mass at the right apex with associated small 
cavitation. There is moderate paraseptal emphysema. There is dependent subsegmental 
atelectasis. There are a few small nodules in the left lower lobe (3-5 mm size 
range). The heart is not enlarged. There is no pericardial effusion. Thoracic aorta is 
normal in course and caliber. Surrounding bones are intact. ABDOMEN: 
 
Gallbladder is distended. There is a 2 cm low density mass at the pancreatic 
head. There is associated obstruction and biliary/pancreatic ductal dilatation. No focal liver lesion. There is a 10 mm left renal cyst. There is a tiny right renal cyst. No 
hydronephrosis. Abdominal aorta is normal in course and caliber. There is suggestion of gastric wall thickening. Small bowel loops are normal in 
caliber. No small bowel obstruction. No abnormally enlarged lymph nodes are 
identified in the abdomen. PELVIC: 
 
Urinary bladder is normal in contour. Colon appears normal in course and 
caliber. Appendix is not well-seen. There is no free air or free fluid. Surrounding bones are intact. Impression:    
IMPRESSION: 
 
1. Right lower lobe segmental pulmonary embolism. Telephoned to ED physician, Dr. Fabián Rangel, at 3:00 AM on exam date. 2. Mediastinal lymphadenopathy, likely metastasis. Lymphoma is considered less 
likely. Negative for liver lesion or enlarged lymph node in the abdomen. 3. A 4 cm pleurally base cavitary mass in the left apex. 4. An approximately 2 cm hypoenhancing pancreatic head mass, obstructing 
pancreatic and common bile duct. Associated biliary duct dilatation and 
gallbladder distention. 5.  Suggestion of gastric wall thickening. ASSESSMENT: 
Problem List  Date Reviewed: 7/10/2019 Codes Class Noted Emphysema lung (HCC) (Chronic) ICD-10-CM: J43.9 ICD-9-CM: 492.8  7/12/2019 Protein malnutrition ([address] (Chronic) ICD-10-CM: E46 
ICD-9-CM: 260  7/9/2019 Cocaine abuse (HCC) (Chronic) ICD-10-CM: F14.10 ICD-9-CM: 305.60  7/9/2019 * (Principal) Pancreatitis ICD-10-CM: K85.90 ICD-9-CM: 930.1  7/8/2019 Lung mass ICD-10-CM: R91.8 ICD-9-CM: 786.6  7/8/2019 Pulmonary emboli [facility]) ICD-10-CM: I26.99 
ICD-9-CM: 415.19  7/8/2019 Pancreatic mass ICD-10-CM: K86.9 ICD-9-CM: 577.9  7/8/2019 Mediastinal adenopathy ICD-10-CM: R59.0 ICD-9-CM: 785.6  7/8/2019 RECOMMENDATIONS: 
Suspect metastatic cancer - CT CAP RLL segmental PE, mediastinal LAD, 4 cm pleurally based cavitary mass in L apex, 2cm pancreatic mass obstructing pancreatic and CBD with associated biliary duct dilation and GB distention, and gastric wall thickening - s/p ERCP with stent placement and FNA of pancreatic mass/periportal LAD on 7/8. Biliary brushing cytology +rare atypical cells. Prelim path on periportal node +metastatic carcinoma most suspicious for pancreatic or biliary origin. Prelim path on pancreatic mass suspicious for malignancy. - Pulm following - need pulm bx for concern of possible 2nd malignancy. RLL PE 
- On heparin gtt Lab studies and imaging studies were personally reviewed. Thank you for allowing us to participate in the care of Mr. Guadalupe Yen. We will sign off; please do not hesitate to call with any questions. We will arrange f/u appt with Dr. Claudette Reilly. Magdaleno Iraheta NP [facility] Hematology & Oncology [address] [place] [address] Office : [phone] Fax : [phone]

## 2019-07-12 NOTE — PROGRESS NOTES
Overnight oximetry report faxed to [facility]. Patient is self pay, however [facility] will work with patient and provide oxygen.

## 2019-07-12 NOTE — PROGRESS NOTES
Hourly rounds completed this shift. All needs met. Bed low/locked. Pt c/o headache x1-medicated per MAR. Heparin gtt continues at 20units/kg/hr. Tele DC'd today as pt ran NSR consistently. Call light in reach. Will continue to monitor pt and give report to oncoming RN. Peripheral IV 07/09/19 Anterior;Right Forearm (Active) Site Assessment Clean, dry, & intact 7/12/2019  2:10 PM  
Phlebitis Assessment 0 7/12/2019  2:10 PM  
Infiltration Assessment 0 7/12/2019  2:10 PM  
Dressing Status Clean, dry, & intact 7/12/2019  2:10 PM  
Dressing Type Transparent;Tape 7/12/2019  2:10 PM  
Hub Color/Line Status Blue; Infusing;Patent 7/12/2019  2:10 PM  
Alcohol Cap Used No 7/11/2019  3:30 PM  
   
Peripheral IV 84/30/81 Right Cephalic (Active) Site Assessment Clean, dry, & intact 7/12/2019  2:10 PM  
Phlebitis Assessment 0 7/12/2019  2:10 PM  
Infiltration Assessment 0 7/12/2019  2:10 PM  
Dressing Status Clean, dry, & intact 7/12/2019  2:10 PM  
Dressing Type Transparent;Tape 7/12/2019  2:10 PM  
Hub Color/Line Status Pink;Flushed;Patent 7/12/2019  2:10 PM

## 2019-07-12 NOTE — PROGRESS NOTES
Siomara Bhakta Admission Date: 7/7/2019 Daily Progress Note: 7/12/2019  
54 y. o. male presents with weight loss and abdominal pain.  Lipase was 5932 and CT abdomen and chest reported a RLL segmental pulmonary embolism, mediastinal lymphadenopathy, likely metastasis; a 4-cm pleural-based cavitary mass in the left apex, an approximately 2-cm hypoenhancing pancreatic head mass obstructing pancreatic and common bile duct, associated biliary duct dilatation and gallbladder distention, and suggestion of gastric wall thickening. 
   
Was placed on heparin for PTE. RA with O2 sat 98%. Pulmonary was asked to see for mediastinal adenopathy and PTE. He reports 4 day duration of chest pain with inspiration. Troponin was negative, EKG showed inverted T-waves. UDS positive for multiple substances. He is a very poor historian. 
  
 Pancreatic mass. This is compressing the common bile duct and pancreatic duct without vascular involvement. While this may represent adenocarcinoma, the differential diagnosis includes lymphoma or metastatic disease. An FNA of pancreatic mass performed on 7/8 by Dr. Jimbo Ramirez lymphadenopathy- FNA was performed.  Left adrenal mass. Biliary obstruction with echogenic debris in the distal common duct. Mediastinal lymphadenopathy- FNA was performed of \"1 enlarged precarinal node\". ERCP performed 7/8 with papillotomy and stone extraction. ECHO with EF 50%. 
  
PC and oncology consulted Subjective: No sob Off O2 Review of Systems Respiratory: positive for cough or dyspnea on exertion Gastrointestinal: positive for abdominal pain Current Facility-Administered Medications Medication Dose Route Frequency  heparin 25,000 units in dextrose 500 mL infusion  18-36 Units/kg/hr IntraVENous TITRATE  naloxone (NARCAN) injection 0.4 mg  0.4 mg IntraVENous PRN  
 senna-docusate (PERICOLACE) 8.6-50 mg per tablet 1 Tab  1 Tab Oral DAILY  calcium carbonate (TUMS) chewable tablet 400 mg [elemental]  400 mg Oral TID PRN  
 famotidine (PF) (PEPCID) 20 mg in sodium chloride 0.9% 10 mL injection  20 mg IntraVENous Q12H  
 ondansetron (ZOFRAN) injection 4 mg  4 mg IntraVENous Q6H PRN  
 acetaminophen (TYLENOL) tablet 650 mg  650 mg Oral Q6H PRN  
 oxyCODONE IR (ROXICODONE) tablet 5 mg  5 mg Oral Q6H PRN  
 morphine injection 2 mg  2 mg IntraVENous Q4H PRN  
 albuterol (PROVENTIL VENTOLIN) nebulizer solution 2.5 mg  2.5 mg Nebulization Q6H PRN  
 nicotine (NICODERM CQ) 21 mg/24 hr patch 1 Patch  1 Patch TransDERmal Q24H Objective:  
 
Vitals:  
 07/12/19 4071 07/12/19 9676 07/12/19 1907 07/12/19 1868 BP: 101/62 122/78  111/70 Pulse: 75 67 69 69 Resp: 18 18  16 Temp: 98.9 °F (37.2 °C) 98.4 °F (36.9 °C)  97.6 °F (36.4 °C) SpO2: 96% 92%  100% Weight:      
Height:      
 
Intake and Output:  
07/10 1901 - 07/12 0700 In: -  
Out: 3350 [YSRIY:2208] 07/12 0701 - 07/12 1900 In: 360 [P.O.:360] Out: 450 [Urine:450] Physical Exam:         
Constitutional: the patient is ill appearing HEENT: Sclera clear, pupils equal, oral mucosa moist 
Lungs:  
Cardiovascular: RRR without M,G,R 
Abd/GI: soft and non-tender; with positive bowel sounds. Ext: warm without cyanosis. There is no lower leg edema. Musculoskeletal: moves all four extremities with equal strength Skin: no jaundice or rashes, no wounds Neuro: no gross neuro deficits Lines/Drains: PIV Nutrition: GI soft LAB Recent Labs  
  07/11/19 
0542 07/10/19 
1633 07/10/19 
6126 WBC 5.7  --  5.4 HGB 9.7* 10.7* 9.9*  
HCT 29.4* 31.8* 29.2*  
  --  235 Recent Labs  
  07/11/19 
0542 07/10/19 
0318 07/09/19 
1844  136  --   
K 3.3* 3.8  --   
 103  --   
CO2 29 26  --   
GLU 91 86  --   
BUN 5* 6  --   
CREA 0.70* 0.71*  --   
INR  --   --  1.1 CT chest:   
7/8/19:   
1.  Right lower lobe segmental pulmonary embolism.   
 2. Mediastinal lymphadenopathy, likely metastasis. Lymphoma is considered less 
likely. Negative for liver lesion or enlarged lymph node in the abdomen. 3. A 4 cm pleurally base cavitary mass in the left apex. 4. An approximately 2 cm hypoenhancing pancreatic head mass, obstructing pancreatic and common bile duct. Associated biliary duct dilatation and gallbladder distention. 5.  Suggestion of gastric wall thickening. 
 
  
 
  
 
  
 
 
 
 
Assessment:  
 
Patient Active Problem List  
Diagnosis Code  Pancreatitis K85.90  
 Lung mass R91.8  Pulmonary emboli (HCC) I26.99  
 Pancreatic mass K86.9  Mediastinal adenopathy R59.0  Protein malnutrition ([address] E46  
 Cocaine abuse ([address] F14.10 Pancreatitis (7/8/2019) Lung mass (7/8/2019) Pulmonary emboli ([address] (7/8/2019) Pancreatic mass (7/8/2019) The periportal and precarinal nodes reveal metastatic carcinoma preliminarily most suspicious for pancreatic or biliary etiology. Pancreas biopsies were suspicious for malignancy. The formal pathology report is pending. Mediastinal adenopathy (7/8/2019) The periportal and precarinal nodes reveal metastatic carcinoma preliminarily most suspicious for pancreatic or biliary etiology. Pancreas biopsies were suspicious for malignancy. The formal pathology report is pending. Protein malnutrition ([address] (7/9/2019) Cocaine abuse ([address] (7/9/2019) PLAN: 
-- sent out on 7/10 for additional staining per lab, unclear when the final will be back. Prelim Cancer 
-- oncology and PC consulted -- heparin drip until no further procedures needed. Convert to oral Anticoagulation thereafter. 
 -May still need biopsies performed in future depending upon results of pending path. He may have 2 primaries: lung and pancreatic process. Differ to oncology regarding need for additional biopsies. Jett Encarnacion, NP-C Lungs: no wheezing Heart:  RRR with no Murmur/Rubs/Gallops Additional Comments:   Cont. Heparin drip for now and change to [address] when ok with oncology. Follow pathology results I have spoken with and examined the patient. I agree with the above assessment and plan as documented. Sergey Jarquin MD 
 
 
 
More than 50% of time documented was spent in face-to-face contact with the patient and in the care of the patient on the floor/unit where the patient is located.

## 2019-07-12 NOTE — PROGRESS NOTES
Palliative Care Progress Note Patient: Arik Dacosta MRN: [id number]  SSN: [id number] YOB: 1965  Age: 53 y.o. Sex: male Assessment/Plan: Chief Complaint/Interval History: resting, states he is comfortable Principal Diagnosis: · Fatigue, Lethargy  R53.83 Additional Diagnoses:  
· Pain, abdomen  R10.9 · Encounter for Palliative Care  Z51.5 Palliative Performance Scale (PPS) PPS: 50 Medical Decision Making:  
Reviewed and summarized notes and events since last PC visit. Discussed case with appropriate providers: GERARDO Palacio Reviewed laboratory and x-ray data Pt resting in bed, no distress noted. No family at bedside. Pt denies pain at present, but states he is tired. He anticipates discharge home tomorrow. Will arrange for follow up with PC at the [facility] post discharge for ongoing symptom management and goals. No further PC needs- we will sign off. Thank you for allowing us to participate in Mr Dodie lopez. Will discuss findings with members of the interdisciplinary team.   
 
  
More than 50% of this 15 minute visit was spent counseling and coordination of care as outlined above. Subjective:  
 
Review of Systems: A comprehensive review of systems was negative except for:  
Constitutional: positive for fatigue. Objective:  
 
Visit Vitals /70 (BP 1 Location: Left arm, BP Patient Position: At rest) Pulse 69 Temp 97.6 °F (36.4 °C) Resp 16 Ht 6' 1.5\" (1.867 m) Wt 170 lb (77.1 kg) SpO2 100% BMI 22.12 kg/m² Physical Exam: 
 
General:  Cooperative. No acute distress. Eyes:  Conjunctivae/corneas clear. Nose: Nares normal. Septum midline. Neck: Supple, symmetrical, trachea midline. Lungs:   Clear to auscultation bilaterally, unlabored. Heart:  Regular rate and rhythm, no murmur. Abdomen:   Soft, non-distended, mildly tender over epigastric area. Extremities: Normal, atraumatic, no cyanosis or edema. Skin: Skin color, texture, turgor normal. No rash. Neurologic: Nonfocal.  
Psych: Alert and oriented. Signed By: Natalie Cotton NP   
 July 12, 2019

## 2019-07-12 NOTE — PROGRESS NOTES
Interdisciplinary Rounds completed 07/12/19. Nursing, Case Management, Physician and PT present. Plan of care reviewed and updated. Possible d/c in am on Xarelto.

## 2019-07-12 NOTE — PROGRESS NOTES
Hospitalist Progress Note Admit Date:  2019 11:18 PM  
Name:  Devorah Spencer Age:  53 y.o. 
:  1965 MRN:  [id number] PCP:  Unknown, Provider Treatment Team: Attending Provider: Cheyenne Quintana MD; Consulting Provider: Brandy Grant MD; Consulting Provider: Stephanie Moscoso MD; Care Manager: Roxie Verdin RN; Consulting Provider: Teresita Martinez NP; Utilization Review: Mary Solitario RN; Consulting Provider: Juan Carlos Singh MD 
 
Subjective:  
 
  
HISTORY OF PRESENT ILLNESS:  This is a 53-year-old male patient who does not follow on a regular basis with any physician and who came into the emergency room reporting several months of progressive weight loss and approximately 3 days of progressive severe abdominal pain. He localizes the pain in the epigastric area and denies any radiation. He rates it as 10/10 in intensity and is associated with nausea but denies vomiting. He also denies diarrhea, fever, chills, or any other symptom. The patient also reported some chest discomfort, shortness of breath with exertion, and the sensation that he has a bump in the left supraclavicular area. When he arrived into the emergency room, his vital signs were blood pressure of 114/78, heart rate of 93, respiratory rate of 20, O2 saturation of 100% at room air. The patient was awake and alert. His physical exam showed a cachectic male with decreased breath sounds in both lung bases and epigastric tenderness. His initial blood workup reported a lactic acid of 1.3, normal white blood cell count, stable hemoglobin. His creatinine was 1.3 with a baseline of 1.07. He had a total bilirubin of 2.6, an ALT of 227, AST of 74, and a lipase of 6000.   A CT scan of the chest, abdomen and pelvis was done and reported a right lower lobe segmental pulmonary embolism, mediastinal lymphadenopathy, likely metastasis; a 4-cm pleural-based cavitary mass in the left apex, an approximately 2-cm hypoenhancing pancreatic head mass obstructing pancreatic and common bile duct, associated biliary duct dilatation and gallbladder distention, and suggestion of gastric wall thickening. The patient was started on a heparin drip and he was presented to the Hospitalist team for admission. \" 
 
 
ERCP with biopsy done 07/08/2019. Biopsy results back today and concerning for malignancy and oncology consulted. 07/12/2019- pt seen -  
Reports feeling okay. Denies chest or abdominal pain. No nausea. ROS: 10 point ROS negative except what mentioned above Objective:  
 
Patient Vitals for the past 24 hrs: 
 Temp Pulse Resp BP SpO2  
07/12/19 0417  69     
07/12/19 0412 98.4 °F (36.9 °C) 67 18 122/78 92 % 07/12/19 0032 98.9 °F (37.2 °C) 75 18 101/62 96 % 07/11/19 2112 98.6 °F (37 °C) 70 18 111/74 96 % 07/11/19 1723 98.4 °F (36.9 °C) 78 18 122/65 98 % 07/11/19 1412 98.8 °F (37.1 °C) 78 18 135/70 98 % 07/11/19 0851 98.1 °F (36.7 °C) 78 18 116/70 95 % Oxygen Therapy O2 Sat (%): 92 % (07/12/19 0412) Pulse via Oximetry: 59 beats per minute (07/08/19 1950) O2 Device: Room air (07/08/19 1906) O2 Flow Rate (L/min): 6 l/min (07/08/19 1825) Intake/Output Summary (Last 24 hours) at 7/12/2019 0191 Last data filed at 7/12/2019 1169 Gross per 24 hour Intake  Output 1900 ml Net -1900 ml General:    Thin and cachetic. Smells of cigarette smoke. Alert. CV:   RRR. No murmur, rub, or gallop. Lungs:   CTAB. No wheezing, rhonchi, or rales. Abdomen:   Soft, nontender, nondistended. Extremities: Warm and dry. No cyanosis or edema. Skin:     No rashes or jaundice. Data Review: 
I have reviewed all labs, meds, telemetry events, and studies from the last 24 hours. Recent Results (from the past 24 hour(s)) PTT Collection Time: 07/11/19  9:21 AM  
Result Value Ref Range aPTT 109.0 (H) 24.7 - 39.8 SEC  
PTT  Collection Time: 07/12/19  5:50 AM  
 Result Value Ref Range aPTT 88.8 (H) 24.7 - 39.8 SEC All Micro Results Procedure Component Value Units Date/Time AFB CULTURE + SMEAR W/RFLX ID FROM CULTURE [[id number]] Collected:  07/09/19 0835 Order Status:  Completed Specimen:  Miscellaneous sample Updated:  07/10/19 1836 Source SPUTUM     
  AFB Specimen processing Concentration Acid Fast Smear NEGATIVE Comment: (NOTE) Performed At: [address] [id number] Haleigh Abbott MD QN:[id number] Acid Fast Culture PENDING  
 AFB CULTURE + SMEAR W/RFLX ID FROM CULTURE [[id number]] Order Status:  Sent AFB CULTURE + SMEAR W/RFLX ID FROM CULTURE [[id number]] Collected:  07/08/19 0515 Order Status:  Canceled Current Meds: 
Current Facility-Administered Medications Medication Dose Route Frequency  heparin 25,000 units in dextrose 500 mL infusion  18-36 Units/kg/hr IntraVENous TITRATE  naloxone (NARCAN) injection 0.4 mg  0.4 mg IntraVENous PRN  
 senna-docusate (PERICOLACE) 8.6-50 mg per tablet 1 Tab  1 Tab Oral DAILY  calcium carbonate (TUMS) chewable tablet 400 mg [elemental]  400 mg Oral TID PRN  
 famotidine (PF) (PEPCID) 20 mg in sodium chloride 0.9% 10 mL injection  20 mg IntraVENous Q12H  
 ondansetron (ZOFRAN) injection 4 mg  4 mg IntraVENous Q6H PRN  
 acetaminophen (TYLENOL) tablet 650 mg  650 mg Oral Q6H PRN  
 oxyCODONE IR (ROXICODONE) tablet 5 mg  5 mg Oral Q6H PRN  
 morphine injection 2 mg  2 mg IntraVENous Q4H PRN  
 albuterol (PROVENTIL VENTOLIN) nebulizer solution 2.5 mg  2.5 mg Nebulization Q6H PRN  
 nicotine (NICODERM CQ) 21 mg/24 hr patch 1 Patch  1 Patch TransDERmal Q24H Other Studies (last 24 hours): No results found. Assessment and Plan:  
 
Hospital Problems as of 7/12/2019 Date Reviewed: 7/10/2019 Codes Class Noted - Resolved POA  Protein malnutrition ([facility] [address] ICD-10-CM: P47 
 ICD-9-CM: 260  7/9/2019 - Present Yes Cocaine abuse ([address] ICD-10-CM: F14.10 ICD-9-CM: 305.60  7/9/2019 - Present Yes * (Principal) Pancreatitis ICD-10-CM: K85.90 ICD-9-CM: 138.1  7/8/2019 - Present Yes Lung mass ICD-10-CM: R91.8 ICD-9-CM: 786.6  7/8/2019 - Present Yes Pulmonary emboli [facility]) ICD-10-CM: I26.99 
ICD-9-CM: 415.19  7/8/2019 - Present Yes Pancreatic mass ICD-10-CM: K86.9 ICD-9-CM: 577.9  7/8/2019 - Present Yes Mediastinal adenopathy ICD-10-CM: R59.0 ICD-9-CM: 785.6  7/8/2019 - Present Yes PLAN:   
 
No plan for further bronch or biopsy as per [facility] Oncology recommends outpatient follow up with Dr. Gisella Borja Prelim path suspicious for pancreatic malignancy with possible metastases. Will talk to  if patient can get NOAC. If not, then he will be bridged with coumadin prior to discharge. If he can get Xarelto from [facility], he can be discharged on 7/13/19 He will need outpatient follow up with Pulmonology and Oncology Counseled for cocaine cessations DC planning/Dispo: as above DVT ppx:  Heparin drip Signed: 
Nikolas Delvalle MD

## 2019-07-12 NOTE — PROGRESS NOTES
Spoke with [facility] and they can only provide coumadin or xarelto. md notified.

## 2019-07-12 NOTE — PROGRESS NOTES
Hourly rounds done. Pt c/o pain, medicated per MAR. Denies nausea, vomiting. Cardiac monitor running NSR 67-75. Overnight RT sleep study completed. Hep gtt running @ 20 gtt/kg/hr. All needs met at this time.

## 2019-07-13 NOTE — PROGRESS NOTES
Problem: Pulmonary Embolism Care Plan (Adult) Goal: *Improvement of existing pulmonary embolism Outcome: Progressing Towards Goal 
Goal: *Absence of bleeding Outcome: Progressing Towards Goal 
Goal: *Labs within defined limits Outcome: Progressing Towards Goal 
  
Problem: Patient Education: Go to Patient Education Activity Goal: Patient/Family Education Outcome: Progressing Towards Goal 
  
Problem: Falls - Risk of 
Goal: *Absence of Falls Description Document German Harris Fall Risk and appropriate interventions in the flowsheet. Outcome: Progressing Towards Goal 
  
Problem: Patient Education: Go to Patient Education Activity Goal: Patient/Family Education Outcome: Progressing Towards Goal 
  
Problem: Nutrition Deficit Goal: *Optimize nutritional status Outcome: Progressing Towards Goal

## 2019-07-13 NOTE — DISCHARGE SUMMARY
Hospitalist Discharge Summary Patient ID: 
Latasha Barroso [id number] 
53 y.o. 
1965 Admit date: 7/7/2019 11:18 PM 
Discharge date and time: 7/13/2019 Attending: Orquidea Hay 
PCP:  Unknown, Provider Treatment Team: Attending Provider: Kim Saleem MD; Consulting Provider: Rhae Essex, MD; Consulting Provider: Annette Schultz MD; Care Manager: Olivia Wall RN; Utilization Review: Susanna Brewer RN 
 
Principal Diagnosis: 
Pancreatic head mass Acute pancreatitis Left upper lobe cavitary lesion Metastatic disease Right lower lobe acute pulmonary embolism Cachexia Principal Problem: 
  Pancreatitis (7/8/2019) Active Problems: 
  Lung mass (7/8/2019) Pulmonary emboli ([address] (7/8/2019) Pancreatic mass (7/8/2019) Mediastinal adenopathy (7/8/2019) Protein malnutrition ([address] (7/9/2019) Cocaine abuse ([address] (7/9/2019) Emphysema lung ([address] (7/12/2019) Hospital Course: 
Please refer to the admission H&P for details of presentation. In summary, the patient is a 53years old male with hx of cocaine and opioid abuse admitted on 7/8/19 for abdominal pain which was found to be secondary to pancreatitis. Initial lipase level was 6000. Pt was managed conservatively with NPO, IV fluids, opioid pain medications and antiemetics. GI performed ERCP on 7/8/19 with placement of biliary stent. CTAP showed right lower lobe PE with mediastinal lymphadenopathy, likely metastasis, a 4 cm pleural based cavitary mass in left apex, and approximately 2-cm hypoenhancing mass pancreatic head obstructing pancreatic and common bile duct associated with biliary duct dilation and GB distention. Pt was started on heparin drip. Pulmonary did recommend EBUS but postponed to be done at a later date for unclear reasons. Pt likely has stage 4 cancer which has a poor prognosis.  Pt has been counseled about this. Oncology has also evaluated the patient, recommend outpatient for further work up. Pt will be discharged on oral Xarelto. Pt is medically cleared and stable for discharge today. Rest of the hospital course was uneventful. For further details, please refer to daily progress notes. Significant Diagnostic Studies:  
IMPRESSION: 
  
1. Right lower lobe segmental pulmonary embolism. Telephoned to ED physician, Dr. Frank Lowe, at 3:00 AM on exam date. 
  
2. Mediastinal lymphadenopathy, likely metastasis. Lymphoma is considered less 
likely. Negative for liver lesion or enlarged lymph node in the abdomen. 
  
3. A 4 cm pleurally base cavitary mass in the left apex. 
  
4. An approximately 2 cm hypoenhancing pancreatic head mass, obstructing 
pancreatic and common bile duct. Associated biliary duct dilatation and 
gallbladder distention. 
  
5.  Suggestion of gastric wall thickening. EXAMINATION: HEAD CT WITHOUT CONTRAST 6/20/2019 1:33 PM 
  
ACCESSION NUMBER: [id number] 
  
COMPARISON: None available 
  INDICATION: Syncope/fainting 
  
TECHNIQUE: Multiple-row detector helical CT examination of the head without 
intravenous contrast.  
  
Radiation dose reduction techniques were used for this study. Our CT scanners 
use one or all of the following: Automated exposure control, adjustment of the 
mA and/or kV according to patient size, iterative reconstruction. 
  
FINDINGS: 
  
Brain: There is no mass, mass effect, evidence of an acute stroke, or 
intracranial hemorrhage. 
  
Ventricles: Normal 
  
Vasculature: Normal 
  
Bones: Normal 
  
Surrounding soft tissues:  A small air-fluid level is seen in the right 
maxillary sinus. 
  
IMPRESSION IMPRESSION: 
  
No acute intracranial abnormality. 
  
Right maxillary sinus disease 
  
 
Labs: Results:  
   
Chemistry Recent Labs  
  07/11/19 
0542 GLU 91   
K 3.3*  
 CO2 29 BUN 5*  
CREA 0.70* CA 8.1* AGAP 7  
  
 CBC w/Diff Recent Labs  
  07/11/19 
0542 07/10/19 
1633 WBC 5.7  --   
RBC 3.37*  --   
HGB 9.7* 10.7* HCT 29.4* 31.8*  
  --   
GRANS 69  --   
LYMPH 17  --   
EOS 3  --   
  
Cardiac Enzymes No results for input(s): CPK, CKND1, SANCHEZ in the last 72 hours. No lab exists for component: Alvin Zeke Coagulation Recent Labs  
  07/12/19 
0550 07/11/19 
2642 APTT 88.8* 109.0* Lipid Panel No results found for: CHOL, CHOLPOCT, CHOLX, CHLST, CHOLV, 223846, HDL, LDL, LDLC, DLDLP, 556360, VLDLC, VLDL, TGLX, TRIGL, TRIGP, TGLPOCT, CHHD, CHHDX  
BNP No results for input(s): BNPP in the last 72 hours. Liver Enzymes No results for input(s): TP, ALB, TBIL, AP, SGOT, GPT in the last 72 hours. No lab exists for component: DBIL Thyroid Studies No results found for: T4, T3U, TSH, TSHEXT Discharge Exam: 
Visit Vitals /76 (BP 1 Location: Left arm, BP Patient Position: At rest) Pulse 61 Temp 97.9 °F (36.6 °C) Resp 18 Ht 6' 1.5\" (1.867 m) Wt 77.1 kg (170 lb) SpO2 97% Comment: RA  
BMI 22.12 kg/m² General appearance: alert, awake, frail, cachectic Lungs: clear to auscultation bilaterally Heart: regular rate and rhythm, S1, S2 normal, no murmur, click, rub or gallop Abdomen: soft, non-tender. Bowel sounds normal. No masses,  no organomegaly Extremities: no cyanosis or edema Neurologic: Grossly normal 
 
Disposition: home Discharge Condition: stable Patient Instructions:  
Current Discharge Medication List  
  
START taking these medications Details  
!! rivaroxaban (XARELTO) 15 mg tab tablet Take 1 Tab by mouth two (2) times daily (with meals) for 20 days. Qty: 40 Tab, Refills: 0  
  
!! rivaroxaban (XARELTO) 20 mg tab tablet Take 1 Tab by mouth daily (with breakfast) for 30 days. Qty: 30 Tab, Refills: 2  
  
famotidine (PEPCID) 40 mg tablet Take 1 Tab by mouth daily for 30 days. Qty: 30 Tab, Refills: 2 !! - Potential duplicate medications found. Please discuss with provider. Activity: Activity as tolerated Diet: Regular Diet Wound Care: None needed Follow-up ·   Follow up with Oncology and Pulmonology as scheduled. Time spent to discharge patient 35 minutes Signed: 
Armando Gerber MD 
7/13/2019 
8:09 AM

## 2019-07-13 NOTE — PROGRESS NOTES
Pt is for discharge home today. Follow up appt at the [address] [facility] is scheduled and in D/C AVS.  Xarelto Free coupon provided. Call placed to [address] [facility] about nocturnal O2 and he will follow up with pt about delivery of O2 to pt home today after his discharge. Care Management Interventions PCP Verified by CM: Yes Mode of Transport at Discharge: Other (see comment) Transition of Care Consult (CM Consult): Discharge Planning Discharge Durable Medical Equipment: No 
Physical Therapy Consult: No 
Occupational Therapy Consult: No 
Current Support Network: Own Home Confirm Follow Up Transport: Family Plan discussed with Pt/Family/Caregiver: Yes Freedom of Choice Offered: Yes Discharge Location Discharge Placement: Home

## 2019-07-13 NOTE — DISCHARGE INSTRUCTIONS
Patient Education        Learning About Acute Pancreatitis  What is acute pancreatitis? The pancreas is an organ behind the stomach. It makes hormones like insulin that help control how your body uses sugar. It also makes enzymes that help you digest food. Usually these enzymes flow from the pancreas to the intestines. But if they leak into the pancreas, they can irritate it and cause pain and swelling. When this happens suddenly, it's called acute pancreatitis. What causes it? Most of the time, acute pancreatitis is caused by gallstones or by heavy alcohol use. But several other things can cause it, such as:  · High levels of fats in the blood. · An injury. · A problem after a surgery or a procedure. · Certain medicines. What are the symptoms? The main symptom is pain in the upper belly. The pain can be severe. You also may have a fever, nausea, or vomiting. Some people get so sick that they have problems breathing. They also may have low blood pressure. How is it diagnosed? Your doctor will diagnose pancreatitis with an exam and by looking at your lab tests. Your doctor may think that you have this problem based on your symptoms and where you have pain in your belly. You may have blood tests of enzymes called amylase and lipase. In pancreatitis, the level of these enzymes is usually much higher than normal.  You also may have imaging tests of the belly. These may include an ultrasound, a CT scan, or an MRI. A special MRI called MRCP can show images of the bile ducts. This test can be very helpful when gallstones are causing the problem. How is it treated? Treatment of acute pancreatitis usually takes place in the hospital. It focuses on taking care of pain and supporting your body while your pancreas heals. In severe cases, treatment may occur in an intensive care unit to support breathing, treat serious infections, or help raise very low blood pressure.   If a gallstone is causing the problem, the gallstone may need to be removed. This is done during a procedure called ERCP. The doctor puts a scope in your mouth and moves it gently through the stomach and into the ducts from the pancreas and gallbladder. The doctor is then able to see a stone and remove it. Sometimes the gallbladder, which makes gallstones, needs to be removed by surgery. People with pancreatitis often need a lot of fluid to help support their other organs and their blood pressure. They get fluids through a vein (intravenous, or IV). Instead of food by mouth, nutrition is sometimes given through a vein while the pancreas heals. Follow-up care is a key part of your treatment and safety. Be sure to make and go to all appointments, and call your doctor if you are having problems. It's also a good idea to know your test results and keep a list of the medicines you take. Where can you learn more? Go to http://earnestine-mark.info/. Enter I374 in the search box to learn more about \"Learning About Acute Pancreatitis. \"  Current as of: March 27, 2018  Content Version: 11.9  © 0147-4675 [employer]. Care instructions adapted under license by [employer] (which disclaims liability or warranty for this information). If you have questions about a medical condition or this instruction, always ask your healthcare professional. [address] any warranty or liability for your use of this information. DISCHARGE SUMMARY from Nurse    PATIENT INSTRUCTIONS:    After general anesthesia or intravenous sedation, for 24 hours or while taking prescription Narcotics:  · Limit your activities  · Do not drive and operate hazardous machinery  · Do not make important personal or business decisions  · Do  not drink alcoholic beverages  · If you have not urinated within 8 hours after discharge, please contact your surgeon on call.     Report the following to your surgeon:  · Excessive pain, swelling, redness or odor of or around the surgical area  · Temperature over 100.5  · Nausea and vomiting lasting longer than 4 hours or if unable to take medications  · Any signs of decreased circulation or nerve impairment to extremity: change in color, persistent  numbness, tingling, coldness or increase pain  · Any questions    What to do at Home:  Recommended activity: Activity as tolerated,     If you experience any of the following symptoms fever greater then 100.5, pain unrelieved by medication, increase in shortness of breath, please follow up with primary care doctor. *  Please give a list of your current medications to your Primary Care Provider. *  Please update this list whenever your medications are discontinued, doses are      changed, or new medications (including over-the-counter products) are added. *  Please carry medication information at all times in case of emergency situations. These are general instructions for a healthy lifestyle:    No smoking/ No tobacco products/ Avoid exposure to second hand smoke  Surgeon General's Warning:  Quitting smoking now greatly reduces serious risk to your health. Obesity, smoking, and sedentary lifestyle greatly increases your risk for illness    A healthy diet, regular physical exercise & weight monitoring are important for maintaining a healthy lifestyle    You may be retaining fluid if you have a history of heart failure or if you experience any of the following symptoms:  Weight gain of 3 pounds or more overnight or 5 pounds in a week, increased swelling in our hands or feet or shortness of breath while lying flat in bed. Please call your doctor as soon as you notice any of these symptoms; do not wait until your next office visit. The discharge information has been reviewed with the patient. The patient verbalized understanding.   Discharge medications reviewed with the patient and appropriate educational materials and side effects teaching were provided.   ___________________________________________________________________________________________________________________________________

## 2019-07-13 NOTE — PROGRESS NOTES
No change in patient condition during nursing rounds. PTT 88. No change in heparin drip that is currently at 20u/kg/hr or 30.8 cc/hr.  Next PTT ordered for in the am.

## 2019-07-21 PROBLEM — I48.91 ATRIAL FIBRILLATION WITH RVR (HCC): Status: ACTIVE | Noted: 2019-01-01

## 2019-07-21 PROBLEM — J18.9 PNEUMONIA: Status: ACTIVE | Noted: 2019-01-01

## 2019-07-21 PROBLEM — C25.9 PRIMARY PANCREATIC CANCER WITH METASTASIS TO OTHER SITE (HCC): Status: ACTIVE | Noted: 2019-01-01

## 2019-07-21 PROBLEM — I47.1 SVT (SUPRAVENTRICULAR TACHYCARDIA) (HCC): Status: ACTIVE | Noted: 2019-01-01

## 2019-07-21 NOTE — H&P
Hospitalist Note     Admit Date:  2019  7:03 AM   Name:  Nico Valle   Age:  53 y.o.  :  1965   MRN:  [id number]   PCP:  Unknown, Provider  Treatment Team: Attending Provider: Raman Le MD    HPI/Subjective:   Pt is a 52 y/o M with recent diagnosis of metastatic pancreatic cancer, PE on xarelto, discharged from hospital earlier this month, who presented back to ER with complaints of nausea and sharp epigastric pain 10/10. Symptoms have been present about 1 day, worsened this morning. Has also complained of SOB. No cough. In ER noted to have several runs of SVT and at one point went into afib RVR. He went back into NSR. He says at one point he during an episode he felt like he was passing out, lightheaded. Had similar episode at home. No CP, palpitations, chills, urinary changes, diarrhea, rashes or wounds    10 systems reviewed and negative except as noted in HPI. Past Medical History:   Diagnosis Date    Arthritis     Other ill-defined conditions(799.89)     chronic L leg pain      Past Surgical History:   Procedure Laterality Date    HX ORTHOPAEDIC        No Known Allergies   Social History     Tobacco Use    Smoking status: Current Every Day Smoker     Packs/day: 1.00   Substance Use Topics    Alcohol use: Yes     Comment: occasionally      No family history on file. Immunization History   Administered Date(s) Administered    TB Skin Test (PPD) Intradermal 2019     PTA Medications:  Prior to Admission Medications   Prescriptions Last Dose Informant Patient Reported? Taking?   famotidine (PEPCID) 40 mg tablet 2019 at Unknown time  No Yes   Sig: Take 1 Tab by mouth daily for 30 days. rivaroxaban (XARELTO) 15 mg tab tablet 2019 at Unknown time  No Yes   Sig: Take 1 Tab by mouth two (2) times daily (with meals) for 20 days. rivaroxaban (XARELTO) 20 mg tab tablet   No No   Sig: Take 1 Tab by mouth daily (with breakfast) for 30 days. Facility-Administered Medications: None       Objective:     Patient Vitals for the past 24 hrs:   Temp Pulse Resp BP SpO2   07/21/19 1135 97.8 °F (36.6 °C) 84 18 110/76 96 %   07/21/19 1114  86 15     07/21/19 1050 97.5 °F (36.4 °C) 85 12 108/79    07/21/19 1048  86 13     07/21/19 1044  86 13     07/21/19 1008  87 17     07/21/19 0941  85 14     07/21/19 0933  87 22     07/21/19 0904  84 14     07/21/19 0859  88 19     07/21/19 0858  (!) 145 26 100/75    07/21/19 0855  (!) 145 17 100/75    07/21/19 0850  (!) 135 24     07/21/19 0817  93 15 115/85 97 %   07/21/19 0811  92 12  94 %   07/21/19 0807  93 13  94 %   07/21/19 0755  92 13  95 %   07/21/19 0747  91 20 106/67    07/21/19 0746  90 12     07/21/19 0730  100 28 117/64 94 %   07/21/19 0727  92 16  94 %   07/21/19 0722  94 18  97 %   07/21/19 0718  96 18 111/79    07/21/19 0711  (!) 101 20  92 %   07/21/19 0710  98 17  99 %   07/21/19 0707  (!) 152 28 108/74 95 %   07/21/19 0704 97.5 °F (36.4 °C) 97 16  92 %     Oxygen Therapy  O2 Sat (%): 96 % (07/21/19 1135)  Pulse via Oximetry: 93 beats per minute (07/21/19 0817)  O2 Device: Room air (07/21/19 0704)    Intake/Output Summary (Last 24 hours) at 7/21/2019 1315  Last data filed at 7/21/2019 1130  Gross per 24 hour   Intake 2966.67 ml   Output 0 ml   Net 2966.67 ml       Physical Exam:  General:    Well nourished. Alert. Eyes:   Normal sclera. Extraocular movements intact. ENT:  Normocephalic, atraumatic. Moist mucous membranes  CV:   RRR. No m/r/g. Lungs:  CTAB. No wheezing, rhonchi, or rales. Abdomen: Soft, nontender, nondistended. Extremities: Warm and dry. No cyanosis or edema. Neurologic: CN II-XII grossly intact. Sensation intact. Skin:     No rashes or jaundice. Normal coloration  Psych:  Normal mood and affect. I reviewed the labs, imaging, EKGs, telemetry, and other studies done this admission.   Data Review:   Recent Results (from the past 24 hour(s))   EKG, 12 LEAD, SUBSEQUENT    Collection Time: 07/21/19  7:08 AM   Result Value Ref Range    Ventricular Rate 192 BPM    Atrial Rate 192 BPM    P-R Interval 128 ms    QRS Duration 86 ms    Q-T Interval 222 ms    QTC Calculation (Bezet) 397 ms    Calculated P Axis 0 degrees    Calculated R Axis 75 degrees    Calculated T Axis -90 degrees    Diagnosis       !! AGE AND GENDER SPECIFIC ECG ANALYSIS !! Supraventricular tachycardia  Septal infarct (cited on or before 21-JUL-2019)  Marked ST abnormality, possible inferior subendocardial injury  Abnormal ECG  When compared with ECG of 21-JUL-2019 07:04,  Premature ventricular complexes are no longer Present  Aberrant conduction is no longer Present  Vent. rate has increased BY  94 BPM  Serial changes of evolving Septal infarct Present  Confirmed by Kylie Plascencia MD (), YENNY JENKINS ([id number]) on 7/21/2019 10:19:32 AM     CBC WITH AUTOMATED DIFF    Collection Time: 07/21/19  7:09 AM   Result Value Ref Range    WBC 8.9 4.3 - 11.1 K/uL    RBC 3.79 (L) 4.23 - 5.6 M/uL    HGB 11.1 (L) 13.6 - 17.2 g/dL    HCT 33.4 (L) 41.1 - 50.3 %    MCV 88.1 79.6 - 97.8 FL    MCH 29.3 26.1 - 32.9 PG    MCHC 33.2 31.4 - 35.0 g/dL    RDW 11.3 (L) 11.9 - 14.6 %    PLATELET 959 226 - 734 K/uL    MPV 9.7 9.4 - 12.3 FL    ABSOLUTE NRBC 0.00 0.0 - 0.2 K/uL    DF AUTOMATED      NEUTROPHILS 74 43 - 78 %    LYMPHOCYTES 12 (L) 13 - 44 %    MONOCYTES 11 4.0 - 12.0 %    EOSINOPHILS 2 0.5 - 7.8 %    BASOPHILS 1 0.0 - 2.0 %    IMMATURE GRANULOCYTES 1 0.0 - 5.0 %    ABS. NEUTROPHILS 6.5 1.7 - 8.2 K/UL    ABS. LYMPHOCYTES 1.1 0.5 - 4.6 K/UL    ABS. MONOCYTES 1.0 0.1 - 1.3 K/UL    ABS. EOSINOPHILS 0.2 0.0 - 0.8 K/UL    ABS. BASOPHILS 0.0 0.0 - 0.2 K/UL    ABS. IMM.  GRANS. 0.1 0.0 - 0.5 K/UL   METABOLIC PANEL, COMPREHENSIVE    Collection Time: 07/21/19  7:09 AM   Result Value Ref Range    Sodium 136 136 - 145 mmol/L    Potassium 3.8 3.5 - 5.1 mmol/L    Chloride 100 98 - 107 mmol/L    CO2 25 21 - 32 mmol/L    Anion gap 11 7 - 16 mmol/L    Glucose 105 (H) 65 - 100 mg/dL    BUN 21 6 - 23 MG/DL    Creatinine 0.92 0.8 - 1.5 MG/DL    GFR est AA >60 >60 ml/min/1.73m2    GFR est non-AA >60 >60 ml/min/1.73m2    Calcium 8.2 (L) 8.3 - 10.4 MG/DL    Bilirubin, total 0.5 0.2 - 1.1 MG/DL    ALT (SGPT) 31 12 - 65 U/L    AST (SGOT) 27 15 - 37 U/L    Alk. phosphatase 158 (H) 50 - 136 U/L    Protein, total 7.2 6.3 - 8.2 g/dL    Albumin 2.6 (L) 3.5 - 5.0 g/dL    Globulin 4.6 (H) 2.3 - 3.5 g/dL    A-G Ratio 0.6 (L) 1.2 - 3.5     LIPASE    Collection Time: 07/21/19  7:09 AM   Result Value Ref Range    Lipase 1,312 (H) 73 - 393 U/L   MAGNESIUM    Collection Time: 07/21/19  7:09 AM   Result Value Ref Range    Magnesium 2.0 1.8 - 2.4 mg/dL   POC LACTIC ACID    Collection Time: 07/21/19  8:38 AM   Result Value Ref Range    Lactic Acid (POC) 1.82 0.5 - 1.9 mmol/L   EKG, 12 LEAD, SUBSEQUENT    Collection Time: 07/21/19  8:56 AM   Result Value Ref Range    Ventricular Rate 145 BPM    Atrial Rate 145 BPM    P-R Interval 128 ms    QRS Duration 84 ms    Q-T Interval 280 ms    QTC Calculation (Bezet) 434 ms    Calculated P Axis -88 degrees    Calculated R Axis 75 degrees    Calculated T Axis -83 degrees    Diagnosis       Suspect atrial flutter with rapid rate  Abnormal ECG  When compared with ECG of 21-JUL-2019 08:48,    Confirmed by Lali Huddleston MD (), YENNY JENKINS ([id number]) on 7/21/2019 10:20:27 AM     EKG, 12 LEAD, INITIAL    Collection Time: 07/21/19  9:21 AM   Result Value Ref Range    Ventricular Rate 134 BPM    Atrial Rate 182 BPM    QRS Duration 90 ms    Q-T Interval 322 ms    QTC Calculation (Bezet) 480 ms    Calculated P Axis 75 degrees    Calculated R Axis 78 degrees    Calculated T Axis -52 degrees    Diagnosis       !! AGE AND GENDER SPECIFIC ECG ANALYSIS !!   Atrial fibrillation  Septal infarct (cited on or before 21-JUL-2019)  Abnormal ECG  When compared with ECG of 21-JUL-2019 08:56,  Serial changes of evolving Septal infarct Present  Confirmed by Astrid Rubio MD (), YENNY JENKINS ([id number]) on 7/21/2019 10:20:58 AM         All Micro Results     Procedure Component Value Units Date/Time    CULTURE, BLOOD [[id number]] Collected:  07/21/19 0832    Order Status:  Completed Specimen:  Blood Updated:  07/21/19 0922    CULTURE, BLOOD [[id number]] Collected:  07/21/19 0832    Order Status:  Completed Specimen:  Blood Updated:  07/21/19 0922          Current Facility-Administered Medications   Medication Dose Route Frequency    sodium chloride (NS) flush 5-40 mL  5-40 mL IntraVENous Q8H    sodium chloride (NS) flush 5-40 mL  5-40 mL IntraVENous PRN    rivaroxaban (XARELTO) tablet 15 mg  15 mg Oral BID WITH MEALS    sodium chloride (NS) flush 5-40 mL  5-40 mL IntraVENous Q8H    sodium chloride (NS) flush 5-40 mL  5-40 mL IntraVENous PRN    [START ON 7/22/2019] azithromycin (ZITHROMAX) 500 mg in 0.9% sodium chloride (MBP/ADV) 250 mL  500 mg IntraVENous Q24H    [START ON 7/22/2019] cefTRIAXone (ROCEPHIN) 1 g in 0.9% sodium chloride (MBP/ADV) 50 mL  1 g IntraVENous Q24H    acetaminophen (TYLENOL) tablet 650 mg  650 mg Oral Q4H PRN    HYDROcodone-acetaminophen (NORCO) 5-325 mg per tablet 1 Tab  1 Tab Oral Q4H PRN    naloxone (NARCAN) injection 0.4 mg  0.4 mg IntraVENous PRN    albuterol (PROVENTIL VENTOLIN) nebulizer solution 2.5 mg  2.5 mg Nebulization Q4H PRN    LORazepam (ATIVAN) tablet 0.5-1 mg  0.5-1 mg Oral Q6H PRN    zolpidem (AMBIEN) tablet 5 mg  5 mg Oral QHS PRN    senna-docusate (PERICOLACE) 8.6-50 mg per tablet 2 Tab  2 Tab Oral BID PRN    guaiFENesin ER (MUCINEX) tablet 1,200 mg  1,200 mg Oral Q12H    hydrALAZINE (APRESOLINE) 20 mg/mL injection 20 mg  20 mg IntraVENous Q4H PRN    HYDROcodone-acetaminophen (NORCO)  mg tablet 1 Tab  1 Tab Oral Q4H PRN    promethazine (PHENERGAN) with saline injection 25 mg  25 mg IntraVENous Q6H PRN    0.9% sodium chloride infusion  100 mL/hr IntraVENous CONTINUOUS    amiodarone (CORDARONE) tablet 200 mg  200 mg Oral Q12H    famotidine (PEPCID) tablet 40 mg  40 mg Oral DAILY    ondansetron (ZOFRAN ODT) tablet 8 mg  8 mg Oral TID       Other Studies:  Xr Chest Sngl V    Result Date: 7/21/2019  Clinical History: The patient is a 53years year old Male presenting with symptoms of abdominal pain, evaluate for free air Comparison:  Chest x-ray 7/20/2019 Findings:  Frontal view of the chest was obtained. There is developing diffuse mild alveolar infiltrate bilaterally. No pleural effusions are demonstrated. The cardiomediastinal silhouette is within normal limits. There are no acute osseous abnormalities. There is no evidence of free subdiaphragmatic air. Impression:  Developing diffuse alveolar infiltrates bilaterally. CPT code(s) 71957       Assessment and Plan:     Hospital Problems as of 7/21/2019 Date Reviewed: 7/10/2019          Codes Class Noted - Resolved POA    Pneumonia ICD-10-CM: J18.9  ICD-9-CM: 544  7/21/2019 - Present Yes        SVT (supraventricular tachycardia) (HCC) ICD-10-CM: I47.1  ICD-9-CM: 427.89  7/21/2019 - Present Yes        * (Principal) Atrial fibrillation with RVR ([facility] [address] ICD-10-CM: I48.91  ICD-9-CM: 427.31  7/21/2019 - Present Yes        Emphysema lung (HCC) (Chronic) ICD-10-CM: J43.9  ICD-9-CM: 492.8  7/12/2019 - Present Yes        Protein malnutrition (HCC) (Chronic) ICD-10-CM: E46  ICD-9-CM: 260  7/9/2019 - Present Yes        Cocaine abuse (HCC) (Chronic) ICD-10-CM: F14.10  ICD-9-CM: 305.60  7/9/2019 - Present Yes        Pulmonary emboli (HCC) ICD-10-CM: I26.99  ICD-9-CM: 415.19  7/8/2019 - Present Yes        Primary pancreatic cancer with metastasis to other site [facility]) ICD-10-CM: C25.9  ICD-9-CM: 157.9  7/8/2019 - Present Yes              Plan:  AFib RVR, SVT  -inpatient  -appreciate cardio help  -amiodarone loading now. Probably couldn't tolerate BB or CCB due to low normal BP.   Also has hx cocaine abuse though reportedly has stopped  -already on xarelto  -cont tele  -check TSH, fT4    ? PNA    -based on CXR with infiltrates. does not have typical symptoms  -rocephin/azithro, mucinex, PRN nebs   -check BNP. Recent echo ordered for PE wnl  -Follow cultures    Pancreatic cancer  -is supposed to have EBUS by pulm as outpatient  -also supposed to see oncology as outpt  -per previous notes, may have lung cancer and pancreatic cancer ie two separate primaries    Palliative care  -need to focus on symptom management  -scheduled zofran. QTc ok on EKG  -MS contin low dose to start, 15mg BID  -PRN norco.    -bowel regimen  -Probably will need a prescription of narcotics and antiemetics at discharge to avoid bouncing back. hx drug use irrelevant here; his symptoms are real    PE  -xarelto    Discharge planning:    -lost to follow up last admit probably because he was self pay and discharged on a weekend when appointments could not be made.   Pt appears to have poor cognitive understanding and I do not think he will follow up on his own  -needs to have concrete discharge plans in place prior to discharge    DVT ppx: xarelto  Code status:  Full  Estimated LOS:  Greater than 2 midnights  Risk:  high    Signed:  Ruby Dickerson MD

## 2019-07-21 NOTE — ED NOTES
Patient had another run of HR of 180s. Dr Noemi Ramon orders to give 20mg of Cardizem, HR decreased to 88 after med given. Dr. Noemi Ramon notified. This RN updated Dr. Macario Alston and his orders to call cardiology.

## 2019-07-21 NOTE — PROGRESS NOTES
TRANSFER - IN REPORT:    Verbal report received from Alicja Reed, [facility] on Jeffrey Hernandez being received from ED for routine progression of care      Report consisted of patients Situation, Background, Assessment and Recommendations(SBAR). Information from the following report(s) Kardex, ED Summary, MAR and Cardiac Rhythm NSR was reviewed with the receiving nurse. Opportunity for questions and clarification was provided. Assessment completed upon patients arrival to unit and care assumed.

## 2019-07-21 NOTE — PROGRESS NOTES
Problem: Falls - Risk of  Goal: *Absence of Falls  Description  Document Prema Payment Fall Risk and appropriate interventions in the flowsheet. Outcome: Progressing Towards Goal  Note:   Fall Risk Interventions:   Non-slip socks  Instructed to call for assistance before attempting to get out of bed. Problem: Pneumonia: Day 1  Goal: Medications  Outcome: Progressing Towards Goal  Goal: Respiratory  Outcome: Progressing Towards Goal   IV antibiotics.

## 2019-07-21 NOTE — PROGRESS NOTES
Patient is being admitted to floor from ER  Reviewed notes  Noted that patient is cancer patient/ not started treatments  Has some social issues    Will follow upon transfer to floor    Giuseppe Pichardo,  Staff   C: [phone]  /  [email or web address]

## 2019-07-21 NOTE — CONSULTS
[facility] CARDIOLOGY CONSULT                   Subjective:     Patient is a 26-year-old male who was recently discharged on July 13, 2019 after a admission with pancreatitis. Workup revealed pancreatic cancer with metastasis. He was also noted to have a pulmonary embolus based on CT scan and was started on Xarelto therapy. He presented to the emergency room earlier today with recurrent and worsening abdominal discomfort and nausea. During transport he was noted to be in SVT. In the emergency room initial EKG shows a narrow complex SVT which spontaneously converted. He later developed a recurrent SVT and was given intravenous Cardizem. It appears at this time he was having atrial fibrillation. He has now converted to sinus rhythm. He notes that when he was in a tachycardic rate he felt lightheaded, hot and flushed. He did have one episode prior to activating EMS but has not had any other symptoms at home. He denies any prior cardiac history. Echocardiogram from July 8, 2019 shows normal LV systolic velocity of 12%. No significant valvular abnormalities only mild regurgitation. Normal left and right atrial size. Past Medical History:   Diagnosis Date    Arthritis     Other ill-defined conditions(799.89)     chronic L leg pain      Past Surgical History:   Procedure Laterality Date    HX ORTHOPAEDIC        No Known Allergies  Social History     Tobacco Use    Smoking status: Current Every Day Smoker     Packs/day: 1.00   Substance Use Topics    Alcohol use: Yes     Comment: occasionally      FH: No family history on file. Review of Systems   Constitutional: Negative for chills and fever. HENT: Negative for tinnitus. Eyes: Negative for blurred vision. Respiratory: Negative for shortness of breath. Cardiovascular: Negative for chest pain and orthopnea. Gastrointestinal: Positive for abdominal pain and nausea. Genitourinary: Negative for dysuria.    Musculoskeletal: Negative for back pain, falls and joint pain. Skin: Negative for rash. Neurological: Negative for tremors, sensory change and headaches. Endo/Heme/Allergies: Does not bruise/bleed easily. Psychiatric/Behavioral: Negative for depression and suicidal ideas. Objective:       Visit Vitals  /79   Pulse 86   Temp 97.5 °F (36.4 °C)   Resp 15   Ht 6' 5\" (1.956 m)   Wt 77.1 kg (170 lb)   SpO2 97%   BMI 20.16 kg/m²       07/21 0701 - 07/21 1900  In: 1350 [I.V.:1350]  Out: -   No intake/output data recorded. Physical Exam   Constitutional: He is oriented to person, place, and time and well-developed, well-nourished, and in no distress. HENT:   Head: Atraumatic. Eyes: Pupils are equal, round, and reactive to light. Conjunctivae are normal.   Neck: No JVD present. No thyromegaly present. Cardiovascular: Normal rate and regular rhythm. No murmur heard. Pulmonary/Chest: Effort normal and breath sounds normal.   Abdominal: Soft. Bowel sounds are normal. He exhibits no distension. There is no tenderness. Musculoskeletal: He exhibits no edema. Neurological: He is alert and oriented to person, place, and time. Skin: Skin is warm. No rash noted. Psychiatric: Mood normal.           Data Review:   Labs:   Recent Labs     07/21/19  0709      K 3.8   MG 2.0   BUN 21   CREA 0.92   *   WBC 8.9   HGB 11.1*   HCT 33.4*         No results found for: AMINATA Uribe        Assessment/Plan:   Principal Problem:    Pneumonia (7/21/2019)  Defer management to primary team.      Active Problems:    Pulmonary emboli ([address] (7/8/2019)  Continue anticoagulation      Primary pancreatic cancer with metastasis to other site [facility]) (7/8/2019)  Defer management to primary team.        Protein malnutrition ([address] (7/9/2019)  Defer management to primary team.        Cocaine abuse ([address] (7/9/2019)  No recent use per patient report.        SVT (supraventricular tachycardia) ([address] (7/21/2019)  Suspect initial rhythm may have been atrial flutter with 2:1 conduction versus SVT. He currently is in sinus rhythm but did have an episode of atrial fibrillation. His blood pressure is low and I doubt he will tolerate beta blocker therapy. We'll start amiodarone given his multiple medical issues and metastatic cancer as I'm concerned about recurrent arrythmias as he requires treatment. We'll monitor on telemetry. Check thyroid panel.                 KRIS Pittman  7/21/2019  11:30 AM

## 2019-07-21 NOTE — ED TRIAGE NOTES
Patient arrives via EMS. Patient calls out for abdominal pain that hurts right above umbilical. EMS placed patient on monitor and was NSR. Patient went into narrow complex tachycardia at 190. Patient had 3 episodes in route. Patient was converting on his own. Given 500 ml of saline in route. Denies n/v. Patient complained of SOB that comes and goes. Patient states he could \"feel his body changing when it was happening\" BP 96/64. . Temp 97.7.

## 2019-07-21 NOTE — ED NOTES
TRANSFER - OUT REPORT:    Verbal report given to [facility], (name) on [facility]  being transferred to [facility](unit) for routine progression of care       Report consisted of patients Situation, Background, Assessment and   Recommendations(SBAR). Information from the following report(s) ED Summary was reviewed with the receiving nurse. Lines:   Peripheral IV 07/21/19 Left Antecubital (Active)   Site Assessment Clean, dry, & intact 7/21/2019  7:14 AM   Phlebitis Assessment 0 7/21/2019  7:14 AM   Infiltration Assessment 0 7/21/2019  7:14 AM   Dressing Status Clean, dry, & intact 7/21/2019  7:14 AM   Dressing Type 4 X 4 7/21/2019  7:14 AM       Peripheral IV 07/21/19 Right Antecubital (Active)        Opportunity for questions and clarification was provided. Patient transported with:   Mackenzie Markham, [address] [place] [address].

## 2019-07-21 NOTE — ED PROVIDER NOTES
58-year-old gentleman presents with concerns about nausea and epigastric abdominal pain that has gradually gotten worse over the last several hours. Patient has a history of pancreatic cancer for which he has not yet started treatment. EMS arrived and initially his heart rate was normal.  However they observed 2 or 3 runs of a narrow complex tachycardia,. Patient says he has no known history of tachycardia or heart problems. He says his pain is a 10 out of 10 goes from front to back. He said he does not feel when his heart goes fast.    Patient was discharged from the hospital a few days ago after being diagnosed with a pancreatic mass, a lung mass, and mediastinal lymphadenopathy. It was suspected that he has a stage IV metastatic cancer. He also developed a pulmonary embolism and ended up being placed on Xarelto. He said he was able to get the prescription filled and has been taking it. He does have a history of cocaine and opioid use. Patient says that he has not had any cocaine since discharge. Elements of this note were created using speech recognition software. As such, errors of speech recognition may be present. Past Medical History:   Diagnosis Date    Arthritis     Other ill-defined conditions(799.89)     chronic L leg pain       Past Surgical History:   Procedure Laterality Date    HX ORTHOPAEDIC           No family history on file.     Social History     Socioeconomic History    Marital status: SINGLE     Spouse name: Not on file    Number of children: Not on file    Years of education: Not on file    Highest education level: Not on file   Occupational History    Not on file   Social Needs    Financial resource strain: Not on file    Food insecurity:     Worry: Not on file     Inability: Not on file    Transportation needs:     Medical: Not on file     Non-medical: Not on file   Tobacco Use    Smoking status: Current Every Day Smoker     Packs/day: 1.00 Substance and Sexual Activity    Alcohol use: Yes     Comment: occasionally    Drug use: No    Sexual activity: Not on file   Lifestyle    Physical activity:     Days per week: Not on file     Minutes per session: Not on file    Stress: Not on file   Relationships    Social connections:     Talks on phone: Not on file     Gets together: Not on file     Attends Anabaptist service: Not on file     Active member of club or organization: Not on file     Attends meetings of clubs or organizations: Not on file     Relationship status: Not on file    Intimate partner violence:     Fear of current or ex partner: Not on file     Emotionally abused: Not on file     Physically abused: Not on file     Forced sexual activity: Not on file   Other Topics Concern    Not on file   Social History Narrative    Not on file         ALLERGIES: Patient has no known allergies. Review of Systems   Constitutional: Positive for fatigue. Negative for chills, diaphoresis and fever. HENT: Negative for congestion, rhinorrhea and sore throat. Eyes: Negative for redness and visual disturbance. Respiratory: Negative for cough, chest tightness, shortness of breath and wheezing. Cardiovascular: Negative for chest pain and palpitations. Gastrointestinal: Positive for abdominal pain and nausea. Negative for blood in stool, diarrhea and vomiting. Endocrine: Negative for polydipsia and polyuria. Genitourinary: Negative for dysuria and hematuria. Musculoskeletal: Negative for arthralgias, myalgias and neck stiffness. Skin: Negative for rash. Allergic/Immunologic: Negative for environmental allergies and food allergies. Neurological: Negative for dizziness, weakness and headaches. Hematological: Negative for adenopathy. Does not bruise/bleed easily. Psychiatric/Behavioral: Negative for confusion and sleep disturbance. The patient is not nervous/anxious. There were no vitals filed for this visit. Physical Exam   Constitutional: He is oriented to person, place, and time. He appears well-developed and well-nourished. Thin mildly cachectic gentleman   HENT:   Head: Normocephalic and atraumatic. Eyes: Pupils are equal, round, and reactive to light. Conjunctivae and EOM are normal.   Neck: Normal range of motion. Cardiovascular: Normal rate and regular rhythm. Pulmonary/Chest: Effort normal and breath sounds normal. No respiratory distress. He has no wheezes. He has no rales. He exhibits no tenderness. Abdominal: Soft. Bowel sounds are normal. There is tenderness. There is no rebound and no guarding. Mild diffuse tenderness with no rebound or guarding   Musculoskeletal: Normal range of motion. He exhibits no edema or tenderness. Lymphadenopathy:     He has no cervical adenopathy. Neurological: He is alert and oriented to person, place, and time. Skin: Skin is warm and dry. Psychiatric: He has a normal mood and affect. Nursing note and vitals reviewed. MDM  Number of Diagnoses or Management Options  Bilateral pulmonary infiltrates on chest x-ray:   Paroxysmal SVT (supraventricular tachycardia) [facility]):   Diagnosis management comments: We will treat his symptoms with some pain medicine and nausea medicine. I will give him a bolus of fluids. He did have a run of SVT that we were able to capture. He did spontaneously convert. 8:20 AM  Patient's chest x-ray reveals bilateral infiltrates. I will add blood cultures and lactic acid and start him on Rocephin and Zithromax. I spoke with the hospitalist who kindly agreed to see the patient. 8:57 AM  Patient's heart rate sped up again and this time he stayed in an A. Fib with RVR. Therefore I will give him 20 of Cardizem.          Procedures

## 2019-07-22 NOTE — PROGRESS NOTES
EDGARDSW visited briefly with pt this am.  He is known to CM from previous admission. He lives with a nephew and was referred to [facility] when last discharged and will confirm a follow up appt for pt following this discharge. Pt has been seen by [facility] and is pending for  SSD/SSI/medicaid benefits. Pt awaits other medical consults regarding his care planning. CM will follow pt care and assist further as needs are known.   Care Management Interventions  PCP Verified by CM: (previously referred to [facility])  Mode of Transport at Discharge: (family)  Transition of Care Consult (CM Consult): Discharge Planning(Pt has been seen by [facility] for fianancial assistance and is pending for medicaid eligibility/disability.)  Discharge Durable Medical Equipment: No(Pt has private pay arrangements with [employer] for home O2.)  Physical Therapy Consult: No  Occupational Therapy Consult: No  Speech Therapy Consult: No  Current Support Network: Relative's Home(Pt lives with his nephew and hs other supportive extended family.  )  Plan discussed with Pt/Family/Caregiver: Yes  Freedom of Choice Offered: Yes  [place] Resource Information Provided?: No  Discharge Location  Discharge Placement: Home(Await recommendations for any follow up supportive care needs.  )

## 2019-07-22 NOTE — PROGRESS NOTES
Bedside and Verbal shift change report given to self (oncoming nurse) by Shakeel Villagomez RN (offgoing nurse). Report included the following information SBAR, Kardex, Intake/Output, MAR and Recent Results.

## 2019-07-22 NOTE — PROGRESS NOTES
Hospitalist Progress Note     Admit Date:  2019  7:03 AM   Name:  Ilene Torres   Age:  53 y.o.  :  1965   MRN:  [id number]   PCP:  Unknown, Provider  Treatment Team: Attending Provider: Katelyn Clemente MD; Consulting Provider: Margo Watts MD; Primary Nurse: Cinthia Dewey; Care Manager: Brian Garcia    Subjective:   HPI and or CC:  Pt is a 54 y/o M with recent diagnosis of metastatic pancreatic cancer, PE on xarelto, discharged from hospital earlier this month, who presented back to ER with complaints of nausea and sharp epigastric pain 10/10. Symptoms have been present about 1 day, worsened this morning. Has also complained of SOB. No cough. In ER noted to have several runs of SVT and at one point went into afib RVR. He went back into NSR. He says at one point he during an episode he felt like he was passing out, lightheaded. Had similar episode at home. No CP, palpitations, chills, urinary changes, diarrhea, rashes or wounds    : Patient alert and oriented x3. States feeling better and pain under control. No complaints of N/V. NSR on monitor at present. Palliative care to see patient today given his cancer diagnosis. Would be a good hospice candidate. Hemoglobin 9.4, no active signs of bleeding. I will also consult oncology.            Objective:     Patient Vitals for the past 24 hrs:   Temp Pulse Resp BP SpO2   19 0833 98.3 °F (36.8 °C) 94 18 105/72 93 %   19 0436 97.4 °F (36.3 °C) 67 18 108/73 97 %   19 0056 99 °F (37.2 °C) 84 18 92/66 95 %   19 2115 99 °F (37.2 °C) 92 18 117/74 93 %   19 1649 99 °F (37.2 °C) 90 18 111/77 93 %   19 1135 97.8 °F (36.6 °C) 84 18 110/76 96 %   19 1114  86 15     19 1050 97.5 °F (36.4 °C) 85 12 108/79    19 1048  86 13     19 1044  86 13     19 1008  87 17       Oxygen Therapy  O2 Sat (%): 93 % (19 0833)  Pulse via Oximetry: 93 beats per minute (07/21/19 0817)  O2 Device: Room air (07/22/19 0691)    Intake/Output Summary (Last 24 hours) at 7/22/2019 0947  Last data filed at 7/22/2019 0856  Gross per 24 hour   Intake 4477.67 ml   Output 850 ml   Net 3627.67 ml         REVIEW OF SYSTEMS: Comprehensive ROS performed and negative except as stated in HPI. Physical Examination:  General:         Cachectic. Alert. Eyes:               Normal sclera. Extraocular movements intact. ENT:                Normocephalic, atraumatic. Moist mucous membranes  CV:                  RRR. No m/r/g. Lungs:             CTAB. No wheezing, rhonchi, or rales. Abdomen:        Soft, nontender, nondistended. Extremities:     Warm and dry. No cyanosis or edema. Neurologic:      CN II-XII grossly intact. Sensation intact. Skin:                No rashes or jaundice. Normal coloration  Psych:             Normal mood and affect. Data Review:  I have reviewed all labs, meds, telemetry events, and studies from the last 24 hours.     Recent Results (from the past 24 hour(s))   CBC W/O DIFF    Collection Time: 07/22/19  4:06 AM   Result Value Ref Range    WBC 8.3 4.3 - 11.1 K/uL    RBC 3.20 (L) 4.23 - 5.6 M/uL    HGB 9.4 (L) 13.6 - 17.2 g/dL    HCT 28.8 (L) 41.1 - 50.3 %    MCV 90.0 79.6 - 97.8 FL    MCH 29.4 26.1 - 32.9 PG    MCHC 32.6 31.4 - 35.0 g/dL    RDW 11.2 (L) 11.9 - 14.6 %    PLATELET 490 690 - 218 K/uL    MPV 9.4 9.4 - 12.3 FL    ABSOLUTE NRBC 0.00 0.0 - 0.2 K/uL   METABOLIC PANEL, BASIC    Collection Time: 07/22/19  4:06 AM   Result Value Ref Range    Sodium 136 136 - 145 mmol/L    Potassium 3.7 3.5 - 5.1 mmol/L    Chloride 102 98 - 107 mmol/L    CO2 25 21 - 32 mmol/L    Anion gap 9 7 - 16 mmol/L    Glucose 96 65 - 100 mg/dL    BUN 12 6 - 23 MG/DL    Creatinine 0.65 (L) 0.8 - 1.5 MG/DL    GFR est AA >60 >60 ml/min/1.73m2    GFR est non-AA >60 >60 ml/min/1.73m2    Calcium 7.9 (L) 8.3 - 10.4 MG/DL   PHOSPHORUS    Collection Time: 07/22/19  4:06 AM Result Value Ref Range    Phosphorus 3.0 2.5 - 4.5 MG/DL        All Micro Results     Procedure Component Value Units Date/Time    CULTURE, BLOOD [[id number]] Collected:  07/21/19 0832    Order Status:  Completed Specimen:  Blood Updated:  07/21/19 0922    CULTURE, BLOOD [[id number]] Collected:  07/21/19 0832    Order Status:  Completed Specimen:  Blood Updated:  07/21/19 0922          Current Meds:  Current Facility-Administered Medications   Medication Dose Route Frequency    azithromycin (ZITHROMAX) tablet 500 mg  500 mg Oral DAILY    LORazepam (ATIVAN) tablet 0.5 mg  0.5 mg Oral Q6H PRN    sodium chloride (NS) flush 5-40 mL  5-40 mL IntraVENous PRN    rivaroxaban (XARELTO) tablet 15 mg  15 mg Oral BID WITH MEALS    sodium chloride (NS) flush 5-40 mL  5-40 mL IntraVENous Q8H    sodium chloride (NS) flush 5-40 mL  5-40 mL IntraVENous PRN    cefTRIAXone (ROCEPHIN) 1 g in 0.9% sodium chloride (MBP/ADV) 50 mL  1 g IntraVENous Q24H    acetaminophen (TYLENOL) tablet 650 mg  650 mg Oral Q4H PRN    HYDROcodone-acetaminophen (NORCO) 5-325 mg per tablet 1 Tab  1 Tab Oral Q4H PRN    naloxone (NARCAN) injection 0.4 mg  0.4 mg IntraVENous PRN    albuterol (PROVENTIL VENTOLIN) nebulizer solution 2.5 mg  2.5 mg Nebulization Q4H PRN    zolpidem (AMBIEN) tablet 5 mg  5 mg Oral QHS PRN    senna-docusate (PERICOLACE) 8.6-50 mg per tablet 2 Tab  2 Tab Oral BID PRN    guaiFENesin ER (MUCINEX) tablet 1,200 mg  1,200 mg Oral Q12H    hydrALAZINE (APRESOLINE) 20 mg/mL injection 20 mg  20 mg IntraVENous Q4H PRN    HYDROcodone-acetaminophen (NORCO)  mg tablet 1 Tab  1 Tab Oral Q4H PRN    promethazine (PHENERGAN) with saline injection 25 mg  25 mg IntraVENous Q6H PRN    0.9% sodium chloride infusion  100 mL/hr IntraVENous CONTINUOUS    amiodarone (CORDARONE) tablet 200 mg  200 mg Oral Q12H    famotidine (PEPCID) tablet 40 mg  40 mg Oral DAILY    ondansetron (ZOFRAN ODT) tablet 8 mg  8 mg Oral TID    polyethylene glycol (MIRALAX) packet 17 g  17 g Oral DAILY    morphine CR (MS CONTIN) tablet 15 mg  15 mg Oral Q12H       Diet:  DIET REGULAR    Other Studies (last 24 hours):  No results found. Assessment and Plan:     Hospital Problems as of 7/22/2019 Date Reviewed: 7/10/2019          Codes Class Noted - Resolved POA    Pneumonia ICD-10-CM: J18.9  ICD-9-CM: 811  7/21/2019 - Present Yes        SVT (supraventricular tachycardia) (HCC) ICD-10-CM: I47.1  ICD-9-CM: 427.89  7/21/2019 - Present Yes        * (Principal) Atrial fibrillation with RVR ([facility] [address] ICD-10-CM: I48.91  ICD-9-CM: 427.31  7/21/2019 - Present Yes        Emphysema lung (HCC) (Chronic) ICD-10-CM: J43.9  ICD-9-CM: 492.8  7/12/2019 - Present Yes        Protein malnutrition (HCC) (Chronic) ICD-10-CM: E46  ICD-9-CM: 260  7/9/2019 - Present Yes        Cocaine abuse (HCC) (Chronic) ICD-10-CM: F14.10  ICD-9-CM: 305.60  7/9/2019 - Present Yes        Pulmonary emboli ([facility] ICD-10-CM: I26.99  ICD-9-CM: 415.19  7/8/2019 - Present Yes        Primary pancreatic cancer with metastasis to other site [facility]) ICD-10-CM: C25.9  ICD-9-CM: 157.9  7/8/2019 - Present Yes              A/P:    AFib RVR, SVT  -inpatient  -appreciate cardio help  -amiodarone loading now. Probably couldn't tolerate BB or CCB due to low normal BP. Also has hx cocaine abuse though reportedly has stopped  -already on xarelto  -cont tele  -check TSH, fT4     ?PNA    -based on CXR with infiltrates. does not have typical symptoms  -rocephin/azithro, mucinex, PRN nebs   -check BNP. Recent echo ordered for PE wnl  -Follow cultures     Pancreatic cancer  -is supposed to have EBUS by pulm as outpatient  -also supposed to see oncology as outpt  -per previous notes, may have lung cancer and pancreatic cancer ie two separate primaries     Palliative care  -need to focus on symptom management  -scheduled zofran.   QTc ok on EKG  -MS contin low dose to start, 15mg BID  -PRN norco.    -bowel regimen  -Probably will need a prescription of narcotics and antiemetics at discharge to avoid bouncing back.  hx drug use irrelevant here; his symptoms are real     PE  -xafabiola    Signed:  DENNIS Rodriguez

## 2019-07-22 NOTE — PROGRESS NOTES
[facility] CARDIOLOGY PROGRESS NOTE           7/22/2019 7:50 AM    Admit Date: 7/21/2019      Subjective:   Patient notes epigastric discomfort. Eating breakfast.  No chest pain. No recurrent arrhythmias. ROS:  Cardiovascular:  As noted above    Objective:      Vitals:    07/21/19 1649 07/21/19 2115 07/22/19 0056 07/22/19 0436   BP: 111/77 117/74 92/66 108/73   Pulse: 90 92 84 67   Resp: 18 18 18 18   Temp: 99 °F (37.2 °C) 99 °F (37.2 °C) 99 °F (37.2 °C) 97.4 °F (36.3 °C)   SpO2: 93% 93% 95% 97%   Weight:    67.8 kg (149 lb 6.4 oz)   Height:           Physical Exam:  General-No Acute Distress  Neck- supple, no JVD  CV- regular rate and rhythm no MRG  Lung- clear bilaterally  Abd- soft, nontender, nondistended  Ext- no edema bilaterally. Skin- warm and dry      Data Review:   Recent Labs     07/22/19  0406 07/21/19  0709    136   K 3.7 3.8   MG  --  2.0   BUN 12 21   CREA 0.65* 0.92   GLU 96 105*   WBC 8.3 8.9   HGB 9.4* 11.1*   HCT 28.8* 33.4*    200      No results found for: Bernardine PeerAMINATA    Assessment/Plan:     Principal Problem:    Atrial fibrillation with RVR ([address] (7/21/2019)   Started on amiodarone and in sinus rhythm. ON Xarelto. Active Problems:    Pulmonary emboli ([address] (7/8/2019)    ON Xarelto. Primary pancreatic cancer with metastasis to other site [facility]) (7/8/2019)    Defer management to primary team.        Cocaine abuse ([address] (7/9/2019)    Cessation discussed. Emphysema lung ([address] (7/12/2019)    STable. Pneumonia (7/21/2019)    On Rocephin and azithromycin      SVT (supraventricular tachycardia) ([address] (7/21/2019)    Amiodarone loading. Continue telemetry.                    Eduard Ybarra MD  7/22/2019 7:50 AM

## 2019-07-22 NOTE — PROGRESS NOTES
Problem: Falls - Risk of  Goal: *Absence of Falls  Description  Document Sergio Erickson Fall Risk and appropriate interventions in the flowsheet.   Outcome: Progressing Towards Goal  Note:   Fall Risk Interventions:            Medication Interventions: Patient to call before getting OOB, Teach patient to arise slowly                   Problem: Patient Education: Go to Patient Education Activity  Goal: Patient/Family Education  Outcome: Progressing Towards Goal     Problem: Nutrition Deficit  Goal: *Optimize nutritional status  Outcome: Progressing Towards Goal

## 2019-07-22 NOTE — CONSULTS
Palliative Care    Patient: Frank Mills MRN: [id number]  SSN: [id number]    YOB: 1965  Age: 53 y.o. Sex: male      Date of Request: 7/22/2019  Date of Consult:  7/22/2019  Reason for Consult:  advanced care plan planning/end of life and pain and symptom management  Requesting Physician: Dr. Teagan Ingram     Assessment/Plan:     Principal Diagnosis:    Pain, abdomen  R10.9    Additional Diagnoses:   · Advance Care Planning Counseling Z71.89  · Cachexia  R64  · Debility, Unspecified  R53.81  · Frailty  R54  · Encounter for Palliative Care  Z51.5    Palliative Performance Scale (PPS):   60%    Medical Decision Making:   Reviewed and summarized labs and imaging. We discussed current medical conditions and pt understanding of his disease. Pt states he knows he has pancreatic cancer but is unsure regarding the staging. He knows we found other lesions that are concerning for metastatic disease. He states he has been unable to follow up with his oncologist due to readmission to hospital.  He states he is worried because he feels weaker. Pt stated he was going through some spiritual reconciliation with a friend who was present as well. We discussed his wishes. Pt stated that should he be unable to speak for himself then his daughter would be his decision maker. Pt states he would want cpr should he require it but would not want to be intubated. I discussed that these 2 events happen together often and pt stated if was unable to breath without a tube down his throat then let me pass. I have place the order for DNI. Will continue to follow and have discussions regarding goals of care.       Will discuss findings with members of the interdisciplinary team.      Thank you for this referral.         Subjective:     History obtained from:  Patient and Chart    Chief Complaint: nausea  History of Present Illness:  Pt is a 54 y/o M with recent diagnosis of metastatic pancreatic cancer, PE on xarelto, discharged from hospital earlier this month, who presented back to ER with complaints of nausea and sharp epigastric pain 10/10. Symptoms have been present about 1 day, worsened this morning. Has also complained of SOB. No cough. In ER noted to have several runs of SVT and at one point went into afib RVR. He went back into NSR. He says at one point he during an episode he felt like he was passing out, lightheaded. Had similar episode at home. No CP, palpitations, chills, urinary changes, diarrhea, rashes or wounds    Abdominal pain and nausea improving. Pt now tolerating po intake without complication. Advance Directive: No       Code Status:  Full Code            Health Care Power of : No - Patient does not have a [address]. Past Medical History:   Diagnosis Date    Arthritis     Other ill-defined conditions(799.89)     chronic L leg pain      Past Surgical History:   Procedure Laterality Date    HX ORTHOPAEDIC       No family history on file. Social History     Tobacco Use    Smoking status: Current Every Day Smoker     Packs/day: 1.00   Substance Use Topics    Alcohol use: Yes     Comment: occasionally     Prior to Admission medications    Medication Sig Start Date End Date Taking? Authorizing Provider   rivaroxaban (XARELTO) 15 mg tab tablet Take 1 Tab by mouth two (2) times daily (with meals) for 20 days. 7/13/19 8/2/19 Yes Trell Son MD   famotidine (PEPCID) 40 mg tablet Take 1 Tab by mouth daily for 30 days. 7/13/19 8/12/19 Yes Trell Son MD   rivaroxaban (XARELTO) 20 mg tab tablet Take 1 Tab by mouth daily (with breakfast) for 30 days.  8/3/19 9/2/19  Trell Son MD       No Known Allergies     Review of systems negative with exception of noted above     Objective:     Visit Vitals  /74   Pulse 93   Temp 97.8 °F (36.6 °C)   Resp 18   Ht 6' 5\" (1.956 m)   Wt 149 lb 6.4 oz (67.8 kg)   SpO2 93%   BMI 17.72 kg/m²        Physical Exam:    General:  Cooperative. No acute distress. cachexia   Eyes:  Conjunctivae/corneas clear    Nose: Nares normal. Septum midline. Neck: Supple, symmetrical, trachea midline, no JVD   Lungs:   Clear to auscultation bilaterally, unlabored   Heart:  Regular rate and rhythm, no murmur    Abdomen:   Soft, non-tender, non-distended. Positive bowel sounds   Extremities: Normal, atraumatic, no cyanosis or edema   Skin: Skin color, texture, turgor normal. No rash or lesions.    Neurologic: Nonfocal   Psych: Alert and oriented      Assessment:     Hospital Problems  Date Reviewed: 7/10/2019          Codes Class Noted POA    Pneumonia ICD-10-CM: J18.9  ICD-9-CM: 264  7/21/2019 Yes        SVT (supraventricular tachycardia) (HCC) ICD-10-CM: I47.1  ICD-9-CM: 427.89  7/21/2019 Yes        * (Principal) Atrial fibrillation with RVR ([facility] [address] ICD-10-CM: I48.91  ICD-9-CM: 427.31  7/21/2019 Yes        Emphysema lung (HCC) (Chronic) ICD-10-CM: J43.9  ICD-9-CM: 492.8  7/12/2019 Yes        Protein malnutrition ([facility] [facility] (Chronic) ICD-10-CM: E46  ICD-9-CM: 748  7/9/2019 Yes        Cocaine abuse (HCC) (Chronic) ICD-10-CM: F14.10  ICD-9-CM: 305.60  7/9/2019 Yes        Pulmonary emboli ([facility] [address] [facility] ICD-10-CM: I26.99  ICD-9-CM: 415.19  7/8/2019 Yes        Primary pancreatic cancer with metastasis to other site [facility]) ICD-10-CM: C25.9  ICD-9-CM: 157.9  7/8/2019 Yes              Signed By: Gaby Broderick MD     July 22, 2019

## 2019-07-23 NOTE — PROGRESS NOTES
Amio drip has been running for over four hours now, heartrate remains in the 140's. BP is slowly trending down with most recent being 98/73. Spoke with Kamila Lehman NP. Order received to continue to monitor and give amio more time and notify him if there continues to be no change in HR or a decrease in BP. Will continue to monitor.

## 2019-07-23 NOTE — PROGRESS NOTES
Hospitalist Progress Note     Admit Date:  2019  7:03 AM   Name:  Cici Engel   Age:  53 y.o.  :  1965   MRN:  [id number]   PCP:  Unknown, Provider  Treatment Team: Attending Provider: Dez Candelaria MD; Consulting Provider: Sharita Montes MD; Care Manager: Sergey Navarro; Consulting Provider: Fawn Oviedo MD; Utilization Review: Autumn Dickens RN; Consulting Provider: Jamie Saavedra MD; Primary Nurse: Eben Ingram    Subjective:   HPI - Patient is a 52 yo male  with recent hx metastatic pancreatic cancer dx, PE on xarelto, who presented ER with complaints of SOB, nausea,  and sharp epigastric pain x 1 day. In ER patient had several runs of SVT and at one point went into afib RVR. Cardiology consulting. Patient on amiodarone gtt. CXR with bilat infiltrates. Patient started on rocephin and azithromycin. Oncology consult pending. Subjective  Patient alert and oriented. On 4LNC. Does not know how many liters at home. Nursing noted patient O2 sat dropped down to 78% overnight.  Patient denies CP, cough, n/v/d. LUQ tenderness on exam.         Objective:     Patient Vitals for the past 24 hrs:   Temp Pulse Resp BP SpO2   19 0831 98.2 °F (36.8 °C) (!) 140 18 111/79 91 %   19 0459 98.8 °F (37.1 °C) (!) 105 18 119/78 92 %   19 0124 99.1 °F (37.3 °C) 97 20 100/70 98 %   19     93 %   19     92 %   19     (!) 89 %   19 99.4 °F (37.4 °C) (!) 110 21 129/78 (!) 85 %   19 1644 97.9 °F (36.6 °C) 65 18 105/71 92 %   19 1230 97.8 °F (36.6 °C) 93 18 108/74      Oxygen Therapy  O2 Sat (%): 91 % (19 0831)  Pulse via Oximetry: 104 beats per minute (19)  O2 Device: Nasal cannula (19)  O2 Flow Rate (L/min): 4 l/min (19)    Intake/Output Summary (Last 24 hours) at 2019 1054  Last data filed at 2019  Gross per 24 hour   Intake 2056 ml Output 975 ml   Net 1081 ml         REVIEW OF SYSTEMS: Comprehensive ROS performed and negative except as stated in HPI. Physical Examination:  General:         Cachectic. Alert. No distress noted. Eyes:               Normal sclera. Extraocular movements intact. ENT:                Normocephalic, atraumatic. Moist mucous membranes  CV:                  afib 140  Lungs:             CTAB. No wheezing, rhonchi, or rales. Abdomen:        Soft, LUQ tenderness on palpation  Extremities:     Warm and dry. No cyanosis or edema. Neurologic:      CN II-XII grossly intact. Sensation intact. Skin:                No rashes or jaundice. Normal coloration  Psych:             Normal mood and affect. Data Review:  I have reviewed all labs, meds, telemetry events, and studies from the last 24 hours. Recent Results (from the past 24 hour(s))   GLUCOSE, POC    Collection Time: 07/22/19  3:51 PM   Result Value Ref Range    Glucose (POC) 95 65 - 100 mg/dL   CBC WITH AUTOMATED DIFF    Collection Time: 07/23/19  4:14 AM   Result Value Ref Range    WBC 9.9 4.3 - 11.1 K/uL    RBC 3.24 (L) 4.23 - 5.6 M/uL    HGB 9.4 (L) 13.6 - 17.2 g/dL    HCT 28.6 (L) 41.1 - 50.3 %    MCV 88.3 79.6 - 97.8 FL    MCH 29.0 26.1 - 32.9 PG    MCHC 32.9 31.4 - 35.0 g/dL    RDW 11.2 (L) 11.9 - 14.6 %    PLATELET 834 912 - 235 K/uL    MPV 9.6 9.4 - 12.3 FL    ABSOLUTE NRBC 0.00 0.0 - 0.2 K/uL    DF AUTOMATED      NEUTROPHILS 78 43 - 78 %    LYMPHOCYTES 8 (L) 13 - 44 %    MONOCYTES 9 4.0 - 12.0 %    EOSINOPHILS 3 0.5 - 7.8 %    BASOPHILS 1 0.0 - 2.0 %    IMMATURE GRANULOCYTES 1 0.0 - 5.0 %    ABS. NEUTROPHILS 7.8 1.7 - 8.2 K/UL    ABS. LYMPHOCYTES 0.8 0.5 - 4.6 K/UL    ABS. MONOCYTES 0.9 0.1 - 1.3 K/UL    ABS. EOSINOPHILS 0.3 0.0 - 0.8 K/UL    ABS. BASOPHILS 0.1 0.0 - 0.2 K/UL    ABS. IMM.  GRANS. 0.1 0.0 - 0.5 K/UL   METABOLIC PANEL, BASIC    Collection Time: 07/23/19  4:14 AM   Result Value Ref Range    Sodium 133 (L) 136 - 145 mmol/L Potassium 3.7 3.5 - 5.1 mmol/L    Chloride 98 98 - 107 mmol/L    CO2 25 21 - 32 mmol/L    Anion gap 10 7 - 16 mmol/L    Glucose 98 65 - 100 mg/dL    BUN 8 6 - 23 MG/DL    Creatinine 0.76 (L) 0.8 - 1.5 MG/DL    GFR est AA >60 >60 ml/min/1.73m2    GFR est non-AA >60 >60 ml/min/1.73m2    Calcium 7.8 (L) 8.3 - 10.4 MG/DL   LIPASE    Collection Time: 07/23/19  4:14 AM   Result Value Ref Range    Lipase 1,229 (H) 73 - 393 U/L   EKG, 12 LEAD, INITIAL    Collection Time: 07/23/19  8:01 AM   Result Value Ref Range    Ventricular Rate 146 BPM    Atrial Rate 292 BPM    QRS Duration 74 ms    Q-T Interval 296 ms    QTC Calculation (Bezet) 461 ms    Calculated P Axis -83 degrees    Calculated R Axis 76 degrees    Calculated T Axis -93 degrees    Diagnosis       Atrial flutter with 2:1 A-V conduction  Nonspecific ST abnormality  Abnormal ECG  When compared with ECG of 21-JUL-2019 09:21,  Atrial flutter has replaced Atrial fibrillation  Confirmed by Bere Rock MD (), DIEUDONNE NEWBERRY ([id number]) on 7/23/2019 8:32:02 AM          All Micro Results     Procedure Component Value Units Date/Time    CULTURE, BLOOD [[id number]] Collected:  07/21/19 0832    Order Status:  Completed Specimen:  Blood Updated:  07/23/19 0641     Special Requests: --        LEFT  Antecubital       Culture result: NO GROWTH 2 DAYS       CULTURE, BLOOD [[id number]] Collected:  07/21/19 0832    Order Status:  Completed Specimen:  Blood Updated:  07/23/19 0641     Special Requests: --        RIGHT  Antecubital       Culture result: NO GROWTH 2 DAYS             Current Meds:  Current Facility-Administered Medications   Medication Dose Route Frequency    amiodarone (CORDARONE) 450 mg in dextrose 5% 250 mL infusion  1 mg/min IntraVENous CONTINUOUS    gabapentin (NEURONTIN) capsule 100 mg  100 mg Oral BID    azithromycin (ZITHROMAX) tablet 500 mg  500 mg Oral DAILY    LORazepam (ATIVAN) tablet 0.5 mg  0.5 mg Oral Q6H PRN    sodium chloride (NS) flush 5-40 mL  5-40 mL IntraVENous PRN    rivaroxaban (XARELTO) tablet 15 mg  15 mg Oral BID WITH MEALS    sodium chloride (NS) flush 5-40 mL  5-40 mL IntraVENous Q8H    sodium chloride (NS) flush 5-40 mL  5-40 mL IntraVENous PRN    cefTRIAXone (ROCEPHIN) 1 g in 0.9% sodium chloride (MBP/ADV) 50 mL  1 g IntraVENous Q24H    acetaminophen (TYLENOL) tablet 650 mg  650 mg Oral Q4H PRN    HYDROcodone-acetaminophen (NORCO) 5-325 mg per tablet 1 Tab  1 Tab Oral Q4H PRN    naloxone (NARCAN) injection 0.4 mg  0.4 mg IntraVENous PRN    albuterol (PROVENTIL VENTOLIN) nebulizer solution 2.5 mg  2.5 mg Nebulization Q4H PRN    zolpidem (AMBIEN) tablet 5 mg  5 mg Oral QHS PRN    senna-docusate (PERICOLACE) 8.6-50 mg per tablet 2 Tab  2 Tab Oral BID PRN    guaiFENesin ER (MUCINEX) tablet 1,200 mg  1,200 mg Oral Q12H    hydrALAZINE (APRESOLINE) 20 mg/mL injection 20 mg  20 mg IntraVENous Q4H PRN    HYDROcodone-acetaminophen (NORCO)  mg tablet 1 Tab  1 Tab Oral Q4H PRN    promethazine (PHENERGAN) with saline injection 25 mg  25 mg IntraVENous Q6H PRN    0.9% sodium chloride infusion  100 mL/hr IntraVENous CONTINUOUS    famotidine (PEPCID) tablet 40 mg  40 mg Oral DAILY    ondansetron (ZOFRAN ODT) tablet 8 mg  8 mg Oral TID    polyethylene glycol (MIRALAX) packet 17 g  17 g Oral DAILY    morphine CR (MS CONTIN) tablet 15 mg  15 mg Oral Q12H       Diet:  DIET REGULAR    Other Studies (last 24 hours):  No results found.     Assessment and Plan:     Hospital Problems as of 7/23/2019 Date Reviewed: 7/10/2019          Codes Class Noted - Resolved POA    Pneumonia ICD-10-CM: J18.9  ICD-9-CM: 994  7/21/2019 - Present Yes        SVT (supraventricular tachycardia) (HCC) ICD-10-CM: I47.1  ICD-9-CM: 427.89  7/21/2019 - Present Yes        * (Principal) Atrial fibrillation with RVR (HCC) ICD-10-CM: I48.91  ICD-9-CM: 427.31  7/21/2019 - Present Yes        Emphysema lung ([facility] [address] (Chronic) ICD-10-CM: J43.9  ICD-9-CM: 492.8  7/12/2019 - Present Yes Protein malnutrition ([facility] [place] [address] (Chronic) ICD-10-CM: E46  ICD-9-CM: 260  7/9/2019 - Present Yes        Cocaine abuse (HCC) (Chronic) ICD-10-CM: F14.10  ICD-9-CM: 305.60  7/9/2019 - Present Yes        Pulmonary emboli (HCC) ICD-10-CM: I26.99  ICD-9-CM: 415.19  7/8/2019 - Present Yes        Primary pancreatic cancer with metastasis to other site [facility]) ICD-10-CM: C25.9  ICD-9-CM: 157.9  7/8/2019 - Present Yes              A/P:    AFib RVR  -Telemetry  -cardiology consulting  -continue amiodarone gtt   -EKG this am  -already on xarelto       PNA    -based on CXR with infiltrates  -rocephin/azithro, mucinex, PRN nebs  -Follow cultures  -supplemental oxygen     Pancreatic cancer  -is supposed to have EBUS by pulm as outpatient  -also supposed to see oncology as outpt  -per previous notes, may have lung cancer and pancreatic cancer ie two separate primaries  -oncology consult     Palliative care  -need to focus on symptom management  -scheduled zofran. QTc ok on EKG  -MS contin low dose to start, 15mg BID  -PRN norco.    -bowel regimen  -Probably will need a prescription of narcotics and antiemetics at discharge to avoid bouncing back.  hx drug use irrelevant here; his symptoms are real     Hx PE  -xarelto    Discharge dispo - TBD  DVT ppx - xarelto       Plan of care discussed with Dr. Sherrill Rice,      Signed:  Viviana RUSSELL

## 2019-07-23 NOTE — PROGRESS NOTES
Spoke with Ron Mckeon NP heart rate 130's, most recent /64. Per NP keep amio drip at 1mg/min for a total of 12 hours and then turn down to 0.5mg/min. Nursing misc order placed.

## 2019-07-23 NOTE — HOSPICE
Met with patient and family member in room. Briefly discussed hospice philosophy, levels of care, management of symptoms and medications. Patient stated he needed to have more time to talk with his family regarding new cancer diagnosis. Brochure and contact numbers given to patient. CM aware of conversation. Will continue to follow.  Thank you for this referral.  Evens Ritter, RN, BSN  Community Nurse Liaison  [facility]  [phone]

## 2019-07-23 NOTE — PROGRESS NOTES
[facility] CARDIOLOGY PROGRESS NOTE           7/23/2019 7:50 AM    Admit Date: 7/21/2019      Subjective:   Patient with recurrent SVT/Atrial fibrillation this AM.  Persistent epigastric pain. ROS:  Cardiovascular:  As noted above    Objective:      Vitals:    07/22/19 2038 07/22/19 2054 07/23/19 0124 07/23/19 0459   BP:   100/70 119/78   Pulse:   97 (!) 105   Resp:   20 18   Temp:   99.1 °F (37.3 °C) 98.8 °F (37.1 °C)   SpO2: 92% 93% 98% 92%   Weight:    68.7 kg (151 lb 8 oz)   Height:           Physical Exam:  General-No Acute Distress  Neck- supple, no JVD  CV- tachycardic rate with irregular rhythm. Lung- clear bilaterally  Abd- soft, nontender, nondistended  Ext- no edema bilaterally. Skin- warm and dry      Data Review:   Recent Labs     07/23/19  0414 07/22/19  0406 07/21/19  0709   * 136 136   K 3.7 3.7 3.8   MG  --   --  2.0   BUN 8 12 21   CREA 0.76* 0.65* 0.92   GLU 98 96 105*   WBC 9.9 8.3 8.9   HGB 9.4* 9.4* 11.1*   HCT 28.6* 28.8* 33.4*    161 200      No results found for: AMINATA Cox    Assessment/Plan:     Principal Problem:    Atrial fibrillation with RVR ([facility] [address] (7/21/2019)   Recurrent. EKG this AM.  Start IV amiodarone. Active Problems:    Pulmonary emboli ([address] (7/8/2019)    ON Xarelto. Primary pancreatic cancer with metastasis to other site [facility]) (7/8/2019)    Defer management to primary team.        Cocaine abuse ([address] (7/9/2019)    Cessation discussed. Emphysema lung ([address] (7/12/2019)    STable. Pneumonia (7/21/2019)    On Rocephin and azithromycin      SVT (supraventricular tachycardia) ([address] (7/21/2019)    Amiodarone loading. Continue telemetry.      Hypokalemia:  Replete              Antoine Khan MD  7/23/2019 7:50 AM

## 2019-07-23 NOTE — CONSULTS
[facility] Hematology & Oncology        Inpatient Hematology / Oncology Consult Note    Reason for Consult:  Pneumonia [J18.9]  Pneumonia [J18.9]  Pneumonia [J18.9]  Referring Physician:  Ganga Spivey MD    History of Present Illness:  Mr. Jonas Potter is a 53 y.o. male admitted on 7/21/2019 with a primary diagnosis of pneumonia. His PMH includes chronic tobacco use and cocaine use. He was seen in consult by Dr. Keena Morse on 7/12/19. At that time, he presented with c/o progressive severe abd pain x 3 days as well as wt loss over the past several months. Bili 2.6, Lipase 6000. CT CAP revealed RLL segmental PE, mediastinal LAD, 4 cm pleurally based cavitary mass in L apex, 2cm pancreatic mass obstructing pancreatic and CBD with associated biliary duct dilation and GB distention, and gastric wall thickening. He is s/p ERCP with stent placement and FNA of pancreatic mass/periportal LAD on 7/8. Biliary brushing cytology +rare atypical cells. Path on periportal/precarinal nodes +poorly differentiated metastatic carcinoma. IHC non-specific as to origin, however a primary tumor in upper GI or pancreatobiliary tree cannot be excluded. During that consult, EBUS with possible navigational biopsy to the lung was recommended as there may be a possible 2nd primary. He returned on 7/21/19 to ED with c/o nausea and sharp epigastric pain x 1 day as well as shortness of breath. In ED, he was having runs of SVT and Afib with RVR. On amio. CXR with developing diffuse alveolar infiltrates bilaterally. On Rocephin/Azith. Palliative care following. We were consulted for recommendations for our patient. Review of Systems:  Constitutional +weight loss +fatigue. Denies fever, chills, night sweats. HEENT Denies trauma, blurry vision, hearing loss, ear pain, nosebleeds, sore throat, neck pain and ear discharge. Skin Denies lesions or rashes. Lungs +dyspnea. Denies cough, sputum production or hemoptysis. Cardiovascular +palpitations. Denies chest pain or lower extremity edema. Gastrointestinal +nausea +abd pain. Denies vomiting, changes in bowel habits, bloody or black stools    Denies dysuria, frequency or hesitancy of urination. Neuro Denies headaches, visual changes or ataxia. Denies dizziness, tingling, tremors, sensory change, speech change, focal weakness or headaches. Hematology Denies easy bruising or bleeding, denies gingival bleeding or epistaxis. Endo Denies heat/cold intolerance, denies diabetes or thyroid abnormalities. MSK Denies back pain, arthralgias, myalgias or frequent falls. Psychiatric/Behavioral +tobacco use  +hx cocaine use. Denies depression. The patient is not nervous/anxious. No Known Allergies  Past Medical History:   Diagnosis Date    Arthritis     Other ill-defined conditions(269.89)     chronic L leg pain     Past Surgical History:   Procedure Laterality Date    HX ORTHOPAEDIC       No family history on file.   Social History     Socioeconomic History    Marital status: SINGLE     Spouse name: Not on file    Number of children: Not on file    Years of education: Not on file    Highest education level: Not on file   Occupational History    Not on file   Social Needs    Financial resource strain: Not on file    Food insecurity:     Worry: Not on file     Inability: Not on file    Transportation needs:     Medical: Not on file     Non-medical: Not on file   Tobacco Use    Smoking status: Current Every Day Smoker     Packs/day: 1.00   Substance and Sexual Activity    Alcohol use: Yes     Comment: occasionally    Drug use: No    Sexual activity: Not on file   Lifestyle    Physical activity:     Days per week: Not on file     Minutes per session: Not on file    Stress: Not on file   Relationships    Social connections:     Talks on phone: Not on file     Gets together: Not on file     Attends Nondenominational service: Not on file     Active member of club or organization: Not on file     Attends meetings of clubs or organizations: Not on file     Relationship status: Not on file    Intimate partner violence:     Fear of current or ex partner: Not on file     Emotionally abused: Not on file     Physically abused: Not on file     Forced sexual activity: Not on file   Other Topics Concern    Not on file   Social History Narrative    Not on file     Current Facility-Administered Medications   Medication Dose Route Frequency Provider Last Rate Last Dose    amiodarone (CORDARONE) 450 mg in dextrose 5% 250 mL infusion  1 mg/min IntraVENous CONTINUOUS Cecily Diggs MD 33.3 mL/hr at 07/23/19 0835 1 mg/min at 07/23/19 0835    gabapentin (NEURONTIN) capsule 100 mg  100 mg Oral BID Fernanda Pena MD        [facility]) tablet 500 mg  500 mg Oral DAILY Maday Mcrae MD   500 mg at 07/23/19 2895    LORazepam (ATIVAN) tablet 0.5 mg  0.5 mg Oral Q6H PRN Maday Mcrae MD        sodium chloride (NS) flush 5-40 mL  5-40 mL IntraVENous PRN Stevenson Ramirez MD        rivaroxaban Kourtney Marte) tablet 15 mg  15 mg Oral BID WITH MEALS Maday Mcrae MD   15 mg at 07/23/19 8381    sodium chloride (NS) flush 5-40 mL  5-40 mL IntraVENous Nando Haines MD   10 mL at 07/22/19 2126    sodium chloride (NS) flush 5-40 mL  5-40 mL IntraVENous PRN Maday Mcrae MD        cefTRIAXone (ROCEPHIN) 1 g in 0.9% sodium chloride (MBP/ADV) 50 mL  1 g IntraVENous Q24H Maday Mcrae  mL/hr at 07/23/19 0543 1 g at 07/23/19 0543    acetaminophen (TYLENOL) tablet 650 mg  650 mg Oral Q4H PRN Maday Mcrae MD        HYDROcodone-acetaminophen [facility]) 5-325 mg per tablet 1 Tab  1 Tab Oral Q4H PRN Maday Mcrae MD        naloxone [facility]) injection 0.4 mg  0.4 mg IntraVENous PRN Maday Mcrae MD        albuterol (PROVENTIL VENTOLIN) nebulizer solution 2.5 mg  2.5 mg Nebulization Q4H PRN Ryan Wills Vanesa Ontiveros MD   2.5 mg at 19    zolpidem (AMBIEN) tablet 5 mg  5 mg Oral QHS PRN Martine Gudino MD        senna-docusate (PERICOLACE) 8.6-50 mg per tablet 2 Tab  2 Tab Oral BID PRN Martine Gudino MD        guaiFENesin ER [facility]) tablet 1,200 mg  1,200 mg Oral Q12H Martine Gudino MD   1,200 mg at 19 9152    hydrALAZINE (APRESOLINE) 20 mg/mL injection 20 mg  20 mg IntraVENous Q4H PRN Martine Gudino MD        HYDROcodone-acetaminophen [facility])  mg tablet 1 Tab  1 Tab Oral Q4H PRN Martine Gudino MD   1 Tab at 19 1012    promethazine (PHENERGAN) with saline injection 25 mg  25 mg IntraVENous Q6H PRN Martine Gudino MD        0.9% sodium chloride infusion  100 mL/hr IntraVENous CONTINUOUS Martine Gudino  mL/hr at 19 1321 100 mL/hr at 19 1321    famotidine (PEPCID) tablet 40 mg  40 mg Oral DAILY Martine Gudino MD   40 mg at 19 1214    ondansetron (ZOFRAN ODT) tablet 8 mg  8 mg Oral TID Martine Gudino MD   8 mg at 19 4264    polyethylene glycol (MIRALAX) packet 17 g  17 g Oral DAILY Martine Gudino MD   17 g at 19 0807    morphine CR (MS CONTIN) tablet 15 mg  15 mg Oral Q12H Martine Gudino MD   15 mg at 19 1642       OBJECTIVE:  Patient Vitals for the past 8 hrs:   BP Temp Pulse Resp SpO2 Weight   19 0831 111/79 98.2 °F (36.8 °C) (!) 140 18 91 %    19 0459 119/78 98.8 °F (37.1 °C) (!) 105 18 92 % 151 lb 8 oz (68.7 kg)     Temp (24hrs), Av.5 °F (36.9 °C), Min:97.8 °F (36.6 °C), Max:99.4 °F (37.4 °C)    701 - 1900  In: 61 [P.O.:60]  Out: -     Physical Exam:  Constitutional: Cachetic male in no acute distress, sitting comfortably in the hospital bed. HEENT: Normocephalic and atraumatic. Oropharynx is clear, mucous membranes are moist.  Extraocular muscles are intact. Sclerae anicteric. Neck supple without JVD. No thyromegaly present. Skin Warm and dry. No bruising and no rash noted. No erythema. No pallor. Respiratory Lungs are clear to auscultation bilaterally without wheezes, rales or rhonchi, normal air exchange without accessory muscle use. CVS Tachycardic rate, irregular rhythm and normal S1 and S2. No murmurs, gallops, or rubs. Abdomen Soft, tenderness to LUQ and nondistended, normoactive bowel sounds. No palpable mass. No hepatosplenomegaly. Neuro Grossly nonfocal with no obvious sensory or motor deficits. MSK Normal range of motion in general.  No edema and no tenderness. Psych Appropriate mood and affect. Labs:    Recent Results (from the past 24 hour(s))   GLUCOSE, POC    Collection Time: 07/22/19  3:51 PM   Result Value Ref Range    Glucose (POC) 95 65 - 100 mg/dL   CBC WITH AUTOMATED DIFF    Collection Time: 07/23/19  4:14 AM   Result Value Ref Range    WBC 9.9 4.3 - 11.1 K/uL    RBC 3.24 (L) 4.23 - 5.6 M/uL    HGB 9.4 (L) 13.6 - 17.2 g/dL    HCT 28.6 (L) 41.1 - 50.3 %    MCV 88.3 79.6 - 97.8 FL    MCH 29.0 26.1 - 32.9 PG    MCHC 32.9 31.4 - 35.0 g/dL    RDW 11.2 (L) 11.9 - 14.6 %    PLATELET 469 564 - 310 K/uL    MPV 9.6 9.4 - 12.3 FL    ABSOLUTE NRBC 0.00 0.0 - 0.2 K/uL    DF AUTOMATED      NEUTROPHILS 78 43 - 78 %    LYMPHOCYTES 8 (L) 13 - 44 %    MONOCYTES 9 4.0 - 12.0 %    EOSINOPHILS 3 0.5 - 7.8 %    BASOPHILS 1 0.0 - 2.0 %    IMMATURE GRANULOCYTES 1 0.0 - 5.0 %    ABS. NEUTROPHILS 7.8 1.7 - 8.2 K/UL    ABS. LYMPHOCYTES 0.8 0.5 - 4.6 K/UL    ABS. MONOCYTES 0.9 0.1 - 1.3 K/UL    ABS. EOSINOPHILS 0.3 0.0 - 0.8 K/UL    ABS. BASOPHILS 0.1 0.0 - 0.2 K/UL    ABS. IMM.  GRANS. 0.1 0.0 - 0.5 K/UL   METABOLIC PANEL, BASIC    Collection Time: 07/23/19  4:14 AM   Result Value Ref Range    Sodium 133 (L) 136 - 145 mmol/L    Potassium 3.7 3.5 - 5.1 mmol/L    Chloride 98 98 - 107 mmol/L    CO2 25 21 - 32 mmol/L    Anion gap 10 7 - 16 mmol/L    Glucose 98 65 - 100 mg/dL    BUN 8 6 - 23 MG/DL    Creatinine 0.76 (L) 0.8 - 1.5 MG/DL    GFR est AA >60 >60 ml/min/1.73m2    GFR est non-AA >60 >60 ml/min/1.73m2    Calcium 7.8 (L) 8.3 - 10.4 MG/DL   LIPASE    Collection Time: 07/23/19  4:14 AM   Result Value Ref Range    Lipase 1,229 (H) 73 - 393 U/L   EKG, 12 LEAD, INITIAL    Collection Time: 07/23/19  8:01 AM   Result Value Ref Range    Ventricular Rate 146 BPM    Atrial Rate 292 BPM    QRS Duration 74 ms    Q-T Interval 296 ms    QTC Calculation (Bezet) 461 ms    Calculated P Axis -83 degrees    Calculated R Axis 76 degrees    Calculated T Axis -93 degrees    Diagnosis       Atrial flutter with 2:1 A-V conduction  Nonspecific ST abnormality  Abnormal ECG  When compared with ECG of 21-JUL-2019 09:21,  Atrial flutter has replaced Atrial fibrillation  Confirmed by Thuan Olivas MD (), DIEUDONNE NEWBERRY ([id number]) on 7/23/2019 8:32:02 AM         Imaging:  XR CHEST SNGL V [[id number]] Collected: 07/21/19 0800   Order Status: Completed Updated: 07/21/19 0810   Narrative:     Clinical History: The patient is a 53years year old Male presenting with  symptoms of abdominal pain, evaluate for free air    Comparison:  Chest x-ray 7/20/2019    Findings:  Frontal view of the chest was obtained. There is developing diffuse mild alveolar infiltrate bilaterally. No pleural  effusions are demonstrated.  The cardiomediastinal silhouette is within normal  limits.  There are no acute osseous abnormalities. There is no evidence of free  subdiaphragmatic air. Impression:     Impression:      Developing diffuse alveolar infiltrates bilaterally.        ASSESSMENT:  Problem List  Date Reviewed: 7/10/2019          Codes Class Noted    Pneumonia ICD-10-CM: J18.9  ICD-9-CM: 303  7/21/2019        SVT (supraventricular tachycardia) ([facility] [address] ICD-10-CM: I47.1  ICD-9-CM: 427.89  7/21/2019        * (Principal) Atrial fibrillation with RVR ([facility] [address] ICD-10-CM: I48.91  ICD-9-CM: 427.31  7/21/2019        Emphysema lung (HCC) (Chronic) ICD-10-CM: J43.9  ICD-9-CM: 492.8  7/12/2019 Protein malnutrition ([facility] [address] (Chronic) ICD-10-CM: E46  ICD-9-CM: 260  7/9/2019        Cocaine abuse (HCC) (Chronic) ICD-10-CM: F14.10  ICD-9-CM: 305.60  7/9/2019        Pancreatitis ICD-10-CM: K85.90  ICD-9-CM: 529.0  7/8/2019        Lung mass ICD-10-CM: R91.8  ICD-9-CM: 786.6  7/8/2019        Pulmonary emboli ([facility] [address] ICD-10-CM: I26.99  ICD-9-CM: 415.19  7/8/2019        Primary pancreatic cancer with metastasis to other site [facility]) ICD-10-CM: C25.9  ICD-9-CM: 157.9  7/8/2019        Mediastinal adenopathy ICD-10-CM: R59.0  ICD-9-CM: 785.6  7/8/2019                RECOMMENDATIONS:  Metastatic carcinoma of unknown origin  - Needs PET/CT as OP  - Still needs EBUS with possible navigational biopsy of the lung for tissue dx - concern for 2 primary cancers. Consult pulm. Anemia  - Check hemolysis labs, iron studies, and TSH    Lab studies and imaging studies were personally reviewed. Thank you for allowing us to participate in the care of Mr. Chel Mares. He will need to f/u with Dr. Dell Jeffries as previously scheduled on 7/31. Sukhdev Carranza NP   [facility] Hematology & Oncology  [address] [place] [address]  Office : [phone]  Fax : [phone]     Attending Addendum:  Patient seen with ESAU Dubose. Mr. Chel Mares is a 62-year-old man with poorly differentiated carcinoma. He is a patient of Dr. Pepper Ashton. Past medical history includes smoking, cocaine use (last use ~3mo ago per pt), PE on Xarelto. He was originally seen in consultation by Dr. Pepper Ashton on 7/12/2019. At that time he was admitted with abdominal pain. CT CAP showed right lower lobe segmental PE, mediastinal LAD, 4 cm cavitary mass in left apex, and 2 cm pancreatic mass. ERCP with stent placement and FNA of pancreatic mass was completed on 7/8/2019. Biliary brushings were positive for rare atypical cells. Periportal and kaleb-carinal nodes were positive for poorly differentiated metastatic carcinoma.   IHC nonspecific but felt primary tumor may be upper GI or pancreatic/biliary. At that time biopsy of lung lesion was recommended with concern for 2 primaries. He was scheduled to follow-up outpatient but returned to ED on 7/21 with nausea, epigastric pain and hypoxia with shortness of breath. He was found to be in A. fib with RVR and is currently on amiodarone drip. Currently on Rocephin and azithromycin. Today, he feels a little bit better. Heart rate remains in 130s to 140s, and SBP in the 90s. Cardiology consulted and managing. I personally performed a face to face diagnostic evaluation on this patient. My findings are as follows: A&ox3, lungs with decreased BS at base and scattered rhonchi, heart tachy, abdomen benign and no LE edema. During today's consultation we reviewed his work-up thus far and recommended pulmonary consultation for EBUS and biopsy of the lung lesion. Will further workup anemia. All questions answered to his satisfaction. I have reviewed and agree with the care plan. Thank you for the opportunity to take care of Mr. Emeka Harvey along with you.              oSnny Roth MD  [facility] Hematology and Oncology  [address] [place] [address]  Office : [phone]  Fax : [phone]

## 2019-07-23 NOTE — PROGRESS NOTES
Problem: Falls - Risk of  Goal: *Absence of Falls  Description  Document Sean Lilly Fall Risk and appropriate interventions in the flowsheet.   Outcome: Progressing Towards Goal  Note:   Fall Risk Interventions:            Medication Interventions: Patient to call before getting OOB, Teach patient to arise slowly                   Problem: Patient Education: Go to Patient Education Activity  Goal: Patient/Family Education  Outcome: Progressing Towards Goal     Problem: Nutrition Deficit  Goal: *Optimize nutritional status  Outcome: Progressing Towards Goal

## 2019-07-23 NOTE — H&P (VIEW-ONLY)
CONSULT NOTE    Melva Mckeon    7/23/2019    Date of Admission:  7/21/2019    The patient's chart is reviewed and the patient is discussed with the staff. Subjective:     Patient is a 53 y.o.  male seen and evaluated at the request of Dr. Lydia Perkins. Mr. Dorothy Sharpe is a 53 y.o. male admitted on 7/21/2019 with a primary diagnosis of pneumonia. His PMH includes chronic tobacco use and cocaine use. He was seen in consult by Dr. Johnathan Mcmahon on 7/12/19. Jose Mack that time, he presented with c/o progressive severe abd pain x 3 days as well as wt loss over the past several months.  Bili 2.6, Lipase 6000.  CT CAP revealed RLL segmental PE, mediastinal LAD, 4 cm pleurally based cavitary mass in L apex, 2cm pancreatic mass obstructing pancreatic and CBD with associated biliary duct dilation and GB distention, and gastric wall thickening.  He is s/p ERCP with stent placement and FNA of pancreatic mass/periportal LAD on 7/8.  Biliary brushing cytology +rare atypical cells.  Path on periportal/precarinal nodes +poorly differentiated metastatic carcinoma. IHC non-specific as to origin, however a primary tumor in upper GI or pancreatobiliary tree cannot be excluded. During that consult, EBUS with possible navigational biopsy to the lung was recommended as there may be a possible 2nd primary. He returned on 7/21/19 to ED with c/o nausea and sharp epigastric pain x 1 day as well as shortness of breath. In ED, he was having runs of SVT and Afib with RVR. On amio. CXR with developing diffuse alveolar infiltrates bilaterally. On Rocephin/Azith. Palliative care following. We have been consulted for consideration of EBUS and possible navigation bronchoscopy. Seen last on 7/12 by our service at the time [facility] was not completed yet. Review of Systems  A comprehensive review of systems was negative except for that written in the HPI.     Patient Active Problem List   Diagnosis Code    Pancreatitis K85.90    Lung mass R91.8    Pulmonary emboli (HCC) I26.99    Primary pancreatic cancer with metastasis to other site ([facility] C25.9    Mediastinal adenopathy R59.0    Protein malnutrition (HCC) E46    Cocaine abuse ([facility] F14.10    Emphysema lung (HCC) J43.9    Pneumonia J18.9    SVT (supraventricular tachycardia) (HCC) I47.1    Atrial fibrillation with RVR (HCC) I48.91         Prior to Admission Medications   Prescriptions Last Dose Informant Patient Reported? Taking?   famotidine (PEPCID) 40 mg tablet 7/20/2019 at Unknown time  No Yes   Sig: Take 1 Tab by mouth daily for 30 days. rivaroxaban (XARELTO) 15 mg tab tablet 7/20/2019 at Unknown time  No Yes   Sig: Take 1 Tab by mouth two (2) times daily (with meals) for 20 days. rivaroxaban (XARELTO) 20 mg tab tablet   No No   Sig: Take 1 Tab by mouth daily (with breakfast) for 30 days.       Facility-Administered Medications: None       Past Medical History:   Diagnosis Date    Arthritis     Other ill-defined conditions(799.89)     chronic L leg pain     Past Surgical History:   Procedure Laterality Date    HX ORTHOPAEDIC       Social History     Socioeconomic History    Marital status: SINGLE     Spouse name: Not on file    Number of children: Not on file    Years of education: Not on file    Highest education level: Not on file   Occupational History    Not on file   Social Needs    Financial resource strain: Not on file    Food insecurity:     Worry: Not on file     Inability: Not on file    Transportation needs:     Medical: Not on file     Non-medical: Not on file   Tobacco Use    Smoking status: Current Every Day Smoker     Packs/day: 1.00   Substance and Sexual Activity    Alcohol use: Yes     Comment: occasionally    Drug use: No    Sexual activity: Not on file   Lifestyle    Physical activity:     Days per week: Not on file     Minutes per session: Not on file    Stress: Not on file   Relationships    Social connections:     Talks on phone: Not on file     Gets together: Not on file     Attends Gnosticism service: Not on file     Active member of club or organization: Not on file     Attends meetings of clubs or organizations: Not on file     Relationship status: Not on file    Intimate partner violence:     Fear of current or ex partner: Not on file     Emotionally abused: Not on file     Physically abused: Not on file     Forced sexual activity: Not on file   Other Topics Concern    Not on file   Social History Narrative    Not on file     No family history on file.   No Known Allergies    Current Facility-Administered Medications   Medication Dose Route Frequency    amiodarone (CORDARONE) 450 mg in dextrose 5% 250 mL infusion  1 mg/min IntraVENous CONTINUOUS    gabapentin (NEURONTIN) capsule 100 mg  100 mg Oral BID    azithromycin (ZITHROMAX) tablet 500 mg  500 mg Oral DAILY    LORazepam (ATIVAN) tablet 0.5 mg  0.5 mg Oral Q6H PRN    sodium chloride (NS) flush 5-40 mL  5-40 mL IntraVENous PRN    rivaroxaban (XARELTO) tablet 15 mg  15 mg Oral BID WITH MEALS    sodium chloride (NS) flush 5-40 mL  5-40 mL IntraVENous Q8H    sodium chloride (NS) flush 5-40 mL  5-40 mL IntraVENous PRN    cefTRIAXone (ROCEPHIN) 1 g in 0.9% sodium chloride (MBP/ADV) 50 mL  1 g IntraVENous Q24H    acetaminophen (TYLENOL) tablet 650 mg  650 mg Oral Q4H PRN    HYDROcodone-acetaminophen (NORCO) 5-325 mg per tablet 1 Tab  1 Tab Oral Q4H PRN    naloxone (NARCAN) injection 0.4 mg  0.4 mg IntraVENous PRN    albuterol (PROVENTIL VENTOLIN) nebulizer solution 2.5 mg  2.5 mg Nebulization Q4H PRN    zolpidem (AMBIEN) tablet 5 mg  5 mg Oral QHS PRN    senna-docusate (PERICOLACE) 8.6-50 mg per tablet 2 Tab  2 Tab Oral BID PRN    guaiFENesin ER (MUCINEX) tablet 1,200 mg  1,200 mg Oral Q12H    hydrALAZINE (APRESOLINE) 20 mg/mL injection 20 mg  20 mg IntraVENous Q4H PRN    HYDROcodone-acetaminophen (NORCO)  mg tablet 1 Tab  1 Tab Oral Q4H PRN    promethazine (PHENERGAN) with saline injection 25 mg  25 mg IntraVENous Q6H PRN    0.9% sodium chloride infusion  100 mL/hr IntraVENous CONTINUOUS    famotidine (PEPCID) tablet 40 mg  40 mg Oral DAILY    ondansetron (ZOFRAN ODT) tablet 8 mg  8 mg Oral TID    polyethylene glycol (MIRALAX) packet 17 g  17 g Oral DAILY    morphine CR (MS CONTIN) tablet 15 mg  15 mg Oral Q12H         Objective:     Vitals:    07/23/19 0124 07/23/19 0459 07/23/19 0831 07/23/19 1223   BP: 100/70 119/78 111/79 98/73   Pulse: 97 (!) 105 (!) 140 (!) 139   Resp: 20 18 18 18   Temp: 99.1 °F (37.3 °C) 98.8 °F (37.1 °C) 98.2 °F (36.8 °C) 99.2 °F (37.3 °C)   SpO2: 98% 92% 91% 92%   Weight:  151 lb 8 oz (68.7 kg)     Height:           PHYSICAL EXAM     Gen:  the patient is cachectic and in no acute distress  HEENT:  Sclera clear, pupils equal, oral mucosa moist  Lungs: CTA  Heart:  RRR without M,G,R  Abd: soft and non-tender; with positive bowel sounds. Ext: warm without cyanosis. There is no lower leg edema. Skin:  no jaundice or rashes, no wounds   Neuro: no gross neuro deficits     Psychiatric:  alert and oriented x 3    Chest X-ray:        Recent Labs     07/23/19  0414 07/22/19  0406 07/21/19  0709   WBC 9.9 8.3 8.9   HGB 9.4* 9.4* 11.1*   HCT 28.6* 28.8* 33.4*    161 200     Recent Labs     07/23/19  0414 07/22/19  0406 07/21/19  0709   * 136 136   K 3.7 3.7 3.8   CL 98 102 100   GLU 98 96 105*   CO2 25 25 25   BUN 8 12 21   CREA 0.76* 0.65* 0.92   MG  --   --  2.0   PHOS  --  3.0  --    CA 7.8* 7.9* 8.2*   ALB  --   --  2.6*   SGOT  --   --  27     No results for input(s): PH, PCO2, PO2, HCO3 in the last 72 hours.       Assessment:  (Medical Decision Making)     Patient Active Problem List   Diagnosis Code    Pancreatitis K85.90    Lung mass R91.8    Pulmonary emboli (HCC) I26.99    Primary pancreatic cancer with metastasis to other site ([facility] [address] C25.9    Mediastinal adenopathy R59.0    [facility] E46    Cocaine abuse ([facility] F14.10    Emphysema lung ([facility] J43.9    Pneumonia J18.9    SVT (supraventricular tachycardia) (HCC) I47.1    Atrial fibrillation with RVR (HCC) I48.91         Plan:  (Medical Decision Making)     Hospital Problems  Date Reviewed: 7/10/2019          Codes Class Noted POA    Pneumonia ICD-10-CM: J18.9  ICD-9-CM: 490  7/21/2019 Yes        SVT (supraventricular tachycardia) ([facility] ICD-10-CM: I47.1  ICD-9-CM: 427.89  7/21/2019 Yes        * (Principal) Atrial fibrillation with RVR ([facility] ICD-10-CM: I48.91  ICD-9-CM: 427.31  7/21/2019 Yes        Emphysema lung (HCC) (Chronic) ICD-10-CM: J43.9  ICD-9-CM: 492.8  7/12/2019 Yes        Protein malnutrition ([facility] (Chronic) ICD-10-CM: E46  ICD-9-CM: 214  7/9/2019 Yes        Cocaine abuse (HCC) (Chronic) ICD-10-CM: F14.10  ICD-9-CM: 305.60  7/9/2019 Yes        Lung mass ICD-10-CM: R91.8  ICD-9-CM: 786.6  7/8/2019 Yes        Pulmonary emboli ([facility] ICD-10-CM: I26.99  ICD-9-CM: 415.19  7/8/2019 Yes        Primary pancreatic cancer with metastasis to other site [facility]) ICD-10-CM: C25.9  ICD-9-CM: 157.9  7/8/2019 Yes        Mediastinal adenopathy ICD-10-CM: R59.0  ICD-9-CM: 785.6  7/8/2019 Yes          Small lung nodule with predominant bulky mediastinal adenopathy, L hilar 4R and level 6 LN stations- suspect small cell or lymphoma or metastasis  Nodule is very peripheral and small and I suspect is related to the level 6 LN station. Will schedule EBUS in AM for diagnosis. Nodule is anatomically a difficult target for navigationdue to its size and location   Needs to go on heparin with fresh PE- stop xarelto, stop heparin 6h prior to procedure(5AM for bronch at 11AM). Thank you very much for this referral.  We appreciate the opportunity to participate in this patient's care. Will follow along with above stated plan.     Ilsa Bunn MD

## 2019-07-23 NOTE — PROGRESS NOTES
2031- Patient O2 sat found to be 78% on initial assessment. Lung sounds clear. Patient placed on 4L/O2/NC. O2 sat 92%. 2051- Respiratory at bedside. Patient states he feels better.

## 2019-07-23 NOTE — CONSULTS
CONSULT NOTE    Latasha Dharmesh    7/23/2019    Date of Admission:  7/21/2019    The patient's chart is reviewed and the patient is discussed with the staff. Subjective:     Patient is a 53 y.o.  male seen and evaluated at the request of Dr. Chante Chacon. Mr. Jagjit Baxter is a 53 y.o. male admitted on 7/21/2019 with a primary diagnosis of pneumonia. His PMH includes chronic tobacco use and cocaine use. He was seen in consult by Dr. Catracho Muñiz on 7/12/19. Skye Fofana that time, he presented with c/o progressive severe abd pain x 3 days as well as wt loss over the past several months.  Bili 2.6, Lipase 6000.  CT CAP revealed RLL segmental PE, mediastinal LAD, 4 cm pleurally based cavitary mass in L apex, 2cm pancreatic mass obstructing pancreatic and CBD with associated biliary duct dilation and GB distention, and gastric wall thickening.  He is s/p ERCP with stent placement and FNA of pancreatic mass/periportal LAD on 7/8.  Biliary brushing cytology +rare atypical cells.  Path on periportal/precarinal nodes +poorly differentiated metastatic carcinoma. IHC non-specific as to origin, however a primary tumor in upper GI or pancreatobiliary tree cannot be excluded. During that consult, EBUS with possible navigational biopsy to the lung was recommended as there may be a possible 2nd primary. He returned on 7/21/19 to ED with c/o nausea and sharp epigastric pain x 1 day as well as shortness of breath. In ED, he was having runs of SVT and Afib with RVR. On amio. CXR with developing diffuse alveolar infiltrates bilaterally. On Rocephin/Azith. Palliative care following. We have been consulted for consideration of EBUS and possible navigation bronchoscopy. Seen last on 7/12 by our service at the time [facility] was not completed yet. Review of Systems  A comprehensive review of systems was negative except for that written in the HPI.     Patient Active Problem List   Diagnosis Code    Pancreatitis K85.90    Lung mass R91.8    Pulmonary emboli (HCC) I26.99    Primary pancreatic cancer with metastasis to other site ([facility] [address] C25.9    Mediastinal adenopathy R59.0    Protein malnutrition (HCC) E46    Cocaine abuse ([facility] [address] F14.10    Emphysema lung (HCC) J43.9    Pneumonia J18.9    SVT (supraventricular tachycardia) (HCC) I47.1    Atrial fibrillation with RVR (HCC) I48.91         Prior to Admission Medications   Prescriptions Last Dose Informant Patient Reported? Taking?   famotidine (PEPCID) 40 mg tablet 7/20/2019 at Unknown time  No Yes   Sig: Take 1 Tab by mouth daily for 30 days. rivaroxaban (XARELTO) 15 mg tab tablet 7/20/2019 at Unknown time  No Yes   Sig: Take 1 Tab by mouth two (2) times daily (with meals) for 20 days. rivaroxaban (XARELTO) 20 mg tab tablet   No No   Sig: Take 1 Tab by mouth daily (with breakfast) for 30 days.       Facility-Administered Medications: None       Past Medical History:   Diagnosis Date    Arthritis     Other ill-defined conditions(799.89)     chronic L leg pain     Past Surgical History:   Procedure Laterality Date    HX ORTHOPAEDIC       Social History     Socioeconomic History    Marital status: SINGLE     Spouse name: Not on file    Number of children: Not on file    Years of education: Not on file    Highest education level: Not on file   Occupational History    Not on file   Social Needs    Financial resource strain: Not on file    Food insecurity:     Worry: Not on file     Inability: Not on file    Transportation needs:     Medical: Not on file     Non-medical: Not on file   Tobacco Use    Smoking status: Current Every Day Smoker     Packs/day: 1.00   Substance and Sexual Activity    Alcohol use: Yes     Comment: occasionally    Drug use: No    Sexual activity: Not on file   Lifestyle    Physical activity:     Days per week: Not on file     Minutes per session: Not on file    Stress: Not on file   Relationships    Social connections:     Talks on phone: Not on file     Gets together: Not on file     Attends Pentecostalism service: Not on file     Active member of club or organization: Not on file     Attends meetings of clubs or organizations: Not on file     Relationship status: Not on file    Intimate partner violence:     Fear of current or ex partner: Not on file     Emotionally abused: Not on file     Physically abused: Not on file     Forced sexual activity: Not on file   Other Topics Concern    Not on file   Social History Narrative    Not on file     No family history on file.   No Known Allergies    Current Facility-Administered Medications   Medication Dose Route Frequency    amiodarone (CORDARONE) 450 mg in dextrose 5% 250 mL infusion  1 mg/min IntraVENous CONTINUOUS    gabapentin (NEURONTIN) capsule 100 mg  100 mg Oral BID    azithromycin (ZITHROMAX) tablet 500 mg  500 mg Oral DAILY    LORazepam (ATIVAN) tablet 0.5 mg  0.5 mg Oral Q6H PRN    sodium chloride (NS) flush 5-40 mL  5-40 mL IntraVENous PRN    rivaroxaban (XARELTO) tablet 15 mg  15 mg Oral BID WITH MEALS    sodium chloride (NS) flush 5-40 mL  5-40 mL IntraVENous Q8H    sodium chloride (NS) flush 5-40 mL  5-40 mL IntraVENous PRN    cefTRIAXone (ROCEPHIN) 1 g in 0.9% sodium chloride (MBP/ADV) 50 mL  1 g IntraVENous Q24H    acetaminophen (TYLENOL) tablet 650 mg  650 mg Oral Q4H PRN    HYDROcodone-acetaminophen (NORCO) 5-325 mg per tablet 1 Tab  1 Tab Oral Q4H PRN    naloxone (NARCAN) injection 0.4 mg  0.4 mg IntraVENous PRN    albuterol (PROVENTIL VENTOLIN) nebulizer solution 2.5 mg  2.5 mg Nebulization Q4H PRN    zolpidem (AMBIEN) tablet 5 mg  5 mg Oral QHS PRN    senna-docusate (PERICOLACE) 8.6-50 mg per tablet 2 Tab  2 Tab Oral BID PRN    guaiFENesin ER (MUCINEX) tablet 1,200 mg  1,200 mg Oral Q12H    hydrALAZINE (APRESOLINE) 20 mg/mL injection 20 mg  20 mg IntraVENous Q4H PRN    HYDROcodone-acetaminophen (NORCO)  mg tablet 1 Tab  1 Tab Oral Q4H PRN    promethazine (PHENERGAN) with saline injection 25 mg  25 mg IntraVENous Q6H PRN    0.9% sodium chloride infusion  100 mL/hr IntraVENous CONTINUOUS    famotidine (PEPCID) tablet 40 mg  40 mg Oral DAILY    ondansetron (ZOFRAN ODT) tablet 8 mg  8 mg Oral TID    polyethylene glycol (MIRALAX) packet 17 g  17 g Oral DAILY    morphine CR (MS CONTIN) tablet 15 mg  15 mg Oral Q12H         Objective:     Vitals:    07/23/19 0124 07/23/19 0459 07/23/19 0831 07/23/19 1223   BP: 100/70 119/78 111/79 98/73   Pulse: 97 (!) 105 (!) 140 (!) 139   Resp: 20 18 18 18   Temp: 99.1 °F (37.3 °C) 98.8 °F (37.1 °C) 98.2 °F (36.8 °C) 99.2 °F (37.3 °C)   SpO2: 98% 92% 91% 92%   Weight:  151 lb 8 oz (68.7 kg)     Height:           PHYSICAL EXAM     Gen:  the patient is cachectic and in no acute distress  HEENT:  Sclera clear, pupils equal, oral mucosa moist  Lungs: CTA  Heart:  RRR without M,G,R  Abd: soft and non-tender; with positive bowel sounds. Ext: warm without cyanosis. There is no lower leg edema. Skin:  no jaundice or rashes, no wounds   Neuro: no gross neuro deficits     Psychiatric:  alert and oriented x 3    Chest X-ray:        Recent Labs     07/23/19  0414 07/22/19  0406 07/21/19  0709   WBC 9.9 8.3 8.9   HGB 9.4* 9.4* 11.1*   HCT 28.6* 28.8* 33.4*    161 200     Recent Labs     07/23/19  0414 07/22/19  0406 07/21/19  0709   * 136 136   K 3.7 3.7 3.8   CL 98 102 100   GLU 98 96 105*   CO2 25 25 25   BUN 8 12 21   CREA 0.76* 0.65* 0.92   MG  --   --  2.0   PHOS  --  3.0  --    CA 7.8* 7.9* 8.2*   ALB  --   --  2.6*   SGOT  --   --  27     No results for input(s): PH, PCO2, PO2, HCO3 in the last 72 hours.       Assessment:  (Medical Decision Making)     Patient Active Problem List   Diagnosis Code    Pancreatitis K85.90    Lung mass R91.8    Pulmonary emboli (HCC) I26.99    Primary pancreatic cancer with metastasis to other site ([facility] [address] C25.9    Mediastinal adenopathy R59.0    [facility] E46    Cocaine abuse ([facility] F14.10    Emphysema lung ([facility] J43.9    Pneumonia J18.9    SVT (supraventricular tachycardia) (HCC) I47.1    Atrial fibrillation with RVR (HCC) I48.91         Plan:  (Medical Decision Making)     Hospital Problems  Date Reviewed: 7/10/2019          Codes Class Noted POA    Pneumonia ICD-10-CM: J18.9  ICD-9-CM: 342  7/21/2019 Yes        SVT (supraventricular tachycardia) ([facility] ICD-10-CM: I47.1  ICD-9-CM: 427.89  7/21/2019 Yes        * (Principal) Atrial fibrillation with RVR ([facility] ICD-10-CM: I48.91  ICD-9-CM: 427.31  7/21/2019 Yes        Emphysema lung (HCC) (Chronic) ICD-10-CM: J43.9  ICD-9-CM: 492.8  7/12/2019 Yes        Protein malnutrition ([facility] (Chronic) ICD-10-CM: E46  ICD-9-CM: 850  7/9/2019 Yes        Cocaine abuse (HCC) (Chronic) ICD-10-CM: F14.10  ICD-9-CM: 305.60  7/9/2019 Yes        Lung mass ICD-10-CM: R91.8  ICD-9-CM: 786.6  7/8/2019 Yes        Pulmonary emboli ([facility] ICD-10-CM: I26.99  ICD-9-CM: 415.19  7/8/2019 Yes        Primary pancreatic cancer with metastasis to other site [facility]) ICD-10-CM: C25.9  ICD-9-CM: 157.9  7/8/2019 Yes        Mediastinal adenopathy ICD-10-CM: R59.0  ICD-9-CM: 785.6  7/8/2019 Yes          Small lung nodule with predominant bulky mediastinal adenopathy, L hilar 4R and level 6 LN stations- suspect small cell or lymphoma or metastasis  Nodule is very peripheral and small and I suspect is related to the level 6 LN station. Will schedule EBUS in AM for diagnosis. Nodule is anatomically a difficult target for navigationdue to its size and location   Needs to go on heparin with fresh PE- stop xarelto, stop heparin 6h prior to procedure(5AM for bronch at 11AM). Thank you very much for this referral.  We appreciate the opportunity to participate in this patient's care. Will follow along with above stated plan.     Marifer Hurtado MD

## 2019-07-23 NOTE — PROGRESS NOTES
Called by nursing regarding low SpO2 on RA - RN placed pt on 4L NC with increase in SpO2. Upon entering room, pt appears calm and in no resp distress. Pt endorses that the application of oxygen helped his breathing. PRN tx given. RN aware, will monitor accordingly.

## 2019-07-23 NOTE — PROGRESS NOTES
Problem: Falls - Risk of  Goal: *Absence of Falls  Description  Document Elo Hernandez Fall Risk and appropriate interventions in the flowsheet.   Outcome: Progressing Towards Goal  Note:   Fall Risk Interventions:            Medication Interventions: Patient to call before getting OOB                   Problem: Pneumonia: Day 2  Goal: *Oxygen saturation within defined limits  Outcome: Progressing Towards Goal     Problem: Pneumonia: Day 2  Goal: *Hemodynamically stable  Outcome: Resolved/Not Met  Goal: *Demonstrates progressive activity  Outcome: Resolved/Not Met  Goal: *Tolerating diet  Outcome: Resolved/Not Met  Goal: *Optimal pain control at patient's stated goal  Outcome: Resolved/Not Met

## 2019-07-23 NOTE — PROGRESS NOTES
Palliative Care Progress Note    Patient: Zeynep Gardner MRN: [id number]  SSN: [id number]    YOB: 1965  Age: 53 y.o. Sex: male       Assessment/Plan:     Chief Complaint/Interval History: pt alert. Feeling well this am.  Notes ongoing hiccups. Denies any abdominal pain, nausea this am.  HR in 140s and now on amio drip. Principal Diagnosis:    · Pain, abdomen  R10.9    Additional Diagnoses:   · Debility, Unspecified  R53.81  · Hiccough  R06.6  · Encounter for Palliative Care  Z51.5    Palliative Performance Scale (PPS)   60%    Medical Decision Making:   Reviewed and summarized labs and imaging. No family or friends present at bedside. We talked about pt goals and what his hopes are on discharge. Pt states he hopes to return home where he lives with 2 nieces and a nephew. He states family is important to him and have helped care for him. I relayed my concerns regarding his current level of care required. I discussed hospice philosophy and levels of care with pt. I have consulted hospice team for further discussion. Pt would be hospice appropriate should he decide to pursue that care. Will start gabapentin 100 mg po bid for hiccups. Will discuss findings with members of the interdisciplinary team.      Total time spent with patient: 20 minutes. Subjective:     Review of Systems negative with the exception of as noted above     Objective:     Visit Vitals  /79   Pulse (!) 140   Temp 98.2 °F (36.8 °C)   Resp 18   Ht 6' 5\" (1.956 m)   Wt 151 lb 8 oz (68.7 kg)   SpO2 91%   BMI 17.97 kg/m²       Physical Exam:    General:  Cooperative. No acute distress. Cachexia    Eyes:  Conjunctivae/corneas clear    Nose: Nares normal. Septum midline.    Neck: Supple, symmetrical, trachea midline, no JVD   Lungs:   Coarse bs bilat   Heart:  Irregular, tachycardic   Abdomen:   Soft, non-tender, non-distended   Extremities: Normal, atraumatic, no cyanosis or edema   Skin: Skin color, texture, turgor normal. No rash or lesions.    Neurologic: Nonfocal   Psych: Alert and oriented     Signed By: Martinez Bhakta MD     July 23, 2019

## 2019-07-24 NOTE — PROGRESS NOTES
Problem: Falls - Risk of  Goal: *Absence of Falls  Description  Document Maik Falk Fall Risk and appropriate interventions in the flowsheet.   Outcome: Progressing Towards Goal  Note:   Fall Risk Interventions:  Mobility Interventions: Bed/chair exit alarm, Patient to call before getting OOB         Medication Interventions: Bed/chair exit alarm, Patient to call before getting OOB, Teach patient to arise slowly                   Problem: Patient Education: Go to Patient Education Activity  Goal: Patient/Family Education  Outcome: Progressing Towards Goal

## 2019-07-24 NOTE — PROGRESS NOTES
Hospitalist Progress Note     Admit Date:  2019  7:03 AM   Name:  Erma Rodriguez   Age:  53 y.o.  :  1965   MRN:  [id number]   PCP:  Unknown, Provider  Treatment Team: Attending Provider: Joan Alcala MD; Consulting Provider: Darío Hurtado MD; Care Manager: Mary Gregory; Consulting Provider: Shelli Del Cid MD; Utilization Review: Rajeev Taylor RN; Consulting Provider: Latrell Mohr MD; Utilization Review: Ivonne Calero RN; Consulting Provider: Yumiko Shell MD; Primary Nurse: Ranjit Lopez, RN; Primary Nurse: Cherelle Lopez RN    Subjective:   HPI - Patient is a 54 yo male  with recent hx metastatic pancreatic cancer dx, PE on xarelto, who presented ER with complaints of SOB, nausea,  and sharp epigastric pain x 1 day. In ER patient had several runs of SVT and at one point went into afib RVR. Cardiology consulting. Patient on amiodarone gtt. CXR with bilat infiltrates. Patient started on rocephin and azithromycin. Oncology consulting. Pulm consulting with plan for EBUS . xarelto changed to heparin gtt for procedure. Subjective  Patient alert and oriented. On 4LNC. Reports SOB. Denies fever/chills, cough. CP.      Objective:     Patient Vitals for the past 24 hrs:   Temp Pulse Resp BP SpO2   19 0845 97.4 °F (36.3 °C) 87 20 108/80 91 %   19 0503 97.8 °F (36.6 °C) 86 19 106/78 92 %   19 0017 99 °F (37.2 °C) (!) 129 19 93/64 90 %   19 2047 98.9 °F (37.2 °C) (!) 130 20 120/63 90 %   19 1633 99 °F (37.2 °C) (!) 128 18 103/76 93 %   19 1223 99.2 °F (37.3 °C) (!) 139 18 98/73 92 %     Oxygen Therapy  O2 Sat (%): 91 % (19 0845)  Pulse via Oximetry: 104 beats per minute (19)  O2 Device: Nasal cannula (19)  O2 Flow Rate (L/min): 4 l/min (19)    Intake/Output Summary (Last 24 hours) at 2019 1129  Last data filed at 2019 3177  Gross per 24 hour   Intake 1411 ml Output 750 ml   Net 661 ml         REVIEW OF SYSTEMS: Comprehensive ROS performed and negative except as stated in HPI. Physical Examination:  General:         Cachectic. Alert. No distress noted. Eyes:               Normal sclera. Extraocular movements intact. ENT:                Normocephalic, atraumatic. Moist mucous membranes  CV:                  SR 87  Lungs:             CTAB. No wheezing, rhonchi, or rales. Diminished 4LNC   Abdomen:        Soft, LUQ tenderness on palpation  Extremities:     Warm and dry. No cyanosis or edema. Neurologic:      CN II-XII grossly intact. Sensation intact. Skin:                No rashes or jaundice. Normal coloration  Psych:             Normal mood and affect. Data Review:  I have reviewed all labs, meds, telemetry events, and studies from the last 24 hours.     Recent Results (from the past 24 hour(s))   LD    Collection Time: 07/23/19 12:16 PM   Result Value Ref Range     (H) 100 - 190 U/L   BILIRUBIN, FRACTIONATED    Collection Time: 07/23/19 12:16 PM   Result Value Ref Range    Bilirubin, total 0.4 0.2 - 1.1 MG/DL    Bilirubin, direct 0.2 <0.4 MG/DL    Bilirubin, indirect 0.2 0.0 - 1.1 MG/DL   HAPTOGLOBIN    Collection Time: 07/23/19 12:16 PM   Result Value Ref Range    Haptoglobin 209 (H) 30 - 200 mg/dL   TRANSFERRIN SATURATION    Collection Time: 07/23/19 12:16 PM   Result Value Ref Range    Iron 20 (L) 35 - 150 ug/dL    TIBC 271 250 - 450 ug/dL    Transferrin Saturation 7 (L) >20 %   FERRITIN    Collection Time: 07/23/19 12:16 PM   Result Value Ref Range    Ferritin 419 (H) 8 - 388 NG/ML   VITAMIN B12    Collection Time: 07/23/19 12:16 PM   Result Value Ref Range    Vitamin B12 364 193 - 986 pg/mL   FOLATE    Collection Time: 07/23/19 12:16 PM   Result Value Ref Range    Folate 6.4 3.1 - 17.5 ng/mL   PTT    Collection Time: 07/23/19  9:29 PM   Result Value Ref Range    aPTT 83.7 (H) 24.7 - 39.8 SEC   PTT    Collection Time: 07/24/19  3:27 AM Result Value Ref Range    aPTT 69.2 (H) 24.7 - 39.8 SEC   CBC WITH AUTOMATED DIFF    Collection Time: 07/24/19  3:27 AM   Result Value Ref Range    WBC 10.9 4.3 - 11.1 K/uL    RBC 3.51 (L) 4.23 - 5.6 M/uL    HGB 10.3 (L) 13.6 - 17.2 g/dL    HCT 31.3 (L) 41.1 - 50.3 %    MCV 89.2 79.6 - 97.8 FL    MCH 29.3 26.1 - 32.9 PG    MCHC 32.9 31.4 - 35.0 g/dL    RDW 11.4 (L) 11.9 - 14.6 %    PLATELET 825 054 - 987 K/uL    MPV 10.9 9.4 - 12.3 FL    ABSOLUTE NRBC 0.00 0.0 - 0.2 K/uL    DF AUTOMATED      NEUTROPHILS 79 (H) 43 - 78 %    LYMPHOCYTES 9 (L) 13 - 44 %    MONOCYTES 9 4.0 - 12.0 %    EOSINOPHILS 2 0.5 - 7.8 %    BASOPHILS 1 0.0 - 2.0 %    IMMATURE GRANULOCYTES 1 0.0 - 5.0 %    ABS. NEUTROPHILS 8.6 (H) 1.7 - 8.2 K/UL    ABS. LYMPHOCYTES 1.0 0.5 - 4.6 K/UL    ABS. MONOCYTES 1.0 0.1 - 1.3 K/UL    ABS. EOSINOPHILS 0.2 0.0 - 0.8 K/UL    ABS. BASOPHILS 0.1 0.0 - 0.2 K/UL    ABS. IMM.  GRANS. 0.1 0.0 - 0.5 K/UL   METABOLIC PANEL, BASIC    Collection Time: 07/24/19  3:27 AM   Result Value Ref Range    Sodium 132 (L) 136 - 145 mmol/L    Potassium 4.1 3.5 - 5.1 mmol/L    Chloride 96 (L) 98 - 107 mmol/L    CO2 26 21 - 32 mmol/L    Anion gap 10 7 - 16 mmol/L    Glucose 94 65 - 100 mg/dL    BUN 10 6 - 23 MG/DL    Creatinine 0.72 (L) 0.8 - 1.5 MG/DL    GFR est AA >60 >60 ml/min/1.73m2    GFR est non-AA >60 >60 ml/min/1.73m2    Calcium 7.6 (L) 8.3 - 10.4 MG/DL        All Micro Results     Procedure Component Value Units Date/Time    CULTURE, BLOOD [[id number]] Collected:  07/21/19 0832    Order Status:  Completed Specimen:  Blood Updated:  07/24/19 0638     Special Requests: --        LEFT  Antecubital       Culture result: NO GROWTH 3 DAYS       CULTURE, BLOOD [[id number]] Collected:  07/21/19 0832    Order Status:  Completed Specimen:  Blood Updated:  07/24/19 349     Special Requests: --        RIGHT  Antecubital       Culture result: NO GROWTH 3 DAYS             Current Meds:  Current Facility-Administered Medications Medication Dose Route Frequency    amiodarone (CORDARONE) 450 mg in dextrose 5% 250 mL infusion  1 mg/min IntraVENous CONTINUOUS    gabapentin (NEURONTIN) capsule 100 mg  100 mg Oral BID    azithromycin (ZITHROMAX) tablet 500 mg  500 mg Oral DAILY    LORazepam (ATIVAN) tablet 0.5 mg  0.5 mg Oral Q6H PRN    sodium chloride (NS) flush 5-40 mL  5-40 mL IntraVENous PRN    sodium chloride (NS) flush 5-40 mL  5-40 mL IntraVENous Q8H    sodium chloride (NS) flush 5-40 mL  5-40 mL IntraVENous PRN    cefTRIAXone (ROCEPHIN) 1 g in 0.9% sodium chloride (MBP/ADV) 50 mL  1 g IntraVENous Q24H    acetaminophen (TYLENOL) tablet 650 mg  650 mg Oral Q4H PRN    HYDROcodone-acetaminophen (NORCO) 5-325 mg per tablet 1 Tab  1 Tab Oral Q4H PRN    naloxone (NARCAN) injection 0.4 mg  0.4 mg IntraVENous PRN    albuterol (PROVENTIL VENTOLIN) nebulizer solution 2.5 mg  2.5 mg Nebulization Q4H PRN    zolpidem (AMBIEN) tablet 5 mg  5 mg Oral QHS PRN    senna-docusate (PERICOLACE) 8.6-50 mg per tablet 2 Tab  2 Tab Oral BID PRN    guaiFENesin ER (MUCINEX) tablet 1,200 mg  1,200 mg Oral Q12H    hydrALAZINE (APRESOLINE) 20 mg/mL injection 20 mg  20 mg IntraVENous Q4H PRN    HYDROcodone-acetaminophen (NORCO)  mg tablet 1 Tab  1 Tab Oral Q4H PRN    promethazine (PHENERGAN) with saline injection 25 mg  25 mg IntraVENous Q6H PRN    0.9% sodium chloride infusion  75 mL/hr IntraVENous CONTINUOUS    famotidine (PEPCID) tablet 40 mg  40 mg Oral DAILY    ondansetron (ZOFRAN ODT) tablet 8 mg  8 mg Oral TID    polyethylene glycol (MIRALAX) packet 17 g  17 g Oral DAILY    morphine CR (MS CONTIN) tablet 15 mg  15 mg Oral Q12H       Diet:  DIET NPO    Other Studies (last 24 hours):  No results found.     Assessment and Plan:     Hospital Problems as of 7/24/2019 Date Reviewed: 7/10/2019          Codes Class Noted - Resolved POA    Pneumonia ICD-10-CM: J18.9  ICD-9-CM: 674  7/21/2019 - Present Yes        SVT (supraventricular tachycardia) ([facility] [address] ICD-10-CM: I47.1  ICD-9-CM: 427.89  7/21/2019 - Present Yes        * (Principal) Atrial fibrillation with RVR (HCC) ICD-10-CM: I48.91  ICD-9-CM: 427.31  7/21/2019 - Present Yes        Emphysema lung (HCC) (Chronic) ICD-10-CM: J43.9  ICD-9-CM: 492.8  7/12/2019 - Present Yes        Protein malnutrition (HCC) (Chronic) ICD-10-CM: E46  ICD-9-CM: 260  7/9/2019 - Present Yes        Cocaine abuse (HCC) (Chronic) ICD-10-CM: F14.10  ICD-9-CM: 305.60  7/9/2019 - Present Yes        Lung mass ICD-10-CM: R91.8  ICD-9-CM: 786.6  7/8/2019 - Present Yes        Pulmonary emboli (HCC) ICD-10-CM: I26.99  ICD-9-CM: 415.19  7/8/2019 - Present Yes        Primary pancreatic cancer with metastasis to other site [facility]) ICD-10-CM: C25.9  ICD-9-CM: 157.9  7/8/2019 - Present Yes        Mediastinal adenopathy ICD-10-CM: R59.0  ICD-9-CM: 785.6  7/8/2019 - Present Yes              A/P:    AFib RVR  -Telemetry, currently SR 87,   -cardiology consulting  -continue amiodarone gtt,  plan to change gtt to oral tomorrow if rhythm stable  -already on xarelto, changed to heparin gtt for EBUS, change back to xarelto post procedure       PNA    -based on CXR with infiltrates  -rocephin/azithro, mucinex, PRN nebs  -Follow cultures  -supplemental oxygen     Pancreatic cancer  -EBUS planned with pulm today 7/24  -per previous notes, may have lung cancer and pancreatic cancer ie two separate primaries  -oncology consulting, anemia work up      Palliative care  -need to focus on symptom management  -scheduled zofran. QTc ok on EKG  -MS contin low dose to start, 15mg BID  -PRN norco.    -bowel regimen  -Probably will need a prescription of narcotics and antiemetics at discharge to avoid bouncing back.  hx drug use irrelevant here; his symptoms are real     Hx PE  -currently on heparin gtt bridge for EBUS, restart xarelto post procedure per cardiology    Discharge dispo - TBD  DVT ppx - xarelto       Plan of care discussed with  Mary Slater,      Signed:  Ian RUSSELL

## 2019-07-24 NOTE — PROGRESS NOTES
Spiritual Care Visit, initial visit. Provided a Bible as requested by patient. .    Visit by Damari Booth, Staff .  MALAIKA.Faustino., Billy.B., B.A.

## 2019-07-24 NOTE — PROGRESS NOTES
TRANSFER - IN REPORT:    Verbal report received from Main Humphreys RN(name) on [employer]  being received from 319(unit) for ordered procedure      Report consisted of patients Situation, Background, Assessment and   Recommendations(SBAR). Information from the following report(s) Kardex was reviewed with the receiving nurse. Opportunity for questions and clarification was provided. Assessment completed upon patients arrival to unit and care assumed.

## 2019-07-24 NOTE — PROGRESS NOTES
Letty Flores  Admission Date: 7/21/2019             Daily Progress Note: 7/24/2019    The patient's chart is reviewed and the patient is discussed with the staff. Patient is a 53 y.o.  male seen and evaluated at the request of Dr. Mauri Lechuga. Mr. Dodd is a 53 y. o. male admitted on 7/21/2019 with a primary diagnosis of pneumonia.  His PMH includes chronic tobacco use and cocaine use.  He was seen in consult by Dr. Rafaela Payne on 7/12/19. Dell Wright that time, he presented with c/o progressive severe abd pain x 3 days as well as wt loss over the past several months.  Bili 2.6, Lipase 6000.  CT CAP revealed RLL segmental PE, mediastinal LAD, 4 cm pleurally based cavitary mass in L apex, 2cm pancreatic mass obstructing pancreatic and CBD with associated biliary duct dilation and GB distention, and gastric wall thickening.  He is s/p ERCP with stent placement and FNA of pancreatic mass/periportal LAD on 7/8.  Biliary brushing cytology +rare atypical cells.  Path on periportal/precarinal nodes +poorly differentiated metastatic carcinoma. [facility] non-specific as to origin, however a primary tumor in upper GI or pancreatobiliary tree cannot be excluded.  During that consult, EBUS with possible navigational biopsy to the lung was recommended as there may be a possible 2nd primary. [facility] returned on 7/21/19 to ED with c/o nausea and sharp epigastric pain x 1 day as well as shortness of breath.  In ED, he was having runs of SVT and Afib with RVR.  On amio.  CXR with developing diffuse alveolar infiltrates bilaterally.  On Rocephin/Azith.  Palliative care following. We have been consulted for consideration of EBUS and possible navigation bronchoscopy. Seen last on 7/12 by our service at the time [facility] was not completed yet. Subjective: For EBUS later this AM. Currently on 4L with sat of 91. On amio drip. Currently in no distress. Heparin is off.      Current Facility-Administered Medications Medication Dose Route Frequency    amiodarone (CORDARONE) 450 mg in dextrose 5% 250 mL infusion  1 mg/min IntraVENous CONTINUOUS    gabapentin (NEURONTIN) capsule 100 mg  100 mg Oral BID    azithromycin (ZITHROMAX) tablet 500 mg  500 mg Oral DAILY    LORazepam (ATIVAN) tablet 0.5 mg  0.5 mg Oral Q6H PRN    sodium chloride (NS) flush 5-40 mL  5-40 mL IntraVENous PRN    sodium chloride (NS) flush 5-40 mL  5-40 mL IntraVENous Q8H    sodium chloride (NS) flush 5-40 mL  5-40 mL IntraVENous PRN    cefTRIAXone (ROCEPHIN) 1 g in 0.9% sodium chloride (MBP/ADV) 50 mL  1 g IntraVENous Q24H    acetaminophen (TYLENOL) tablet 650 mg  650 mg Oral Q4H PRN    HYDROcodone-acetaminophen (NORCO) 5-325 mg per tablet 1 Tab  1 Tab Oral Q4H PRN    naloxone (NARCAN) injection 0.4 mg  0.4 mg IntraVENous PRN    albuterol (PROVENTIL VENTOLIN) nebulizer solution 2.5 mg  2.5 mg Nebulization Q4H PRN    zolpidem (AMBIEN) tablet 5 mg  5 mg Oral QHS PRN    senna-docusate (PERICOLACE) 8.6-50 mg per tablet 2 Tab  2 Tab Oral BID PRN    guaiFENesin ER (MUCINEX) tablet 1,200 mg  1,200 mg Oral Q12H    hydrALAZINE (APRESOLINE) 20 mg/mL injection 20 mg  20 mg IntraVENous Q4H PRN    HYDROcodone-acetaminophen (NORCO)  mg tablet 1 Tab  1 Tab Oral Q4H PRN    promethazine (PHENERGAN) with saline injection 25 mg  25 mg IntraVENous Q6H PRN    0.9% sodium chloride infusion  100 mL/hr IntraVENous CONTINUOUS    famotidine (PEPCID) tablet 40 mg  40 mg Oral DAILY    ondansetron (ZOFRAN ODT) tablet 8 mg  8 mg Oral TID    polyethylene glycol (MIRALAX) packet 17 g  17 g Oral DAILY    morphine CR (MS CONTIN) tablet 15 mg  15 mg Oral Q12H       Review of Systems    Constitutional: negative for fever, chills, sweats  Cardiovascular: negative for chest pain, palpitations, syncope, edema  Gastrointestinal:  negative for dysphagia, reflux, vomiting, diarrhea, abdominal pain, or melena  Neurologic:  negative for focal weakness, numbness, headache    Objective:     Vitals:    07/23/19 2047 07/24/19 0017 07/24/19 0503 07/24/19 0845   BP: 120/63 93/64 106/78 108/80   Pulse: (!) 130 (!) 129 86 87   Resp: 20 19 19 20   Temp: 98.9 °F (37.2 °C) 99 °F (37.2 °C) 97.8 °F (36.6 °C) 97.4 °F (36.3 °C)   SpO2: 90% 90% 92% 91%   Weight:   154 lb 1.6 oz (69.9 kg)    Height:         Intake and Output:   07/22 1901 - 07/24 0700  In: 2627 [P.O.:490; I.V.:2137]  Out: 2200 [Urine:2200]  No intake/output data recorded. Physical Exam:   Constitution:  the patient is well developed and in no acute distress on 4L; hoarse  EENMT:  Sclera clear, pupils equal, oral mucosa moist; poor dentition  Respiratory: decreased BS bilaterally. Cardiovascular:  RRR without M,G,R  Gastrointestinal: soft and non-tender; with positive bowel sounds. Musculoskeletal: warm without cyanosis. There is no lower extremity edema. Skin:  no jaundice or rashes  Neurologic: no gross neuro deficits     Psychiatric:  alert and oriented x 3    CXR:           LAB  Recent Labs     07/22/19  1551   GLUCPOC 95      Recent Labs     07/24/19  0327 07/23/19  0414 07/22/19  0406   WBC 10.9 9.9 8.3   HGB 10.3* 9.4* 9.4*   HCT 31.3* 28.6* 28.8*    164 161     Recent Labs     07/24/19  0327 07/23/19  1216 07/23/19  0414 07/22/19  0406   *  --  133* 136   K 4.1  --  3.7 3.7   CL 96*  --  98 102   CO2 26  --  25 25   GLU 94  --  98 96   BUN 10  --  8 12   CREA 0.72*  --  0.76* 0.65*   CA 7.6*  --  7.8* 7.9*   PHOS  --   --   --  3.0   TBILI  --  0.4  --   --      No results for input(s): PH, PCO2, PO2, HCO3, PHI, PCO2I, PO2I, HCO3I in the last 72 hours. No results for input(s): LCAD, LAC in the last 72 hours.       Assessment:  (Medical Decision Making)     Hospital Problems  Date Reviewed: 7/10/2019          Codes Class Noted POA    Pneumonia ICD-10-CM: J18.9  ICD-9-CM: 440  7/21/2019 Yes    ON Abx    SVT (supraventricular tachycardia) ([facility] [address] ICD-10-CM: I47.1  ICD-9-CM: 427.89  7/21/2019 Yes * (Principal) Atrial fibrillation with RVR (HCC) ICD-10-CM: I48.91  ICD-9-CM: 427.31  7/21/2019 Yes    On amiodarone. Heparin stopped for bronch with EBUS    Emphysema lung (HCC) (Chronic) ICD-10-CM: J43.9  ICD-9-CM: 492.8  7/12/2019 Yes        Protein malnutrition ([address] (Chronic) ICD-10-CM: E46  ICD-9-CM: 260  7/9/2019 Yes        Cocaine abuse (HCC) (Chronic) ICD-10-CM: F14.10  ICD-9-CM: 305.60  7/9/2019 Yes        Lung mass ICD-10-CM: R91.8  ICD-9-CM: 786.6  7/8/2019 Yes    Bronch today. Pulmonary emboli [facility]) ICD-10-CM: I26.99  ICD-9-CM: 415.19  7/8/2019 Yes    Heparin off at 9AM.     Primary pancreatic cancer with metastasis to other site [facility]) ICD-10-CM: C25.9  ICD-9-CM: 157.9  7/8/2019 Yes    Oncology following. Mediastinal adenopathy ICD-10-CM: R59.0  ICD-9-CM: 785.6  7/8/2019 Yes    ? Pancreatic mets vs lung CA with mets vs lymphoma. Bronch with EBUS today. Plan:  (Medical Decision Making)     Heparin stopped at 9 AM for 3 PM bronch/EBUS. Continue oxygen. Amiodarone for afib. Add on BNP as pt 6L behind in UOP. --    More than 50% of the time documented was spent in face-to-face contact with the patient and in the care of the patient on the floor/unit where the patient is located.     Gary Max MD

## 2019-07-25 PROBLEM — J96.01 ACUTE RESPIRATORY FAILURE WITH HYPOXEMIA (HCC): Status: ACTIVE | Noted: 2019-01-01

## 2019-07-25 NOTE — PROGRESS NOTES
[facility] [place] CARDIOLOGY PROGRESS NOTE           7/25/2019 7:50 AM    Admit Date: 7/21/2019         Subjective: remain in sinus rhythm    ROS:  Cardiovascular:  As noted above    Objective:      Vitals:    07/25/19 0042 07/25/19 0045 07/25/19 0514 07/25/19 0744   BP: 109/73  117/77    Pulse: 94  94    Resp: 22  22    Temp: 99.6 °F (37.6 °C)  98.5 °F (36.9 °C)    SpO2: (!) 86% 94% 94% 90%   Weight:   165 lb (74.8 kg)    Height:           On telemetry: SR      Physical Exam:  General-No Acute Distress  Neck- supple, no JVD  CV- RRR  Lung- rhonchi throughout both sides  Abd- soft, nontender, nondistended  Ext- no edema bilaterally. Skin- warm and dry    Data Review:   Recent Labs     07/25/19  0347 07/24/19  0327   * 132*   K 4.6 4.1   BUN 13 10   CREA 0.79* 0.72*   * 94   WBC 11.6* 10.9   HGB 9.3* 10.3*   HCT 28.2* 31.3*    210       No results for input(s): TNIPOC, TROIQ in the last 72 hours. Assessment/Plan:     Principal Problem:    Atrial fibrillation with RVR ([facility] [address] (7/21/2019)    Active Problems:    Lung mass (7/8/2019)      Pulmonary emboli (HCC) (7/8/2019)      Primary pancreatic cancer with metastasis to other site [facility]) (7/8/2019)      Mediastinal adenopathy (7/8/2019)      Protein malnutrition ([address] (7/9/2019)      Cocaine abuse ([address] (7/9/2019)      Emphysema lung ([address] (7/12/2019)      Pneumonia (7/21/2019)      SVT (supraventricular tachycardia) (HCC) (7/21/2019)    A/P  1) Afib - amiodarone loading decrease to 0.5 mg/min, start xarelto once procedures are complete, until then continue hep gtt  2) pulm emboli - as above  3) Emphysema - per pulm    Addendum - discussed with Pulm and would like to stop amiodarone incase there is some amio lung toxicity leading to hypoxia. Would also like to stop heparin gtt.   Pt at increased risk of stroke off anticoag but acute illness will need to be addressed first.    Katie Cerna MD  7/25/2019 7:50 AM

## 2019-07-25 NOTE — PROGRESS NOTES
Spoke with Dr. Jaycee Mortimer about CRP >190 and pulmonary recommendation to stop amio. Also asked about heparin gtt and the fact that patient has potential lung hemorrhage and pulmonary is recommending D/C of drip, now that patient is in NSR. Per Dr. Jaycee Mortimer okay to D/C heparin gtt and amio.

## 2019-07-25 NOTE — PROGRESS NOTES
Spoke to JOBY, BeccaD, regarding Amio gtt ordered. Stated working on it right now and will send up shortly.

## 2019-07-25 NOTE — PROGRESS NOTES
Hospitalist Progress Note     Admit Date:  2019  7:03 AM   Name:  Erma Rodriguez   Age:  53 y.o.  :  1965   MRN:  [id number]   PCP:  Unknown, Provider  Treatment Team: Attending Provider: Joan Alcala MD; Consulting Provider: Darío Hurtado MD; Care Manager: Mary Gregory; Consulting Provider: Shelli Del Cid MD; Utilization Review: Rajeev Taylor RN; Consulting Provider: Latrell Mohr MD; Utilization Review: Ivonne Calero RN; Consulting Provider: Yumiko Shell MD; Primary Nurse: Carlyn Waller; Primary Nurse: Jeremy Pastor    Subjective:   HPI - Patient is a 54 yo male  with recent hx metastatic pancreatic cancer dx, PE on xarelto, who presented ER with complaints of SOB, nausea,  and sharp epigastric pain x 1 day. In ER patient had several runs of SVT and at one point went into afib RVR. Cardiology consulting. Patient on amiodarone gtt. CXR with bilat infiltrates. Patient started on rocephin and azithromycin. Oncology consulting. Pulm consulting with plan for EBUS . xarelto changed to heparin gtt for procedure. pulm consulted for EBUS and possible bronchoscopy on , but was unable to be completed due to amiodarone gtt. Subjective  Patient alert and oriented. Worsening oxygenation. SOB, reports occasional cough.      Objective:     Patient Vitals for the past 24 hrs:   Temp Pulse Resp BP SpO2   19 0835 98 °F (36.7 °C) 93 22 140/90 98 %   19 0744     90 %   19 0514 98.5 °F (36.9 °C) 94 22 117/77 94 %   19 0045     94 %   19 0042 99.6 °F (37.6 °C) 94 22 109/73 (!) 86 %   19 2315     94 %   19 2150     94 %   19 2125 99.1 °F (37.3 °C) 100 22 115/72    19 1811 98 °F (36.7 °C) (!) 101 21 115/77 95 %   19 1740     96 %   19 1230 98.7 °F (37.1 °C) 87 20 108/76 90 %     Oxygen Therapy  O2 Sat (%): 98 % (19 0835)  Pulse via Oximetry: 88 beats per minute (07/25/19 0744)  O2 Device: Heated; Hi flow nasal cannula (07/25/19 0758)  O2 Flow Rate (L/min): 60 l/min (07/25/19 0744)  O2 Temperature: 87.8 °F (31 °C) (07/25/19 0744)  FIO2 (%): 75 % (07/25/19 0744)    Intake/Output Summary (Last 24 hours) at 7/25/2019 1131  Last data filed at 7/25/2019 0514  Gross per 24 hour   Intake 1482.34 ml   Output 950 ml   Net 532.34 ml         REVIEW OF SYSTEMS: Comprehensive ROS performed and negative except as stated in HPI. Physical Examination:  General:         Cachectic. Alert. No distress noted. Eyes:               Normal sclera. Extraocular movements intact. ENT:                Normocephalic, atraumatic. Moist mucous membranes  CV:                  SR 87  Lungs:             rhonci on airvo   Abdomen:        Soft, LUQ tenderness on palpation  Extremities:     Warm and dry. No cyanosis or edema. Neurologic:      CN II-XII grossly intact. Sensation intact. Skin:                No rashes or jaundice. Normal coloration  Psych:             Normal mood and affect. Data Review:  I have reviewed all labs, meds, telemetry events, and studies from the last 24 hours.     Recent Results (from the past 24 hour(s))   PTT    Collection Time: 07/24/19 12:10 PM   Result Value Ref Range    aPTT 42.1 (H) 24.7 - 39.8 SEC   PTT    Collection Time: 07/24/19  8:58 PM   Result Value Ref Range    aPTT 81.8 (H) 24.7 - 39.8 SEC   CBC WITH AUTOMATED DIFF    Collection Time: 07/25/19  3:47 AM   Result Value Ref Range    WBC 11.6 (H) 4.3 - 11.1 K/uL    RBC 3.18 (L) 4.23 - 5.6 M/uL    HGB 9.3 (L) 13.6 - 17.2 g/dL    HCT 28.2 (L) 41.1 - 50.3 %    MCV 88.7 79.6 - 97.8 FL    MCH 29.2 26.1 - 32.9 PG    MCHC 33.0 31.4 - 35.0 g/dL    RDW 11.3 (L) 11.9 - 14.6 %    PLATELET 765 704 - 383 K/uL    MPV 9.5 9.4 - 12.3 FL    ABSOLUTE NRBC 0.00 0.0 - 0.2 K/uL    DF AUTOMATED      NEUTROPHILS 82 (H) 43 - 78 %    LYMPHOCYTES 8 (L) 13 - 44 %    MONOCYTES 8 4.0 - 12.0 %    EOSINOPHILS 1 0.5 - 7.8 %    BASOPHILS 0 0.0 - 2.0 %    IMMATURE GRANULOCYTES 1 0.0 - 5.0 %    ABS. NEUTROPHILS 9.5 (H) 1.7 - 8.2 K/UL    ABS. LYMPHOCYTES 0.9 0.5 - 4.6 K/UL    ABS. MONOCYTES 1.0 0.1 - 1.3 K/UL    ABS. EOSINOPHILS 0.1 0.0 - 0.8 K/UL    ABS. BASOPHILS 0.1 0.0 - 0.2 K/UL    ABS. IMM.  GRANS. 0.1 0.0 - 0.5 K/UL   METABOLIC PANEL, BASIC    Collection Time: 07/25/19  3:47 AM   Result Value Ref Range    Sodium 130 (L) 136 - 145 mmol/L    Potassium 4.6 3.5 - 5.1 mmol/L    Chloride 95 (L) 98 - 107 mmol/L    CO2 27 21 - 32 mmol/L    Anion gap 8 7 - 16 mmol/L    Glucose 106 (H) 65 - 100 mg/dL    BUN 13 6 - 23 MG/DL    Creatinine 0.79 (L) 0.8 - 1.5 MG/DL    GFR est AA >60 >60 ml/min/1.73m2    GFR est non-AA >60 >60 ml/min/1.73m2    Calcium 7.7 (L) 8.3 - 10.4 MG/DL   PTT    Collection Time: 07/25/19  6:50 AM   Result Value Ref Range    aPTT 92.5 (H) 24.7 - 39.8 SEC        All Micro Results     Procedure Component Value Units Date/Time    CULTURE, BLOOD [[id number]] Collected:  07/21/19 0832    Order Status:  Completed Specimen:  Blood Updated:  07/25/19 0946     Special Requests: --        LEFT  Antecubital       Culture result: NO GROWTH 4 DAYS       CULTURE, BLOOD [[id number]] Collected:  07/21/19 0832    Order Status:  Completed Specimen:  Blood Updated:  07/25/19 0946     Special Requests: --        RIGHT  Antecubital       Culture result: NO GROWTH 4 DAYS             Current Meds:  Current Facility-Administered Medications   Medication Dose Route Frequency    morphine CR (MS CONTIN) tablet 30 mg  30 mg Oral Q12H    heparin 25,000 units in dextrose 500 mL infusion  18-36 Units/kg/hr (Order-Specific) IntraVENous TITRATE    amiodarone (CORDARONE) 450 mg in dextrose 5% 250 mL infusion  0.5 mg/min IntraVENous CONTINUOUS    gabapentin (NEURONTIN) capsule 100 mg  100 mg Oral BID    LORazepam (ATIVAN) tablet 0.5 mg  0.5 mg Oral Q6H PRN    sodium chloride (NS) flush 5-40 mL  5-40 mL IntraVENous PRN    sodium chloride (NS) flush 5-40 mL  5-40 mL IntraVENous Q8H    sodium chloride (NS) flush 5-40 mL  5-40 mL IntraVENous PRN    cefTRIAXone (ROCEPHIN) 1 g in 0.9% sodium chloride (MBP/ADV) 50 mL  1 g IntraVENous Q24H    acetaminophen (TYLENOL) tablet 650 mg  650 mg Oral Q4H PRN    HYDROcodone-acetaminophen (NORCO) 5-325 mg per tablet 1 Tab  1 Tab Oral Q4H PRN    naloxone (NARCAN) injection 0.4 mg  0.4 mg IntraVENous PRN    albuterol (PROVENTIL VENTOLIN) nebulizer solution 2.5 mg  2.5 mg Nebulization Q4H PRN    zolpidem (AMBIEN) tablet 5 mg  5 mg Oral QHS PRN    senna-docusate (PERICOLACE) 8.6-50 mg per tablet 2 Tab  2 Tab Oral BID PRN    guaiFENesin ER (MUCINEX) tablet 1,200 mg  1,200 mg Oral Q12H    hydrALAZINE (APRESOLINE) 20 mg/mL injection 20 mg  20 mg IntraVENous Q4H PRN    HYDROcodone-acetaminophen (NORCO)  mg tablet 1 Tab  1 Tab Oral Q4H PRN    promethazine (PHENERGAN) with saline injection 25 mg  25 mg IntraVENous Q6H PRN    famotidine (PEPCID) tablet 40 mg  40 mg Oral DAILY    ondansetron (ZOFRAN ODT) tablet 8 mg  8 mg Oral TID    polyethylene glycol (MIRALAX) packet 17 g  17 g Oral DAILY       Diet:  DIET CARDIAC    Other Studies (last 24 hours):  No results found.     Assessment and Plan:     Hospital Problems as of 7/25/2019 Date Reviewed: 7/10/2019          Codes Class Noted - Resolved POA    Pneumonia ICD-10-CM: J18.9  ICD-9-CM: 566  7/21/2019 - Present Yes        SVT (supraventricular tachycardia) (HCC) ICD-10-CM: I47.1  ICD-9-CM: 427.89  7/21/2019 - Present Yes        * (Principal) Atrial fibrillation with RVR ([facility] [address] ICD-10-CM: I48.91  ICD-9-CM: 427.31  7/21/2019 - Present Yes        Emphysema lung ([facility] [address] (Chronic) ICD-10-CM: J43.9  ICD-9-CM: 492.8  7/12/2019 - Present Yes        Protein malnutrition (HCC) (Chronic) ICD-10-CM: E46  ICD-9-CM: 260  7/9/2019 - Present Yes        Cocaine abuse ([address] (Chronic) ICD-10-CM: F14.10  ICD-9-CM: 305.60  7/9/2019 - Present Yes        Lung mass ICD-10-CM: R91.8  ICD-9-CM: 786.6  7/8/2019 - Present Yes        Pulmonary emboli ([facility] [address] ICD-10-CM: I26.99  ICD-9-CM: 415.19  7/8/2019 - Present Yes        Primary pancreatic cancer with metastasis to other site [facility]) ICD-10-CM: C25.9  ICD-9-CM: 157.9  7/8/2019 - Present Yes        Mediastinal adenopathy ICD-10-CM: R59.0  ICD-9-CM: 785.6  7/8/2019 - Present Yes              A/P:    AFib RVR  -Telemetry, currently SR 87,   -cardiology consulting  -continue amiodarone gtt,  plan to change gtt to oral tomorrow if rhythm stable  -already on xarelto, changed to heparin gtt for EBUS, change back to xarelto post procedure       PNA    -based on CXR with infiltrates  -rocephin/azithro, mucinex, PRN nebs  -Follow cultures  -deterioration in oxygentation. -pulm consulting. IVF stopped. CXR ordered. -EBUS to be rescheduled.      Pancreatic cancer  -EBUS planned with pulm today 7/24  -per previous notes, may have lung cancer and pancreatic cancer ie two separate primaries  -oncology consulting, anemia work up      Palliative care  -need to focus on symptom management  -scheduled zofran. QTc ok on EKG  -MS contin low dose to start, 15mg BID  -PRN norco.    -bowel regimen  -Probably will need a prescription of narcotics and antiemetics at discharge to avoid bouncing back.  hx drug use irrelevant here; his symptoms are real     Hx PE  -currently on heparin gtt bridge for EBUS, restart xarelto post procedure per cardiology    Discharge dispo - TBD  DVT ppx - xarelto       Plan of care discussed with Dr. Mary Slater,      Signed:  Ian Casey NP-SHIRLEY

## 2019-07-25 NOTE — PROGRESS NOTES
CRP markedly elevated at > 190. BNP low. PCT low. Will start SM 80 mg q12 x 4 doses. Recommended amiodarone be stopped if Ok with Dr. Fina Chin. Also consider stopping heparin drip since in NSR and he could potentially have a pulmonary hemorrhage.      Fide Love MD

## 2019-07-25 NOTE — PROGRESS NOTES
RAPID RESPONSE CRITICAL CARE OUTREACH NURSE NOTE      Called to assess patient for MD concern. Upon arrival patient found half out of bed, face on the floor. When placed back in bed, patient was agonally breathing, pulse palpable and code blue called. Patient intubated by anesthesia, Dr. London Vargas also at bedside. See MAR for code documentation. LATEST VITAL SIGNS AND LAB VALUES RECORDED:  MEWS Score: 2 (07/25/19 1740)  Vitals:    07/25/19 1839 07/25/19 1843 07/25/19 1847 07/25/19 1850   BP:    100/73   Pulse:   (!) 101 89   Resp:       Temp:       SpO2: (!) 81% 90%  (!) 71%   Weight:       Height:         Recent Labs     07/25/19 0347 07/24/19 0327 07/23/19  0414   * 132* 133*   K 4.6 4.1 3.7   CL 95* 96* 98   CO2 27 26 25   AGAP 8 10 10   * 94 98   BUN 13 10 8   CREA 0.79* 0.72* 0.76*   GFRAA >60 >60 >60   GFRNA >60 >60 >60   CA 7.7* 7.6* 7.8*        Recent Labs     07/25/19 0347 07/24/19 0327 07/23/19  0414   WBC 11.6* 10.9 9.9   HGB 9.3* 10.3* 9.4*   HCT 28.2* 31.3* 28.6*    210 164          LAB WORK PENDING/SENT DURING RAPID RESPONSE:    Primary RN at bedside: yes  RT at bedside: yes  MD at bedside: yes        Interventions completed during Rapid Response:  Patient intubated and transferred to ICU. Is pt transferring to Critical Care bed? yes  Room #: 4313  Did Outreach RN assist with transfer?  yes

## 2019-07-25 NOTE — PROGRESS NOTES
Problem: Falls - Risk of  Goal: *Absence of Falls  Description  Document Yovana Dukes Fall Risk and appropriate interventions in the flowsheet.   Outcome: Progressing Towards Goal  Note:   Fall Risk Interventions:  Mobility Interventions: Patient to call before getting OOB         Medication Interventions: Evaluate medications/consider consulting pharmacy, Patient to call before getting OOB, Teach patient to arise slowly

## 2019-07-25 NOTE — PROCEDURES
Emergent Intubation  Performed by: Loyde Gosselin, MD  Authorized by: Loyde Gosselin, MD     Emergent Intubation:   Patient location: Floor. Date/Time:  7/25/2019 6:40 PM  Indications:  Respiratory failure  Spontaneous Ventilation: present    Expected sedation level: Minimally alert. Bag mask ventilation in progress on arrival.  Preoxygenated: Yes      Airway Documentation:   Airway:  ETT - Cuffed  Technique:  Direct laryngoscopy  Blade Type:  Jayce  ETT size (mm):  8.0  ETT Line Damion:  Lips  ETT Insertion depth (cm):  25  Placement verified by: auscultation and BBS    Placement verified by comment:  Color change on CO2 colorimetry  Attempts:  1  Difficult airway: Yes (Poor visualization due to patient positioning and secretions in posterior pharynx.  Grade 3 glottic view.)

## 2019-07-25 NOTE — PROGRESS NOTES
At [address] after x-ray had just left patient room, when ICU RN Brooks Cody came to assess patient. RN called for help to the room. Patient found hanging out of bed, face down on the ground, oxygen out of nose. Patient put back into bed by staff. Rapid called upon finding patient on floor. Once back in bed able to palpate strong femoral pulse however patient clearly unable to breath, code blue called at this time. Patient with very shallow, sharp chest movement. Patient bagged and pulmonary intubate. See code documentation flowsheet. Report given to ICU GERARDO Leigh on transfer to  . Floor staff attempt to contact patient family, no answer.

## 2019-07-25 NOTE — PROGRESS NOTES
Pt 02 sat 75%, NC 15 L with 02 sat 80%. RT paged and at bedside. Spoke to Dr. Sonny Decker updating on pt condition. Telephone order to put patient on heated high flow.

## 2019-07-25 NOTE — PROGRESS NOTES
Pateint did not receive EBUS procedure on 7/24. No note in system addressing reason. RN called to bronch lab, per bronch lab RN procedure was put on hold by Dr. Lynch because of amio drip running.

## 2019-07-25 NOTE — PROGRESS NOTES
Bedside and Verbal shift change report given to self (oncoming nurse) by Everardo Clancy RN (offgoing nurse). Report included the following information SBAR, Kardex, MAR and Accordion.  Heparin gtt verified at bedside, amio gtt visualized

## 2019-07-25 NOTE — PROGRESS NOTES
Ilene Torers  Admission Date: 7/21/2019             Daily Progress Note: 7/25/2019    The patient's chart is reviewed and the patient is discussed with the staff. Patient is a 53 y.o.  male seen and evaluated at the request of Dr. Sudhir Shearer. Mr. Dodd is a 53 y. o. male admitted on 7/21/2019 with a primary diagnosis of pneumonia.  His PMH includes chronic tobacco use and cocaine use.  He was seen in consult by Dr. Christin Ruano on 7/12/19. [place] that time, he presented with c/o progressive severe abd pain x 3 days as well as wt loss over the past several months.  Bili 2.6, Lipase 6000.  CT CAP revealed RLL segmental PE, mediastinal LAD, 4 cm pleurally based cavitary mass in L apex, 2cm pancreatic mass obstructing pancreatic and CBD with associated biliary duct dilation and GB distention, and gastric wall thickening.  He is s/p ERCP with stent placement and FNA of pancreatic mass/periportal LAD on 7/8.  Biliary brushing cytology +rare atypical cells.  Path on periportal/precarinal nodes +poorly differentiated metastatic carcinoma. [facility] non-specific as to origin, however a primary tumor in upper GI or pancreatobiliary tree cannot be excluded.  During that consult, EBUS with possible navigational biopsy to the lung was recommended as there may be a possible 2nd primary. [facility] returned on 7/21/19 to ED with c/o nausea and sharp epigastric pain x 1 day as well as shortness of breath.  In ED, he was having runs of SVT and Afib with RVR.  On amio.  CXR with developing diffuse alveolar infiltrates bilaterally.  On Rocephin/Azith.  Palliative care following. We have been consulted for consideration of EBUS and possible navigation bronchoscopy. Seen last on 7/12 by our service at the time [facility] was not completed yet. Subjective:     Apparently pt did not have EBUS yesterday due to amiodarone drip. MS Contin increased by PC. FIO2 requirement now up to 75%.      Current Facility-Administered Medications   Medication Dose Route Frequency    morphine CR (MS CONTIN) tablet 30 mg  30 mg Oral Q12H    heparin 25,000 units in dextrose 500 mL infusion  18-36 Units/kg/hr (Order-Specific) IntraVENous TITRATE    amiodarone (CORDARONE) 450 mg in dextrose 5% 250 mL infusion  0.5 mg/min IntraVENous CONTINUOUS    gabapentin (NEURONTIN) capsule 100 mg  100 mg Oral BID    LORazepam (ATIVAN) tablet 0.5 mg  0.5 mg Oral Q6H PRN    sodium chloride (NS) flush 5-40 mL  5-40 mL IntraVENous PRN    sodium chloride (NS) flush 5-40 mL  5-40 mL IntraVENous Q8H    sodium chloride (NS) flush 5-40 mL  5-40 mL IntraVENous PRN    cefTRIAXone (ROCEPHIN) 1 g in 0.9% sodium chloride (MBP/ADV) 50 mL  1 g IntraVENous Q24H    acetaminophen (TYLENOL) tablet 650 mg  650 mg Oral Q4H PRN    HYDROcodone-acetaminophen (NORCO) 5-325 mg per tablet 1 Tab  1 Tab Oral Q4H PRN    naloxone (NARCAN) injection 0.4 mg  0.4 mg IntraVENous PRN    albuterol (PROVENTIL VENTOLIN) nebulizer solution 2.5 mg  2.5 mg Nebulization Q4H PRN    zolpidem (AMBIEN) tablet 5 mg  5 mg Oral QHS PRN    senna-docusate (PERICOLACE) 8.6-50 mg per tablet 2 Tab  2 Tab Oral BID PRN    guaiFENesin ER (MUCINEX) tablet 1,200 mg  1,200 mg Oral Q12H    hydrALAZINE (APRESOLINE) 20 mg/mL injection 20 mg  20 mg IntraVENous Q4H PRN    HYDROcodone-acetaminophen (NORCO)  mg tablet 1 Tab  1 Tab Oral Q4H PRN    promethazine (PHENERGAN) with saline injection 25 mg  25 mg IntraVENous Q6H PRN    0.9% sodium chloride infusion  75 mL/hr IntraVENous CONTINUOUS    famotidine (PEPCID) tablet 40 mg  40 mg Oral DAILY    ondansetron (ZOFRAN ODT) tablet 8 mg  8 mg Oral TID    polyethylene glycol (MIRALAX) packet 17 g  17 g Oral DAILY       Review of Systems    Constitutional: negative for fever, chills, sweats  Cardiovascular: negative for chest pain, palpitations, syncope, edema  Gastrointestinal:  negative for dysphagia, reflux, vomiting, diarrhea, abdominal pain, or melena  Neurologic:  negative for focal weakness, numbness, headache    Objective:     Vitals:    07/25/19 0045 07/25/19 0514 07/25/19 0744 07/25/19 0835   BP:  117/77  140/90   Pulse:  94  93   Resp:  22 22   Temp:  98.5 °F (36.9 °C)  98 °F (36.7 °C)   SpO2: 94% 94% 90% 98%   Weight:  165 lb (74.8 kg)     Height:         Intake and Output:   07/23 1901 - 07/25 0700  In: 2833.3 [P.O.:400; I.V.:2433.3]  Out: 1150 [Urine:1150]  No intake/output data recorded. Physical Exam:   Constitution:  the patient is well developed and in no acute distress on 75%/40L  EENMT:  Sclera clear, pupils equal, oral mucosa moist; poor dentition  Neck: JVD to jaw  Respiratory: decreased BS bilaterally. Cardiovascular:  RRR without M,G,R  Gastrointestinal: soft and non-tender; with positive bowel sounds. Musculoskeletal: warm without cyanosis. There is no lower extremity edema. Skin:  no jaundice or rashes  Neurologic: no gross neuro deficits     Psychiatric:  alert and oriented x 3    CXR:           LAB  Recent Labs     07/22/19  1551   GLUCPOC 95      Recent Labs     07/25/19  0347 07/24/19  0327 07/23/19  0414   WBC 11.6* 10.9 9.9   HGB 9.3* 10.3* 9.4*   HCT 28.2* 31.3* 28.6*    210 164     Recent Labs     07/25/19  0347 07/24/19  0327 07/23/19  1216 07/23/19  0414   * 132*  --  133*   K 4.6 4.1  --  3.7   CL 95* 96*  --  98   CO2 27 26  --  25   * 94  --  98   BUN 13 10  --  8   CREA 0.79* 0.72*  --  0.76*   CA 7.7* 7.6*  --  7.8*   TBILI  --   --  0.4  --      No results for input(s): PH, PCO2, PO2, HCO3, PHI, PCO2I, PO2I, HCO3I in the last 72 hours. No results for input(s): LCAD, LAC in the last 72 hours.       Assessment:  (Medical Decision Making)     Hospital Problems  Date Reviewed: 7/10/2019          Codes Class Noted POA    Pneumonia ICD-10-CM: J18.9  ICD-9-CM: 871  7/21/2019 Yes    On Abx    SVT (supraventricular tachycardia) ([facility] [address] ICD-10-CM: I47.1  ICD-9-CM: 427.89  7/21/2019 Yes        * (Principal) Atrial fibrillation with RVR (HCC) ICD-10-CM: I48.91  ICD-9-CM: 427.31  7/21/2019 Yes    On amiodarone. Now in SR. Emphysema lung (HCC) (Chronic) ICD-10-CM: J43.9  ICD-9-CM: 492.8  7/12/2019 Yes    Advanced    Protein malnutrition ([address] (Chronic) ICD-10-CM: E46  ICD-9-CM: 641  7/9/2019 Yes        Cocaine abuse (HCC) (Chronic) ICD-10-CM: F14.10  ICD-9-CM: 305.60  7/9/2019 Yes        Lung mass ICD-10-CM: R91.8  ICD-9-CM: 786.6  7/8/2019 Yes    Bronch cancelled. Pulmonary emboli [facility]) ICD-10-CM: I26.99  ICD-9-CM: 415.19  7/8/2019 Yes    Heparin drip     Primary pancreatic cancer with metastasis to other site [facility]) ICD-10-CM: C25.9  ICD-9-CM: 157.9  7/8/2019 Yes    Oncology following. Mediastinal adenopathy ICD-10-CM: R59.0  ICD-9-CM: 785.6  7/8/2019 Yes    ? Pancreatic mets vs lung CA with mets vs lymphoma. Bronch with EBUS today. Plan:  (Medical Decision Making)     Stop IVF. Check CXR given deterioration in oxygenation. Continue heparin drip for recent afib/PTE. [place] Continue oxygen as needed. Amiodarone for recent afib. EBUS will have to be rescheduled. --    More than 50% of the time documented was spent in face-to-face contact with the patient and in the care of the patient on the floor/unit where the patient is located. Messi Tse MD    CXR as follows:            DDX: CHF, ARDS, pneumonia, acute amiodarone lung toxicity, pulmonary hemorrhage. Will check BNP, CRP, PCT. Hgb has been relatively stable so pulmonary hemorrhage less likely. Looking like he may not be able to have EBUS. Spoke with pt that his condition had markedly worsened. He indicates he would not want life support if he couldn't come off. Informed him that given his numerous severe comorbidities, he would likely not be able to survive on the vent as treatment for his cancer(s) would not be possible.     Messi Tse MD

## 2019-07-25 NOTE — PROGRESS NOTES
Latasha Bloomfield  Admission Date: 7/21/2019             Daily Progress Note: 7/25/2019    The patient's chart is reviewed and the patient is discussed with the staff. Patient is a 53 y.o.  male seen and evaluated at the request of Dr. Chante Chacon. Mr. Dodd is a 53 y. o. male admitted on 7/21/2019 with a primary diagnosis of pneumonia.  His PMH includes chronic tobacco use and cocaine use.  He was seen in consult by Dr. Catracho Muñiz on 7/12/19. Skye Fofana that time, he presented with c/o progressive severe abd pain x 3 days as well as wt loss over the past several months.  Bili 2.6, Lipase 6000.  CT CAP revealed RLL segmental PE, mediastinal LAD, 4 cm pleurally based cavitary mass in L apex, 2cm pancreatic mass obstructing pancreatic and CBD with associated biliary duct dilation and GB distention, and gastric wall thickening.  He is s/p ERCP with stent placement and FNA of pancreatic mass/periportal LAD on 7/8.  Biliary brushing cytology +rare atypical cells.  Path on periportal/precarinal nodes +poorly differentiated metastatic carcinoma. [facility] non-specific as to origin, however a primary tumor in upper GI or pancreatobiliary tree cannot be excluded.  During that consult, EBUS with possible navigational biopsy to the lung was recommended as there may be a possible 2nd primary. Feliciano Vaughn returned on 7/21/19 to ED with c/o nausea and sharp epigastric pain x 1 day as well as shortness of breath.  In ED, he was having runs of SVT and Afib with RVR.  On amio.  CXR with developing diffuse alveolar infiltrates bilaterally.  On Rocephin/Azith.  Palliative care following. We have been consulted for consideration of EBUS and possible navigation bronchoscopy. Seen last on 7/12 by our service at the time [facility] was not completed yet. Apparently pt did not have EBUS yesterday due to amiodarone drip. Subjective:     Patient worse now and oxygen up to 84% and reports it is harder to breath today.  Not pain, not expectorating. spoke with cardiology and they stopped amiodarone. Did note that on 7.8.19 his drug screen was positive for cocaine and he reported was using it only a month ago. Also reports now that would like to be on a Vent if needed but not forever, dose not want to be a \"vegetable\"  per him.  Also would like CPR, if needed    Current Facility-Administered Medications   Medication Dose Route Frequency    morphine CR (MS CONTIN) tablet 30 mg  30 mg Oral Q12H    methylPREDNISolone (PF) (Solu-MEDROL) injection 80 mg  80 mg IntraVENous Q12H    gabapentin (NEURONTIN) capsule 100 mg  100 mg Oral BID    LORazepam (ATIVAN) tablet 0.5 mg  0.5 mg Oral Q6H PRN    sodium chloride (NS) flush 5-40 mL  5-40 mL IntraVENous PRN    sodium chloride (NS) flush 5-40 mL  5-40 mL IntraVENous Q8H    sodium chloride (NS) flush 5-40 mL  5-40 mL IntraVENous PRN    cefTRIAXone (ROCEPHIN) 1 g in 0.9% sodium chloride (MBP/ADV) 50 mL  1 g IntraVENous Q24H    acetaminophen (TYLENOL) tablet 650 mg  650 mg Oral Q4H PRN    HYDROcodone-acetaminophen (NORCO) 5-325 mg per tablet 1 Tab  1 Tab Oral Q4H PRN    naloxone (NARCAN) injection 0.4 mg  0.4 mg IntraVENous PRN    albuterol (PROVENTIL VENTOLIN) nebulizer solution 2.5 mg  2.5 mg Nebulization Q4H PRN    zolpidem (AMBIEN) tablet 5 mg  5 mg Oral QHS PRN    senna-docusate (PERICOLACE) 8.6-50 mg per tablet 2 Tab  2 Tab Oral BID PRN    guaiFENesin ER (MUCINEX) tablet 1,200 mg  1,200 mg Oral Q12H    hydrALAZINE (APRESOLINE) 20 mg/mL injection 20 mg  20 mg IntraVENous Q4H PRN    HYDROcodone-acetaminophen (NORCO)  mg tablet 1 Tab  1 Tab Oral Q4H PRN    promethazine (PHENERGAN) with saline injection 25 mg  25 mg IntraVENous Q6H PRN    famotidine (PEPCID) tablet 40 mg  40 mg Oral DAILY    ondansetron (ZOFRAN ODT) tablet 8 mg  8 mg Oral TID    polyethylene glycol (MIRALAX) packet 17 g  17 g Oral DAILY       Review of Systems    Constitutional: negative for fever, chills, sweats  Cardiovascular: negative for chest pain, palpitations, syncope, edema  Gastrointestinal:  negative for dysphagia, reflux, vomiting, diarrhea, abdominal pain, or melena  Neurologic:  negative for focal weakness, numbness, headache    Objective:     Vitals:    07/25/19 0835 07/25/19 1252 07/25/19 1740 07/25/19 1745   BP: 140/90 121/84 (!) 144/94    Pulse: 93 90 100    Resp: 22 22 24    Temp: 98 °F (36.7 °C) 98.1 °F (36.7 °C) 98 °F (36.7 °C)    SpO2: 98% 90% (!) 77% 91%   Weight:       Height:         Intake and Output:   07/23 1901 - 07/25 0700  In: 2833.3 [P.O.:400; I.V.:2433.3]  Out: 1150 [Urine:1150]  No intake/output data recorded. Physical Exam:   Constitution:  the patient is well developed and in no acute distress on 84%/40L  EENMT:  Sclera clear, pupils equal, oral mucosa moist; poor dentition  Neck: JVD to jaw  Respiratory: diffuse crackles b/l -- new per nursing  Cardiovascular:  RRR without M,G,R  Gastrointestinal: soft and non-tender; with positive bowel sounds. Musculoskeletal: warm without cyanosis. There is no lower extremity edema. Skin:  no jaundice or rashes  Neurologic: no gross neuro deficits     Psychiatric:  alert and oriented x 3    CXR: today with diffuse b/l infiltrates which is new compared to prior study below        7/21/19        LAB  No results for input(s): GLUCPOC in the last 72 hours. No lab exists for component: [place]   Recent Labs     07/25/19  0347 07/24/19  0327 07/23/19  0414   WBC 11.6* 10.9 9.9   HGB 9.3* 10.3* 9.4*   HCT 28.2* 31.3* 28.6*    210 164     Recent Labs     07/25/19  0347 07/24/19  0327 07/23/19  1216 07/23/19  0414   * 132*  --  133*   K 4.6 4.1  --  3.7   CL 95* 96*  --  98   CO2 27 26  --  25   * 94  --  98   BUN 13 10  --  8   CREA 0.79* 0.72*  --  0.76*   CA 7.7* 7.6*  --  7.8*   TBILI  --   --  0.4  --      No results for input(s): PH, PCO2, PO2, HCO3, PHI, PCO2I, PO2I, HCO3I in the last 72 hours.   No results for input(s): LCAD, LAC in the last 72 hours. Assessment:  (Medical Decision Making)     Hospital Problems  Date Reviewed: 7/10/2019          Codes Class Noted POA    Pneumonia ICD-10-CM: J18.9  ICD-9-CM: 199  7/21/2019 Yes    On Abx    SVT (supraventricular tachycardia) (HCC) ICD-10-CM: I47.1  ICD-9-CM: 427.89  7/21/2019 Yes    Not at this time. * (Principal) Atrial fibrillation with RVR (HCC) ICD-10-CM: I48.91  ICD-9-CM: 427.31  7/21/2019 Yes    Off amiodarone. Now in SR. Emphysema lung (HCC) (Chronic) ICD-10-CM: J43.9  ICD-9-CM: 492.8  7/12/2019 Yes    Advanced    Protein malnutrition ([facility] [address] (Chronic) ICD-10-CM: E46  ICD-9-CM: 194  7/9/2019 Yes        Cocaine abuse (HCC) (Chronic) ICD-10-CM: F14.10  ICD-9-CM: 305.60  7/9/2019 Yes    --see below    Lung mass ICD-10-CM: R91.8  ICD-9-CM: 786.6  7/8/2019 Yes    Bronch cancelled. Pulmonary emboli [facility]) ICD-10-CM: I26.99  ICD-9-CM: 415.19  7/8/2019 Yes    Heparin drip     Primary pancreatic cancer with metastasis to other site [facility]) ICD-10-CM: C25.9  ICD-9-CM: 157.9  7/8/2019 Yes    Oncology following. Mediastinal adenopathy ICD-10-CM: R59.0  ICD-9-CM: 785.6  7/8/2019 Yes    ? Pancreatic mets vs lung CA with mets vs lymphoma. Bronch with EBUS today. Acute hypoxemic respiratory failure  --worse now    Cocaine use  --per prior drug screen from 7/8. Will repeat to see if he is using it --> per lab urine is negative after 2-4 days. --will repeat to see if contributing.   --per staff did have a lady guest last night who acted odd and urinated on self per staff. Plan:  (Medical Decision Making)     --oxygenation worse and will send to ICU for BIPAP. High risk to end up on vent and he is agreeable to for a short trial (now more than 2 weeks). He does not want to live on a Vent  --BNP normal and has drug use history with cocaine.  Will check drug screen to see if still using it and likely contribuitng to worsening lung condition or if this may be from infection or possible amiodarone. --CRP > 190 and on steroids and would continue  --had PE prior admission and off heparin since earlier ?hemmorrage in chest  --f/u arythmias per PMD  --EBUS IN the future  --continue remaining treatment. Just when I was finishing talking to the nursing staff became unresponsive and not following commands. Bagged and slightly alert and reports Does NOT want to DIE. Just intubated with Anesthesia and going to the ICU. Anesthesia help appreciated. Condition is critical  Time spent is 40 minutes so far. More than 50% of the time documented was spent in face-to-face contact with the patient and in the care of the patient on the floor/unit where the patient is located.     Marquita Huerta MD

## 2019-07-25 NOTE — PROGRESS NOTES
Left message on Dr. Randa Jalloh cell regarding Dr. Kady Gardner recommendations regarding amiodarone and heparin.       Deonte Luna

## 2019-07-25 NOTE — PROGRESS NOTES
Bedside and Verbal shift change report given to Ilda Richmond RN (oncoming nurse) by self (offgoing nurse). Report included the following information SBAR, Kardex, MAR and Recent Results.  Heparin gtt verified at bedside

## 2019-07-25 NOTE — PROGRESS NOTES
Care Management Interventions  PCP Verified by CM: (previously referred to \[facility]\""/[facility])  Mode of Transport at Discharge: (family)  Transition of Care Consult (CM Consult): Discharge Planning(Pt has been seen by [facility] for fianancial assistance and is pending for medicaid eligibility/disability.)  Discharge Durable Medical Equipment: No(Pt has private pay arrangements with [employer] for home O2.)  Physical Therapy Consult: No  Occupational Therapy Consult: No  Speech Therapy Consult: No  Current Support Network: Relative's Home(Pt lives with his nephew and hs other supportive extended family.  )  Plan discussed with Pt/Family/Caregiver: Yes  Freedom of Choice Offered: Yes   Resource Information Provided?: No  Discharge Location  Discharge Placement: Home(Await recommendations for any follow up supportive care needs.  )      EBUS pending, berniceo gtt, palliative care following, Ilya Leonard has met with pt. Pt has no payer source limited dc options. CM to continue to follow.

## 2019-07-25 NOTE — PROGRESS NOTES
Palliative Care Progress Note    Patient: Roberto Love MRN: [id number]  SSN: [id number]    YOB: 1965  Age: 53 y.o. Sex: male       Assessment/Plan:     Chief Complaint/Interval History: pt with dyspnea this am.  HR improved on amio drip. EBUS held yesterday due to ongoing need for amio drip. Principal Diagnosis:    · Dyspnea  R06.00    Additional Diagnoses:   · Acute Respiratory Failure, Unspecified  J96.00  · Debility, Unspecified  R53.81  · Frailty  R54  · Pain, abdomen  R10.9  · Pain, back  M54.9  · Encounter for Palliative Care  Z51.5    Palliative Performance Scale (PPS)   40%    Medical Decision Making:   Reviewed and summarized labs and imaging over past 24 hours. Pt recognizes his decline in functional status. He wishes to pursue workup to establish diagnosis. Discussed in current condition pt would not tolerate any disease directed therapy. Pt understands and states \"its in the Lords hands. \"    I have increased ms contin to 30 mg po bid for ongoing pain and dyspnea. Continue prn meds. Will follow loosely with plans for workup and continue conversations/symptom management. Will discuss findings with members of the interdisciplinary team.      More than 50% of this 30 minute visit was spent counseling and coordination of care as outlined above. Subjective:     Review of Systems negative with the exception of as noted above     Objective:     Visit Vitals  /90 (BP 1 Location: Right arm, BP Patient Position: At rest)   Pulse 93   Temp 98 °F (36.7 °C)   Resp 22   Ht 6' 5\" (1.956 m)   Wt 165 lb (74.8 kg)   SpO2 98%   BMI 19.57 kg/m²       Physical Exam:    General:  Cooperative. No acute distress. cachexia   Eyes:  Conjunctivae/corneas clear    Nose: Nares normal. Septum midline.    Neck: Supple, symmetrical, trachea midline, no JVD   Lungs:   Coarse bilateral BS   Heart:  Regular rate and rhythm, no murmur    Abdomen:   Soft, non-tender, non-distended Extremities: Normal, atraumatic, muscle wasting evident   Skin: Skin color, texture, turgor normal. No rash or lesions.    Neurologic: Nonfocal   Psych: Alert and oriented     Signed By: Sebastian Scuhltz MD     July 25, 2019

## 2019-07-25 NOTE — PROGRESS NOTES
Patient with ferric gluconate due which is not compatible with heparin or amio. Patient has been in NSR for over 24 hours now, amio running at 0.5 mg/min. Called and spoke with Dr. Karol Tesfaye about potentially holding amio gtt for duration of iron or changing patient to PO. Verbal order received to change patient over to PO amio 400 mg BID. Order placed.

## 2019-07-25 NOTE — PROGRESS NOTES
1749 During evening vitals patient O2 80% on optiflow, called respiratory with increased optiflow rate patient now 95%. Paging pulmonary to notify of increased oxygen needs.     1813 pulmonary at bedside

## 2019-07-26 PROBLEM — R91.8 BILATERAL PULMONARY INFILTRATES ON CHEST X-RAY: Status: ACTIVE | Noted: 2019-01-01

## 2019-07-26 PROBLEM — E87.8 ELECTROLYTE ABNORMALITY: Status: ACTIVE | Noted: 2019-01-01

## 2019-07-26 PROBLEM — J43.2 CENTRILOBULAR EMPHYSEMA (HCC): Status: ACTIVE | Noted: 2019-01-01

## 2019-07-26 NOTE — PROGRESS NOTES
Ventilator check complete; patient has a #8. 0 ET tube secured at the 26 at the teeth. Patient is sedated. Patient is able to follow commands. Breath sounds are coarse. Trachea is midline, Negative for subcutaneous air, and chest excursion is symmetric. Patient is also Negative for cyanosis and is Negative for pitting edema. All alarms are set and audible. Resuscitation bag is at the head of the bed.       Ventilator Settings  Mode FIO2 Rate Tidal Volume Pressure PEEP I:E Ratio   Pressure control  50 %      18 cm H2O  10 cm H20  1:2.5      Peak airway pressure: 28 cm H2O   Minute ventilation: 9.8 l/min     Janae Severe

## 2019-07-26 NOTE — PROGRESS NOTES
Chart reviewed after tx to ICU for BIPAP, then intubated/vent. Poor prognosis per MD notes and Palliative care following. Hospice was consulted previously, but family and pt not ready for any decision at that time. Screen completed prior to tx from 3rd tele. CM will continue to follow for any assist and d/c POC.

## 2019-07-26 NOTE — PROGRESS NOTES
Nutrition LOS Note: day 5  Assessment  DIET CARDIAC Regular    Pt is now intubated, ventilated, and sedated after a rapid response was called yesterday. IVF: NS @ 100. Medications: fentanyl, diprivan, solu-medrol (80 mg q12), zosyn, vancomycin  Pt with metastatic carcinoma (unknown origin-pancreatic vs lung). H/O cocaine and tobacco abuse. Anthropometrics: Height: 6' 5\" (195.6 cm), Weight Source: Bed, Weight: 60.4 kg (133 lb 2.5 oz), Body mass index is 15.79 kg/m². BMI class of underweight. WT / BMI 7/26/2019 7/7/2019 6/20/2019 1/12/2019   WEIGHT 133 lb 2.5 oz 170 lb 170 lb 172 lb   Per weights lised in EMR, potential for a 39 pound, 22.3% clinically significant weight loss throughout this year. Macronutrient Needs:(60.4 kg)  · EER:  4594-0351 kcal /day (30-35 kcal/kg listed BW)  · EPR:   grams protein/day (1.2-2 grams/kg CBW)  · Max CHO: 291 gm CHO/day (55% kcal)  · Fluid: 1ml/kcal or per MD  Intake/Comparative Standards: Currently NPO, meeting 0% of needs. Diprivan is providing ~356 kcal at current rate. Nutrition Diagnosis:   1. Inadequate energy intake r/t pt intubated and ventilated, as evidenced by pt meeting <25% of EEN from diprivan. 2. Unintended weight loss r/t hypermetabolic disease state, as evidenced by pt with metastatic cancer and weight loss noted above. Intervention:   Meals and snacks: Change diet to NPO. EN: Please consult nutrition services if TF is c/w goals of care. Discharge nutrition plan: Too soon to determine.      Scout Gleason [address], [address], [address] [place] [address], [facility] [address]

## 2019-07-26 NOTE — PROGRESS NOTES
Pharmacokinetic Consult to Pharmacist    Lali Caputo is a 53 y.o. male being treated for CAP with vancomycin. Height: 6' 5\" (195.6 cm)  Weight: 57.8 kg (127 lb 6.8 oz)  Lab Results   Component Value Date/Time    BUN 13 07/25/2019 03:47 AM    Creatinine 0.79 (L) 07/25/2019 03:47 AM    WBC 11.6 (H) 07/25/2019 03:47 AM    Procalcitonin 0.7 07/25/2019 12:00 PM    Lactic Acid (POC) 1.82 07/21/2019 08:38 AM      Estimated Creatinine Clearance: 86.4 mL/min (A) (based on SCr of 0.79 mg/dL (L)). CULTURES:  All Micro Results     Procedure Component Value Units Date/Time    CULTURE, BLOOD [[id number]] Collected:  07/25/19 2126    Order Status:  Completed Specimen:  Blood Updated:  07/25/19 2147    CULTURE, URINE [[id number]]     Order Status:  Sent Specimen:  Urine from [place] Specimen     CULTURE, RESPIRATORY/SPUTUM/BRONCH Nokomis Passey STAIN [[id number]]     Order Status:  Sent Specimen:  Sputum from Endotracheal aspirate     CULTURE, BLOOD [[id number]] Collected:  07/21/19 0832    Order Status:  Completed Specimen:  Blood Updated:  07/25/19 0946     Special Requests: --        LEFT  Antecubital       Culture result: NO GROWTH 4 DAYS       CULTURE, BLOOD [[id number]] Collected:  07/21/19 0832    Order Status:  Completed Specimen:  Blood Updated:  07/25/19 0946     Special Requests: --        RIGHT  Antecubital       Culture result: NO GROWTH 4 DAYS               Day 1 of vancomycin. Goal trough is 15-20. Vancomycin dose initiated at 1,500 mg load, followed by 1,000 mg q12h. Will continue to follow patient.       Thank you,  Aide Perez, PharmD

## 2019-07-26 NOTE — PROGRESS NOTES
Critical Care Daily Progress Note: 7/26/2019  Admission Date: 7/21/2019     The patient's chart is reviewed and the patient is discussed with the staff. Mr. Dodd is a 53 y. o. male admitted on 7/21/2019 with a primary diagnosis of pneumonia.  His PMH includes chronic tobacco use and cocaine use.  He was seen in consult by Dr. Rafaela Payne on 7/12/19. [facility] that time, he presented with c/o progressive severe abd pain x 3 days as well as wt loss over the past several months.  Bili 2.6, Lipase 6000.  CT CAP revealed RLL segmental PE, mediastinal LAD, 4 cm pleurally based cavitary mass in L apex, 2cm pancreatic mass obstructing pancreatic and CBD with associated biliary duct dilation and GB distention, and gastric wall thickening.  He is s/p ERCP with stent placement and FNA of pancreatic mass/periportal LAD on 7/8.  Biliary brushing cytology +rare atypical cells.  Path on periportal/precarinal nodes +poorly differentiated metastatic carcinoma. [facility] non-specific as to origin, however a primary tumor in upper GI or pancreatobiliary tree cannot be excluded.  During that consult, EBUS with possible navigational biopsy to the lung was recommended as there may be a possible 2nd primary. Harini Milan returned on 7/21/19 to ED with c/o nausea and sharp epigastric pain x 1 day as well as shortness of breath.  In ED, he was having runs of SVT and Afib with RVR.  On amio.  CXR with developing diffuse alveolar infiltrates bilaterally.  On Rocephin/Azith.  Palliative care following. We have been consulted for consideration of EBUS and possible navigation bronchoscopy. Seen last on 7/12 by our service at the time [facility] was not completed       Subjective:     Events from yesterday are noted. Now he is on vent sedated with diprivan and Fentanyl.  No family is here     Current Facility-Administered Medications   Medication Dose Route Frequency    fentaNYL in normal saline (pf) 25 mcg/mL infusion  0-200 mcg/hr IntraVENous TITRATE    LORazepam (ATIVAN) injection 2 mg  2 mg IntraVENous Q2H PRN    fentaNYL citrate (PF) injection 50 mcg  50 mcg IntraVENous Q1H PRN    methylPREDNISolone (PF) (Solu-MEDROL) injection 80 mg  80 mg IntraVENous Q12H    0.9% sodium chloride infusion  100 mL/hr IntraVENous CONTINUOUS    propofol (DIPRIVAN) infusion  0-50 mcg/kg/min IntraVENous TITRATE    piperacillin-tazobactam (ZOSYN) 4.5 g in 0.9% sodium chloride (MBP/ADV) 100 mL  4.5 g IntraVENous Q8H    vancomycin (VANCOCIN) 1,000 mg in 0.9% sodium chloride (MBP/ADV) 250 mL  1,000 mg IntraVENous Q12H    gabapentin (NEURONTIN) capsule 100 mg  100 mg Oral BID    LORazepam (ATIVAN) tablet 0.5 mg  0.5 mg Oral Q6H PRN    sodium chloride (NS) flush 5-40 mL  5-40 mL IntraVENous PRN    sodium chloride (NS) flush 5-40 mL  5-40 mL IntraVENous Q8H    sodium chloride (NS) flush 5-40 mL  5-40 mL IntraVENous PRN    acetaminophen (TYLENOL) tablet 650 mg  650 mg Oral Q4H PRN    HYDROcodone-acetaminophen (NORCO) 5-325 mg per tablet 1 Tab  1 Tab Oral Q4H PRN    naloxone (NARCAN) injection 0.4 mg  0.4 mg IntraVENous PRN    albuterol (PROVENTIL VENTOLIN) nebulizer solution 2.5 mg  2.5 mg Nebulization Q4H PRN    zolpidem (AMBIEN) tablet 5 mg  5 mg Oral QHS PRN    senna-docusate (PERICOLACE) 8.6-50 mg per tablet 2 Tab  2 Tab Oral BID PRN    guaiFENesin ER (MUCINEX) tablet 1,200 mg  1,200 mg Oral Q12H    hydrALAZINE (APRESOLINE) 20 mg/mL injection 20 mg  20 mg IntraVENous Q4H PRN    HYDROcodone-acetaminophen (NORCO)  mg tablet 1 Tab  1 Tab Oral Q4H PRN    promethazine (PHENERGAN) with saline injection 25 mg  25 mg IntraVENous Q6H PRN    famotidine (PEPCID) tablet 40 mg  40 mg Oral DAILY    ondansetron (ZOFRAN ODT) tablet 8 mg  8 mg Oral TID    polyethylene glycol (MIRALAX) packet 17 g  17 g Oral DAILY       Review of Systems    Unobtainable due to patient status.             Objective:     Vitals:    07/26/19 0730 07/26/19 0733 07/26/19 0737 07/26/19 0738   BP: 110/70      Pulse: 76 75 75 75   Resp: 23 22 18 16   Temp:       SpO2: 92% 94% 93% 95%   Weight:       Height:           Intake and Output:   07/24 1901 - 07/26 0700  In: 4015.7 [P.O.:200; I.V.:3815.7]  Out: 1750 [Urine:1750]  No intake/output data recorded. Physical Exam:          Constitutional:  intubated and mechanically ventilated. EENMT:  Sclera clear, pupils equal, oral mucosa moist  Respiratory: crackles bilateral  Cardiovascular:  RRR with no M,G,R;  Gastrointestinal:  soft with no tenderness; positive bowel sounds present  Musculoskeletal:  warm with no cyanosis, no lower extremity edema  Skin:  no jaundice or ecchymosis  Neurologic: no gross neuro deficits     Psychiatric:  sedated     LINES:    ETT, olea    DRIPS:    Fentanyl, Dioprivan    CXR:            Ventilator Settings  Mode FIO2 Rate Tidal Volume Pressure PEEP   Pressure support  75 %    550 ml  12 cm H2O  10 cm H20(per Dr Raquel Morgan)      Peak airway pressure: 20.8 cm H2O   Minute ventilation: 11.2 l/min     ABG:   Recent Labs     07/26/19 0347 07/25/19 2022   PHI 7.332* 7.259*   PCO2I 47.3* 54.7*   PO2I 69* 96   HCO3I 25.0 24.5        LAB  Recent Labs     07/25/19  1838   GLUCPOC 152*     Recent Labs     07/26/19 0324 07/25/19 0347 07/24/19 0327   WBC 13.0* 11.6* 10.9   HGB 8.7* 9.3* 10.3*   HCT 26.7* 28.2* 31.3*    173 210     Recent Labs     07/26/19  0324 07/25/19 0347 07/24/19 0327   * 130* 132*   K 5.0 4.6 4.1   CL 97* 95* 96*   CO2 27 27 26   * 106* 94   BUN 15 13 10   CREA 0.69* 0.79* 0.72*   MG 1.8  --   --    PHOS 3.5  --   --    CA 7.9* 7.7* 7.6*   ALB 1.8*  --   --    SGOT 25  --   --      No results for input(s): LCAD, LAC in the last 72 hours.       Assessment:  (Medical Decision Making)     Hospital Problems  Date Reviewed: 7/10/2019          Codes Class Noted POA    Electrolyte abnormality ICD-10-CM: E87.8  ICD-9-CM: 276.9  7/26/2019 Yes        Acute respiratory failure with hypoxemia ([facility] ICD-10-CM: J96.01  ICD-9-CM: 518.81  7/25/2019 Unknown        Bilateral pulmonary infiltrates on chest x-ray ICD-10-CM: R91.8  ICD-9-CM: 793.19  7/21/2019 Yes    Overview Signed 7/26/2019  8:44 AM by Neelima Harp MD     Suspect developing ARDS             SVT (supraventricular tachycardia) ([facility] ICD-10-CM: I47.1  ICD-9-CM: 427.89  7/21/2019 Yes        * (Principal) Atrial fibrillation with RVR ([facility] ICD-10-CM: I48.91  ICD-9-CM: 427.31  7/21/2019 Yes        Centrilobular emphysema ([facility] ICD-10-CM: J43.2  ICD-9-CM: 492.8  7/12/2019 Yes        Protein malnutrition (HCC) (Chronic) ICD-10-CM: E46  ICD-9-CM: 260  7/9/2019 Yes        Cocaine abuse (HCC) (Chronic) ICD-10-CM: F14.10  ICD-9-CM: 305.60  7/9/2019 Yes        Lung mass ICD-10-CM: R91.8  ICD-9-CM: 786.6  7/8/2019 Yes        Pulmonary emboli ([facility] ICD-10-CM: I26.99  ICD-9-CM: 415.19  7/8/2019 Yes        Primary pancreatic cancer with metastasis to other site [facility]) ICD-10-CM: C25.9  ICD-9-CM: 157.9  7/8/2019 Yes        Mediastinal adenopathy ICD-10-CM: R59.0  ICD-9-CM: 785.6  7/8/2019 Yes              Plan:  (Medical Decision Making)     --adjust vent  --adequate sedation and pain control for his safety  --cont. Vancomycin/Zosyn D #  --PC to see for goals of care   --too critical to consider bronch and the diagnostic utility is much lower since already on ABX  --overall prognosis is very poor    tiotal critical care time is 45 min so far    More than 50% of the time documented was spent in face-to-face contact with the patient and in the care of the patient on the floor/unit where the patient is located.     Seda Key MD

## 2019-07-26 NOTE — PROGRESS NOTES
Patient's daughter, Inder Milton, arrived at bedside. Phone number corrected in chart. Updated on patient condition/plan of care.

## 2019-07-26 NOTE — PROGRESS NOTES
07/25/19 1952   Dual Skin Pressure Injury Assessment   Dual Skin Pressure Injury Assessment WDL   Second Care Provider (Based on [address] [place] [address]) Janice Menezes RN     Dual skin assessment completed on admission to ICU. No evidence of pressure injury. Scattered scars. No other significant skin abnormalities.

## 2019-07-26 NOTE — PROGRESS NOTES
A follow up visit was made to the patient. Emotional support, spiritual presence and   prayer were provided. The patient was not alert and his nephew was present.       [employer]

## 2019-07-26 NOTE — PROGRESS NOTES
Palliative Care Progress Note    Patient: Fab Sin MRN: [id number]  SSN: [id number]    YOB: 1965  Age: 53 y.o. Sex: male       Assessment/Plan:     Chief Complaint/Interval History: pt now in icu and intubated with ARDS. Pt changed to full code per discussions with intensivist prior to intubation but stated he did not want to live on life support. Principal Diagnosis:    · Dyspnea  R06.00    Additional Diagnoses:   · Acute Respiratory Failure, Unspecified  J96.00  · Cachexia  R64  · Debility, Unspecified  R53.81  · Frailty  R54  · Counseling, Encounter for Medical Advice  Z71.9  · Encounter for Palliative Care  Z51.5    Palliative Performance Scale (PPS)   10% however on sedated on fentanyl    Medical Decision Making:   Reviewed and summarized labs and imaging. No family at bedside. Reached out to daughter by phone but got voice mail. Poor overall prognosis. Will continue to reach out to daughter as pt had stated his daughter would make decisions for him should he be unable to prior to respiratory failure. Addendum:  I was able to reach the patient's daughter by phone. We discussed severity of current medical situation and pts overall decline. Expressed my concerns that pt would not likely improve to a point he can come off the ventilator. We also discussed patient's other comorbidities which daughter had some but little knowledge of. Daughter states she hopes to see her farther open his eyes again as she arrived last night after sedation and intubation had occurred. Pt states she will be here this evening after work around 7 pm.      Will discuss findings with members of the interdisciplinary team.      More than 50% of this 15 minute visit was spent counseling and coordination of care as outlined above.     Subjective:     Review of Systems unable to obtain due to mentation     Objective:     Visit Vitals  /70   Pulse 75   Temp 98 °F (36.7 °C)   Resp 16   Ht 6' 5\" (1.956 m)   Wt 133 lb 2.5 oz (60.4 kg)   SpO2 95%   BMI 15.79 kg/m²       Physical Exam:    General:  sedated   Eyes:  Conjunctivae/corneas clear    Nose: Nares normal. Septum midline. Neck: Supple, symmetrical, trachea midline, no JVD   Lungs:   Diffuse rhonchi bilateral   Heart:  Regular rate and rhythm, no murmur    Abdomen:   Soft, non-tender, non-distended   Extremities: Normal, atraumatic, no cyanosis or edema   Skin: Skin color, texture, turgor normal. No rash or lesions.    Neurologic: sedated   Psych: sedated     Signed By: Karlos Harris MD     July 26, 2019

## 2019-07-26 NOTE — PROGRESS NOTES
TRANSFER - IN REPORT:    Verbal report received from Lesly Drew RN(name) on [employer]  being received from 319(unit) for urgent transfer      Report consisted of patients Situation, Background, Assessment and   Recommendations(SBAR). Information from the following report(s) SBAR, Kardex, [facility] and Cardiac Rhythm NSR was reviewed with the receiving nurse. Opportunity for questions and clarification was provided. Assessment completed upon patients arrival to unit and care assumed.

## 2019-07-26 NOTE — PROGRESS NOTES
Problem: Falls - Risk of  Goal: *Absence of Falls  Description  Document Elo Hernandez Fall Risk and appropriate interventions in the flowsheet.   Outcome: Progressing Towards Goal  Note:   Fall Risk Interventions:  Mobility Interventions: Bed/chair exit alarm, Communicate number of staff needed for ambulation/transfer, Strengthening exercises (ROM-active/passive)    Mentation Interventions: Adequate sleep, hydration, pain control, Bed/chair exit alarm, Door open when patient unattended, Evaluate medications/consider consulting pharmacy, Increase mobility, More frequent rounding, Reorient patient, Toileting rounds, Update white board    Medication Interventions: Bed/chair exit alarm, Evaluate medications/consider consulting pharmacy    Elimination Interventions: Bed/chair exit alarm, Call light in reach, Patient to call for help with toileting needs, Toileting schedule/hourly rounds              Problem: Patient Education: Go to Patient Education Activity  Goal: Patient/Family Education  Outcome: Progressing Towards Goal     Problem: Delirium Treatment  Goal: *Level of consciousness restored to baseline  Outcome: Progressing Towards Goal  Goal: *Level of environmental perceptions restored to baseline  Outcome: Progressing Towards Goal  Goal: *Sensory perception restored to baseline  Outcome: Progressing Towards Goal  Goal: *Functional assessment restored to baseline  Outcome: Progressing Towards Goal  Goal: *Absence of falls  Outcome: Progressing Towards Goal  Goal: *Will remain free of delirium, CAM Score negative  Outcome: Progressing Towards Goal  Goal: *Cognitive status will be restored to baseline  Outcome: Progressing Towards Goal  Goal: Interventions  Outcome: Progressing Towards Goal     Problem: Patient Education: Go to Patient Education Activity  Goal: Patient/Family Education  Outcome: Progressing Towards Goal     Problem: Pressure Injury - Risk of  Goal: *Prevention of pressure injury  Description  Document James Scale and appropriate interventions in the flowsheet. Outcome: Progressing Towards Goal  Note:   Pressure Injury Interventions:  Sensory Interventions: Assess changes in LOC, Assess need for specialty bed, Avoid rigorous massage over bony prominences, Check visual cues for pain, Float heels, Keep linens dry and wrinkle-free, Maintain/enhance activity level, Minimize linen layers, Monitor skin under medical devices, Pad between skin to skin, Pressure redistribution bed/mattress (bed type), Turn and reposition approx. every two hours (pillows and wedges if needed), Use 30-degree side-lying position         Activity Interventions: Assess need for specialty bed, Pressure redistribution bed/mattress(bed type)    Mobility Interventions: Assess need for specialty bed, Float heels, HOB 30 degrees or less, Pressure redistribution bed/mattress (bed type), Turn and reposition approx.  every two hours(pillow and wedges)    Nutrition Interventions: Document food/fluid/supplement intake    Friction and Shear Interventions: Apply protective barrier, creams and emollients, Feet elevated on foot rest, Foam dressings/transparent film/skin sealants, HOB 30 degrees or less, Lift sheet, Minimize layers, Transferring/repositioning devices                Problem: Patient Education: Go to Patient Education Activity  Goal: Patient/Family Education  Outcome: Progressing Towards Goal     Problem: Non-Violent Restraints  Goal: *Removal from restraints as soon as assessed to be safe  Outcome: Progressing Towards Goal  Goal: *No harm/injury to patient while restraints in use  Outcome: Progressing Towards Goal  Goal: *Patient's dignity will be maintained  Outcome: Progressing Towards Goal  Goal: Non-violent Restaints:Standard Interventions  Outcome: Progressing Towards Goal  Goal: Non-violent Restraints:Patient Interventions  Outcome: Progressing Towards Goal  Goal: Patient/Family Education  Outcome: Progressing Towards Goal

## 2019-07-26 NOTE — PROGRESS NOTES
[facility] CARDIOLOGY PROGRESS NOTE           7/26/2019 7:17 AM    Admit Date: 7/21/2019      Subjective:   Patient with progressive hypoxia yesterday requiring intubation. CXR with worsening bilateral infiltrates. Hemodynamically stable. IN sinus rhythm currently. ROS:  UNABLE TO OBTAIN DUE TO INABILITY OF PATIENT TO COMMUNICATE SECONDARY TO CURRENT INTUBATION AND NEED FOR MECHANICAL VENTILATION. Objective:      Vitals:    07/26/19 0545 07/26/19 0559 07/26/19 0615 07/26/19 0616   BP: 117/78 107/72 114/70    Pulse: 72 71 73    Resp: 17 21 19    Temp:       SpO2: 95% 94% (!) 89% 97%   Weight:       Height:           Physical Exam:  General-Intubated. Neck- supple, no JVD  CV- regular rate and rhythm no MRG  Lung- + rales. Abd- soft, nontender, nondistended  Ext- no edema bilaterally. Skin- warm and dry  Psychiatric:  Unable to accurately assess due to intubated and sedated status. Neurologic:  Unable to accurately assess due to intubated and sedated status. Data Review:   Labs:   Recent Labs     07/26/19  0324 07/25/19  0347   * 130*   K 5.0 4.6   MG 1.8  --    BUN 15 13   CREA 0.69* 0.79*   * 106*   WBC 13.0* 11.6*   HGB 8.7* 9.3*   HCT 26.7* 28.2*    173        Lab Results   Component Value Date/Time    TROIQ 0.06 (H) 07/26/2019 03:24 AM    TROIQ 0.07 (H) 07/25/2019 09:26 PM       TELEMETRY:  Sinus rhythm    Echo (7/8/19):  -  Left ventricle: Systolic function was at the lower limits of normal.  Ejection fraction was estimated to be 50 %. Jaimie Slight, systemic arteries: The root exhibited mild dilatation. (4.0 cm)    Assessment/Plan:     Principal Problem:    Atrial fibrillation with RVR (HCC) (7/21/2019)  In sinus rhythm. Amiodarone held currently as concern it may be contributing to lung infiltrates. Seems unlikely at this point as infiltrates present at admission prior to initiation of amiodarone but will continue to hold as workup continues. REstart IV heparin when OK with primary team.     Active Problems:    Lung mass (7/8/2019)  Pulmonary following. Pulmonary emboli ([address] (7/8/2019)  Restart IV heparin when OK with primary team.       Primary pancreatic cancer with metastasis to other site [facility]) (7/8/2019)  Poor prognosis. Pneumonia (7/21/2019)  Await pulmonary evaluation. ? Bronchoscopy. SVT (supraventricular tachycardia) ([address] (7/21/2019)  See above. Acute respiratory failure with hypoxemia ([address] (7/25/2019)  Poor prognosis. Palliative care following.              John Tolentino MD  7/26/2019 7:17 AM

## 2019-07-26 NOTE — PROGRESS NOTES
Ventilator check complete; patient has a #8. 0 ET tube secured at the 26 at the teeth. Patient is sedated. Patient is not able to follow commands. Breath sounds are diminished. Trachea is midline, Negative for subcutaneous air, and chest excursion is symmetric. Patient is also Negative for cyanosis and is Negative for pitting edema. All alarms are set and audible. Resuscitation bag is at the head of the bed.       Ventilator Settings  Mode FIO2 Rate Tidal Volume Pressure PEEP I:E Ratio   Pressure support  75 %     12 cm H2O  8 cm H20  1:3.3      Peak airway pressure: 20.8 cm H2O   Minute ventilation: 11.2 l/min       Sachin Melara, RT

## 2019-07-26 NOTE — PROGRESS NOTES
Dr. Josh Perez at bedside to assess patient, ABG reviewed. Orders to D/C Rocephin, add Vanc/Zosyn, PRN Fentanyl, blood, sputum, urine cultures, troponin, HIV and Hepatitis Panel. NGT attempted per order, patient became agitated and 02 sat dropped, recovered after 02 breaths.

## 2019-07-27 NOTE — ROUTINE PROCESS
Guideline     Guideline Number: -[id number]     Title: Management of the Patient with Mechanical Ventilation (including weaning) and ABCDE Bundle    Effective Date:  03/00    Revised Date: 02/09, 03/10, 7/12, 5/13,                                  10/13, 8/14  Reviewed Date: 07/2015, 04/2016, 06/2017       I. Policy:  Management of the patient requiring mechanical ventilation, including readiness to wean and weaning protocol. The information provided serves as a guideline for patient management. Included in this guidelines is the ABCDE Bundle to provide guidance to staff for evidence based management of pain, agitation/anxiety and delirium in the intensive care unit. The goals of critical care analgesia and sedation are to facilitate mechanical ventilation, to prevent patient and caregiver injury, and to avoid the psychological and physiologic consequences of inadequate treatment of pain, anxiety, agitation, and delirium by maintaining a light level of sedation. Pain occurs commonly in adult ICU patients, regardless of admitting diagnosis. Therefore, pain should be frequently assessed and analgesic medications titrated to prevent adverse effects associated with either inadequate or excessive analgesia. Once pain has been addressed, anxiolytic and/or antipsychotic medications can be utilized to treat unresolved agitation/anxiety and delirium, with the goal to prevent over- or under sedation by using the Moy Agitation Sedation Scale (RASS). Assertive management of these issues has been shown to reduce costs, improve ICU outcomes such as successful extubation and ICU length of stay, and allow for patients to participate in their own care. II. Purpose: The respiratory care practitioner and the critical care RN will utilize the following guideline to provide the most efficient and effective management of mechanical ventilators and weaning and extubation processes.     Goals of Treatment:  1. Adequate management of patients pain and discomfort while maintaining a light level of                   sedation (RASS score of 0 to -1)  2. Both chronic and acute sources of pain should be identified and treated. 3. Sedative agents should be considered if patient still expresses discomfort and/or is not at RASS         goal of 0 to -1 despite adequate management of pain. 4. Patients requiring neuromuscular blockage must have continuous infusions of analgesic and              sedative agents. III. Responsibility: Director Respiratory Care Services and all Respiratory Care Practitioners  with documented competency as well as Critical Care RN staff. General Guidelines    1. Introduction to Ventilator Plan Phase  a. Ventilator Management Phase, General Statement  -  The plan should be initiated in patients who have a secure airway/require invasive mechanical ventilation (endotracheal or tracheostomy) only  -   The provider determines the appropriate medications used for analgesia and agitation/anxiety along with the clinical pharmacist    2. Monitoring Levels of Comfort  a. Pain Assessment  - Pain is monitored using the numerical scales  - A pain assessment should be conducted, at a minimum, every 4 hours and as needed and per guidelines. - The level of pain should be determined as satisfactory by the patient based on patients baseline level of pain , considering any chronic pain that the patient may have. - If the patient is unable to communicate pain level, the nurse can assess for nonverbal indicators including facial grimacing, moaning, tachypnea, tachycardia, hypertension, diaphoresis, etc as a cue to begin further pain assessment.             b.  Sedation Assessment  - Sedation is monitored using the Moy Agitation Sedation Scale (RASS)  Target RASS RASS Description   +4 Combative, violent, danger to staff     +3 Pulls or removes tubes(s) or catheters; aggressive   +2 Frequent nonpurposeful movement, fights ventilator   +1 Anxious, apprehensive, but not aggressive   0 Alert and calm   -1 Awakens to voice (eye opening/contact) > 10 sec   -2 Light sedation, briefly awakens to voice (eye opening/contact) < 10 sec   -3 Moderate sedation, movement or eye opening. No eye contact   -4 Deep sedation, no response to voice, but movement or eye opening to physical stimulation   -5 Unarousable, no response to voice or physical stimulation     -  Goal RASS is 0 to -1, unless otherwise specified by providers order.  - Nursing staff should conduct the RASS every 4 hours and as needed to maintain goal RASS of 0 to -1.  - If RASS is outside of goal range, discuss treatment options with provider.  - A RASS score of +2 to +4 requires further assessment by the nurse. Causes of agitation/anxiety that should be considered include:  a. Pulmonary -   endotracheal tube malposition or patency, mode of ventilation, pneumothorax, hypoxemia, hypercarbia  b. Metabolic  hypoglycemia, hyponatremia, acute renal or hepatic failure  c. Emotional upset  with information and awareness of critical condition, prognosis, need for surgical or invasive procedures, other interventions or complications, family or personal stressors  - C. Sedation Assessment while using Neuromusclar Blocking Agents  - D. Delirium Assessment  a. the ICU ([facility]  ICU)   3. Analgesia  The incidence of significant pain has been reported to be 50% or higher in both medical and surgical ICU patients. These patients also experience discomfort during routine/procedural ICU care and at rest.  However, patients may be unable to self-report their pain (either verbally or with other signs) because of an altered level of consciousness, the use of mechanical ventilation, or high doses of sedative agents or neuromuscular blocking agents.   The short and long term consequences of unrelieved or inadequately treated pain are significant and include patient discomfort, decreased satisfaction with care by family and patient, delirium, agitation/anxiety, post traumatic stress disorder and depression. Therefore, routine assessment and treatment of pain should occur in all ICU patients. Causes and Treatment of Pain in the ICU  a. Acute pain (post-operative, procedural pain, discomfort with usual ICU care or other acute episodes of pain-related to underlying disease)  1. Consider use of PCA for alert and oriented patients with pain needs not met by PRN dosing or opoids. 2. Preemptive analgesia should occur prior to chest tube removal, and should be considered for other procedural pain such as turning and repositioning, would drain removal, wound dressing change, tracheal suctioning, femoral catheter removal or place of central venous catheter. 3. Appropriate analgesic medications for preemptive analgesia are short acting intravenous (IV) agents (i.e. fentanyl, morphine, hydromorphone)  a. Administration of analgesia before patient experiences noxious stimuli prevents amplification and hyperexcitability of the central nervous system. b. Analgesia for Mechanically Ventilated Patients:  1. The approach to sedation and analgesia management for mechanically ventilated patients favors use of analgesia first sedation. The primary goal of this strategy is to address pain and discomfort first, and then if necessary, add anxiolytic agent. 2. Analgesia first sedation reduces dose escalation of medications, decreases the duration of mechanical ventilation and the incidence of VAP, improves the probability of successful extubation, and ultimately shortens ICU length of stay. 3. For pain management, analgesic medications are determined by the provider. Intermittent dosing of the analgesic should be attempted first.    If the patient requires more than 3 doses within 1 hour then provider should be contacted to consider continuous infusion.   4.  Analgesic options for mechanically ventilated patients include:  a. Fentanyl which is considered the drug of choice for patients requiring continuous infusion. b.Morphine may be considered for those patients without renal dysfunction who are hemodynamically stable and require intermittent pain medication. Continuous infusions of morphine may be used for patientl who are receiving comfort care as part of end of life care. c.Hydromorphone is reserved for patients who are refractory to fentanyl or morphine and is typically admininstered by intermittent dosing. 4.  Agitation/Anxiety    Background  Agitation and anxiety frequently occur in ICU patients. Anxiolytic/sedation agents may be indicated to help relieve discomfort, improve synchrony with mechanical ventilation, and decrease the overall work of breathing. Pain control alone may be sufficient to make patients comfortable enough to require no anxiolytic/sedative agent. In addition, non-pharmacologic interventions such as repositioning or verbal assurance may be helpful to comfort or redirect an agitated patient. If these methods are unsuccessful, then anxiolytic/sedative medications such as propofol, dexmedetomidine, or benzodiazepines can be used. Selection of an anxiolytic should be based on the pharmacokinetic properties of the medication, patient specific characteristics, and sedation goal.   However, nonbenzodiazepine sedatives (ie propofol or dexmedetomidine) may be preferable over benzodiazepines (ie midazolam or larazepam) due to more favorable outcomes such as delirum. Causes and Treatment of Agitation/Anxiety  a. Possible underlying causes of agitation and anxiety include pain, delirium, hypoxemia, hypoglycemia, hypotension, or withdrawal from alcohol  and other drugs. b. Analgesia first sedation should be attempted initially to manage pain and provide sedation in appropriate patients. Analgesia alone may be adequate to reach RASS goal of 0 to -1.   If patient remains agitated or anxious despite adequate analgesia (ie RASS +2 to +4) then anxiolytic/sedative should be considered. c. The choice of anxiolytic should be based on desired levels of sedation (ie light sedation or deep sedation) with preference for the use of nonbenzodiazepines such as propofol or dexmedetomidine if appropriate. While light sedation (ie RASS 0 to -1) is preferred for most patients, there are instances when deep sedation (ie RASS -4 to -5) is desired. For example, in the setting of ventilator dysynchrony due to ARDS or for patients receiving NMB agents. d. Medications to maintain light sedation (ie RASS 0 to -1) include  1. Propofol continuous infusion can be considered for hemodynamically stable (ie SBP = 100, MAP = 65 and/or not requiring vasopressor support) patients requiring light sedation. Propofol has a quick onset (1-2 minutes) and offset of action, making it a good agent to assess neurological status and facilitate liberation from the mechanical ventilator. 2.Dexmedetomidine continuous infusion is a good option for hemodynamically stable patients requiring light sedation as it allows for a more awake, interactive patient is associated with less delirium. It has an intermediate onset of action (5-10 min). Therefore, abrupt titrations should be avoided, but use of prn haloperidol or benzodiazepine may be useful to manage agitation until the medication takes effect. 3. Antipsychotics are another option. In particular, haloperidol intermittently dosed may be useful for patients with symptoms of agitation/anxiety and delirium. 4.Benzodiazapines can also be considered for light sedation, but should be intermittently doses. Midazolam is an option for patients without renal dysfunction. It has a short onset of action (2-5 minutes) making it a good agent for acute agitation/anxiety, but short duration of action resulting in frequent dosing. Lorazepam is another option.   It has a longer onset of action (15-20 minutes) in comparison to midazolam, but longer duration of action. e. Medications to maintain deep sedation (RASS -4 to -5) include:  1) Propofol continuous infusion should be considered as a first line option for hemodynamically stable patients. 2)Benzodiazepines can be considered as second line options for deep sedation. Studies comparing these agents to other sedatives have shown that they lead to worse outcomes including delirium, oversedation, delayed extubation, and longer time to discharge. Midazolam is one option for patients without renal dysfunction and lorazepam is another options. If patient requires more than 3 doses within 1 hour then contact provider  to consider initiation of continuous infusion. 5.  Daily Sedation Awakening Trial (SAT) from IV Continuous Analgesia/Sedation  a. Patients are to have daily awakening from sedation while on continuous IV analgesia and/or sedation in the ICU. Continuous analgesia infusions may be maintained only if needed for active pain and RASS is at goal 0 - -1. Unit guideline is for the SAT to occur following ICP rounds each morning.    b. The sedation awakening trial (SAT) is done regardless if the patient meets criteria for spontaneous breathing trial (SBT). c. SAT safety screen is assessed and SAT should not be performed if sedation is being used for active seizures, alcohol withdrawal, hemodynamically unstable or requiring support of vasoactive medications , in conjunction with NMB agents, if ICP is greater than  20mmHg or if sedation is being used to control ICP, patients RASS is +3 or +4 (very agitated or combative). Other exclusion criteria are:  if there is documentation of myocardial ischemia in the past 24 hours; or patient is receiving high frequency oscillator ventilation (HFOV) , if the patient has an open chest /abdomen or is receiving comfort care.   d. Criteria for passing the SAT are the patient opened their eyes to verbal stimuli or tolerated sedative interruption without exhibiting failure criteria.  e. Patients fail the SAT if the develop sustained anxiety, agitation, or pain; a respiratory rate of 35 per minutes for 5 minutes or longer, an SpO2 less than 88% for 5 minutes or longer; an acute cardiac dysrhythmia; two or more signs of respiratory distress including tachycardia, bradycardia; use of accessory muscles; diaphoresis; marked dyspnea; or myocardial ischemia. f. Respiratory therapy staff must verify with the nurse that continuous IV analgesia (unless being use  for active pain) and sedation (unless patient is receiving dexmedetomidine) is off prior to placing patient on SBT. g. DO NOT interrupt infusion of analgesia and sedation medications if patient is receiving neuromuscular blockade.  h. Monitor level of wakefulness unless patient is awake and follows commands (RASS 0 to -1) or patient becomes uncomfortable or agitated (RASS +3 to +4)  i. If agitation prevents successful awakening , administer bolus of analgesia and/or sedation then resume infusion of the medication at ½ previous dose and titrate as needed.  j. If oversedation prevents successful awakening, hole infusion until at goal and resume ½ of prior infusion rate/dose if clinically indicated. 6. Delirium  Background  Delirium is characterized by the acute onset of cerebral dysfunction with a change or fluctuation baseline  mental status, inattention, and either disorganized thinking or an altered level of consciousness. It affects up to 80% of mechanically ventilated adult ICU patients, and is associated with increased mortality,   and treatment is important and may in turn allow for a patient to be conscious yet cooperative enough to participate   in ventilator weaning trials and early mobilization efforts.     Delirium can only be assessed in patients who are able to sufficiently interact and communicate with bedside  clinicians (ie RASS -3 to +4). IV. Procedure:  A. Assessment: The following criteria must be assessed prior to the initiation of weaning from mechanical ventilation. Note: The criteria are general guidelines and must be individualized for each patient. The patients primary nurse will be responsible, in coordination with the RT, the Spontaneous Awakening Trial). The RT will perform the SBT. B. Spontaneous Awakening Trials (SATs  also referred to as Sedation Vacation) and Spontaneous Breathing Trials (SBTs) performed to determine readiness to wean. 1. For patients who meet established criteria, such as those without active seizures, alcohol withdrawal and agitation, myocardial ischemia or those requiring cardiac support devices, without increased intracranial pressure and those not receiving neuromuscular blockade, the nurse will reduce the infusions of sedative by 50% of current used for sedation that was used to achieve a level of light sedation (Cedillo Score 2 or RASS score of 0 to -1) and evaluate patient response to reduction of sedation. Analgesics required for pain control are continued during the test.  Obtain MD order to cover no SAT for that time period if patient has any exclusion criteria as described above. 2. Failure of the spontaneous awakening trial occurs when the patient shows symptoms such as increased agitation, anxiety, pain or signs of respiratory distress including respiratory rate >35/min or oxygen saturation <88% as well as development of acute cardiac arrhythmias. If these symptoms develop during the SAT, the nurse then restarts sedation at 75% of the previous dose and titrates the medications until the patient is comfortable and/or symptoms have abated. 3.  If the patient passes the SAT then the patient moves on to the Spontaneous Breathing Trials as performed by the RT.    The SBT Safety Screen included the following:  No agitation, oxygen saturation > 88%, FIO2 < 50%, PEEP < 7.5 cm H20, no myocardial ischemia, no vasopressor use, and with inspiratory efforts. 4. Patients who pass the spontaneous awakening trial but fail the spontaneous breathing trial are placed back on full ventilator support and reassessed the next day. 5. Failure of the SBT (spontaneous breathing trail) includes any of the following:  Respiratory rate > 35/min, respiratory rate < 8/min, oxygen saturation < 88%, respiratory distress, mental status change, acute cardiac arrhythmia. 6. Extubation is considered for patients who tolerate the spontaneous awakening trial and pass the spontaneous breathing trial.    C. Can the cause of respiratory failure be reversed (i.e. absence of high spinal cord injury or advanced ALS)? D. Is gas exchange adequate? 1. PaO2/FIO2 ratio > 150  200,  2. PEEP < 8 cm H20  3. FIO2 < 50  4. pH > 7.30  5. Rapid shallow breathing index (f/VT) < 105  E. Is patient hemodynamically stable? 1. Absence of clinically significant hypotension (minimal vasopressors such as Dopamine < 5mcg/kg/minute)? F. Is there evidence of intact respiratory drive (NIP/NIF >-55 YAN10, stable VC02)? G. Does patient have an adequate cough, airway clearance ability? H. Is there absence of excessive secretions? V. Initiation:     A. The therapist shall consult with RN to determine if sedation can be discontinued or significantly decreased. If this can be achieved, the therapist shall implement the                     followin. Identify patient and verify name and account number via ID [email or web address]. 2. Perform hand hygiene per hospital policy utilizing Standard Precautions for all patients and following transmission-based isolation as indicated per hospital policy. 3. Perform a ONE-MINUTE SPONTANEOUS TRIAL AND ASSESSMENT. 4. Measure Rapid Shallow Breathing Index (RSBI) and monitor SpO2 and cardiovascular parameters during the spontaneous breathing assessment.   5. If SpO2 and cardiovascular parameters are stable, continue spontaneous breathing trial for at least 30 minutes and up to 120 minutes, as patient tolerates. 6. Monitor ventilatory status, SpO2, and cardiovascular status during spontaneous breathing trial.  7. If patient has a successful trial, consider patient as a candidate for extubation and obtain order. 8. If patient fails the weaning trial, place back on ventilator and adjust settings to provide a non-fatiguing form of ventilatory support for the remainder of the day and night. 9. One attempt at weaning shall be performed each day until successful weaning occurs. The RCP will make every attempt to begin the spontaneous breathing trials between 0500 and 0600 to provide documentation of the trial when the pulmonologist makes rounds. B.  Assessment of SBT or PST:  1. Is gas exchange acceptable? 2. PaO2 > 60 mm Hg. 3. PH > 7.30  4. Increase in PaCO2  < 10 mm Hg  C. Is patient hemodynamically stable? 1. HR < 120 beats/minute  2. HR  < 20%   3. Systolic BP < 811 and > 90 mmHg  4. BP  < 20%, no vasopressors required  D. Does patient have stable ventilatory pattern? 1. Sustained RR < 30 breaths per minute  2. Normal and stable VCO2  3. Patient is not demonstrating any signs of increased work of breathing, such as increased use of accessory muscles. E. Mental status stable throughout trial?  1. Absence of changes such as somnolence, excessive agitation or anxiety  2. Absence of diaphoresis during trial?  IV. Safety:    A. The RCP shall monitor patient according to the above guidelines. If at any time during the weaning process, the respiratory therapist or nurse feels that the patient is not tolerating weaning, the therapist shall place patient back on previous ventilator settings. B. The patient shall be reassessed and the weaning process should be continued the following day. V. Reportable Conditions:    A.  The therapist shall notify the physician, as appropriate, for any of the following conditions:  1. FIO2 increase (sustained) at 10% or greater  2. Poor patient/ventilator interface in spite of adjustments  3. Need for increased sedation for respiratory distress  4. Need for increasing ventilating pressures (i.e. PEEP, PIP, MAP)  5. ABG results meeting panic value criteria or other clinical signs indicating deterioration of patients condition. 6. Unplanned extubation. 7. Unexplained sustained increase in PIP greater than 10 cm H2O.  8. Assessment results regarding ventilator discontinuance process. VI. Ventilator protocol management   A. The following items should be maintained for patients who are being mechanically           ventilated. 1. Obtain STAT Chest X-Ray for ET tube placement after insertion. 2. Chest X-Ray q a.m. while on ventilator. 3. ABGs 30 - 60 minutes after being stable on the ventilator. 4. ABG's q a.m. while on ventilator and prn.  5. Do spontaneous breathing trials when patient is hemodynamically stable, responsive, and without fever. 6. Terminate trials if patient exhibits signs of respiratory distress. 7. Therapists should maintain ABG s as follows:      a. pH -  7.30 - 7.50                   b. PaO2 -   60  100        8. Racemic Epinephrine (0.5cc) for post extubation stridor (2 UA treatments max.)    VII.   Early Mobilization    Mobility Level Criteria Start at Level 1 if:   PaO2/FIO2 <250   Positive end-expiratory Pressure (PEEP) >=10 cm H2O   O2 saturation <90%   Respiratory Rate (RR) Not within 10-30 per min   Cardiac arrhythmias or ischemia New onset   Heart Rate  (HR) <60 or >120 beats per min   Mean arterial pressure (MAP) <55 or >976 mmHg   Systolic blood pressure (SBP) <90 or > 180 mmHg   Vasopressor infusion New or increasing   Moy Agitation Scale (RASS) < - 3       Level I:   Breathe  (Rass -5 to -3)  HOB Angle  improve VAP protocol compliance   Visually confirm the [facility] is elevated >= 30 degrees to comply with VAP prevention protocols   The Centers for Disease Control and Prevention recommends an HOB angle of 30-45 degrees , unless contraindicated  Additional activities to be implemented   Every 2 hour turning   Passive range of motion   Up to 20 degrees reverse trendelenburg with lower extremity exercise/retracting footboard   Continuous lateral rotation therapy can be considered part of early mobility therapy in patients who are at high risk for pulmonary complications  Move to Level 2 when the patient  - Has acceptable oxygenation/hemodynamics  - Tolerates q 2 turning  - Tolerates HOB > 30 degrees or up to 20 degrees reverse trendelenburg    Level 2 :Tilt  Patient Assessment Rass > -3  (eg, opens eyes, may have profound weakness)  Up to 20 degrees Reverse Trendelenburg position and 10 degrees minimum HOB  - Reverse Trendelenburg positioning allows for orthostatic position in fragile patients  - If available , use in conjunction with retracting foot section to allow for partial weight bearing prior to sitting up in the bed or getting out of bed  Additional activities to be implemented  -  Maintain HOB >/= 30 degrees  - Q 2 hour turning  - Passive/active range of motion  - Legs dependent  - PT consultation       Move to Level 3 when the patient . .    -Tolerates active- assistance exercises 2 times per day    -Tolerates lower extremity exercises against footboard/Up to 20 degrees Reverse Trendelenburg    -Tolerates legs dependent /HOB 45 degrees  Level 3 :  SIT  (Rass >- 1 (eg , weak but may move arms/legs independently)   Full chair position (footboard on)   Full upright positioning allows for diaphragmatic excursion and lung expansion   Sitting with legs in a dependent position facilitates gas exchange  Additional activities to be implemented  - Maintain HOB >= 30 degrees  - Q 2 hour turning (assisted)  - Active range of motion  PT/OT actively involved  - Encourage activities of daily living  - Dangling, if patient can move arm against gravity  Move to Level 4 when the patient . .  - Tolerates increasing active exercise in bed  - Actively assists with every- 2- hour turning or turns independently  - Tolerates full chair position 3 times/day  Level 4:  Stand ( RASS >0 (eg, weak but may tolerate increased activity)      Stand Attempts   Full chair position (footboard off/feet on the floor)   Consider using a sit-to-stand lift   Pivot to chair, it tolerates partial weight bearing  Additional activities to be implemented  - Maintain head of bed >= 30 degrees  - Q 2 hr turning (self/assisted)  - Active range of motion  - Encourage activities of daily living  - PT/OT actively involved      Move to Level 5 when the patient .  - Can successfully comply with all activities  - Tolerates trial periods of full chair position (footboard off/feet on floor) 3 times per day  - Tolerates partial weight-bearing stand and pivots to chair    Level 5 :  Move  (RASS > 0    (eg, weak but may tolerate increased activity)   Achieve out of bed   Utilize mobile floor life to ambulate to bedside chair  Additional activities to be implemented  - Maintain HOB > = 30 degrees  - Q 2 hour turning (self/assisted)  - Active range of motion  - Patient stands/bears weight > 1 minute  - Patient marches in place  - PT/OT actively involved    Patient continues to ambulate progressively longer distances as tolerated until they consistently participate and move independently.         E Approved by [address] 2-19-09   N [facility] Clinical Guidelines             COLLIN LOMAS [facility] Flowsheet Content Variables to select when addressing section Comments   COLLIN Initiated  Yes/No  RN to address minimum q 24 hours (day shift)   Target RASS  0 = alert and oriented   -1 = drowsy   -2 = light sedation   -3= moderate sedation   -4= deep sedation Target on standard ventilator setting should be -2; -4 with oscillator   CAM -ICU  Positive   Negative   Unable to assess Delirium assessment   SAT Safety Screen Passed  Yes   No Select yes if proceeding on to the sedation vacation (reduction of continuous sedative drip by directed by MD)  Select no if your patient has any of the below reasons for not proceeding on to the daily awakening sedation vacation trial   SAT Screen for Failure  Active seizures   Acute delirium tremors   Agitation that threatens accidental line/tube removal   On paralytics   MI (24-48hr)   Abnormal ICP   Open abdomen Select one of the options when the patient will not undergo the sedation vacation  ALSO MUST OBTAIN AN ORDER FOR no [address] written under nursing miscellaneous for now by either the NP or Intensivist   Daily sedation Vacation/assessment of   Yes   No   Not applicable If yes, MUST see the reduction in sedation on the [facility] and please place in the comment section of the sedative sedation vacation started   SBT Safety Screen Passed  Yes   No Select Yes if the patient has none of the below listed reasons for not proceeding on to the SBT following reduction of sedation   SBT Screen Reason for Failure  Agitation   O2 Sat < or = 88%   FIO2 > 50%   PEEP >7   MI   Vasopressor Use   Bilevel setting on Vent   Oscillator in use   Increased resp effort Select reason as appropriate for NOT proceeding on to the SBT                                               Wake Up and Breathe Protocol

## 2019-07-27 NOTE — PROGRESS NOTES
Critical Care Daily Progress Note: 7/27/2019  Admission Date: 7/21/2019     The patient's chart is reviewed and the patient is discussed with the staff. Mr. Dodd is a 53 y. o. male admitted on 7/21/2019 with a primary diagnosis of pneumonia.  His PMH includes chronic tobacco use and cocaine use.  He was seen in consult by Dr. Amado Botello on 7[facility]12/19[facility] presented with c/o progressive severe abd pain x 3 days as well as wt loss over the past several months.  Bili 2.6, Lipase 6000.  CT CAP revealed RLL segmental PE, mediastinal LAD, 4 cm pleurally based cavitary mass in L apex, 2cm pancreatic mass obstructing pancreatic and CBD with associated biliary duct dilation and GB distention, and gastric wall thickening.  He is s/p ERCP with stent placement and FNA of pancreatic mass/periportal LAD on 7/8.  Biliary brushing cytology +rare atypical cells.  Path on periportal/precarinal nodes +poorly differentiated metastatic carcinoma. [facility] non-specific as to origin, however a primary tumor in upper GI or pancreatobiliary tree cannot be excluded.  During that consult, EBUS with possible navigational biopsy to the lung was recommended as there may be a possible 2nd primary. Rosa Elena Marie returned on 7/21/19 to ED with c/o nausea and sharp epigastric pain x 1 day as well as shortness of breath.  In ED, he was having runs of SVT and Afib with RVR.  On amio.  CXR with developing diffuse alveolar infiltrates bilaterally.  On Rocephin/Azith.  Palliative care following. We have been consulted for consideration of EBUS and possible navigation bronchoscopy. Seen last on 7/12 by our service at the time [facility] was not completed   Transferred to ICU 7/25 and intubated    Subjective:   Remains on mechanical ventilation.       Current Facility-Administered Medications   Medication Dose Route Frequency    fentaNYL in normal saline (pf) 25 mcg/mL infusion  0-200 mcg/hr IntraVENous TITRATE    LORazepam (ATIVAN) injection 2 mg  2 mg IntraVENous Q2H PRN    fentaNYL citrate (PF) injection 50 mcg  50 mcg IntraVENous Q1H PRN    famotidine (PF) (PEPCID) 20 mg in sodium chloride 0.9% 10 mL injection  20 mg IntraVENous Q12H    Vancomycin trough reminder   Other ONCE    0.9% sodium chloride infusion  100 mL/hr IntraVENous CONTINUOUS    propofol (DIPRIVAN) infusion  0-50 mcg/kg/min IntraVENous TITRATE    piperacillin-tazobactam (ZOSYN) 4.5 g in 0.9% sodium chloride (MBP/ADV) 100 mL  4.5 g IntraVENous Q8H    vancomycin (VANCOCIN) 1,000 mg in 0.9% sodium chloride (MBP/ADV) 250 mL  1,000 mg IntraVENous Q12H    gabapentin (NEURONTIN) capsule 100 mg  100 mg Oral BID    LORazepam (ATIVAN) tablet 0.5 mg  0.5 mg Oral Q6H PRN    sodium chloride (NS) flush 5-40 mL  5-40 mL IntraVENous PRN    sodium chloride (NS) flush 5-40 mL  5-40 mL IntraVENous Q8H    sodium chloride (NS) flush 5-40 mL  5-40 mL IntraVENous PRN    acetaminophen (TYLENOL) tablet 650 mg  650 mg Oral Q4H PRN    HYDROcodone-acetaminophen (NORCO) 5-325 mg per tablet 1 Tab  1 Tab Oral Q4H PRN    naloxone (NARCAN) injection 0.4 mg  0.4 mg IntraVENous PRN    albuterol (PROVENTIL VENTOLIN) nebulizer solution 2.5 mg  2.5 mg Nebulization Q4H PRN    zolpidem (AMBIEN) tablet 5 mg  5 mg Oral QHS PRN    senna-docusate (PERICOLACE) 8.6-50 mg per tablet 2 Tab  2 Tab Oral BID PRN    guaiFENesin ER (MUCINEX) tablet 1,200 mg  1,200 mg Oral Q12H    hydrALAZINE (APRESOLINE) 20 mg/mL injection 20 mg  20 mg IntraVENous Q4H PRN    HYDROcodone-acetaminophen (NORCO)  mg tablet 1 Tab  1 Tab Oral Q4H PRN    promethazine (PHENERGAN) with saline injection 25 mg  25 mg IntraVENous Q6H PRN    ondansetron (ZOFRAN ODT) tablet 8 mg  8 mg Oral TID    polyethylene glycol (MIRALAX) packet 17 g  17 g Oral DAILY       Review of Systems    Unobtainable due to patient status.             Objective:     Vitals:    07/27/19 0700 07/27/19 0745 07/27/19 0800 07/27/19 0810   BP: 108/70 104/71 104/67    Pulse: 74 74 74 74   Resp: 18 15 19 19   Temp:  98.2 °F (36.8 °C)     SpO2: 95% 100% 98% 99%   Weight:       Height:           Intake and Output:   07/25 1901 - 07/27 0700  In: 5601.4 [I.V.:5601.4]  Out: 2250 [Urine:2250]  No intake/output data recorded. Physical Exam:          Constitutional:  intubated and mechanically ventilated. EENMT:  Sclera clear, pupils equal, oral mucosa moist  Respiratory: crackles bilateral  Cardiovascular:  RRR with no M,G,R;  Gastrointestinal:  soft with no tenderness; positive bowel sounds present  Musculoskeletal:  warm with no cyanosis, no lower extremity edema  Skin:  no jaundice or ecchymosis  Neurologic: no gross neuro deficits     Psychiatric:  sedated     LINES:    ETT, olea    DRIPS:    Fentanyl, Dioprivan    CXR:            Ventilator Settings  Mode FIO2 Rate Tidal Volume Pressure PEEP   Pressure control  40 %    550 ml  12 cm H2O  10 cm H20      Peak airway pressure: 27.9 cm H2O   Minute ventilation: 10.6 l/min     ABG:   Recent Labs     07/27/19  0346 07/26/19 0347 07/25/19 2022   PHI 7.390 7.332* 7.259*   PCO2I 41.5 47.3* 54.7*   PO2I 108* 69* 96   HCO3I 25.1 25.0 24.5        LAB  Recent Labs     07/25/19  1838   GLUCPOC 152*     Recent Labs     07/27/19  0330 07/26/19  0324 07/25/19 0347   WBC 9.9 13.0* 11.6*   HGB 7.3* 8.7* 9.3*   HCT 22.8* 26.7* 28.2*   * 154 173     Recent Labs     07/27/19  0330 07/26/19  0324 07/25/19 0347   * 131* 130*   K 4.7 5.0 4.6    97* 95*   CO2 26 27 27   * 123* 106*   BUN 25* 15 13   CREA 0.76* 0.69* 0.79*   MG 2.2 1.8  --    PHOS 3.8 3.5  --    CA 8.0* 7.9* 7.7*   ALB 1.6* 1.8*  --    SGOT 17 25  --      No results for input(s): LCAD, LAC in the last 72 hours.       Assessment:  (Medical Decision Making)     Hospital Problems  Date Reviewed: 7/10/2019          Codes Class Noted POA    Electrolyte abnormality ICD-10-CM: E87.8  ICD-9-CM: 276.9  7/26/2019 Yes        Acute respiratory failure with hypoxemia (HCC) ICD-10-CM: J96.01  ICD-9-CM: 518.81  7/25/2019 Unknown        Bilateral pulmonary infiltrates on chest x-ray ICD-10-CM: R91.8  ICD-9-CM: 793.19  7/21/2019 Yes    Overview Signed 7/26/2019  8:44 AM by Yaakov Flores MD     Suspect developing ARDS             SVT (supraventricular tachycardia) ([facility] ICD-10-CM: I47.1  ICD-9-CM: 427.89  7/21/2019 Yes        * (Principal) Atrial fibrillation with RVR ([facility] ICD-10-CM: I48.91  ICD-9-CM: 427.31  7/21/2019 Yes        Centrilobular emphysema ([facility] ICD-10-CM: J43.2  ICD-9-CM: 492.8  7/12/2019 Yes        Protein malnutrition (HCC) (Chronic) ICD-10-CM: E46  ICD-9-CM: 260  7/9/2019 Yes        Cocaine abuse (HCC) (Chronic) ICD-10-CM: F14.10  ICD-9-CM: 305.60  7/9/2019 Yes        Lung mass ICD-10-CM: R91.8  ICD-9-CM: 786.6  7/8/2019 Yes        Pulmonary emboli ([facility] ICD-10-CM: I26.99  ICD-9-CM: 415.19  7/8/2019 Yes        Primary pancreatic cancer with metastasis to other site [facility]) ICD-10-CM: C25.9  ICD-9-CM: 157.9  7/8/2019 Yes        Mediastinal adenopathy ICD-10-CM: R59.0  ICD-9-CM: 785.6  7/8/2019 Yes              Plan:  (Medical Decision Making)     --adjust vent  --adequate sedation and pain control for his safety  --cont. Vancomycin/Zosyn D #3  --PC to see for goals of care   --too critical to consider bronch and the diagnostic utility is much lower since already on ABX  --overall prognosis is very poor  --Give onem dose of lasix IV ? Volume OL on CXR    total critical care time is 30 min so far    More than 50% of the time documented was spent in face-to-face contact with the patient and in the care of the patient on the floor/unit where the patient is located.     Brenda Sanchez MD

## 2019-07-27 NOTE — PROGRESS NOTES
[facility] CARDIOLOGY PROGRESS NOTE           7/27/2019 9:12 AM    Admit Date: 7/21/2019    Admit Diagnosis: Pneumonia [J18.9]; Pneumonia [J18.9]; Pneumonia [J18.9]      Subjective: Intubated, sedated    ROS:  UNABLE TO OBTAIN DUE TO INABILITY OF PATIENT TO COMMUNICATE SECONDARY TO CURRENT INTUBATION AND NEED FOR MECHANICAL VENTILATION. Objective:     Vitals:    07/27/19 0700 07/27/19 0745 07/27/19 0800 07/27/19 0810   BP: 108/70 104/71 104/67    Pulse: 74 74 74 74   Resp: 18 15 19 19   Temp:  98.2 °F (36.8 °C)     SpO2: 95% 100% 98% 99%   Weight:       Height:           Physical Exam:  General- intubated, sedated  Neck- supple, no JVD  CV- regular rate and rhythm no MRG  Lung- coarse  Abd- soft, nontender, nondistended  Ext- no edema bilaterally.   Skin- warm and dry    Current Facility-Administered Medications   Medication Dose Route Frequency    fentaNYL in normal saline (pf) 25 mcg/mL infusion  0-200 mcg/hr IntraVENous TITRATE    LORazepam (ATIVAN) injection 2 mg  2 mg IntraVENous Q2H PRN    fentaNYL citrate (PF) injection 50 mcg  50 mcg IntraVENous Q1H PRN    famotidine (PF) (PEPCID) 20 mg in sodium chloride 0.9% 10 mL injection  20 mg IntraVENous Q12H    Vancomycin trough reminder   Other ONCE    0.9% sodium chloride infusion  100 mL/hr IntraVENous CONTINUOUS    propofol (DIPRIVAN) infusion  0-50 mcg/kg/min IntraVENous TITRATE    piperacillin-tazobactam (ZOSYN) 4.5 g in 0.9% sodium chloride (MBP/ADV) 100 mL  4.5 g IntraVENous Q8H    vancomycin (VANCOCIN) 1,000 mg in 0.9% sodium chloride (MBP/ADV) 250 mL  1,000 mg IntraVENous Q12H    gabapentin (NEURONTIN) capsule 100 mg  100 mg Oral BID    LORazepam (ATIVAN) tablet 0.5 mg  0.5 mg Oral Q6H PRN    sodium chloride (NS) flush 5-40 mL  5-40 mL IntraVENous PRN    sodium chloride (NS) flush 5-40 mL  5-40 mL IntraVENous Q8H    sodium chloride (NS) flush 5-40 mL  5-40 mL IntraVENous PRN    acetaminophen (TYLENOL) tablet 650 mg  650 mg Oral Q4H PRN    HYDROcodone-acetaminophen (NORCO) 5-325 mg per tablet 1 Tab  1 Tab Oral Q4H PRN    naloxone (NARCAN) injection 0.4 mg  0.4 mg IntraVENous PRN    albuterol (PROVENTIL VENTOLIN) nebulizer solution 2.5 mg  2.5 mg Nebulization Q4H PRN    zolpidem (AMBIEN) tablet 5 mg  5 mg Oral QHS PRN    senna-docusate (PERICOLACE) 8.6-50 mg per tablet 2 Tab  2 Tab Oral BID PRN    guaiFENesin ER (MUCINEX) tablet 1,200 mg  1,200 mg Oral Q12H    hydrALAZINE (APRESOLINE) 20 mg/mL injection 20 mg  20 mg IntraVENous Q4H PRN    HYDROcodone-acetaminophen (NORCO)  mg tablet 1 Tab  1 Tab Oral Q4H PRN    promethazine (PHENERGAN) with saline injection 25 mg  25 mg IntraVENous Q6H PRN    ondansetron (ZOFRAN ODT) tablet 8 mg  8 mg Oral TID    polyethylene glycol (MIRALAX) packet 17 g  17 g Oral DAILY     Data Review:   Recent Results (from the past 24 hour(s))   METABOLIC PANEL, COMPREHENSIVE    Collection Time: 07/27/19  3:30 AM   Result Value Ref Range    Sodium 135 (L) 136 - 145 mmol/L    Potassium 4.7 3.5 - 5.1 mmol/L    Chloride 100 98 - 107 mmol/L    CO2 26 21 - 32 mmol/L    Anion gap 9 7 - 16 mmol/L    Glucose 126 (H) 65 - 100 mg/dL    BUN 25 (H) 6 - 23 MG/DL    Creatinine 0.76 (L) 0.8 - 1.5 MG/DL    GFR est AA >60 >60 ml/min/1.73m2    GFR est non-AA >60 >60 ml/min/1.73m2    Calcium 8.0 (L) 8.3 - 10.4 MG/DL    Bilirubin, total 0.3 0.2 - 1.1 MG/DL    ALT (SGPT) 12 12 - 65 U/L    AST (SGOT) 17 15 - 37 U/L    Alk.  phosphatase 91 50 - 136 U/L    Protein, total 5.6 (L) 6.3 - 8.2 g/dL    Albumin 1.6 (L) 3.5 - 5.0 g/dL    Globulin 4.0 (H) 2.3 - 3.5 g/dL    A-G Ratio 0.4 (L) 1.2 - 3.5     CBC WITH AUTOMATED DIFF    Collection Time: 07/27/19  3:30 AM   Result Value Ref Range    WBC 9.9 4.3 - 11.1 K/uL    RBC 2.53 (L) 4.23 - 5.6 M/uL    HGB 7.3 (L) 13.6 - 17.2 g/dL    HCT 22.8 (L) 41.1 - 50.3 %    MCV 90.1 79.6 - 97.8 FL    MCH 28.9 26.1 - 32.9 PG    MCHC 32.0 31.4 - 35.0 g/dL    RDW 11.7 (L) 11.9 - 14.6 % PLATELET 224 (L) 409 - 450 K/uL    MPV 10.6 9.4 - 12.3 FL    ABSOLUTE NRBC 0.02 0.0 - 0.2 K/uL    DF AUTOMATED      NEUTROPHILS 91 (H) 43 - 78 %    LYMPHOCYTES 3 (L) 13 - 44 %    MONOCYTES 5 4.0 - 12.0 %    EOSINOPHILS 0 (L) 0.5 - 7.8 %    BASOPHILS 0 0.0 - 2.0 %    IMMATURE GRANULOCYTES 1 0.0 - 5.0 %    ABS. NEUTROPHILS 9.0 (H) 1.7 - 8.2 K/UL    ABS. LYMPHOCYTES 0.3 (L) 0.5 - 4.6 K/UL    ABS. MONOCYTES 0.5 0.1 - 1.3 K/UL    ABS. EOSINOPHILS 0.0 0.0 - 0.8 K/UL    ABS. BASOPHILS 0.0 0.0 - 0.2 K/UL    ABS. IMM. GRANS. 0.1 0.0 - 0.5 K/UL   MAGNESIUM    Collection Time: 07/27/19  3:30 AM   Result Value Ref Range    Magnesium 2.2 1.8 - 2.4 mg/dL   PHOSPHORUS    Collection Time: 07/27/19  3:30 AM   Result Value Ref Range    Phosphorus 3.8 2.5 - 4.5 MG/DL   POC G3    Collection Time: 07/27/19  3:46 AM   Result Value Ref Range    Device: VENT      FIO2 (POC) 50 %    pH (POC) 7.390 7.35 - 7.45      pCO2 (POC) 41.5 35 - 45 MMHG    pO2 (POC) 108 (H) 75 - 100 MMHG    HCO3 (POC) 25.1 22 - 26 MMOL/L    sO2 (POC) 98 95 - 98 %    Base excess (POC) 0 mmol/L    Mode ASSIST CONTROL      Set Rate 18 bpm    PEEP/CPAP (POC) 10 cmH2O    Allens test (POC) YES      Inspiratory Time 1 sec    Site LEFT RADIAL      Patient temp.  98.6      Specimen type (POC) ARTERIAL      Performed by DavisConnorRRT     CO2, POC 26 MMOL/L    Pressure control 18      Respiratory comment: NurseNotified     Exhaled minute volume 10.30 L/min    COLLECT TIME 342         EKG:  (EKG has been independently visualized by me with interpretation below)  Assessment:     Principal Problem:    Atrial fibrillation with RVR ([facility] (7/21/2019)    Active Problems:    Lung mass (7/8/2019)      Pulmonary emboli ([facility] (7/8/2019)      Primary pancreatic cancer with metastasis to other site [facility]) (7/8/2019)      Mediastinal adenopathy (7/8/2019)      Protein malnutrition ([address] (7/9/2019)      Cocaine abuse ([facility] (7/9/2019)      Centrilobular emphysema ([facility] (7/12/2019)      Bilateral pulmonary infiltrates on chest x-ray (7/21/2019)      Overview: Suspect developing ARDS      SVT (supraventricular tachycardia) ([address] (7/21/2019)      Acute respiratory failure with hypoxemia ([address] (7/25/2019)      Electrolyte abnormality (7/26/2019)      Plan:   1. Atrial fibrillation with RVR ([address] (7/21/2019): In sinus rhythm. Amiodarone held currently as concern it may be contributing to lung infiltrates. Seems unlikely at this point as infiltrates present at admission prior to initiation of amiodarone but will continue to hold as workup continues. REstart IV heparin when OK with primary team. Cont telemetry     2.  Lung mass (7/8/2019) Pulmonary following.      3.  Pulmonary emboli ([address] (7/8/2019): Restart IV heparin when OK with primary team.      4.  Primary pancreatic cancer with metastasis to other site [facility]) (7/8/2019): Poor prognosis.      5. Pneumonia (7/21/2019): per primary team     6. SVT (supraventricular tachycardia) ([address] (7/21/2019): see above.      7.  Acute respiratory failure with hypoxemia ([address] (7/25/2019): Poor prognosis. Palliative care following. Beto Ruffin MD  Cardiology/Electrophysiology

## 2019-07-27 NOTE — PROGRESS NOTES
Problem: Falls - Risk of  Goal: *Absence of Falls  Description  Document Charly Azul Fall Risk and appropriate interventions in the flowsheet.   Outcome: Progressing Towards Goal  Note:   Fall Risk Interventions:  Mobility Interventions: Bed/chair exit alarm, Communicate number of staff needed for ambulation/transfer, Strengthening exercises (ROM-active/passive)    Mentation Interventions: Adequate sleep, hydration, pain control, Bed/chair exit alarm, Door open when patient unattended, Evaluate medications/consider consulting pharmacy, Increase mobility, More frequent rounding, Reorient patient, Toileting rounds, Update white board    Medication Interventions: Bed/chair exit alarm, Evaluate medications/consider consulting pharmacy    Elimination Interventions: Bed/chair exit alarm, Call light in reach, Patient to call for help with toileting needs, Toileting schedule/hourly rounds              Problem: Patient Education: Go to Patient Education Activity  Goal: Patient/Family Education  Outcome: Progressing Towards Goal     Problem: Pneumonia: Discharge Outcomes  Goal: *Demonstrates progressive activity  Outcome: Progressing Towards Goal  Goal: *Tolerating diet  Outcome: Progressing Towards Goal  Goal: *Vital signs within defined limits  Outcome: Progressing Towards Goal  Goal: *Optimal pain control at patient's stated goal  Outcome: Progressing Towards Goal     Problem: Delirium Treatment  Goal: *Level of consciousness restored to baseline  Outcome: Progressing Towards Goal  Goal: *Level of environmental perceptions restored to baseline  Outcome: Progressing Towards Goal  Goal: *Sensory perception restored to baseline  Outcome: Progressing Towards Goal  Goal: *Emotional stability restored to baseline  Outcome: Progressing Towards Goal  Goal: *Functional assessment restored to baseline  Outcome: Progressing Towards Goal  Goal: *Absence of falls  Outcome: Progressing Towards Goal  Goal: *Will remain free of delirium, CAM Score negative  Outcome: Progressing Towards Goal  Goal: *Cognitive status will be restored to baseline  Outcome: Progressing Towards Goal  Goal: Interventions  Outcome: Progressing Towards Goal     Problem: Patient Education: Go to Patient Education Activity  Goal: Patient/Family Education  Outcome: Progressing Towards Goal     Problem: Pressure Injury - Risk of  Goal: *Prevention of pressure injury  Description  Document James Scale and appropriate interventions in the flowsheet. Outcome: Progressing Towards Goal  Note:   Pressure Injury Interventions:  Sensory Interventions: Assess changes in LOC, Assess need for specialty bed, Avoid rigorous massage over bony prominences, Check visual cues for pain, Float heels, Keep linens dry and wrinkle-free, Maintain/enhance activity level, Minimize linen layers, Monitor skin under medical devices, Pad between skin to skin, Pressure redistribution bed/mattress (bed type), Turn and reposition approx. every two hours (pillows and wedges if needed), Use 30-degree side-lying position         Activity Interventions: Assess need for specialty bed, Pressure redistribution bed/mattress(bed type)    Mobility Interventions: Assess need for specialty bed, Float heels, HOB 30 degrees or less, Pressure redistribution bed/mattress (bed type), Turn and reposition approx.  every two hours(pillow and wedges)    Nutrition Interventions: Document food/fluid/supplement intake    Friction and Shear Interventions: Apply protective barrier, creams and emollients, Feet elevated on foot rest, Foam dressings/transparent film/skin sealants, HOB 30 degrees or less, Lift sheet, Minimize layers                Problem: Patient Education: Go to Patient Education Activity  Goal: Patient/Family Education  Outcome: Progressing Towards Goal     Problem: Non-Violent Restraints  Goal: *Removal from restraints as soon as assessed to be safe  Outcome: Progressing Towards Goal  Goal: *No harm/injury to patient while restraints in use  Outcome: Progressing Towards Goal  Goal: *Patient's dignity will be maintained  Outcome: Progressing Towards Goal  Goal: Non-violent Restaints:Standard Interventions  Outcome: Progressing Towards Goal  Goal: Non-violent Restraints:Patient Interventions  Outcome: Progressing Towards Goal  Goal: Patient/Family Education  Outcome: Progressing Towards Goal

## 2019-07-27 NOTE — PROGRESS NOTES
Bedside shift change report given to [address] (oncoming nurse) by Sivakumar Azul (offgoing nurse).  Report included the following information Kardex, Intake/Output, MAR, Recent Results, Med Rec Status and Cardiac Rhythm SR.

## 2019-07-27 NOTE — PROGRESS NOTES
Ventilator check complete; patient has a #8. 0 ET tube secured at the 27 at the lip. Patient is sedated. Patient is able to follow commands. Breath sounds are clear. Trachea is midline, Negative for subcutaneous air, and chest excursion is symmetric. Patient is also Negative for cyanosis. All alarms are set and audible. Resuscitation bag is at the head of the bed. Ventilator Settings  Mode FIO2 Rate Tidal Volume Pressure PEEP I:E Ratio   Pressure control  40 %      18  10 cm H20  1:2.5      Peak airway pressure: 27.9 cm H2O   Minute ventilation: 10.6 l/min     ABG: No results for input(s): PH, PCO2, PO2, HCO3 in the last 72 hours.       Jazmine Stands, RT

## 2019-07-27 NOTE — ROUTINE PROCESS
Respiratory Care Services     Policy Number: -[id number]    Title: RU Ventilator Weaning Protocol    Effective Date: 10/2000    Revised Date:  05/2009, 07/2019     Reviewed Date: 06/2013,  05/2014, 07/2015, 03/2016, 06/2017, 05/2018       I. Policy:Upon physician's initial order, this protocol will be implemented for those patients who meet the criteria for weaning from mechanical ventilation. II. Purpose: The respiratory care practitioner will utilize the following protocol to provide the most efficient and effective ventilator weaning and extubation. III. Responsibility: Director, Emeli Avila, and all Respiratory Care Practitioners with documented competency. IV. Procedure: Management of Ventilator, Extubation, and Post-Extubation Process  Refer to the Fast Track Cardiac Surgery Protocol Policy Number:  [id number] [email or web address]/policies/Critical%20Care%[id number]. doc     V. Documentation  A. Document findings in the respiratory section of the patient's EMR. 1. Document when ventilator is discontinued. 2. Document when airway is removed. B.  Document the following in the Progress Notes:  1. mechanics  2. extubation   3. patient tolerance of extubation  4. respiratory delivery device placed on patient    C. Document changes in therapy per protocol in the respiratory orders section and in the         care plan section of the patient's EMR. D. Document patient education in the patient education section of the patient's EMR. References:  L -  Therapist Driven Respiratory Care Protocols  A Practitioner's Guide for Criteria-Based Respiratory Care by Mey Jessica M.D., and KARLEY Aleman RRT. N  [facility] Clinical Practice Guidelines, Evidenced Based Guidelines for Weaning and Discontinuing Ventilatory Support.   .              Guideline     Guideline Number: -[id number]     Title: Management of the Patient with Mechanical Ventilation (including weaning) and ABCDE Bundle    Effective Date:  03/00    Revised Date: 02/09, 03/10, 7/12, 5/13,                                  10/13, 8/14  Reviewed Date: 07/2015, 04/2016, 06/2017       I. Policy:  Management of the patient requiring mechanical ventilation, including readiness to wean and weaning protocol. The information provided serves as a guideline for patient management. Included in this guidelines is the ABCDE Bundle to provide guidance to staff for evidence based management of pain, agitation/anxiety and delirium in the intensive care unit. The goals of critical care analgesia and sedation are to facilitate mechanical ventilation, to prevent patient and caregiver injury, and to avoid the psychological and physiologic consequences of inadequate treatment of pain, anxiety, agitation, and delirium by maintaining a light level of sedation. Pain occurs commonly in adult ICU patients, regardless of admitting diagnosis. Therefore, pain should be frequently assessed and analgesic medications titrated to prevent adverse effects associated with either inadequate or excessive analgesia. Once pain has been addressed, anxiolytic and/or antipsychotic medications can be utilized to treat unresolved agitation/anxiety and delirium, with the goal to prevent over- or under sedation by using the Moy Agitation Sedation Scale (RASS). Assertive management of these issues has been shown to reduce costs, improve ICU outcomes such as successful extubation and ICU length of stay, and allow for patients to participate in their own care. II. Purpose: The respiratory care practitioner and the critical care RN will utilize the following guideline to provide the most efficient and effective management of mechanical ventilators and weaning and extubation processes. Goals of Treatment:  1.  Adequate management of patients pain and discomfort while maintaining a light level of sedation (RASS score of 0 to -1)  2. Both chronic and acute sources of pain should be identified and treated. 3. Sedative agents should be considered if patient still expresses discomfort and/or is not at RASS         goal of 0 to -1 despite adequate management of pain. 4. Patients requiring neuromuscular blockage must have continuous infusions of analgesic and              sedative agents. III. Responsibility: Director Respiratory Care Services and all Respiratory Care Practitioners  with documented competency as well as Critical Care RN staff. General Guidelines    1. Introduction to Ventilator Plan Phase  a. Ventilator Management Phase, General Statement  -  The plan should be initiated in patients who have a secure airway/require invasive mechanical ventilation (endotracheal or tracheostomy) only  -   The provider determines the appropriate medications used for analgesia and agitation/anxiety along with the clinical pharmacist    2. Monitoring Levels of Comfort  a. Pain Assessment  - Pain is monitored using the numerical scales  - A pain assessment should be conducted, at a minimum, every 4 hours and as needed and per guidelines. - The level of pain should be determined as satisfactory by the patient based on patients baseline level of pain , considering any chronic pain that the patient may have. - If the patient is unable to communicate pain level, the nurse can assess for nonverbal indicators including facial grimacing, moaning, tachypnea, tachycardia, hypertension, diaphoresis, etc as a cue to begin further pain assessment.             b.  Sedation Assessment  - Sedation is monitored using the Myo Agitation Sedation Scale (RASS)  Target RASS RASS Description   +4 Combative, violent, danger to staff     +3 Pulls or removes tubes(s) or catheters; aggressive   +2 Frequent nonpurposeful movement, fights ventilator   +1 Anxious, apprehensive, but not aggressive   0 Alert and calm   -1 Awakens to voice (eye opening/contact) > 10 sec   -2 Light sedation, briefly awakens to voice (eye opening/contact) < 10 sec   -3 Moderate sedation, movement or eye opening. No eye contact   -4 Deep sedation, no response to voice, but movement or eye opening to physical stimulation   -5 Unarousable, no response to voice or physical stimulation     -  Goal RASS is 0 to -1, unless otherwise specified by providers order.  - Nursing staff should conduct the RASS every 4 hours and as needed to maintain goal RASS of 0 to -1.  - If RASS is outside of goal range, discuss treatment options with provider.  - A RASS score of +2 to +4 requires further assessment by the nurse. Causes of agitation/anxiety that should be considered include:  a. Pulmonary -   endotracheal tube malposition or patency, mode of ventilation, pneumothorax, hypoxemia, hypercarbia  b. Metabolic  hypoglycemia, hyponatremia, acute renal or hepatic failure  c. Emotional upset  with information and awareness of critical condition, prognosis, need for surgical or invasive procedures, other interventions or complications, family or personal stressors  - C. Sedation Assessment while using Neuromusclar Blocking Agents  - D. Delirium Assessment  a. the ICU (CAM  ICU)   3. Analgesia  The incidence of significant pain has been reported to be 50% or higher in both medical and surgical ICU patients. These patients also experience discomfort during routine/procedural ICU care and at rest.  However, patients may be unable to self-report their pain (either verbally or with other signs) because of an altered level of consciousness, the use of mechanical ventilation, or high doses of sedative agents or neuromuscular blocking agents.   The short and long term consequences of unrelieved or inadequately treated pain are significant and include patient discomfort, decreased satisfaction with care by family and patient, delirium, agitation/anxiety, post traumatic stress disorder and depression. Therefore, routine assessment and treatment of pain should occur in all ICU patients. Causes and Treatment of Pain in the ICU  a. Acute pain (post-operative, procedural pain, discomfort with usual ICU care or other acute episodes of pain-related to underlying disease)  1. Consider use of PCA for alert and oriented patients with pain needs not met by PRN dosing or opoids. 2. Preemptive analgesia should occur prior to chest tube removal, and should be considered for other procedural pain such as turning and repositioning, would drain removal, wound dressing change, tracheal suctioning, femoral catheter removal or place of central venous catheter. 3. Appropriate analgesic medications for preemptive analgesia are short acting intravenous (IV) agents (i.e. fentanyl, morphine, hydromorphone)  a. Administration of analgesia before patient experiences noxious stimuli prevents amplification and hyperexcitability of the central nervous system. b. Analgesia for Mechanically Ventilated Patients:  1. The approach to sedation and analgesia management for mechanically ventilated patients favors use of analgesia first sedation. The primary goal of this strategy is to address pain and discomfort first, and then if necessary, add anxiolytic agent. 2. Analgesia first sedation reduces dose escalation of medications, decreases the duration of mechanical ventilation and the incidence of VAP, improves the probability of successful extubation, and ultimately shortens ICU length of stay. 3. For pain management, analgesic medications are determined by the provider. Intermittent dosing of the analgesic should be attempted first.    If the patient requires more than 3 doses within 1 hour then provider should be contacted to consider continuous infusion. 4.  Analgesic options for mechanically ventilated patients include:  a.   Fentanyl which is considered the drug of choice for patients requiring continuous infusion. b.Morphine may be considered for those patients without renal dysfunction who are hemodynamically stable and require intermittent pain medication. Continuous infusions of morphine may be used for patientl who are receiving comfort care as part of end of life care. c.Hydromorphone is reserved for patients who are refractory to fentanyl or morphine and is typically admininstered by intermittent dosing. 4.  Agitation/Anxiety    Background  Agitation and anxiety frequently occur in ICU patients. Anxiolytic/sedation agents may be indicated to help relieve discomfort, improve synchrony with mechanical ventilation, and decrease the overall work of breathing. Pain control alone may be sufficient to make patients comfortable enough to require no anxiolytic/sedative agent. In addition, non-pharmacologic interventions such as repositioning or verbal assurance may be helpful to comfort or redirect an agitated patient. If these methods are unsuccessful, then anxiolytic/sedative medications such as propofol, dexmedetomidine, or benzodiazepines can be used. Selection of an anxiolytic should be based on the pharmacokinetic properties of the medication, patient specific characteristics, and sedation goal.   However, nonbenzodiazepine sedatives (ie propofol or dexmedetomidine) may be preferable over benzodiazepines (ie midazolam or larazepam) due to more favorable outcomes such as delirum. Causes and Treatment of Agitation/Anxiety  a. Possible underlying causes of agitation and anxiety include pain, delirium, hypoxemia, hypoglycemia, hypotension, or withdrawal from alcohol  and other drugs. b. Analgesia first sedation should be attempted initially to manage pain and provide sedation in appropriate patients. Analgesia alone may be adequate to reach RASS goal of 0 to -1.   If patient remains agitated or anxious despite adequate analgesia (ie RASS +2 to +4) then anxiolytic/sedative should be considered. c. The choice of anxiolytic should be based on desired levels of sedation (ie light sedation or deep sedation) with preference for the use of nonbenzodiazepines such as propofol or dexmedetomidine if appropriate. While light sedation (ie RASS 0 to -1) is preferred for most patients, there are instances when deep sedation (ie RASS -4 to -5) is desired. For example, in the setting of ventilator dysynchrony due to ARDS or for patients receiving NMB agents. d. Medications to maintain light sedation (ie RASS 0 to -1) include  1. Propofol continuous infusion can be considered for hemodynamically stable (ie SBP = 100, MAP = 65 and/or not requiring vasopressor support) patients requiring light sedation. Propofol has a quick onset (1-2 minutes) and offset of action, making it a good agent to assess neurological status and facilitate liberation from the mechanical ventilator. 2.Dexmedetomidine continuous infusion is a good option for hemodynamically stable patients requiring light sedation as it allows for a more awake, interactive patient is associated with less delirium. It has an intermediate onset of action (5-10 min). Therefore, abrupt titrations should be avoided, but use of prn haloperidol or benzodiazepine may be useful to manage agitation until the medication takes effect. 3. Antipsychotics are another option. In particular, haloperidol intermittently dosed may be useful for patients with symptoms of agitation/anxiety and delirium. 4.Benzodiazapines can also be considered for light sedation, but should be intermittently doses. Midazolam is an option for patients without renal dysfunction. It has a short onset of action (2-5 minutes) making it a good agent for acute agitation/anxiety, but short duration of action resulting in frequent dosing. Lorazepam is another option.   It has a longer onset of action (15-20 minutes) in comparison to midazolam, but longer duration of action. e. Medications to maintain deep sedation (RASS -4 to -5) include:  1) Propofol continuous infusion should be considered as a first line option for hemodynamically stable patients. 2)Benzodiazepines can be considered as second line options for deep sedation. Studies comparing these agents to other sedatives have shown that they lead to worse outcomes including delirium, oversedation, delayed extubation, and longer time to discharge. Midazolam is one option for patients without renal dysfunction and lorazepam is another options. If patient requires more than 3 doses within 1 hour then contact provider  to consider initiation of continuous infusion. 5.  Daily Sedation Awakening Trial (SAT) from IV Continuous Analgesia/Sedation  a. Patients are to have daily awakening from sedation while on continuous IV analgesia and/or sedation in the ICU. Continuous analgesia infusions may be maintained only if needed for active pain and RASS is at goal 0 - -1. Unit guideline is for the SAT to occur following ICP rounds each morning.    b. The sedation awakening trial (SAT) is done regardless if the patient meets criteria for spontaneous breathing trial (SBT). c. SAT safety screen is assessed and SAT should not be performed if sedation is being used for active seizures, alcohol withdrawal, hemodynamically unstable or requiring support of vasoactive medications , in conjunction with NMB agents, if ICP is greater than  20mmHg or if sedation is being used to control ICP, patients RASS is +3 or +4 (very agitated or combative). Other exclusion criteria are:  if there is documentation of myocardial ischemia in the past 24 hours; or patient is receiving high frequency oscillator ventilation (HFOV) , if the patient has an open chest /abdomen or is receiving comfort care.   d. Criteria for passing the SAT are the patient opened their eyes to verbal stimuli or tolerated sedative interruption without exhibiting failure criteria.  e. Patients fail the SAT if the develop sustained anxiety, agitation, or pain; a respiratory rate of 35 per minutes for 5 minutes or longer, an SpO2 less than 88% for 5 minutes or longer; an acute cardiac dysrhythmia; two or more signs of respiratory distress including tachycardia, bradycardia; use of accessory muscles; diaphoresis; marked dyspnea; or myocardial ischemia. f. Respiratory therapy staff must verify with the nurse that continuous IV analgesia (unless being use  for active pain) and sedation (unless patient is receiving dexmedetomidine) is off prior to placing patient on SBT. g. DO NOT interrupt infusion of analgesia and sedation medications if patient is receiving neuromuscular blockade.  h. Monitor level of wakefulness unless patient is awake and follows commands (RASS 0 to -1) or patient becomes uncomfortable or agitated (RASS +3 to +4)  i. If agitation prevents successful awakening , administer bolus of analgesia and/or sedation then resume infusion of the medication at ½ previous dose and titrate as needed.  j. If oversedation prevents successful awakening, hole infusion until at goal and resume ½ of prior infusion rate/dose if clinically indicated. 6. Delirium  Background  Delirium is characterized by the acute onset of cerebral dysfunction with a change or fluctuation baseline  mental status, inattention, and either disorganized thinking or an altered level of consciousness. It affects up to 80% of mechanically ventilated adult ICU patients, and is associated with increased mortality,   and treatment is important and may in turn allow for a patient to be conscious yet cooperative enough to participate   in ventilator weaning trials and early mobilization efforts. Delirium can only be assessed in patients who are able to sufficiently interact and communicate with bedside  clinicians (ie RASS -3 to +4). IV. Procedure:  A.  Assessment: The following criteria must be assessed prior to the initiation of weaning from mechanical ventilation. Note: The criteria are general guidelines and must be individualized for each patient. The patients primary nurse will be responsible, in coordination with the RT, the Spontaneous Awakening Trial). The RT will perform the SBT. B. Spontaneous Awakening Trials (SATs  also referred to as Sedation Vacation) and Spontaneous Breathing Trials (SBTs) performed to determine readiness to wean. 1. For patients who meet established criteria, such as those without active seizures, alcohol withdrawal and agitation, myocardial ischemia or those requiring cardiac support devices, without increased intracranial pressure and those not receiving neuromuscular blockade, the nurse will reduce the infusions of sedative by 50% of current used for sedation that was used to achieve a level of light sedation (Cedillo Score 2 or RASS score of 0 to -1) and evaluate patient response to reduction of sedation. Analgesics required for pain control are continued during the test.  Obtain MD order to cover no SAT for that time period if patient has any exclusion criteria as described above. 2. Failure of the spontaneous awakening trial occurs when the patient shows symptoms such as increased agitation, anxiety, pain or signs of respiratory distress including respiratory rate >35/min or oxygen saturation <88% as well as development of acute cardiac arrhythmias. If these symptoms develop during the SAT, the nurse then restarts sedation at 75% of the previous dose and titrates the medications until the patient is comfortable and/or symptoms have abated. 3.  If the patient passes the SAT then the patient moves on to the Spontaneous Breathing Trials as performed by the RT. The SBT Safety Screen included the following:  No agitation, oxygen saturation > 88%, FIO2 < 50%, PEEP < 7.5 cm H20, no myocardial ischemia, no vasopressor use, and with inspiratory efforts.     4. Patients who pass the spontaneous awakening trial but fail the spontaneous breathing trial are placed back on full ventilator support and reassessed the next day. 5. Failure of the SBT (spontaneous breathing trail) includes any of the following:  Respiratory rate > 35/min, respiratory rate < 8/min, oxygen saturation < 88%, respiratory distress, mental status change, acute cardiac arrhythmia. 6. Extubation is considered for patients who tolerate the spontaneous awakening trial and pass the spontaneous breathing trial.    C. Can the cause of respiratory failure be reversed (i.e. absence of high spinal cord injury or advanced ALS)? D. Is gas exchange adequate? 1. PaO2/FIO2 ratio > 150  200,  2. PEEP < 8 cm H20  3. FIO2 < 50  4. pH > 7.30  5. Rapid shallow breathing index (f/VT) < 105  E. Is patient hemodynamically stable? 1. Absence of clinically significant hypotension (minimal vasopressors such as Dopamine < 5mcg/kg/minute)? F. Is there evidence of intact respiratory drive (NIP/NIF >-63 NUI68, stable VC02)? G. Does patient have an adequate cough, airway clearance ability? H. Is there absence of excessive secretions? V. Initiation:     A. The therapist shall consult with RN to determine if sedation can be discontinued or significantly decreased. If this can be achieved, the therapist shall implement the                     followin. Identify patient and verify name and account number via ID bracelet. 2. Perform hand hygiene per hospital policy utilizing Standard Precautions for all patients and following transmission-based isolation as indicated per hospital policy. 3. Perform a ONE-MINUTE SPONTANEOUS TRIAL AND ASSESSMENT. 4. Measure Rapid Shallow Breathing Index (RSBI) and monitor SpO2 and cardiovascular parameters during the spontaneous breathing assessment.   5. If SpO2 and cardiovascular parameters are stable, continue spontaneous breathing trial for at least 30 minutes and up to 120 minutes, as patient tolerates. 6. Monitor ventilatory status, SpO2, and cardiovascular status during spontaneous breathing trial.  7. If patient has a successful trial, consider patient as a candidate for extubation and obtain order. 8. If patient fails the weaning trial, place back on ventilator and adjust settings to provide a non-fatiguing form of ventilatory support for the remainder of the day and night. 9. One attempt at weaning shall be performed each day until successful weaning occurs. The RCP will make every attempt to begin the spontaneous breathing trials between 0500 and 0600 to provide documentation of the trial when the pulmonologist makes rounds. B.  Assessment of SBT or PST:  1. Is gas exchange acceptable? 2. PaO2 > 60 mm Hg. 3. PH > 7.30  4. Increase in PaCO2  < 10 mm Hg  C. Is patient hemodynamically stable? 1. HR < 120 beats/minute  2. HR  < 20%   3. Systolic BP < 281 and > 90 mmHg  4. BP  < 20%, no vasopressors required  D. Does patient have stable ventilatory pattern? 1. Sustained RR < 30 breaths per minute  2. Normal and stable VCO2  3. Patient is not demonstrating any signs of increased work of breathing, such as increased use of accessory muscles. E. Mental status stable throughout trial?  1. Absence of changes such as somnolence, excessive agitation or anxiety  2. Absence of diaphoresis during trial?  IV. Safety:    A. The RCP shall monitor patient according to the above guidelines. If at any time during the weaning process, the respiratory therapist or nurse feels that the patient is not tolerating weaning, the therapist shall place patient back on previous ventilator settings. B. The patient shall be reassessed and the weaning process should be continued the following day. V. Reportable Conditions:    A. The therapist shall notify the physician, as appropriate, for any of the following         conditions:  1. FIO2 increase (sustained) at 10% or greater  2.  Poor patient/ventilator interface in spite of adjustments  3. Need for increased sedation for respiratory distress  4. Need for increasing ventilating pressures (i.e. PEEP, PIP, MAP)  5. ABG results meeting panic value criteria or other clinical signs indicating deterioration of patients condition. 6. Unplanned extubation. 7. Unexplained sustained increase in PIP greater than 10 cm H2O.  8. Assessment results regarding ventilator discontinuance process. VI. Ventilator protocol management   A. The following items should be maintained for patients who are being mechanically           ventilated. 1. Obtain STAT Chest X-Ray for ET tube placement after insertion. 2. Chest X-Ray q a.m. while on ventilator. 3. ABGs 30 - 60 minutes after being stable on the ventilator. 4. ABG's q a.m. while on ventilator and prn.  5. Do spontaneous breathing trials when patient is hemodynamically stable, responsive, and without fever. 6. Terminate trials if patient exhibits signs of respiratory distress. 7. Therapists should maintain ABG s as follows:      a. pH -  7.30 - 7.50                   b. PaO2 -   60  100        8. Racemic Epinephrine (0.5cc) for post extubation stridor (2 UA treatments max.)    VII.   Early Mobilization    Mobility Level Criteria Start at Level 1 if:   PaO2/FIO2 <250   Positive end-expiratory Pressure (PEEP) >=10 cm H2O   O2 saturation <90%   Respiratory Rate (RR) Not within 10-30 per min   Cardiac arrhythmias or ischemia New onset   Heart Rate  (HR) <60 or >120 beats per min   Mean arterial pressure (MAP) <55 or >934 mmHg   Systolic blood pressure (SBP) <90 or > 180 mmHg   Vasopressor infusion New or increasing   Moy Agitation Scale (RASS) < - 3       Level I:   Breathe  (Rass -5 to -3)  HOB Angle  improve VAP protocol compliance   Visually confirm the [facility] is elevated >= 30 degrees to comply with VAP prevention protocols   The Centers for Disease Control and Prevention recommends an HOB angle of 30-45 degrees , unless contraindicated  Additional activities to be implemented   Every 2 hour turning   Passive range of motion   Up to 20 degrees reverse trendelenburg with lower extremity exercise/retracting footboard   Continuous lateral rotation therapy can be considered part of early mobility therapy in patients who are at high risk for pulmonary complications  Move to Level 2 when the patient  - Has acceptable oxygenation/hemodynamics  - Tolerates q 2 turning  - Tolerates HOB > 30 degrees or up to 20 degrees reverse trendelenburg    Level 2 :Tilt  Patient Assessment Rass > -3  (eg, opens eyes, may have profound weakness)  Up to 20 degrees Reverse Trendelenburg position and 10 degrees minimum HOB  - Reverse Trendelenburg positioning allows for orthostatic position in fragile patients  - If available , use in conjunction with retracting foot section to allow for partial weight bearing prior to sitting up in the bed or getting out of bed  Additional activities to be implemented  -  Maintain HOB >/= 30 degrees  - Q 2 hour turning  - Passive/active range of motion  - Legs dependent  - PT consultation       Move to Level 3 when the patient . .    -Tolerates active- assistance exercises 2 times per day    -Tolerates lower extremity exercises against footboard/Up to 20 degrees Reverse Trendelenburg    -Tolerates legs dependent /HOB 45 degrees  Level 3 :  SIT  (Rass >- 1 (eg , weak but may move arms/legs independently)   Full chair position (footboard on)   Full upright positioning allows for diaphragmatic excursion and lung expansion   Sitting with legs in a dependent position facilitates gas exchange  Additional activities to be implemented  - Maintain HOB >= 30 degrees  - Q 2 hour turning (assisted)  - Active range of motion  PT/OT actively involved  - Encourage activities of daily living  - Dangling, if patient can move arm against gravity  Move to Level 4 when the patient . .  - Tolerates increasing active exercise in bed  - Actively assists with every- 2- hour turning or turns independently  - Tolerates full chair position 3 times/day  Level 4:  Stand ( RASS >0 (eg, weak but may tolerate increased activity)      Stand Attempts   Full chair position (footboard off/feet on the floor)   Consider using a sit-to-stand lift   Pivot to chair, it tolerates partial weight bearing  Additional activities to be implemented  - Maintain head of bed >= 30 degrees  - Q 2 hr turning (self/assisted)  - Active range of motion  - Encourage activities of daily living  - PT/OT actively involved      Move to Level 5 when the patient .  - Can successfully comply with all activities  - Tolerates trial periods of full chair position (footboard off/feet on floor) 3 times per day  - Tolerates partial weight-bearing stand and pivots to chair    Level 5 :  Move  (RASS > 0    (eg, weak but may tolerate increased activity)   Achieve out of bed   Utilize mobile floor life to ambulate to bedside chair  Additional activities to be implemented  - Maintain HOB > = 30 degrees  - Q 2 hour turning (self/assisted)  - Active range of motion  - Patient stands/bears weight > 1 minute  - Patient marches in place  - PT/OT actively involved    Patient continues to ambulate progressively longer distances as tolerated until they consistently participate and move independently.         E Approved by [address] 2-19-09   [facility] Clinical Guidelines             COLLIN LOMAS [facility] Flowsheet Content Variables to select when addressing section Comments   COLLIN Initiated  Yes/No  RN to address minimum q 24 hours (day shift)   Target RASS  0 = alert and oriented   -1 = drowsy   -2 = light sedation   -3= moderate sedation   -4= deep sedation Target on standard ventilator setting should be -2; -4 with oscillator   CAM -ICU  Positive   Negative   Unable to assess Delirium assessment   SAT Safety Screen Passed  Yes   No Select yes if proceeding on to the sedation vacation (reduction of continuous sedative drip by directed by MD)  Select no if your patient has any of the below reasons for not proceeding on to the daily awakening sedation vacation trial   SAT Screen for Failure  Active seizures   Acute delirium tremors   Agitation that threatens accidental line/tube removal   On paralytics   MI (24-48hr)   Abnormal ICP   Open abdomen Select one of the options when the patient will not undergo the sedation vacation  ALSO MUST OBTAIN AN ORDER FOR no SEDATION VACATION written under nursing miscellaneous for now by either the NP or Intensivist   Daily sedation Vacation/assessment of   Yes   No   Not applicable If yes, MUST see the reduction in sedation on the [facility] and please place in the comment section of the sedative sedation vacation started   SBT Safety Screen Passed  Yes   No Select Yes if the patient has none of the below listed reasons for not proceeding on to the SBT following reduction of sedation   SBT Screen Reason for Failure  Agitation   O2 Sat < or = 88%   FIO2 > 50%   PEEP >7   MI   Vasopressor Use   Bilevel setting on Vent   Oscillator in use   Increased resp effort Select reason as appropriate for NOT proceeding on to the SBT                                               Wake Up and Breathe Protocol

## 2019-07-27 NOTE — PROGRESS NOTES
Nutrition:  Reason for assessment: Consult for TF management per nutritional support protocols. (Dr. Saulo De Oliveira [facility])  Assessment:   Diet: DIET NPO    Patient was transferred to ICU on 7/25 and remains intubated. At this time tube feeding is warranted to meet nutrition needs. He is noted to have metastatic cancer with primary source either lung or pancreatic. NGT in place,  Abdomen soft with hypoactive bowel sounds. Date of Last BM: prior to admission (patient may need to be checked for an impaction)  Pertinent Medications: IVF: NS at 50 ml/hr, Pepcid, Fentanyl, Ativan, Zosyn, Diprivan 15 mcg/kg/min (currently providing ~177 kcals per day), Pericolace (not given), Vanc  Pertinent labs:  Lab Results   Component Value Date/Time    Sodium 135 (L) 07/27/2019 03:30 AM    Potassium 4.7 07/27/2019 03:30 AM    Chloride 100 07/27/2019 03:30 AM    CO2 26 07/27/2019 03:30 AM    Anion gap 9 07/27/2019 03:30 AM    Glucose 126 (H) 07/27/2019 03:30 AM    BUN 25 (H) 07/27/2019 03:30 AM    Creatinine 0.76 (L) 07/27/2019 03:30 AM    Calcium 8.0 (L) 07/27/2019 03:30 AM    Albumin 1.6 (L) 07/27/2019 03:30 AM    Phosphorus 3.8 07/27/2019 03:30 AM   Mag 2.2  Anthropometrics:Height: 6' 5\" (195.6 cm), Weight Source: Bed, Weight: 60.3 kg (132 lb 15 oz). Macronutrient Needs:(60.4 kg)  EER:  2988-7849 kcal /day (30-35 kcal/kg listed BW)  EPR:   grams protein/day (1.2-2 grams/kg CBW)  Max CHO: 291 gm CHO/day (55% kcal)  Fluid: 1ml/kcal or per MD  Intake of comparativee standards: Current NPO status does not meet kcal or protein needs    Nutrition Diagnosis: Difficulty swallowing r/t current medical status as evidenced by patient currently intubated, sedated on vent with NGT for tube feeding.      Intervention:  Meals and snacks: NPO  EN:Start TF of Osmolite 1.2 at 15 ml/hr and increase by 10 ml/hr q 4 hrs as tolerated to goal rate of 65ml/hr with 25ml water q 1hr to provide 1872 kcal/day (100% of needs), 87 grams protein/day (100% of needs), 246 grams CHO/day (does not exceed max CHO),  and ~ 1879ml free water/day (100% of needs). Nutrition Supplement Therapy: Electrolyte replacement per nutritional support protocols are active on MAR. IVF: Per MD  Discharge nutrition plan: Too soon to determine    Scout Rubio [address], [address],  [phone]

## 2019-07-27 NOTE — PROGRESS NOTES
Verbal bedside report received from [place], [place]. Shift assessment completed. Patient is sedated, eyes open to voice, sometimes follows commands and nods appropriately. Denies pain. Periods of restlessness. Afebrile. NSR on monitor. 02 sat 95% on 50% Fi02. Regular unlabored respirations, lung sounds course. Abdomen flat, semi-soft, hypoactive bowel sounds. Voss draining yellow urine with sediment. No visitors present.     Lines:  #20 LUE  #22 LUE    Drips:  Fentanyl @ 50 mcg/hr  Propofol @ 30 mcg/kg/min  NS @ 100 ml/hr    Drains:  Voss    Airway:  #8 ETT @ 26 cm teeth

## 2019-07-27 NOTE — PROGRESS NOTES
Ventilator check complete; patient has a #8. 0 ET tube secured at the 27 at the lip. Breath sounds are coarse. Trachea is midline, Negative for subcutaneous air, and chest excursion is symmetric. Patient is also Negative for cyanosis and is Negative for pitting edema. All alarms are set and audible. Resuscitation bag is at the head of the bed. Ventilator Settings  Mode FIO2 Rate Tidal Volume Pressure PEEP I:E Ratio   Pressure support  40 %    550 ml  10 cm H2O  10 cm H20  1:2.5      Peak airway pressure: 20.9 cm H2O   Minute ventilation: 7.2 l/min     ABG: No results for input(s): PH, PCO2, PO2, HCO3 in the last 72 hours.       Lalitha Jean, RT

## 2019-07-28 NOTE — PROGRESS NOTES
[address] [place] [address] Hematology and Oncology: Inpatient Hematology / Oncology Progress Note    Admission Date: 2019  7:03 AM    SUBJECTIVE:  No significant events overnight. Remains in ICU - intubated   Chart reviewed   Family at bedside updated     ROS:  Intubated - unable to answer questions     10 point review of systems is otherwise negative with the exception of the elements mentioned above in the HPI. No Known Allergies    OBJECTIVE:  Patient Vitals for the past 8 hrs:   BP Temp Pulse Resp SpO2   19 1613   (!) 104 15 95 %   19 1315 116/73  89 20 94 %   19 1259 120/67  91 26 93 %   19 1244 107/60  87 14 96 %   19 1215 106/60  84 17 93 %   19 1159 102/65  82 16 94 %   19 1130 109/67 98.7 °F (37.1 °C) 89 28 92 %   19 1115 101/60  84 15 94 %   19 1106   83 14 93 %   19 1100 103/63  84 23 94 %   19 1045   84 (!) 31 96 %   19 1030 109/63  84 12 95 %   19 1015 95/60  83 19 94 %   19 1000 97/60  84 16 95 %   19 0945   85 30 94 %   19 0930 103/58  88 26 96 %   19 0915 99/58  87 20 96 %   19 0900 98/55  85 20 97 %   19 0845   87 23 94 %     Temp (24hrs), Av.2 °F (36.8 °C), Min:97.9 °F (36.6 °C), Max:98.7 °F (37.1 °C)    No intake/output data recorded. Physical Exam:  Constitutional: Cachetic male, intubated, not sedated     HEENT: Normocephalic and atraumatic. Extraocular muscles are intact. Sclerae anicteric. Skin Warm and dry. No bruising and no rash noted. No erythema. No pallor. Respiratory Lungs are clear to auscultation bilaterally without wheezes, scattered rhonchi. Intubated     CVS Tachycardic rate, irregular rhythm and normal S1 and S2. No murmurs   Abdomen Soft, tenderness to LUQ and nondistended, normoactive bowel sounds. Neuro Grossly nonfocal with no obvious sensory or motor deficits.    MSK Normal range of motion in general.  +edema and no tenderness. Psych Nods to questions appropriately      Labs:      Recent Labs     07/28/19 0329 07/27/19  0330 07/26/19  0324   WBC 12.1* 9.9 13.0*   RBC 2.59* 2.53* 2.96*   HGB 7.5* 7.3* 8.7*   HCT 24.7* 22.8* 26.7*   MCV 95.4 90.1 90.2   MCH 29.0 28.9 29.4   MCHC 30.4* 32.0 32.6   RDW 11.9 11.7* 11.5*   * 147* 154   GRANS 83* 91* 92*   LYMPH 6* 3* 4*   MONOS 10 5 3*   EOS 0* 0* 0*   BASOS 0 0 0   IG 1 1 1   DF AUTOMATED AUTOMATED AUTOMATED   ANEU 10.0* 9.0* 12.0*   ABL 0.8 0.3* 0.5   ABM 1.2 0.5 0.4   VERONICA 0.0 0.0 0.0   ABB 0.0 0.0 0.0   AIG 0.1 0.1 0.1        Recent Labs     07/28/19 0329 07/27/19 0330 07/26/19  0324    135* 131*   K 4.1 4.7 5.0    100 97*   CO2 26 26 27   AGAP 11 9 7   GLU 90 126* 123*   BUN 27* 25* 15   CREA 0.77* 0.76* 0.69*   GFRAA >60 >60 >60   GFRNA >60 >60 >60   CA 7.9* 8.0* 7.9*   SGOT 22 17 25   AP 86 91 95   TP 5.7* 5.6* 6.1*   ALB 1.8* 1.6* 1.8*   GLOB 3.9* 4.0* 4.3*   AGRAT 0.5* 0.4* 0.4*   MG 2.2 2.2 1.8   PHOS 3.3 3.8 3.5       Imaging:      ASSESSMENT:    Principal Problem:    Atrial fibrillation with RVR (HCC) (7/21/2019)    Active Problems:    Lung mass (7/8/2019)      Pulmonary emboli (HCC) (7/8/2019)      Primary pancreatic cancer with metastasis to other site [facility]) (7/8/2019)      Mediastinal adenopathy (7/8/2019)      Protein malnutrition ([address] (7/9/2019)      Cocaine abuse ([address] (7/9/2019)      Centrilobular emphysema (HCC) (7/12/2019)      Bilateral pulmonary infiltrates on chest x-ray (7/21/2019)      Overview: Suspect developing ARDS      SVT (supraventricular tachycardia) (HCC) (7/21/2019)      Acute respiratory failure with hypoxemia ([address] (7/25/2019)      Electrolyte abnormality (7/26/2019)        PLAN:  Mr. Argentina Bradford is a 43-year-old man with poorly differentiated carcinoma. He is a patient of Dr. Jordi Flynn. Past medical history includes smoking, cocaine use (last use ~3mo ago per pt), PE on Xarelto.   He was originally seen in consultation by  Janiya Philippe on 7/12/2019. At that time he was admitted with abdominal pain. CT CAP showed right lower lobe segmental PE, mediastinal LAD, 4 cm cavitary mass in left apex, and 2 cm pancreatic mass. ERCP with stent placement and FNA of pancreatic mass was completed on 7/8/2019. Biliary brushings were positive for rare atypical cells. Periportal and kaleb-carinal nodes were positive for poorly differentiated metastatic carcinoma. IHC nonspecific but felt primary tumor may be upper GI or pancreatic/biliary. At that time biopsy of lung lesion was recommended with concern for 2 primaries. He was scheduled to follow-up outpatient but returned to ED on 7/21 with nausea, epigastric pain and hypoxia with shortness of breath. He was found to be in A. fib with RVR. He also developed acute resp failure, requiring intubation. He currently remains in the ICU intubated. Metastatic carcinoma of unknown origin  - Needs PET/CT as OP  - plan was for EBUS with possible navigational biopsy of the lung for tissue dx - concern for 2 primary cancers. But pt was intubated for acute resp failure.       Anemia  - Check hemolysis labs, iron studies, and TSH  - iron deficient - got IV iron (TSAT 7%), can also boost B12     Lab studies and imaging studies were personally reviewed. Thank you for allowing us to participate in the care of Mr. Titus Restrepo. At this time, holding off chemotherapy as discussed with pt/family and they VU.   He will need to f/u with Dr. Janiya Philippe upon d/c.            Herb Nava MD  [facility] Hematology and Oncology  [address] [place] [address]  Office : [phone]  Fax : [phone]

## 2019-07-28 NOTE — PROGRESS NOTES
Bedside shift change report given to Hero Dsouza 1313 (oncoming nurse) by Poncho Hammonds (offgoing nurse). Report included the following information Kardex, Intake/Output, MAR, Recent Results, Med Rec Status and Cardiac Rhythm SR. Alert and opens eyes spontaneously. ETT/Vent FiO2 50%. Thick mucous plugs. *May benefit from scheduled breathing treatments.(prn only at this time) O2 sats 88-94% overnight. Voss. SCDs. 2 peripheral IV sites in LUE. Swelling in bilateral upper extremities. *May benefit from central line placement. NG tube, tolerating TF. Assist with turning in bed. Nods appropriately. Mouths words. No DTIs/No pressure ulcers observed.

## 2019-07-28 NOTE — PROGRESS NOTES
US today with extensive bilateral upper extremity DVT along with involvement of LIJV. Needs central venous access for IVF and meds. Will place RIJV CVL since this area was not involved with DVT.     Nando Mathias MD

## 2019-07-28 NOTE — PROGRESS NOTES
Unsuccessful PICC Placement Note  Correct Procedure: yes time out completed assistant Trent Ortiz RN all persons present in agreement with time out. PRE-PROCEDURE VERIFICATION  Correct Site:  Yes  Allergies verfied:  Yes  Temperature: Temp: 98.7 °F (37.1 °C), Temperature Source: Temp Source: Oral  Recent Labs     07/28/19  0329   BUN 27*   CREA 0.77*   *   WBC 12.1*     Allergies: @AL     PROCEDURE DETAIL  A double lumen PICC line was attempted for vascular access and desire for reliable access. Complication related to insertion:Accessed right basilic and  PICC continued to meet resistance in subclavian area and would not pass.    Lot #: [id number]  Xylocaine used: yes  Mid-Arm Circumference: 32 (cm)

## 2019-07-28 NOTE — PROGRESS NOTES
Dr Delmer Hartman called and informed of preliminary results of venous duplex. Pt with extensive clot burden in E. IVs currently in [facility]. Dr Delmer Hartman states to have Ultrasound and quad lumen central line kit at the bedside for possible insertion.

## 2019-07-28 NOTE — MANAGEMENT PLAN
Cardiology    Pt remains in NSR off amiodarone for 24 hours, not currently anticoagulation candidate given H/H 7.5/24.7, cardiology will sign off, please call with questions or changes in clinical status

## 2019-07-28 NOTE — PROGRESS NOTES
Bedside, Verbal and Written shift change report given to Hero Dsouza 1313 (oncoming nurse) by [facility] (offgoing nurse). Report included the following information SBAR, Kardex, Intake/Output, MAR, Recent Results and Cardiac Rhythm Sinus Rhythm/Sinus Tach.

## 2019-07-28 NOTE — PROGRESS NOTES
Pharmacokinetic Consult to Pharmacist    Frank Gene is a 53 y.o. male being treated for CAP with vancomycin. Height: 6' 5\" (195.6 cm)  Weight: 59.3 kg (130 lb 11.7 oz)  Lab Results   Component Value Date/Time    BUN 27 (H) 07/28/2019 03:29 AM    Creatinine 0.77 (L) 07/28/2019 03:29 AM    WBC 12.1 (H) 07/28/2019 03:29 AM    Procalcitonin 0.7 07/25/2019 12:00 PM    Lactic Acid (POC) 1.82 07/21/2019 08:38 AM      Estimated Creatinine Clearance: 90.9 mL/min (A) (based on SCr of 0.77 mg/dL (L)). Lab Results   Component Value Date/Time    Vancomycin,trough 17.0 07/28/2019 01:10 PM       Day 4 of vancomycin. Goal trough is 15-20. Dose was increased to 1000 mg IV q8h following a level of 3.9 on 1000 mg IV q12h. Trough on increased dose is 17. Continue current regimen. Further levels will be ordered as clinically indicated. Pharmacy will continue to follow. Please call with any questions.     Thank you,  Nhung Burns, PharmD  Clinical Pharmacist  [phone]

## 2019-07-28 NOTE — INTERVAL H&P NOTE
H&P Update:  Melva Mckeon was seen and examined. History and physical has been reviewed. The patient has been examined.  There have been significant clinical changes since the completion of the originally dated History and Physical.    Charity Vazquez MD

## 2019-07-28 NOTE — PROGRESS NOTES
Dr Pastrana [place] informed of rhythm changes and patient spontaneously converted to SR while on the phone. Rate 102. Orders for Amiodarone 400mg Q12 hrs to be administered via NGT.

## 2019-07-28 NOTE — PROGRESS NOTES
Pt with another episode of converting to a-fib/a-flutter with rate of 146 then becoming more variable a-fib then converting back to SR within approximately 10 minutes.

## 2019-07-28 NOTE — H&P
CENTRAL VENOUS LINE PLACEMENT    Chlorohexidine Skin Antiseptic: Yes  Hand Hygiene: Yes  Maximal Barrier Precautions: Yes  Optimal Catheter Site Selection: Yes  Sterile Ultrasound Technique: Yes      Indication:  Need for central venous access with extensive upper extremity DVTs    Summary:    Informed consent was unable to be obtained from the patient. Using ultrasound guidance, a quad lumen catheter was placed in the right internal jugular vein to 15 cm with no difficulty. Sterile Seldinger technique was used after local 1% Lidocaine anesthesia. Aspiration of air was not evident during the procedure. All ports were aspirated free of air and flushed with sterile NSS. The line was sewn in place at the insertion site and using one of the hubs molded into the catheter. A chest x-ray is pending.       Jatin Puente MD

## 2019-07-28 NOTE — PROGRESS NOTES
Tube feeding restarted and patient shortly stated he was feeling nauseated and was about to vomit. NGT again placed to suction and a small amount of tan gastric contents aspirated out of NGT.

## 2019-07-28 NOTE — PROGRESS NOTES
Dr Zain Dang called and informed of PICC team unable to pass PICC on R arm in the shoulder area, and abnormal appearing lumen and incompressibility of L brachial vein. Pt with history of thrombus and PE. Verbal order for Bilateral UE venous duplex to be performed today.

## 2019-07-28 NOTE — PROGRESS NOTES
Pt complaining of nausea and about to throw up. Tube feeding stopped and NGT placed to suction. 150ml tan gastric contents aspirated. Pt stated immediate relief of nausea. Will leave TF on hold and attempt to start again in 2 hours.

## 2019-07-28 NOTE — PROGRESS NOTES
Sandy Blandon (son) called and notified of need for central line insertion d/t blood clots in BUE proximal to all IV sites. Carmelina Louise stated understanding. Carmelina Louise also stated that he would like patient to rest and wished for only immediate family to visit with patient.

## 2019-07-28 NOTE — PROGRESS NOTES
Ventilator check complete; patient has a #8. 0 ET tube secured at the 27 at the lip. Patient is sedated. Patient is able to follow commands. Breath sounds are inspiratory wheezes. Trachea is midline, Negative for subcutaneous air, and chest excursion is symmetric. Patient is also Negative for cyanosis. All alarms are set and audible. Resuscitation bag is at the head of the bed. Ventilator Settings  Mode FIO2 Rate Tidal Volume Pressure PEEP I:E Ratio   Pressure support  50 %      8 cm H2O  12 cm H20        Peak airway pressure: 20.5 cm H2O   Minute ventilation: 7.3 l/min     ABG: No results for input(s): PH, PCO2, PO2, HCO3 in the last 72 hours.       Mariusz Golden, RT

## 2019-07-28 NOTE — PROGRESS NOTES
Notified respiratory therapist black mucous plugs with suction and lavage. O2 saturations 88% while asleep on 45% s/p blood gas results.

## 2019-07-28 NOTE — PROGRESS NOTES
Patient converted into AF RVR in high 140s. Rate currently steady at 146. Pt asymptomatic and BP stable. Dr Ro Rao with [facility] Cardiology paged.

## 2019-07-28 NOTE — PROGRESS NOTES
Critical Care Daily Progress Note: 7/28/2019  Admission Date: 7/21/2019     The patient's chart is reviewed and the patient is discussed with the staff. Mr. Dodd is a 53 y. o. male admitted on 7/21/2019 with a primary diagnosis of pneumonia.  His PMH includes chronic tobacco use and cocaine use.  He was seen in consult by Dr. Alex Donovan on 7/12/19. [facility] that time, he presented with c/o progressive severe abd pain x 3 days as well as wt loss over the past several months.  Bili 2.6, Lipase 6000.  CT CAP revealed RLL segmental PE, mediastinal LAD, 4 cm pleurally based cavitary mass in L apex, 2cm pancreatic mass obstructing pancreatic and CBD with associated biliary duct dilation and GB distention, and gastric wall thickening.  He is s/p ERCP with stent placement and FNA of pancreatic mass/periportal LAD on 7/8.  Biliary brushing cytology +rare atypical cells.  Path on periportal/precarinal nodes +poorly differentiated metastatic carcinoma. [facility] non-specific as to origin, however a primary tumor in upper GI or pancreatobiliary tree cannot be excluded.  During that consult, EBUS with possible navigational biopsy to the lung was recommended as there may be a possible 2nd primary. Lito Howell returned on 7/21/19 to ED with c/o nausea and sharp epigastric pain x 1 day as well as shortness of breath.  In ED, he was having runs of SVT and Afib with RVR.  On amio.  CXR with developing diffuse alveolar infiltrates bilaterally.  On Rocephin/Azith.  Palliative care following. We have been consulted for consideration of EBUS and possible navigation bronchoscopy. Seen last on 7/12 by our service at the time [facility] was not completed   Transferred to ICU 7/25 and intubated    Subjective:   Remains on mechanical ventilation.       Current Facility-Administered Medications   Medication Dose Route Frequency    Vancomycin trough reminder   Other ONCE    gabapentin (NEURONTIN) capsule 100 mg  100 mg Per NG tube BID    polyethylene glycol (MIRALAX) packet 17 g  17 g Per NG tube DAILY    vancomycin (VANCOCIN) 1,000 mg in 0.9% sodium chloride (MBP/ADV) 250 mL  1,000 mg IntraVENous Q8H    NUTRITIONAL SUPPORT ELECTROLYTE PRN ORDERS   Does Not Apply PRN    senna-docusate (PERICOLACE) 8.6-50 mg per tablet 2 Tab  2 Tab Per NG tube DAILY    fentaNYL in normal saline (pf) 25 mcg/mL infusion  0-200 mcg/hr IntraVENous TITRATE    LORazepam (ATIVAN) injection 2 mg  2 mg IntraVENous Q2H PRN    fentaNYL citrate (PF) injection 50 mcg  50 mcg IntraVENous Q1H PRN    famotidine (PF) (PEPCID) 20 mg in sodium chloride 0.9% 10 mL injection  20 mg IntraVENous Q12H    0.9% sodium chloride infusion  50 mL/hr IntraVENous CONTINUOUS    propofol (DIPRIVAN) infusion  0-50 mcg/kg/min IntraVENous TITRATE    piperacillin-tazobactam (ZOSYN) 4.5 g in 0.9% sodium chloride (MBP/ADV) 100 mL  4.5 g IntraVENous Q8H    LORazepam (ATIVAN) tablet 0.5 mg  0.5 mg Oral Q6H PRN    sodium chloride (NS) flush 5-40 mL  5-40 mL IntraVENous PRN    sodium chloride (NS) flush 5-40 mL  5-40 mL IntraVENous Q8H    sodium chloride (NS) flush 5-40 mL  5-40 mL IntraVENous PRN    acetaminophen (TYLENOL) tablet 650 mg  650 mg Oral Q4H PRN    HYDROcodone-acetaminophen (NORCO) 5-325 mg per tablet 1 Tab  1 Tab Oral Q4H PRN    naloxone (NARCAN) injection 0.4 mg  0.4 mg IntraVENous PRN    albuterol (PROVENTIL VENTOLIN) nebulizer solution 2.5 mg  2.5 mg Nebulization Q4H PRN    zolpidem (AMBIEN) tablet 5 mg  5 mg Oral QHS PRN    hydrALAZINE (APRESOLINE) 20 mg/mL injection 20 mg  20 mg IntraVENous Q4H PRN    HYDROcodone-acetaminophen (NORCO)  mg tablet 1 Tab  1 Tab Oral Q4H PRN    promethazine (PHENERGAN) with saline injection 25 mg  25 mg IntraVENous Q6H PRN       Review of Systems    Unobtainable due to patient status.             Objective:     Vitals:    07/28/19 0800 07/28/19 0830 07/28/19 0900 07/28/19 0915   BP: 95/55 103/57 98/55 99/58   Pulse: 78 87 85 87   Resp: 17 21 20 20 Temp:       SpO2: 100% 94% 97% 96%   Weight:       Height:           Intake and Output:   07/26 1901 - 07/28 0700  In: 5653.5 [I.V.:4977.5]  Out: 4225 [Urine:4225]  No intake/output data recorded. Physical Exam:          Constitutional:  intubated and mechanically ventilated. EENMT:  Sclera clear, pupils equal, oral mucosa moist  Respiratory: crackles bilateral  Cardiovascular:  RRR with no M,G,R;  Gastrointestinal:  soft with no tenderness; positive bowel sounds present  Musculoskeletal:  warm with no cyanosis, no lower extremity edema  Skin:  no jaundice or ecchymosis  Neurologic: no gross neuro deficits     Psychiatric:  sedated     LINES:    ETT, olea    DRIPS:    Fentanyl, Dioprivan    CXR:            Ventilator Settings  Mode FIO2 Rate Tidal Volume Pressure PEEP   Pressure support  50 %    550 ml  8 cm H2O  12 cm H20      Peak airway pressure: 20.5 cm H2O   Minute ventilation: 7.3 l/min     ABG:   Recent Labs     07/28/19  0312 07/27/19  0346 07/26/19  0347   PHI 7.394 7.390 7.332*   PCO2I 43.6 41.5 47.3*   PO2I 56* 108* 69*   HCO3I 26.7* 25.1 25.0        LAB  Recent Labs     07/25/19  1838   GLUCPOC 152*     Recent Labs     07/28/19  0329 07/27/19  0330 07/26/19  0324   WBC 12.1* 9.9 13.0*   HGB 7.5* 7.3* 8.7*   HCT 24.7* 22.8* 26.7*   * 147* 154     Recent Labs     07/28/19  0329 07/27/19  0330 07/26/19  0324    135* 131*   K 4.1 4.7 5.0    100 97*   CO2 26 26 27   GLU 90 126* 123*   BUN 27* 25* 15   CREA 0.77* 0.76* 0.69*   MG 2.2 2.2 1.8   PHOS 3.3 3.8 3.5   CA 7.9* 8.0* 7.9*   ALB 1.8* 1.6* 1.8*   SGOT 22 17 25     No results for input(s): LCAD, LAC in the last 72 hours.       Assessment:  (Medical Decision Making)     Hospital Problems  Date Reviewed: 7/10/2019          Codes Class Noted POA    Electrolyte abnormality ICD-10-CM: E87.8  ICD-9-CM: 276.9  7/26/2019 Yes        Acute respiratory failure with hypoxemia [facility]) ICD-10-CM: J96.01  ICD-9-CM: 518.81  7/25/2019 Unknown Bilateral pulmonary infiltrates on chest x-ray ICD-10-CM: R91.8  ICD-9-CM: 793.19  7/21/2019 Yes    Overview Signed 7/26/2019  8:44 AM by Kami Edwards MD     Suspect developing ARDS             SVT (supraventricular tachycardia) ([facility] ICD-10-CM: I47.1  ICD-9-CM: 427.89  7/21/2019 Yes        * (Principal) Atrial fibrillation with RVR ([facility] ICD-10-CM: I48.91  ICD-9-CM: 427.31  7/21/2019 Yes        Centrilobular emphysema ([facility] ICD-10-CM: J43.2  ICD-9-CM: 492.8  7/12/2019 Yes        Protein malnutrition (HCC) (Chronic) ICD-10-CM: E46  ICD-9-CM: 260  7/9/2019 Yes        Cocaine abuse (HCC) (Chronic) ICD-10-CM: F14.10  ICD-9-CM: 305.60  7/9/2019 Yes        Lung mass ICD-10-CM: R91.8  ICD-9-CM: 786.6  7/8/2019 Yes        Pulmonary emboli ([facility] ICD-10-CM: I26.99  ICD-9-CM: 415.19  7/8/2019 Yes        Primary pancreatic cancer with metastasis to other site [facility]) ICD-10-CM: C25.9  ICD-9-CM: 157.9  7/8/2019 Yes        Mediastinal adenopathy ICD-10-CM: R59.0  ICD-9-CM: 785.6  7/8/2019 Yes              Plan:  (Medical Decision Making)     --adjust vent- still quite hypoxic on 50% FiO2 with very abnormal CXR  --adequate sedation and pain control for his safety  --cont. Vancomycin/Zosyn D #4  --PC to see for goals of care   --too critical to consider bronch and the diagnostic utility is much lower since already on ABX  --overall prognosis is very poor  --Given onem dose of lasix IV 3.76L U/O still in positive balance with overall +10L since admit. Place on bumex drip at 1mg/hr. NOT READY TO EXTUBATE  Monitor BMP/Mg/Po4 and replenish as needed  total critical care time is 30 min so far    More than 50% of the time documented was spent in face-to-face contact with the patient and in the care of the patient on the floor/unit where the patient is located.     Kelby Calvo MD

## 2019-07-29 PROBLEM — I82.623 ACUTE BILATERAL DEEP VEIN THROMBOSIS (DVT) OF UPPER EXTREMITIES (HCC): Status: ACTIVE | Noted: 2019-01-01

## 2019-07-29 NOTE — PROGRESS NOTES
Right Quad lumen CVC placed by Marline Haddad. All peripheral IV sites discontinued. STAT chest xray ordered.

## 2019-07-29 NOTE — PROGRESS NOTES
Ventilator check complete; patient has a #8. 0 ET tube secured at the 27 at the teeth. Patient is not sedated. Patient is able to follow commands. Breath sounds are coarse. Trachea is midline, Negative for subcutaneous air, and chest excursion is symmetric. Patient is also Negative for cyanosis and is Negative for pitting edema. All alarms are set and audible. Resuscitation bag is at the head of the bed. Head sat probe changed to left side. Ventilator Settings  Mode FIO2 Rate Tidal Volume Pressure PEEP I:E Ratio   Pressure support  50 %    550 ml  6 cm H2O  12 cm H20  1:2.5      Peak airway pressure: 19 cm H2O   Minute ventilation: 12 l/min     ABG: No results for input(s): PH, PCO2, PO2, HCO3 in the last 72 hours.       Omar Gilmore

## 2019-07-29 NOTE — PROGRESS NOTES
Chart reviewed and discussed with primary RN. Remains in ICU, intubated vent. Dx of metastatic pancreatic carcinoma according to MD notes. Currently SHANICE pending with DECO. CM following for any assist and d/c POC when medically stable.

## 2019-07-29 NOTE — PROGRESS NOTES
PTT=78.1. Therapeutic per orders. Gtt to continue at current rate of 20 units/kg/hr. Will recheck PTT in 6 hours.

## 2019-07-29 NOTE — PROGRESS NOTES
Nutrition F/U:  TF Management for [facility] Pulmonary. Assessment:  Weight 55.5 kg (ICU bed 7/29/19), edema - trace BLEs, 3+ BUEs. The patient remains intubated, ventilated, sedated with Diprivan and NPO. It was noted that the TF was not infusing when I visited the patient this morning. RN reports that the patient complained of nausea shortly after the TF was started. The FT was placed to suction and ~150 ml was immediately obtained. TF was again restarted without the patient being aware and he again reported nausea shortly after it was infusing. Noted no bowel movement has been reported since admission. Miralax was given today and Phenergan was also started. Enteral Access:   NGT. Macronutrient Needs (60.4 kg):  EER:  7487-1700 kcal /day (30-35 kcal/kg listed BW)  EPR:   grams protein/day (1.2-2 grams/kg CBW)  Max CHO: 291 gm CHO/day (55% kcal)  Fluid: 1ml/kcal or per MD  Intake/Comparative Standards:  No intake of TF is recorded. Intervention:   Meals and Snacks: NPO. Enteral Nutrition: TF is to resume today with current order. Mineral Supplement Therapy: Nutrition Support Orders/Electrolyte Management Replacement Protocols are active on the MAR. Nutrition-Related Medication Management: If no response to Miralax today, check for an impaction and place a suppository. Coordination of Nutrition Care by a Nutrition Professional: AM ICU rounds, collaboration with GERARDO Camacho. Nutrition Discharge Plan: Too soon to determine. Marlyn Bunn.  Philip Gonzalez  [phone]

## 2019-07-29 NOTE — PROGRESS NOTES
Bedside shift change report given to Hero Dsouza [id number] (oncoming nurse) by Allie Gautam (offgoing nurse). Report included the following information Kardex, Intake/Output, MAR, Recent Results, Med Rec Status and Cardiac Rhythm SR/ST.

## 2019-07-29 NOTE — PROGRESS NOTES
No changes in reassessment. VSS. Phenergan prn dose administered for c/o nausea. Ng tube continues to be clamped.

## 2019-07-29 NOTE — PROGRESS NOTES
Ventilator check complete; patient has a #8. 0 ET tube secured at the 27 at the teeth. Patient is not sedated. Patient is able to follow commands. Breath sounds are clear,diminished. Trachea is midline, Negative for subcutaneous air, and chest excursion is symmetric. Patient is also Negative for cyanosis and is Positive for pitting edema. All alarms are set and audible. Resuscitation bag is at the head of the bed.       Ventilator Settings  Mode FIO2 Rate Tidal Volume Pressure PEEP I:E Ratio   Pressure support  50 %     8 cm H2O  12 cm H20  1:2.5      Peak airway pressure: 20.9 cm H2O   Minute ventilation: 9.4 l/min       Jennifer Leonard

## 2019-07-29 NOTE — PROGRESS NOTES
Geri Chin RN assessed for impaction and none noted per Geri Chin. Will continue current tube feeding regimen and antiemetics and assess for tolerance.

## 2019-07-29 NOTE — PROGRESS NOTES
Bedside shift change report given to [address] (oncoming nurse) by Jessica Bueno (offgoing nurse). Report included the following information Kardex, Intake/Output, MAR, Recent Results, Med Rec Status and Cardiac Rhythm SR/ST. Alert with ETT/Vent FiO2 50%. Ultrasound tech at bedside for bilateral upper extremity ultrasounds to evaluate for clots. NG tube clamped, not tolerating TF today per report. (nausea)  Voss, patent. Bumex IV started today. SCDs. 3 peripheral IV sites: Unable to obtain PICC line today. Fentanyl, Bumex, Propofol, NS, Vancomycin and Zosyn. No DTIs/No pressure ulcers observed.

## 2019-07-29 NOTE — PROGRESS NOTES
A follow up visit was made to the patient. Emotional support, spiritual presence and   prayer were provided. The patient was not alert.       [employer]

## 2019-07-29 NOTE — PROGRESS NOTES
Palliative Care Progress Note    Patient: Devorah Spencer MRN: [id number]  SSN: [id number]    YOB: 1965  Age: 53 y.o. Sex: male       Assessment/Plan:     Chief Complaint/Interval History: Patient remains intubated in the ICU. He is on sedation but is arousable and able to nod yes or no to questions    Principal Diagnosis:    · Debility, Unspecified  R53.81    Additional Diagnoses:   · Acute Respiratory Failure, Unspecified  J96.00  · Cachexia  R64  · Failure to Thrive  R62.7  · Frailty  R54  · Encounter for Palliative Care  Z51.5    Palliative Performance Scale (PPS)   20%    Medical Decision Making:   Reviewed and summarized labs and imaging. Patient is arousable and able to nod yes or no to questions. Denies any current pain or dyspnea. I reached out to patient's daughter and discussed overall medical situation. Explained the patient is currently requiring less oxygenation assistance when compared to last week however I feel the situation is tedious and could change quickly given his level of debility. Daughter was appreciative of update. She is hopeful that patient will remain alert and possibly undergo successful extubation in hopes of providing assistance with direction of care. We will continue to follow and assist with any possible transitions or further conversations. Will discuss findings with members of the interdisciplinary team.      More than 50% of this 25 minute visit was spent counseling and coordination of care as outlined above. Subjective:     Review of Systems unable to obtain due to mentation     Objective:     Visit Vitals  BP 96/69   Pulse (!) 105   Temp 98.1 °F (36.7 °C)   Resp 8   Ht 6' 5\" (1.956 m)   Wt 122 lb 5.7 oz (55.5 kg)   SpO2 92%   BMI 14.51 kg/m²       Physical Exam:    General:  Cooperative. No acute distress. Cachexia   Eyes:  Conjunctivae/corneas clear    Nose: Nares normal. Septum midline.    Neck: Supple, symmetrical, trachea midline, no JVD   Lungs:    Coarse bilateral breath sounds   Heart:  Regular rate and rhythm, no murmur    Abdomen:   Soft, non-tender, non-distended   Extremities: Normal, atraumatic, muscle wasting evident   Skin: Skin color, texture, turgor normal. No rash or lesions.    Neurologic: Nonfocal.  Moves extremities spontaneously   Psych:  Sedated but arousable     Signed By: Jose Gale MD     July 29, 2019

## 2019-07-29 NOTE — PROGRESS NOTES
Dr Tamala Gowers notified of results of BLE duplex. No new orders with regards to clots in BLE. MD also notified of urine output decreasing even with bumex gtt infusing. Orders to stop the bumex gtt.

## 2019-07-29 NOTE — PROGRESS NOTES
Critical Care Daily Progress Note: 7/29/2019  Admission Date: 7/21/2019     The patient's chart is reviewed and the patient is discussed with the staff. 53 y.o. AAM evaluated at the request of Dr. Chante Chacon. Was admitted 7/21 with shortness of breath, nausea, epigastric pain with known pancreatic cancer. Was diagnosis with pneumonia.  In ER  with narrow complex tachycardia and cardiology consulted. CXR with developing diffuse alveolar infiltrates bilaterally.  Started on Rocephin and Azithromax. Chronic medical:  chronic tobacco abuse and cocaine abuse.      He was seen in consult by Dr. Catracho Muñiz on 7/12/19[facility] presented with c/o progressive severe abd pain x 3 days and wt loss over the past several months.  Bili 2.6, Lipase 6000.  CT CAP revealed RLL segmental PE, mediastinal LAD, 4 cm pleurally based cavitary mass in L apex, 2cm pancreatic mass obstructing pancreatic and CBD with associated biliary duct dilation and GB distention and gastric wall thickening.  He is s/p ERCP with stent placement and FNA of pancreatic mass/periportal LAD on 7/8.  Biliary brushing cytology +rare atypical cells.  Path on periportal/precarinal  nodes +poorly differentiated metastatic carcinoma. [facility] non-specific as to origin, however a primary tumor in upper GI or pancreatobiliary tree cannot be excluded.  During that consult, EBUS with possible navigational biopsy to the lung was recommended as there may be a possible 2nd primary.       Palliative care following. We have been consulted for consideration of EBUS and possible navigation bronchoscopy. Seen last on 7/12 by our service at the time [facility] was not completed yet. O2 sat dropped to 75% on 15L HFNC. Required intubation for respiratory support. Notes with bilateral upper extremity DVTs    Subjective:     Sedated but easily arouse and nodding responses. Remains on vent support. Did not tolerate TFs yesterday due to nausea.     Current Facility-Administered Medications   Medication Dose Route Frequency    bumetanide (BUMEX) 0.25 mg/mL infusion  1 mg/hr IntraVENous CONTINUOUS    amiodarone (CORDARONE) tablet 400 mg  400 mg Per NG tube Q12H    heparin 25,000 units in dextrose 500 mL infusion  18-36 Units/kg/hr IntraVENous TITRATE    gabapentin (NEURONTIN) capsule 100 mg  100 mg Per NG tube BID    polyethylene glycol (MIRALAX) packet 17 g  17 g Per NG tube DAILY    vancomycin (VANCOCIN) 1,000 mg in 0.9% sodium chloride (MBP/ADV) 250 mL  1,000 mg IntraVENous Q8H    NUTRITIONAL SUPPORT ELECTROLYTE PRN ORDERS   Does Not Apply PRN    senna-docusate (PERICOLACE) 8.6-50 mg per tablet 2 Tab  2 Tab Per NG tube DAILY    fentaNYL in normal saline (pf) 25 mcg/mL infusion  0-200 mcg/hr IntraVENous TITRATE    LORazepam (ATIVAN) injection 2 mg  2 mg IntraVENous Q2H PRN    fentaNYL citrate (PF) injection 50 mcg  50 mcg IntraVENous Q1H PRN    famotidine (PF) (PEPCID) 20 mg in sodium chloride 0.9% 10 mL injection  20 mg IntraVENous Q12H    0.9% sodium chloride infusion  50 mL/hr IntraVENous CONTINUOUS    propofol (DIPRIVAN) infusion  0-50 mcg/kg/min IntraVENous TITRATE    piperacillin-tazobactam (ZOSYN) 4.5 g in 0.9% sodium chloride (MBP/ADV) 100 mL  4.5 g IntraVENous Q8H    LORazepam (ATIVAN) tablet 0.5 mg  0.5 mg Oral Q6H PRN    sodium chloride (NS) flush 5-40 mL  5-40 mL IntraVENous PRN    sodium chloride (NS) flush 5-40 mL  5-40 mL IntraVENous Q8H    sodium chloride (NS) flush 5-40 mL  5-40 mL IntraVENous PRN    acetaminophen (TYLENOL) tablet 650 mg  650 mg Oral Q4H PRN    HYDROcodone-acetaminophen (NORCO) 5-325 mg per tablet 1 Tab  1 Tab Oral Q4H PRN    naloxone (NARCAN) injection 0.4 mg  0.4 mg IntraVENous PRN    albuterol (PROVENTIL VENTOLIN) nebulizer solution 2.5 mg  2.5 mg Nebulization Q4H PRN    zolpidem (AMBIEN) tablet 5 mg  5 mg Oral QHS PRN    hydrALAZINE (APRESOLINE) 20 mg/mL injection 20 mg  20 mg IntraVENous Q4H PRN    HYDROcodone-acetaminophen (NORCO)  mg tablet 1 Tab  1 Tab Oral Q4H PRN    promethazine (PHENERGAN) with saline injection 25 mg  25 mg IntraVENous Q6H PRN       Review of Systems  Constitutional:  negative for fever, chills, sweats  Cardiovascular:  bilateral upper extremity edema,  negative for chest pain, palpitations, syncope  Gastrointestinal:  No BM yet. Nausea with TFs, negative for vomiting, diarrhea, abdominal pain, or melena  Neurologic:  negative for focal weakness, numbness, headache      Objective:     Vitals:    07/29/19 0659 07/29/19 0730 07/29/19 0823 07/29/19 0900   BP: 115/80 112/78  122/87   Pulse: (!) 108 (!) 108 (!) 109 (!) 107   Resp: 11 15 12    Temp: 98.1 °F (36.7 °C)      SpO2: 94% 94% 94%    Weight:       Height:           Intake and Output:   07/27 1901 - 07/29 0700  In: 5308.1 [I.V.:4189.6]  Out: 7975 [Urine:7825]  No intake/output data recorded. Physical Exam:          Constitutional:  intubated and mechanically ventilated. EENMT:  Sclera clear, pupils equal, oral mucosa moist, missing teeth  Respiratory: clear anterior breath sounds  Cardiovascular:  RRR with no M,G,R;  Gastrointestinal:  flat abdomen, denies pain; hypoactive bowel sounds present  Musculoskeletal:  warm with no cyanosis, no lower extremity edema, bilateral UE edema--taunt and tight  Skin:  no jaundice or ecchymosis  Neurologic: no gross neuro deficits     Psychiatric:  Sedated but arouses to voice and nodding responses     LINES:    ETT 7/25/19  Right IJ quad lumen 7/28/19  NG 7/27/19  Voss 7/25/19    DRIPS:    NS 50ml/hr  Bumex 1mg/hr  Fentanyl 100 mcg/hr  Heparin drip  Diprivan 10mcg/kg/min    CXR:    7/29/19:   1. Bilateral airspace disease. ET tube tip is in good position. 2. No significant change compared to prior.       Ventilator Settings  Mode FIO2 Rate Tidal Volume Pressure PEEP   Pressure support  50 %    550 ml  6 cm H2O  12 cm H20      Peak airway pressure: 19 cm H2O   Minute ventilation: 8.1 l/min     ABG:   Recent Labs     07/29/19 0322 07/28/19 0312 07/27/19  0346   PHI 7.364 7.394 7.390   PCO2I 55.5* 43.6 41.5   PO2I 63* 56* 108*   HCO3I 31.6* 26.7* 25.1        LAB  No results for input(s): GLUCPOC in the last 72 hours. No lab exists for component: [place]  Recent Labs     07/29/19 0353 07/28/19 2011 07/28/19 2010 07/28/19 0329   WBC 10.6  --  10.0 12.1*   HGB 8.0*  --  8.3* 7.5*   HCT 24.9*  --  26.2* 24.7*   *  --  114* 113*   INR  --  1.3  --   --      Recent Labs     07/29/19 0353 07/28/19 2011 07/28/19 0329 07/27/19  0330    138 137 135*   K 3.8 3.6 4.1 4.7   CL 95* 95* 100 100   CO2 32 32 26 26   * 88 90 126*   BUN 25* 24* 27* 25*   CREA 1.07 0.89 0.77* 0.76*   MG 1.9  --  2.2 2.2   PHOS 5.1*  --  3.3 3.8   CA 8.1* 8.2* 7.9* 8.0*   ALB 1.8* 1.9* 1.8* 1.6*   SGOT 36 36 22 17     No results for input(s): LCAD, LAC in the last 72 hours.       Assessment:  (Medical Decision Making)     Patient Active Problem List   Diagnosis Code    Pancreatitis K85.90    Lung mass R91.8    Pulmonary emboli (HCC) I26.99    Primary pancreatic cancer with metastasis to other site ([facility] C25.9    Mediastinal adenopathy R59.0    Protein malnutrition ([facility] E46    Cocaine abuse ([facility] F14.10    Centrilobular emphysema ([facility] J43.2    Bilateral pulmonary infiltrates on chest x-ray R91.8    SVT (supraventricular tachycardia) (HCC) I47.1    Atrial fibrillation with RVR (HCC) I48.91    Acute respiratory failure with hypoxemia ([facility]) J96.01    Electrolyte abnormality E87.8    Acute bilateral deep vein thrombosis (DVT) of upper extremities ([facility]) I82.623         Plan:  (Medical Decision Making)     Hospital Problems  Date Reviewed: 7/29/2019          Codes Class Noted POA    Acute bilateral deep vein thrombosis (DVT) of upper extremities ([facility] ICD-10-CM: J91.537  ICD-9-CM: 453.82  7/29/2019 No    Overview Signed 7/29/2019  9:10 AM by Elihue Sicard, NP     7/28/19:  Findings consistent with extensive bilateral upper extremity deep venous thrombosis. Heparin drip    Electrolyte abnormality ICD-10-CM: E87.8  ICD-9-CM: 276.9  7/26/2019 Yes    Supplement as needed    Acute respiratory failure with hypoxemia [facility]) ICD-10-CM: J96.01  ICD-9-CM: 518.81  7/25/2019 Unknown    Remains on vent support    Bilateral pulmonary infiltrates on chest x-ray ICD-10-CM: R91.8  ICD-9-CM: 793.19  7/21/2019 Yes    Overview Signed 7/26/2019  8:44 AM by Noah Yun MD     Suspect developing ARDS         unchanged    SVT (supraventricular tachycardia) ([facility] ICD-10-CM: I47.1  ICD-9-CM: 427.89  7/21/2019 Yes    controlled    * (Principal) Atrial fibrillation with RVR ([facility] ICD-10-CM: I48.91  ICD-9-CM: 427.31  7/21/2019 Yes    controlled    Centrilobular emphysema ([facility] ICD-10-CM: J43.2  ICD-9-CM: 492.8  7/12/2019 Yes    chronic    Protein malnutrition (HCC) (Chronic) ICD-10-CM: E46  ICD-9-CM: 260  7/9/2019 Yes    Poor tolerating TF--albumin 1.8    Cocaine abuse (HCC) (Chronic) ICD-10-CM: F14.10  ICD-9-CM: 305.60  7/9/2019 Yes    chronic    Lung mass ICD-10-CM: R91.8  ICD-9-CM: 786.6  7/8/2019 Yes    unchanged    Pulmonary emboli (HCC) ICD-10-CM: I26.99  ICD-9-CM: 415.19  7/8/2019 Yes    Continue Heparin drip    Primary pancreatic cancer with metastasis to other site [facility]) ICD-10-CM: C25.9  ICD-9-CM: 157.9  7/8/2019 Yes    unchanged    Mediastinal adenopathy ICD-10-CM: R59.0  ICD-9-CM: 785.6  7/8/2019 Yes    unchanged          --Vancomycin, Zosyn day 5  --Blood cultures 7/21:  No growth, repeat 7/25:  NGTD  --Respiratory culture:  Scant candida  --Upper extremity ultrasound:  Findings consistent with extensive bilateral upper extremity deep venous thrombosis. On Heparin drip. --Check ECHO.   If right heart strain would repeat chest CT--may have extension of PE and might be a candidate for EKOS with with hope of reducing vent   --Decrease Bumex drip--3.9 L positive last 24 hrs  --LE duplex:  Pending  --Hgb 8.0--follow  --Albumin 1.8--did not tolerate TFs yesterday due to nausea. Add scheduled Phenergan for ongoing nausea. More than 50% of the time documented was spent in face-to-face contact with the patient and in the care of the patient on the floor/unit where the patient is located. Gianna Fernández NP        Lungs:  Intubated. CTA B, no w/r/r  Heart:  RRR with no Murmur/Rubs/Gallops    Additional Comments:    Patient with extensive B infiltrates starting 7/25. Unclear cause. CXR 7/21 had minimal infiltrates. No story of vomiting/aspiration or other clear cause. Getting better with diuresis and abx. No obvious evidence of infection. Cont vanc/zosyn day 5. Cut bumex drip in half. Goal net negative 1L max per day. Monitor daily BMP's. Repeat TTE now looking for signs of R heart strain. If so may need repeat CT PE looking for PE that IR may be able to intervene on. Concerning the possible EBUS, unsure how much additional utility this will bring. He has already had a mediastinal lymph node biopsied via EUS with poorly differentiated carcinoma. Additional mediastinal/hilar lymph node biopsies are likely to show the same. The concern for separate primary would best be addressed by navigation biopsy of the L apex lesion but this is not possible. Given his extensive clot burden do not feel his heparin should be held for any length of time even if he clinically improves from here. Schedule phenergan for nausea and restart tube feeds. I have spoken with and examined the patient. I agree with the above assessment and plan as documented.     Rosilyn Cabot, MD

## 2019-07-30 PROBLEM — I82.403 DVT, BILATERAL LOWER LIMBS (HCC): Status: ACTIVE | Noted: 2019-01-01

## 2019-07-30 NOTE — CDMP QUERY
Pt admitted with Afib and has protein malnutrition documented. Please further specify the severity of protein malnutrition.  Protein calorie Malnutrition moderate   Protein calorie malnutrition severe   Other (please specify)   Unable to determine    The medical record reflects the following:     Risk Factors: metastatic cancer, hx of cocaine abuse      Clinical Indicators:  Albumin on admit 2.6 dropped down to 1.6, per documentation \"Thin mildly cachectic     [place]\"   BMI 14.51, per documentation \"muscle wasting evident\", \"22.3% clinically significant    weight loss \"     Treatment: Dietitian consult, TF, continuous IV fluids        Thanks,  Wilian Oconnor RN  [employer] Management Program  [phone]

## 2019-07-30 NOTE — PROGRESS NOTES
Palliative Care Progress Note    Patient: Cici Engel MRN: [id number]  SSN: [id number]    YOB: 1965  Age: 53 y.o. Sex: male       Assessment/Plan:     Chief Complaint/Interval History: alert, nods yes when asked if he has pain       Principal Diagnosis:    · Pain, general  R52    Additional Diagnoses:   · Acute Respiratory Failure, Unspecified  J96.00  · Advance Care Planning Counseling Z71.89  · Anorexia  R63.0  · Cachexia  R64  · Debility, Unspecified  R53.81  · Edema  R60.9  · Fatigue, Lethargy  R53.83  · Encounter for Palliative Care  Z51.5    Palliative Performance Scale (PPS)       Medical Decision Making:   Reviewed and summarized notes over last 24 hours   Discussed case with appropriate providers- ICU IDT; GERARDO Goldberg    Reviewed laboratory and x-ray data- Duplex BLE, CBC, CMP    Pt alert, remains intubated and ventilated. He nods head yes when asked if he is having pain. Pt is able to write notes. Counseled that our goal is to work towards extubation, and asked if he would want to be re-intubated if he declined after ET tube was removed. Pt wrote \"Don't let me die. \"  I asked him several different ways if he wanted re-intubation/resuscitation, and he consistently answered yes. Assured him of our ongoing care. Discussed with GERARDO Goldberg. Pt with extensive DVT's in BUE and BLE- he is currently on a Heparin drip. Renal function declining, and Bumex drip has been decreased. Will continue to follow. Will discuss findings with members of the interdisciplinary team.         More than 50% of this 25 minute visit was spent counseling and coordination of care as outlined above. Subjective:     Review of Systems:  Review of systems not obtained due to patient factors- intubated and ventilated.   He was able to endorse pain      Objective:     Visit Vitals  /73   Pulse (!) 101   Temp 98.7 °F (37.1 °C)   Resp 20   Ht 6' 5\" (1.956 m)   Wt 122 lb 5.7 oz (55.5 kg)   SpO2 100% BMI 14.51 kg/m²       Physical Exam:    General:  Cooperative. Intubated and ventilated. No acute distress. Eyes:  Conjunctivae/corneas clear    Nose: Nares normal. Septum midline. NG tube   Neck: Supple, symmetrical, trachea midline   Lungs:   Coarse bilaterally   Heart:  Regular rate and rhythm   Abdomen:   Soft, non-tender, non-distended   Extremities: Normal, atraumatic, no cyanosis. BLE and BUE edema.  Bilateral soft wrist restraints    Skin: Skin color, texture, turgor normal.    Neurologic: Nonfocal   Psych: Alert     Signed By: Violeta Harden NP     July 30, 2019

## 2019-07-30 NOTE — PROGRESS NOTES
Bedside shift report with GERARDO Goldberg  Updated on pt condition   Dual medication verification on fentanyl and heparin   Pt alert and following commands

## 2019-07-30 NOTE — PROGRESS NOTES
Ventilator check complete; patient has a #8. 0 ET tube secured at the 26 at the teeth. Patient is able to follow commands. Breath sounds are coarse. Trachea is midline, Negative for subcutaneous air, and chest excursion is symmetric. Patient is also Negative for cyanosis and is Positive for pitting edema. All alarms are set and audible. Resuscitation bag is at the head of the bed. Ventilator Settings  Mode FIO2 Rate Tidal Volume Pressure PEEP I:E Ratio   PRVC  50 %(decreased to 40%)   20 500 ml  6 cm H2O  12 cm H20(decreased to 10)  1:2.22      Peak airway pressure: 33.7 cm H2O   Minute ventilation: 10.7 l/min     ABG: No results for input(s): PH, PCO2, PO2, HCO3 in the last 72 hours.       Jennifer Corrales, RT

## 2019-07-30 NOTE — PROGRESS NOTES
PT showing increased respiratory effort   RT notified   Pt changed to SIMV / PRVC with fiO2 of 50%  Pt O2 saturation 100% with  RR 12

## 2019-07-30 NOTE — PROGRESS NOTES
Bedside shift report with Domenico Dent RN  Updated on pt condition   Pt opens eyes to voice and follows commands x2   VSS stable on monitor will continue to monitor

## 2019-07-30 NOTE — PROGRESS NOTES
Critical Care Daily Progress Note: 7/30/2019  Admission Date: 7/21/2019     The patient's chart is reviewed and the patient is discussed with the staff. 53 y.o. AAM evaluated at the request of Dr. Rachna Parmar. Was admitted 7/21 with shortness of breath, nausea, epigastric pain with known pancreatic cancer. Was diagnosis with pneumonia.  In ER  with narrow complex tachycardia and cardiology consulted. CXR with developing diffuse alveolar infiltrates bilaterally.  Started on Rocephin and Azithromax. Chronic medical:  chronic tobacco abuse and cocaine abuse.      He was seen in consult by Dr. Mildred Gong on 7/12/19. John Samuels that time, he presented with c/o progressive severe abd pain x 3 days and wt loss over the past several months.  Bili 2.6, Lipase 6000.  CT CAP revealed RLL segmental PE, mediastinal LAD, 4 cm pleurally based cavitary mass in L apex, 2cm pancreatic mass obstructing pancreatic and CBD with associated biliary duct dilation and GB distention and gastric wall thickening.  He is s/p ERCP with stent placement and FNA of pancreatic mass/periportal LAD on 7/8.  Biliary brushing cytology +rare atypical cells.  Path on periportal/precarinal  nodes +poorly differentiated metastatic carcinoma. [facility] non-specific as to origin, however a primary tumor in upper GI or pancreatobiliary tree cannot be excluded.  During that consult, EBUS with possible navigational biopsy to the lung was recommended as there may be a possible 2nd primary.       Palliative care following. We have been consulted for consideration of EBUS and possible navigation bronchoscopy. Seen last on 7/12 by our service at the time [facility] was not completed yet. O2 sat dropped to 75% on 15L HFNC. Required intubation for respiratory support. Noted with bilateral upper and lower extremity DVTs--Heparin drip infusing. Subjective:     Sitting up in bed, writing notes, following commands and nodding responses. Remains on vent support. Coughing this morning and suctioning TFs out of his mouth--TFs placed on hold. Denies nausea--receiving scheduled Phenergan. Has had 4 large BMs.     Current Facility-Administered Medications   Medication Dose Route Frequency    acetaminophen (TYLENOL) tablet 650 mg  650 mg Per NG tube Q4H PRN    HYDROcodone-acetaminophen (NORCO)  mg tablet 1 Tab  1 Tab Per NG tube Q4H PRN    HYDROcodone-acetaminophen (NORCO) 5-325 mg per tablet 1 Tab  1 Tab Per NG tube Q4H PRN    LORazepam (ATIVAN) tablet 0.5 mg  0.5 mg Per NG tube Q6H PRN    zolpidem (AMBIEN) tablet 5 mg  5 mg Per NG tube QHS PRN    promethazine (PHENERGAN) with saline injection 25 mg  25 mg IntraVENous Q6H    vancomycin (VANCOCIN) 750 mg in  mL infusion  750 mg IntraVENous Q8H    bumetanide (BUMEX) 0.25 mg/mL infusion  0.5 mg/hr IntraVENous CONTINUOUS    amiodarone (CORDARONE) tablet 400 mg  400 mg Per NG tube Q12H    heparin 25,000 units in dextrose 500 mL infusion  18-36 Units/kg/hr IntraVENous TITRATE    gabapentin (NEURONTIN) capsule 100 mg  100 mg Per NG tube BID    polyethylene glycol (MIRALAX) packet 17 g  17 g Per NG tube DAILY    NUTRITIONAL SUPPORT ELECTROLYTE PRN ORDERS   Does Not Apply PRN    senna-docusate (PERICOLACE) 8.6-50 mg per tablet 2 Tab  2 Tab Per NG tube DAILY    fentaNYL in normal saline (pf) 25 mcg/mL infusion  0-200 mcg/hr IntraVENous TITRATE    LORazepam (ATIVAN) injection 2 mg  2 mg IntraVENous Q2H PRN    fentaNYL citrate (PF) injection 50 mcg  50 mcg IntraVENous Q1H PRN    famotidine (PF) (PEPCID) 20 mg in sodium chloride 0.9% 10 mL injection  20 mg IntraVENous Q12H    0.9% sodium chloride infusion  50 mL/hr IntraVENous CONTINUOUS    propofol (DIPRIVAN) infusion  0-50 mcg/kg/min IntraVENous TITRATE    piperacillin-tazobactam (ZOSYN) 4.5 g in 0.9% sodium chloride (MBP/ADV) 100 mL  4.5 g IntraVENous Q8H    sodium chloride (NS) flush 5-40 mL  5-40 mL IntraVENous PRN    sodium chloride (NS) flush 5-40 mL  5-40 mL IntraVENous Q8H    sodium chloride (NS) flush 5-40 mL  5-40 mL IntraVENous PRN    naloxone (NARCAN) injection 0.4 mg  0.4 mg IntraVENous PRN    albuterol (PROVENTIL VENTOLIN) nebulizer solution 2.5 mg  2.5 mg Nebulization Q4H PRN    hydrALAZINE (APRESOLINE) 20 mg/mL injection 20 mg  20 mg IntraVENous Q4H PRN       Review of Systems  Constitutional:  negative for fever, chills, sweats  Cardiovascular:  bilateral upper extremity edema,  negative for chest pain, palpitations, syncope  Gastrointestinal:  Having BMs. Poorly tolerating TFs, negative for vomiting, diarrhea, abdominal pain, or melena  Neurologic:  negative for focal weakness, numbness, headache      Objective:     Vitals:    07/30/19 0530 07/30/19 0545 07/30/19 0600 07/30/19 0706   BP:   103/73    Pulse: 100 100 (!) 101 (!) 101   Resp: 20 20 20 20   Temp:       SpO2: 100% 100% 100% 100%   Weight:       Height:           Intake and Output:   07/28 1901 - 07/30 0700  In: 5526.9 [I.V.:4600.9]  Out: 9045 [Urine:3375]  No intake/output data recorded. Physical Exam:          Constitutional:  intubated and mechanically ventilated. EENMT:  Sclera clear, pupils equal, oral mucosa moist, missing teeth  Respiratory: scattered anterior crackles, PRN suctioning  Cardiovascular:  RRR with no M,G,R;  Gastrointestinal:  flat abdomen, denies pain; active bowel sounds present, having BMs, suctioning TFs orally  Musculoskeletal:  warm with no cyanosis, no lower extremity edema, bilateral UE edema--taunt and tight  Skin:  no jaundice or ecchymosis  Neurologic: no gross neuro deficits     Psychiatric:  Awake, writing notes, nodding responses and following commands    LINES:    ETT 7/25/19  Right IJ quad lumen 7/28/19  NG 7/27/19  Voss 7/25/19    DRIPS:    NS 50ml/hr  Fentanyl 100 mcg/hr  Heparin drip    ECHO 7/29/19:    -  Left ventricle: Systolic function mildly reduced. EF 40-45 %.  Study was inadequate for the evaluation of regional wall motion due to off axis imaging.  -  Inferior vena cava, hepatic veins: The respirophasic change in diameter was more than 50%. -  Tricuspid valve: Mild to moderate regurgitation.  -  Estimated peak pressure 35-40 mmHg. -  Additional impressions: Difficult study with tachycardic patient consider reevaluation once heart rate is controlled. 7/29/19 LE ultrasound:  Bilateral lower extremity venous thrombus. CXR:    7/30/19:  1. Bilateral airspace disease, similar to prior exam.  2. ET tube tip is in good position. 7/29/19:   1. Bilateral airspace disease. ET tube tip is in good position. 2. No significant change compared to prior. Ventilator Settings  Mode FIO2 Rate Tidal Volume Pressure PEEP   PRVC  50 %(decreased to 40%)    500 ml  6 cm H2O  12 cm H20(decreased to 10)      Peak airway pressure: 33.7 cm H2O   Minute ventilation: 10.7 l/min     ABG:   Recent Labs     07/30/19  0509 07/30/19  0319 07/29/19  0322   PHI 7.335* 7.191* 7.364   PCO2I 61.7* 90.1* 55.5*   PO2I 96 91 63*   HCO3I 32.9* 34.5* 31.6*        LAB  No results for input(s): GLUCPOC in the last 72 hours. No lab exists for component: [place]  Recent Labs     07/30/19 0419 07/29/19 0353 07/28/19 2011 07/28/19 2010   WBC 6.2 10.6  --  10.0   HGB 7.2* 8.0*  --  8.3*   HCT 24.5* 24.9*  --  26.2*    123*  --  114*   INR  --   --  1.3  --      Recent Labs     07/30/19 0419 07/29/19  0353 07/28/19 2011 07/28/19  0329    138 138 137   K 4.5 3.8 3.6 4.1   CL 94* 95* 95* 100   CO2 33* 32 32 26   * 112* 88 90   BUN 45* 25* 24* 27*   CREA 1.98* 1.07 0.89 0.77*   MG 2.2 1.9  --  2.2   PHOS 7.4* 5.1*  --  3.3   CA 7.9* 8.1* 8.2* 7.9*   ALB 1.6* 1.8* 1.9* 1.8*   SGOT 48* 36 36 22     No results for input(s): LCAD, LAC in the last 72 hours.       Assessment:  (Medical Decision Making)     Patient Active Problem List   Diagnosis Code    Pancreatitis K85.90    Lung mass R91.8    Pulmonary emboli (HCC) I26.99    Primary pancreatic cancer with metastasis to other site [facility]) C25.9    Mediastinal adenopathy R59.0    Protein malnutrition ([facility] E46    Cocaine abuse ([facility] F14.10    Centrilobular emphysema ([facility] J43.2    Bilateral pulmonary infiltrates on chest x-ray R91.8    SVT (supraventricular tachycardia) ([facility]) I47.1    Atrial fibrillation with RVR ([facility]) I48.91    Acute respiratory failure with hypoxemia ([facility]) J96.01    Electrolyte abnormality E87.8    Acute bilateral deep vein thrombosis (DVT) of upper extremities ([facility]) I82.623    DVT, bilateral lower limbs ([facility]) I82.403         Plan:  (Medical Decision Making)     Hospital Problems  Date Reviewed: 7/30/2019          Codes Class Noted POA    DVT, bilateral lower limbs ([facility] ICD-10-CM: I82.403  ICD-9-CM: 453.40  7/30/2019 Unknown    Overview Signed 7/30/2019  8:56 AM by Dawit Valdivia NP     7/29/19:  There is an occlusive thrombus within the right tibial vein and peroneal vein with a partially occlusive thrombus in the right popliteal vein. There is an  occlusive thrombus in the left peroneal tibial vein and peroneal vein. Continue heparin drip    Acute bilateral deep vein thrombosis (DVT) of upper extremities ([facility] ICD-10-CM: H44.677  ICD-9-CM: 453.82  7/29/2019 No    Overview Signed 7/29/2019  9:10 AM by Dawit Valdivia NP     7/28/19:  Findings consistent with extensive bilateral upper extremity deep venous thrombosis.            Heparin drip    Electrolyte abnormality ICD-10-CM: E87.8  ICD-9-CM: 276.9  7/26/2019 Yes    following    Acute respiratory failure with hypoxemia [facility]) ICD-10-CM: J96.01  ICD-9-CM: 518.81  7/25/2019 Unknown    Weaning vent support as tolerated    Bilateral pulmonary infiltrates on chest x-ray ICD-10-CM: R91.8  ICD-9-CM: 793.19  7/21/2019 Yes    Overview Signed 7/26/2019  8:44 AM by Norberto Ma MD     Suspect developing ARDS         follwoing    SVT (supraventricular tachycardia) ([facility] ICD-10-CM: I47.1  ICD-9-CM: 427.89  7/21/2019 Yes Improved control--rate 100s    * (Principal) Atrial fibrillation with RVR (HCC) ICD-10-CM: I48.91  ICD-9-CM: 427.31  7/21/2019 Yes    controlled    Centrilobular emphysema ([address] ICD-10-CM: J43.2  ICD-9-CM: 492.8  7/12/2019 Yes    unchanged    Protein malnutrition (HCC) (Chronic) ICD-10-CM: T47  ICD-9-CM: 260  7/9/2019 Yes    poorly tolerating TFs    Cocaine abuse (HCC) (Chronic) ICD-10-CM: F14.10  ICD-9-CM: 305.60  7/9/2019 Yes    chronic    Lung mass ICD-10-CM: R91.8  ICD-9-CM: 786.6  7/8/2019 Yes    unchanged    Pulmonary emboli (HCC) ICD-10-CM: I26.99  ICD-9-CM: 415.19  7/8/2019 Yes    Heparin    Primary pancreatic cancer with metastasis to other site [facility]) ICD-10-CM: C25.9  ICD-9-CM: 157.9  7/8/2019 Yes    unchanged    Mediastinal adenopathy ICD-10-CM: R59.0  ICD-9-CM: 785.6  7/8/2019 Yes    unchanged          --Vancomycin, Zosyn day 6  --Blood cultures 7/21 & 7/25:  No growth -final    --Respiratory culture:  Scant candida  --UE and LE ultrasound: Extensive bilateral DVTs    --ECHO reviewed with no significant heart strain  --Hgb down to 7.2--follow  --Albumin down to1. 6--poorly tolerating TFs. On scheduled Phenergan but suctioning TFs orally  --Palliative Care following  --Continue vent weaning efforts  --Concerning the possible EBUS, unsure how much additional utility this will bring. Mediastinal lymph node biopsy via EUS with poorly differentiated carcinoma. Additional mediastinal/hilar lymph node biopsies are likely to show the same. The concern for separate primary would best be addressed by navigation biopsy of the L apex lesion but this is not possible. Given his extensive clot burden do not feel his heparin should be held for any length of time even if he clinically improves from here. More than 50% of the time documented was spent in face-to-face contact with the patient and in the care of the patient on the floor/unit where the patient is located.     Matilda Cooley NP   Lungs: rhonchi  Heart:  RRR with no Murmur/Rubs/Gallops    Additional Comments:  Trial of pressure support    I have spoken with and examined the patient. I agree with the above assessment and plan as documented.     George Joiner MD

## 2019-07-30 NOTE — PROGRESS NOTES
PS trial attempted. Patient's Vt irregular, RR increased, patient complaining of nausea. Placed back on PRVC mode, decreased set RR from 20 to 16. RN updated.

## 2019-07-30 NOTE — PROGRESS NOTES
Bedside shift report with Slade Lindsay RN  Updated on pt condition   Dual medication verification on Fentanyl and Heparin   Pt responds to voice and follows commands x2   VSS stable on monitor

## 2019-07-30 NOTE — PROGRESS NOTES
Ventilator check complete; patient has a #8. 0 ET tube secured at the 26 at the teeth. Patient is not sedated. Patient is not able to follow commands. Breath sounds are coarse. Trachea is midline, Negative for subcutaneous air, and chest excursion is symmetric. Patient is also Negative for cyanosis. All alarms are set and audible. Resuscitation bag is at the head of the bed. Ventilator Settings  Mode FIO2 Rate Tidal Volume Pressure PEEP I:E Ratio   Pressure support  50 %     6 cm H2O  12 cm H20  1:2.5      Peak airway pressure: 18.3 cm H2O   Minute ventilation: 7.1 l/min     ABG: No results for input(s): PH, PCO2, PO2, HCO3 in the last 72 hours.       Rodney Shen, RT

## 2019-07-30 NOTE — PROGRESS NOTES
Bedside report received from [place], [address] [place] [address]. Patient assessed and repositioned for comfort. Patient alert and able to follow commands. Heparin and fentanyl gtt dual verified.

## 2019-07-30 NOTE — PROGRESS NOTES
Patient appears to be vomiting TF, coughing and suctioning tan liquid from ETT and patient's mouth. Lexa Torres, NP at bedside and ordered to stop TF at this time. Orders to wait a while then start TF back at a low rate. Patient denies any nausea at this time. Scribe Attestation (For Scribes USE Only)... Attending Attestation (For Attendings USE Only).../Scribe Attestation (For Scribes USE Only)...

## 2019-07-31 NOTE — PROGRESS NOTES
Ventilator check complete; patient has a #8. 0 ET tube secured at the 26 at the teeth. Patient is able to follow commands. Breath sounds are diminished. Trachea is midline, Negative for subcutaneous air, and chest excursion is symmetrical. Patient is also Negative for cyanosis and is Positive for pitting edema. All alarms are set and audible. Resuscitation bag and mask are at the head of the bed.       Ventilator Settings  Mode FIO2 Rate Tidal Volume Pressure PEEP I:E Ratio   PRVC  40 %   16 500 ml    10 cm H20 1:2.86       Peak airway pressure: 32 cm H2O   Minute ventilation: 9.5 l/min     Ulysses Reinoso, RT

## 2019-07-31 NOTE — PROGRESS NOTES
Critical Care Daily Progress Note: 7/31/2019 Admission Date: 7/21/2019 The patient's chart is reviewed and the patient is discussed with the staff. 53 y.o. AAM evaluated at the request of Dr. Lalit Garcia. Was admitted 7/21 with shortness of breath, nausea, epigastric pain with known pancreatic cancer. Was diagnosis with pneumonia.  In ER  with narrow complex tachycardia and cardiology consulted. CXR with developing diffuse alveolar infiltrates bilaterally.  Started on Rocephin and Azithromax. Chronic medical:  chronic tobacco abuse and cocaine abuse.   
 
He was seen in consult by Dr. Gisella Borja on 7/12/19. [place] Board that time, he presented with c/o progressive severe abd pain x 3 days and wt loss over the past several months.  Bili 2.6, Lipase 6000.  CT CAP revealed RLL segmental PE, mediastinal LAD, 4 cm pleurally based cavitary mass in L apex, 2cm pancreatic mass obstructing pancreatic and CBD with associated biliary duct dilation and GB distention and gastric wall thickening.  He is s/p ERCP with stent placement and FNA of pancreatic mass/periportal LAD on 7/8.  Biliary brushing cytology +rare atypical cells.  Path on periportal/precarinal  nodes +poorly differentiated metastatic carcinoma. [facility] non-specific as to origin, however a primary tumor in upper GI or pancreatobiliary tree cannot be excluded.  During that consult, EBUS with possible navigational biopsy to the lung was recommended as there may be a possible 2nd primary.   
   Palliative care following. We have been consulted for consideration of EBUS and possible navigation bronchoscopy. Seen last on 7/12 by our service at the time [facility] was not completed yet. O2 sat dropped to 75% on 15L HFNC. Required intubation for respiratory support. Noted with bilateral upper and lower extremity DVTs--Heparin drip infusing. Subjective:  
 
Sitting up in bed, nodding responses and following commands. Remains on vent support--tolerating PS. Still vomiting TFs out of his mouth at times--feedings are on hold now. Current Facility-Administered Medications Medication Dose Route Frequency  metoclopramide HCl (REGLAN) injection 5 mg  5 mg IntraVENous Q6H  
 acetaminophen (TYLENOL) tablet 650 mg  650 mg Per NG tube Q4H PRN  
 HYDROcodone-acetaminophen (NORCO)  mg tablet 1 Tab  1 Tab Per NG tube Q4H PRN  
 HYDROcodone-acetaminophen (NORCO) 5-325 mg per tablet 1 Tab  1 Tab Per NG tube Q4H PRN  
 LORazepam (ATIVAN) tablet 0.5 mg  0.5 mg Per NG tube Q6H PRN  
 zolpidem (AMBIEN) tablet 5 mg  5 mg Per NG tube QHS PRN  Vancomycin Intermittent Dosing per pharmacy   750 mg IntraVENous See Admin Instructions  famotidine (PF) (PEPCID) 20 mg in sodium chloride 0.9% 10 mL injection  20 mg IntraVENous DAILY  promethazine (PHENERGAN) with saline injection 25 mg  25 mg IntraVENous Q6H  
 bumetanide (BUMEX) 0.25 mg/mL infusion  0.5 mg/hr IntraVENous CONTINUOUS  
 amiodarone (CORDARONE) tablet 400 mg  400 mg Per NG tube Q12H  
 heparin 25,000 units in dextrose 500 mL infusion  18-36 Units/kg/hr IntraVENous TITRATE  gabapentin (NEURONTIN) capsule 100 mg  100 mg Per NG tube BID  polyethylene glycol (MIRALAX) packet 17 g  17 g Per NG tube DAILY  NUTRITIONAL SUPPORT ELECTROLYTE PRN ORDERS   Does Not Apply PRN  
 senna-docusate (PERICOLACE) 8.6-50 mg per tablet 2 Tab  2 Tab Per NG tube DAILY  fentaNYL in normal saline (pf) 25 mcg/mL infusion  0-200 mcg/hr IntraVENous TITRATE  LORazepam (ATIVAN) injection 2 mg  2 mg IntraVENous Q2H PRN  
 fentaNYL citrate (PF) injection 50 mcg  50 mcg IntraVENous Q1H PRN  
 0.9% sodium chloride infusion  50 mL/hr IntraVENous CONTINUOUS  propofol (DIPRIVAN) infusion  0-50 mcg/kg/min IntraVENous TITRATE  piperacillin-tazobactam (ZOSYN) 4.5 g in 0.9% sodium chloride (MBP/ADV) 100 mL  4.5 g IntraVENous Q8H  
  sodium chloride (NS) flush 5-40 mL  5-40 mL IntraVENous PRN  
 sodium chloride (NS) flush 5-40 mL  5-40 mL IntraVENous Q8H  
 sodium chloride (NS) flush 5-40 mL  5-40 mL IntraVENous PRN  
 naloxone (NARCAN) injection 0.4 mg  0.4 mg IntraVENous PRN  
 albuterol (PROVENTIL VENTOLIN) nebulizer solution 2.5 mg  2.5 mg Nebulization Q4H PRN  
 hydrALAZINE (APRESOLINE) 20 mg/mL injection 20 mg  20 mg IntraVENous Q4H PRN Review of Systems Constitutional:  negative for fever, chills, sweats Cardiovascular:  bilateral upper extremity edema,  negative for chest pain, palpitations, syncope Gastrointestinal:  Having BMs. Poorly tolerating TFs, negative for vomiting, diarrhea, abdominal pain, or melena Neurologic:  negative for focal weakness, numbness, headache Objective:  
 
Vitals:  
 07/31/19 0600 07/31/19 0615 07/31/19 0630 07/31/19 1906 BP:   109/68 Pulse: 89 88 87 93 Resp: 16 19 19 19 Temp:      
SpO2: 97% 97% 96% 96% Weight:      
Height:      
 
 
Intake and Output:  
07/29 1901 - 07/31 0700 In: 7775 [I.V.:3814] Out: 2075 [Urine:2075] No intake/output data recorded. Physical Exam:         
Constitutional:  intubated and mechanically ventilated. EENMT:  Sclera clear, pupils equal, oral mucosa moist, missing teeth Respiratory: scattered anterior crackles, PRN suctioning Cardiovascular:  RRR with no M,G,R; 
Gastrointestinal:  flat abdomen, denies pain; active bowel sounds present, having BMs, suctioning TFs orally Musculoskeletal:  warm with no cyanosis, no lower extremity edema, bilateral UE edema--taunt and tight Skin:  no jaundice or ecchymosis Neurologic: no gross neuro deficits Psychiatric:  Awake, nodding responses and following commands LINES:   
ETT 7/25/19 Right IJ quad lumen 7/28/19 NG 7/27/19 Voss 7/25/19 DRIPS:   
NS 50ml/hr Fentanyl 75 mcg/hr Heparin drip ECHO 7/29/19:   
-  Left ventricle: Systolic function mildly reduced. EF 40-45 %.  Study was inadequate for the evaluation of regional wall motion due to off axis imaging. 
-  Inferior vena cava, hepatic veins: The respirophasic change in diameter was more than 50%. -  Tricuspid valve: Mild to moderate regurgitation. 
-  Estimated peak pressure 35-40 mmHg. -  Additional impressions: Difficult study with tachycardic patient consider reevaluation once heart rate is controlled. CXR:   
7/31/19:  Stable diffuse reticular nodular infiltrates. 7/30/19: 
1. Bilateral airspace disease, similar to prior exam. 
2. ET tube tip is in good position. Ventilator Settings Mode FIO2 Rate Tidal Volume Pressure PEEP Pressure support  40 %    500 ml  10 cm H2O  10 cm H20 Peak airway pressure: 22.5 cm H2O Minute ventilation: 11.5 l/min ABG:  
Recent Labs  
  07/31/19 0313 07/30/19 
0509 07/30/19 0319 PHI 7.421 7.335* 7.191* PCO2I 50.5* 61.7* 90.1*  
PO2I 82 96 91 HCO3I 32.8* 32.9* 34.5* LAB No results for input(s): GLUCPOC in the last 72 hours. No lab exists for component: [address] Recent Labs  
  07/31/19 
0358 07/30/19 0419 07/29/19 
0353 07/28/19 2011 WBC 6.8 6.2 10.6  --   
HGB 7.0* 7.2* 8.0*  --   
HCT 22.6* 24.5* 24.9*  --   
 194 123*  --   
INR  --   --   --  1.3 Recent Labs  
  07/31/19 0358 07/30/19 0419 07/29/19 0353  137 138  
K 3.8 4.5 3.8 CL 98 94* 95* CO2 34* 33* 32 * 144* 112* BUN 53* 45* 25* CREA 1.70* 1.98* 1.07  
MG 2.5* 2.2 1.9 PHOS 3.5 7.4* 5.1*  
CA 8.0* 7.9* 8.1* ALB 1.7* 1.6* 1.8* SGOT 41* 48* 36 No results for input(s): LCAD, LAC in the last 72 hours. Assessment:  (Medical Decision Making) Patient Active Problem List  
Diagnosis Code  Pancreatitis K85.90  
 Lung mass R91.8  Pulmonary emboli (HCC) I26.99  
 Primary pancreatic cancer with metastasis to other site ([facility] C25.9  Mediastinal adenopathy R59.0  Protein malnutrition ([facility] [address] [facility] E46  
 Cocaine abuse ([facility] [address] [facility] F14.10  Centrilobular emphysema ([facility] [address] J43.2  Bilateral pulmonary infiltrates on chest x-ray R91.8  SVT (supraventricular tachycardia) (HCC) I47.1  Atrial fibrillation with RVR ([facility]) I48.91  
 Acute respiratory failure with hypoxemia (HCC) J96.01  
 Electrolyte abnormality E87.8  Acute bilateral deep vein thrombosis (DVT) of upper extremities (HCC) I82.623  
 DVT, bilateral lower limbs (HCC) I82.403 Plan:  (Medical Decision Making) Hospital Problems  Date Reviewed: 7/31/2019 Codes Class Noted POA  
 DVT, bilateral lower limbs ([facility] [address] ICD-10-CM: I82.403 ICD-9-CM: 453.40  7/30/2019 Unknown Overview Signed 7/30/2019  8:56 AM by Lauren Lincoln NP  
  7/29/19:  There is an occlusive thrombus within the right tibial vein and peroneal vein with a partially occlusive thrombus in the right popliteal vein. There is an 
occlusive thrombus in the left peroneal tibial vein and peroneal vein. Continue heparin drip Acute bilateral deep vein thrombosis (DVT) of upper extremities (HCC) ICD-10-CM: U30.865 ICD-9-CM: 453.82  7/29/2019 No  
 Overview Signed 7/29/2019  9:10 AM by Lauren Lincoln NP  
  7/28/19:  Findings consistent with extensive bilateral upper extremity deep venous thrombosis. Heparin drip Electrolyte abnormality ICD-10-CM: E87.8 ICD-9-CM: 276.9  7/26/2019 Yes  
 following Acute respiratory failure with hypoxemia [facility]) ICD-10-CM: J96.01 
ICD-9-CM: 518.81  7/25/2019 Unknown Weaning vent support as tolerated--on PS Bilateral pulmonary infiltrates on chest x-ray ICD-10-CM: R91.8 ICD-9-CM: 793.19  7/21/2019 Yes Overview Signed 7/26/2019  8:44 AM by Stephanie Moscoso MD  
  Suspect developing ARDS 
  
  
 following SVT (supraventricular tachycardia) (HCC) ICD-10-CM: I47.1 ICD-9-CM: 427.89  7/21/2019 Yes Improved control--rate 100s * (Principal) Atrial fibrillation with RVR ([address] ICD-10-CM: I48.91 
ICD-9-CM: 427.31  7/21/2019 Yes  
 controlled Centrilobular emphysema ([facility] ICD-10-CM: J43.2 ICD-9-CM: 492.8  7/12/2019 Yes  
 unchanged Protein malnutrition ([facility] [address] (Chronic) ICD-10-CM: E46 
ICD-9-CM: 260  7/9/2019 Yes  
 poorly tolerating TFs-moderate Cocaine abuse (HCC) (Chronic) ICD-10-CM: F14.10 ICD-9-CM: 305.60  7/9/2019 Yes  
 chronic Lung mass ICD-10-CM: R91.8 ICD-9-CM: 786.6  7/8/2019 Yes  
 unchanged Pulmonary emboli [facility]) ICD-10-CM: I26.99 
ICD-9-CM: 415.19  7/8/2019 Yes Heparin drip Primary pancreatic cancer with metastasis to other site [facility]) ICD-10-CM: C25.9 ICD-9-CM: 157.9  7/8/2019 Yes  
 unchanged Mediastinal adenopathy ICD-10-CM: R59.0 ICD-9-CM: 785.6  7/8/2019 Yes  
 unchanged --Vancomycin, Zosyn day 7 --Blood cultures 7/21 & 7/25:  No growth--final   
--Respiratory culture:  Scant candida 
--Continue Heparin for PE, bilateral UE and LE DVTs  
--Hgb down to 7.0--transfuse? ?? 
--Creatinine down to 1.7--remains of diuretics, + 400ml 
--Albumin low 1.7--poorly tolerating TFs. Still vomiting TFs. Continue scheduled Phenergan and add Reglan q6h x4 doses 
--Palliative Care--has confirmed full code wishes 
--Continue vent weaning efforts--tolerating PS 
--Concerning the possible EBUS, unsure how much additional utility this will bring. Mediastinal lymph node biopsy via EUS with poorly differentiated carcinoma. Additional mediastinal/hilar lymph node biopsies are likely to show the same. The concern for separate primary would best be addressed by navigation biopsy of the L apex lesion but this is not possible. Given his extensive clot burden do not feel his heparin should be held for any length of time even if he clinically improves from here. More than 50% of the time documented was spent in face-to-face contact with the patient and in the care of the patient on the floor/unit where the patient is located. Richy Don, ESAU Lungs:  clear Heart:  RRR with no Murmur/Rubs/Gallops Additional Comments:  Wean I have spoken with and examined the patient. I agree with the above assessment and plan as documented.  
 
George Joiner MD

## 2019-07-31 NOTE — PROGRESS NOTES
Bedside, Verbal and Written shift change report given to GERARDO Archer (oncoming nurse) by Santhosh Villalobos RN (offgoing nurse). Report included the following information SBAR, Kardex, ED Summary, Intake/Output, MAR, Recent Results, Med Rec Status, Cardiac Rhythm SR and Alarm Parameters . Heparin gtt signed off. Pt resting in bed, denies pain or SHOB. Pt Osat high 80's-mid 90's. Will continue to monitor pt oxygen demand.

## 2019-07-31 NOTE — PROGRESS NOTES
Ventilator check complete; patient has a #8. 0 ET tube secured at the 26 at the teeth.  Patient is able to follow commands.  Breath sounds are diminished.  Trachea is midline, Negative for subcutaneous air, and chest excursion is symmetrical. Patient is also Negative for cyanosis and is Positive for pitting edema.  All alarms are set and audible.  Resuscitation bag and mask are at the head of the bed.   
  
Ventilator Settings Mode FIO2 Spon Rate Spon Tidal Volume Pressure PEEP I:E Ratio PS  40 %   19 534 ml  10  10 cm H20   
  
Peak airway pressure: 20 cm H2O  
Minute ventilation: 9.5 l/min

## 2019-07-31 NOTE — PROGRESS NOTES
Looks adequate on CPAP, did have to go to 50%, but RSBI is 50s and able to pull 800cc on VC. Alert and feels breathing is good on CPAP. Will hold TF, place NGT to LIWS, but keep tube for after extubation. Will need to work with speech over the next couple days. If okay 4 hours after extubation can restart TF at prior rate. Will stop IVF for now with JVD on exam and possible pulmonary edema on CXR though improving. Will hold on additional diuretics given KAYLIE. Will extubate to [place] [id number] for additional support. If fails will plan to reintubate per discussion with him and prior discussions with team and family.   
 
Grupo Lipscomb MD

## 2019-07-31 NOTE — PROGRESS NOTES
A follow up visit was made to the patient. Emotional support, spiritual presence and  
prayer were provided. The  observed that the patient was more alert and active today than in previous visits. Nishi Edmondson

## 2019-07-31 NOTE — PROGRESS NOTES
Bedside report received from Gisell Polk, [address] [place] [address]. Fentanyl gtt and Heparin gtt dual verified.

## 2019-07-31 NOTE — PROGRESS NOTES
Palliative Care Progress Note Patient: Donna Molina MRN: [id number]  SSN: [id number] YOB: 1965  Age: 53 y.o. Sex: male Assessment/Plan: Chief Complaint/Interval History: alert, denies pain. Remains intubated but tolerating PS at present Principal Diagnosis: · Debility, Unspecified  R53.81 Additional Diagnoses: · Acute Respiratory Failure, Unspecified  J96.00 
· Advance Care Planning Counseling W76.75 · Anorexia  R63.0 
· Cachexia  R64 · Debility, Unspecified  R53.81 
· Edema  R60.9 · Fatigue, Lethargy  R53.83 
· Encounter for Palliative Care  Z51.5 Palliative Performance Scale (PPS) Medical Decision Making:  
Reviewed and summarized notes over last 24 hours Discussed case with appropriate providers- ICU IDT Reviewed laboratory and x-ray data-  CBC, CMP Pt resting in bed, no distress noted. He remains intubated and ventilated, and is currently on PS and tolerating well. He denies pain. He nods yes when asked if he is okay with current level of support, and desire for ongoing aggressive care. Hopefully weaning will continue to go well, and he can be successfully extubated. Provided support. Of note, he is having nausea/vomiting with tube feedings, but residuals are not high. Will defer to primary team.  Will continue to follow. Will discuss findings with members of the interdisciplinary team.   
 
  
More than 50% of this 15 minute visit was spent counseling and coordination of care as outlined above. Subjective:  
 
Review of Systems: 
Review of systems not obtained due to patient factors- intubated and ventilated. He denied pain Objective:  
 
Visit Vitals /68 Pulse 93 Temp 98.6 °F (37 °C) Resp 19 Ht 6' 5\" (1.956 m) Wt 122 lb 5.7 oz (55.5 kg) SpO2 96% BMI 14.51 kg/m² Physical Exam: 
 
General:  Cooperative. Intubated and ventilated. No acute distress. Eyes:  Conjunctivae/corneas clear Nose: Nares normal. Septum midline. NG tube Neck: Supple, symmetrical, trachea midline Lungs:   Coarse bilaterally Heart:  Regular rate and rhythm Abdomen:   Soft, non-tender, non-distended Extremities: Normal, atraumatic, no cyanosis. BLE and BUE edema. Bilateral hand mitts Skin: Skin color, texture, turgor normal.   
Neurologic: Nonfocal  
Psych: Alert Signed By: Christiana Lazo NP   
 July 31, 2019

## 2019-07-31 NOTE — PROGRESS NOTES
Interdisciplinary team rounds were held 7/31/2019 with the following team members:Care Management, Nursing, Nurse Practitioner, Nutrition, Palliative Care, Pastoral Care, Pharmacy, Physician, Respiratory Therapy and Clinical Coordinator. Plan of care discussed. See clinical pathway and/or care plan for interventions and desired outcomes.

## 2019-08-01 NOTE — PROGRESS NOTES
Initially tolerated extubation and looked comfortable, but had slowly worsening hypoxemia on optiflow. Will trial BIPAP. Likely still somewhat edema related. Will trial add Albumin Q6 (Albumin 1.7 on labs) and Lasix 40mg Q12 IV. If fails bipap could require reintubation.   
 
Mary Shannon MD

## 2019-08-01 NOTE — PROGRESS NOTES
Palliative Care Progress Note Patient: Rogerio Garrett MRN: [id number]  SSN: [id number] YOB: 1965  Age: 53 y.o. Sex: male Assessment/Plan: Chief Complaint/Interval History: extubated yesterday, currently on BIPAP. Lethargic Principal Diagnosis: · Debility, Unspecified  R53.81 Additional Diagnoses: · Acute Respiratory Failure, Unspecified  J96.00 
· Advance Care Planning Counseling C05.16 · Anorexia  R63.0 
· Cachexia  R64 · Debility, Unspecified  R53.81 
· Edema  R60.9 · Fatigue, Lethargy  R53.83 
· Encounter for Palliative Care  Z51.5 Palliative Performance Scale (PPS) Medical Decision Making:  
Reviewed and summarized notes over last 24 hours Discussed case with appropriate providers- ICU IDT Reviewed laboratory and x-ray data-  CBC, CMP Pt lethargic, on BIPAP at present. He briefly opens his eyes to voice but did not answer questions. He initially tolerated extubation yesterday, but has had worsening hypoxia overnight, now requiring BIPAP. His risk of requiring re-intubation is high. Attempted to speak with his daughter, Yolette [facility]), but she did not answer and her VM was full. Will continue to try to reach her to discuss pt's condition and goals of care. Will continue to follow. Will discuss findings with members of the interdisciplinary team.   
 
  
More than 50% of this 15 minute visit was spent counseling and coordination of care as outlined above. Subjective:  
 
Review of Systems: 
Review of systems not obtained due to patient factors- lethargic Objective:  
 
Visit Vitals /75 Pulse 90 Temp 98.2 °F (36.8 °C) Resp 24 Ht 6' 5\" (1.956 m) Wt 122 lb 5.7 oz (55.5 kg) SpO2 96% BMI 14.51 kg/m² Physical Exam: 
 
General:  Lethargic. Debilitated and frail. Eyes:  Conjunctivae/corneas clear Nose: Nares normal. Septum midline. NG tube. BIPAP mask Neck: Supple, symmetrical, trachea midline Lungs:   Coarse bilaterally Heart:  Regular rate and rhythm Abdomen:   Soft, non-tender, non-distended Extremities: Normal, atraumatic, no cyanosis. BLE and BUE edema. Bilateral hand mitts Skin: Skin color, texture, turgor normal.   
Neurologic: Nonfocal  
Psych: Lethargic Signed By: Sana Huynh NP August 1, 2019

## 2019-08-01 NOTE — PROGRESS NOTES
A follow up visit was made to the patient. Emotional support, spiritual presence and  
prayer were provided. He had a friend visiting with him today, [address] [place] [address] Road had a discussion with her. The patient is less responsive and alert today. Juanito Edmondson

## 2019-08-01 NOTE — PROGRESS NOTES
talked with the patient's close friend, Kira Poe who had grown up playing basketball with him. Chele was troubled by the patient's health decline. She shared stories of their times together and their love for playing basketball. Sebas Edmondson

## 2019-08-01 NOTE — PROGRESS NOTES
Chart reviewed and pt discussed in am IDR. Pt remains ICU. Now extubated, but requiring BIPAP and high risk for re-intubation. Palliative care following. CM will continue to follow for any assist and d/c POC.

## 2019-08-01 NOTE — PROGRESS NOTES
Bedside shift change report given to GERARDO Abraham (oncoming nurse) by Lisa Engle RN (offgoing nurse). Report included the following information SBAR, Kardex, ED Summary, Procedure Summary, Intake/Output, MAR, Recent Results, Med Rec Status, Cardiac Rhythm NSR and Alarm Parameters . Dual sign off on Heparin gtt.

## 2019-08-01 NOTE — PROGRESS NOTES
Patient placed on Bipap due to tachypnea and SpO2 <88% despite maximum optiflow settings. Patient tolerating mask at this time. Dr. Deborah Hart verified BiPAP order over the phone.

## 2019-08-01 NOTE — PROGRESS NOTES
Critical Care Daily Progress Note: 8/1/2019 [facility] Admission Date: 7/21/2019 The patient's chart is reviewed and the patient is discussed with the staff. 53 y. o. AAM evaluated at the request of Dr. Nitin Mosqueda. Was admitted 7/21 with shortness of breath, nausea, epigastric pain with known pancreatic cancer. Was diagnosis with pneumonia.  In ER  with narrow complex tachycardia and cardiology consulted. CXR with developing diffuse alveolar infiltrates bilaterally.  Started on Rocephin and Azithromax.  
  
Chronic medical:  chronic tobacco abuse and cocaine abuse.   
  
He was seen in consult by Dr. Navarro May on 7/12/19. Tyra Sprung that time, he presented with c/o progressive severe abd pain x 3 days and wt loss over the past several months.  Bili 2.6, Lipase 6000.  CT CAP revealed RLL segmental PE, mediastinal LAD, 4 cm pleurally based cavitary mass in L apex, 2cm pancreatic mass obstructing pancreatic and CBD with associated biliary duct dilation and GB distention and gastric wall thickening.  He is s/p ERCP with stent placement and FNA of pancreatic mass/periportal LAD on 7/8.  Biliary brushing cytology +rare atypical cells.  Path on periportal/precarinal  nodes +poorly differentiated metastatic carcinoma. [facility] non-specific as to origin, however a primary tumor in upper GI or pancreatobiliary tree cannot be excluded.  During that consult, EBUS with possible navigational biopsy to the lung was recommended as there may be a possible 2nd primary.   
   Palliative care following. We have been consulted for consideration of EBUS and possible navigation bronchoscopy. Seen last on 7/12 by our service at the time [facility] was not completed yet. O2 sat dropped to 75% on 15L HFNC. Required intubation for respiratory support. Noted with bilateral upper and lower extremity DVTs--Heparin drip infusing. Extubated 7/31 to Opti-flow but worsening hypoxia and changed to BIPAP. Subjective: Extubated to Opti-flow but now requiring BiPAP. Confusion and refuses to speak at times. Did arouse and followed some commands but not verbal.  Spontaneous appropriate movements. TFs on hold now. Albumin and Lasix added for diuresis. Current Facility-Administered Medications Medication Dose Route Frequency  senna-docusate (PERICOLACE) 8.6-50 mg per tablet 2 Tab  2 Tab Per NG tube DAILY PRN  
 vancomycin (VANCOCIN) 750 mg in  mL infusion  750 mg IntraVENous Q24H  
 albumin human 25% (BUMINATE) solution 25 g  25 g IntraVENous Q6H  
 furosemide (LASIX) injection 40 mg  40 mg IntraVENous Q12H  
 acetaminophen (TYLENOL) tablet 650 mg  650 mg Per NG tube Q4H PRN  
 HYDROcodone-acetaminophen (NORCO)  mg tablet 1 Tab  1 Tab Per NG tube Q4H PRN  
 HYDROcodone-acetaminophen (NORCO) 5-325 mg per tablet 1 Tab  1 Tab Per NG tube Q4H PRN  
 LORazepam (ATIVAN) tablet 0.5 mg  0.5 mg Per NG tube Q6H PRN  
 zolpidem (AMBIEN) tablet 5 mg  5 mg Per NG tube QHS PRN  
 famotidine (PF) (PEPCID) 20 mg in sodium chloride 0.9% 10 mL injection  20 mg IntraVENous DAILY  promethazine (PHENERGAN) with saline injection 25 mg  25 mg IntraVENous Q6H  
 amiodarone (CORDARONE) tablet 400 mg  400 mg Per NG tube Q12H  
 heparin 25,000 units in dextrose 500 mL infusion  18-36 Units/kg/hr IntraVENous TITRATE  gabapentin (NEURONTIN) capsule 100 mg  100 mg Per NG tube BID  polyethylene glycol (MIRALAX) packet 17 g  17 g Per NG tube DAILY  NUTRITIONAL SUPPORT ELECTROLYTE PRN ORDERS   Does Not Apply PRN  
 fentaNYL in normal saline (pf) 25 mcg/mL infusion  0-200 mcg/hr IntraVENous TITRATE  LORazepam (ATIVAN) injection 2 mg  2 mg IntraVENous Q2H PRN  
 fentaNYL citrate (PF) injection 50 mcg  50 mcg IntraVENous Q1H PRN  propofol (DIPRIVAN) infusion  0-50 mcg/kg/min IntraVENous TITRATE  piperacillin-tazobactam (ZOSYN) 4.5 g in 0.9% sodium chloride (MBP/ADV) 100 mL  4.5 g IntraVENous Q8H  
  sodium chloride (NS) flush 5-40 mL  5-40 mL IntraVENous PRN  
 sodium chloride (NS) flush 5-40 mL  5-40 mL IntraVENous Q8H  
 sodium chloride (NS) flush 5-40 mL  5-40 mL IntraVENous PRN  
 naloxone (NARCAN) injection 0.4 mg  0.4 mg IntraVENous PRN  
 albuterol (PROVENTIL VENTOLIN) nebulizer solution 2.5 mg  2.5 mg Nebulization Q4H PRN  
 hydrALAZINE (APRESOLINE) 20 mg/mL injection 20 mg  20 mg IntraVENous Q4H PRN Review of Systems Unobtainable due to patient status. Objective:  
 
Vitals:  
 08/01/19 2611 08/01/19 6934 08/01/19 0730 08/01/19 0161 BP: 107/57  110/75 Pulse: 93  93 Resp: 21  24 Temp:      
SpO2: 93% 93% 91% 96% Weight:      
Height:      
 
 
Intake and Output:  
07/30 1901 - 08/01 0700 In: 3588.6 [I.V.:2949.6] Out: 4850 [MNQVB:4403] No intake/output data recorded. Physical Exam:         
Constitutional:  the patient is thin and in no acute distress, BIPAP 70%,sat 94% EENMT:  Sclera clear, pupils equal, oral mucosa moist 
Respiratory: few anterior crackles, no wheezing Cardiovascular:  RRR without M,G,R 
Gastrointestinal: flat, with positive bowel sounds. Musculoskeletal: warm without cyanosis. There is bilateral upper lower extremity edema, no LE edema. Skin:  no jaundice or rashes, no wounds Neurologic: no gross neuro deficits Psychiatric:  Arouses and did follow some commands, no words spoken LINES:   
Right IJ quad lumen 7/28/19 NG 7/27/19 [place] 7/25/19 DRIPS:    
Heparin CXR:  
8/1/19: 
1. Status post extubation. 2. Bilateral airspace disease, similar to prior exam. 
 
 
LAB No results for input(s): GLUCPOC in the last 72 hours. No lab exists for component: [place] Recent Labs 08/01/19 
0634 07/31/19 
2837 07/30/19 
9615 WBC 9.3 6.8 6.2 HGB 7.1* 7.0* 7.2* HCT 22.9* 22.6* 24.5*  
 211 194 Recent Labs 08/01/19 
5615 07/31/19 
2279 07/30/19 
8904  140 137  
K 3.3* 3.8 4.5  
CL 99 98 94*  
 CO2 37* 34* 33* GLU 98 103* 144* BUN 50* 53* 45* CREA 1.36 1.70* 1.98* MG 2.4 2.5* 2.2 PHOS 3.0 3.5 7.4*  
CA 8.5 8.0* 7.9* ALB 2.1* 1.7* 1.6* TBILI 0.8 0.5 0.5 ALT 31 23 28 SGOT 54* 41* 48* No results for input(s): LCAD, LAC in the last 72 hours. Recent Labs 08/01/19 
0304 07/31/19 
3966 07/30/19 
4569 PHI 7.471* 7.421 7.335* PCO2I 50.4* 50.5* 61.7*  
PO2I 60* 82 96 HCO3I 36.7* 32.8* 32.9* Assessment:  (Medical Decision Making) Patient Active Problem List  
Diagnosis Code  Pancreatitis K85.90  
 Lung mass R91.8  Pulmonary emboli ([facility]) I26.99  
 Primary pancreatic cancer with metastasis to other site ([facility] [address] C25.9  Mediastinal adenopathy R59.0  Protein malnutrition ([facility] [address] E46  
 Cocaine abuse ([facility] [address] F14.10  Centrilobular emphysema ([facility] [address] J43.2  Bilateral pulmonary infiltrates on chest x-ray R91.8  SVT (supraventricular tachycardia) ([facility]) I47.1  Atrial fibrillation with RVR ([facility]) I48.91  
 Acute respiratory failure with hypoxemia ([facility]) J96.01  
 Electrolyte abnormality E87.8  Acute bilateral deep vein thrombosis (DVT) of upper extremities ([facility]) I82.623  
 DVT, bilateral lower limbs ([facility]) I82.403 Plan:  (Medical Decision Making) Hospital Problems  Date Reviewed: 8/1/2019 Codes Class Noted POA  
 DVT, bilateral lower limbs ([facility] [address] ICD-10-CM: I82.403 ICD-9-CM: 453.40  7/30/2019 Unknown Overview Signed 7/30/2019  8:56 AM by Ari Chamberlain NP  
  7/29/19:  There is an occlusive thrombus within the right tibial vein and peroneal vein with a partially occlusive thrombus in the right popliteal vein. There is an 
occlusive thrombus in the left peroneal tibial vein and peroneal vein. Continue Heparin drip Acute bilateral deep vein thrombosis (DVT) of upper extremities (HCC) ICD-10-CM: F73.205 ICD-9-CM: 453.82  7/29/2019 No  
 Overview Signed 7/29/2019  9:10 AM by Air Chamberlain NP  
 7/28/19:  Findings consistent with extensive bilateral upper extremity deep venous thrombosis. Continue Heparin drip Electrolyte abnormality ICD-10-CM: E87.8 ICD-9-CM: 276.9  7/26/2019 Yes  
 supplement Acute respiratory failure with hypoxemia [facility]) ICD-10-CM: J96.01 
ICD-9-CM: 518.81  7/25/2019 Unknown Extubated--on BIPAP Bilateral pulmonary infiltrates on chest x-ray ICD-10-CM: R91.8 ICD-9-CM: 793.19  7/21/2019 Yes Overview Signed 7/26/2019  8:44 AM by Rosa Elena Norman MD  
  Suspect developing ARDS 
  
  
   
 SVT (supraventricular tachycardia) ([facility] [address] ICD-10-CM: I47.1 ICD-9-CM: 427.89  7/21/2019 Yes  
 resolved * (Principal) Atrial fibrillation with RVR ([facility] [address] ICD-10-CM: I48.91 
ICD-9-CM: 427.31  7/21/2019 Yes  
 controlled Centrilobular emphysema ([facility] [address] ICD-10-CM: J43.2 ICD-9-CM: 492.8  7/12/2019 Yes  
 unchanged Protein malnutrition ([facility] [address] (Chronic) ICD-10-CM: E46 
ICD-9-CM: 260  7/9/2019 Yes Overview Signed 7/31/2019  4:10 PM by Santosh Palacio MD  
  moderate 
  
  
 receiving Albumin--adding low rate TFs Cocaine abuse (HCC) (Chronic) ICD-10-CM: F14.10 ICD-9-CM: 305.60  7/9/2019 Yes  
 chronic Lung mass ICD-10-CM: R91.8 ICD-9-CM: 786.6  7/8/2019 Yes On chest CT Pulmonary emboli [facility]) ICD-10-CM: I26.99 
ICD-9-CM: 415.19  7/8/2019 Yes Continue Heparin drip Primary pancreatic cancer with metastasis to other site [facility]) ICD-10-CM: C25.9 ICD-9-CM: 157.9  7/8/2019 Yes  
 unchanged Mediastinal adenopathy ICD-10-CM: R59.0 ICD-9-CM: 785.6  7/8/2019 Yes  
 unchanged --Vancomycin, Zosyn day 8--cultures: negative  
--Albumin and Lasix IV--urine output 4100 ml (2L -), creatinine 1.36 
--Heparin drip:  PE, bilateral UE and LE DVTs  
--Hgb down to 7.1--transfuse? ?? 
--Resume low flow TFs. Stop scheduled Phenergan and continue Reglan 
--Palliative Care following. Current confusion. --Concerning the possible EBUS, unsure how much additional utility this will bring. Mediastinal lymph node biopsy via EUS with poorly differentiated carcinoma. Additional mediastinal/hilar lymph node biopsies are likely to show the same. The concern for separate primary would best be addressed by navigation biopsy of the L apex lesion but this is not possible. Given his extensive clot burden do not feel his heparin should be held for any length of time even if he clinically improves from here More than 50% of the time documented was spent in face-to-face contact with the patient and in the care of the patient on the floor/unit where the patient is located. Anastasia Valentine NP Lungs:  coarse Heart:  RRR with no Murmur/Rubs/Gallops Additional Comments:  On BIPAP since extubation ,his daughter does not want to make decisions but he is not able to make them ,appreciate PC with helping with the family I have spoken with and examined the patient. I agree with the above assessment and plan as documented.  
 
Mary Lou Gagnon MD

## 2019-08-01 NOTE — PROGRESS NOTES
Nutrition F/U: 
TF Management for [facility] Pulmonary. Assessment: 
Weight 55.5 kg (ICU bed 8/1/19), edema - 1+ BUEs, 3+ BLEs. Reglan was started yesterday for TF intolerance. TF was held yesterday for extubation. The patient was extubated later in the day, NGT remained in place but feeding was held due to the need for BIPAP. The patient had experienced poor TF tolerance with frequent vomiting despite low gastric residuals when checked. Last bowel movements were yesterday. TF was restarted today at a low rate. Enteral Access: 
           NGT. Macronutrient Needs (60.4 kg): UMH:  5541-3517 XYZW /day (30-35 kcal/kg listed BW) XWK:   TATJI protein/day (1.2-2 grams/kg CBW) Max CHO: 291 gm CHO/day (55% kcal) Fluid: 1ml/kcal or per MD 
Intake/Comparative Standards: 
Minimal intake via TF due to frequent emesis over the last several days. Intervention:  
Meals and Snacks: NPO. Enteral Nutrition: Osmolite 1.2 @ 25 ml/hr with a 25 ml/hr water flush and slowly advance as tolerated to the goal rate of 65 ml/hr -1872 kcal/day (100% of needs), 87 grams protein/day (100% of needs), 246 grams CHO/day (does not exceed max CHO),  and ~ 1879 ml free water/day (100% of needs). Mineral Supplement Therapy: Nutrition Support Orders/Electrolyte Management Replacement Protocols are active on the MAR. Coordination of Nutrition Care by a Nutrition Professional: AM ICU/CCU rounds, collaboration with Martha Perez RN. Nutrition Discharge Plan: Too soon to determine. Marisel Rudolph. [employer] 
[phone]

## 2019-08-02 NOTE — PROGRESS NOTES
Guideline Guideline Number: [id number] Title: Management of the Patient with Mechanical Ventilation (including weaning) and ABCDE Bundle Effective Date:  03/00 Revised Date: 02/09, 03/10, 7/12, 5/13,                                  10/13, 8/14 Reviewed Date: 07/2015, 04/2016, 06/2017 I. Policy:  Management of the patient requiring mechanical ventilation, including readiness to wean and weaning protocol. The information provided serves as a guideline for patient management. Included in this guidelines is the ABCDE Bundle to provide guidance to staff for evidence based management of pain, agitation/anxiety and delirium in the intensive care unit. The goals of critical care analgesia and sedation are to facilitate mechanical ventilation, to prevent patient and caregiver injury, and to avoid the psychological and physiologic consequences of inadequate treatment of pain, anxiety, agitation, and delirium by maintaining a light level of sedation. Pain occurs commonly in adult ICU patients, regardless of admitting diagnosis. Therefore, pain should be frequently assessed and analgesic medications titrated to prevent adverse effects associated with either inadequate or excessive analgesia. Once pain has been addressed, anxiolytic and/or antipsychotic medications can be utilized to treat unresolved agitation/anxiety and delirium, with the goal to prevent over- or under sedation by using the Moy Agitation Sedation Scale (RASS). Assertive management of these issues has been shown to reduce costs, improve ICU outcomes such as successful extubation and ICU length of stay, and allow for patients to participate in their own care. II. Purpose: The respiratory care practitioner and the critical care RN will utilize the following guideline to provide the most efficient and effective management of mechanical ventilators and weaning and extubation processes. Goals of Treatment: 1. Adequate management of patients pain and discomfort while maintaining a light level of                   sedation (RASS score of 0 to -1) 2. Both chronic and acute sources of pain should be identified and treated. 3. Sedative agents should be considered if patient still expresses discomfort and/or is not at RASS         goal of 0 to -1 despite adequate management of pain. 4. Patients requiring neuromuscular blockage must have continuous infusions of analgesic and              sedative agents. III. Responsibility: Director Respiratory Care Services and all Respiratory Care Practitioners  with documented competency as well as Critical Care RN staff. General Guidelines 1. Introduction to Ventilator Plan Phase 
a. Ventilator Management Phase, General Statement -  The plan should be initiated in patients who have a secure airway/require invasive mechanical ventilation (endotracheal or tracheostomy) only -   The provider determines the appropriate medications used for analgesia and agitation/anxiety along with the clinical pharmacist 
 
2. Monitoring Levels of Comfort 
a. Pain Assessment 
- Pain is monitored using the numerical scales - A pain assessment should be conducted, at a minimum, every 4 hours and as needed and per guidelines. - The level of pain should be determined as satisfactory by the patient based on patients baseline level of pain , considering any chronic pain that the patient may have. - If the patient is unable to communicate pain level, the nurse can assess for nonverbal indicators including facial grimacing, moaning, tachypnea, tachycardia, hypertension, diaphoresis, etc as a cue to begin further pain assessment. b.  Sedation Assessment - Sedation is monitored using the Moy Agitation Sedation Scale (RASS) Target RASS RASS Description +4 Combative, violent, danger to staff 
  
+3 Pulls or removes tubes(s) or catheters; aggressive +2 Frequent nonpurposeful movement, fights ventilator +1 Anxious, apprehensive, but not aggressive  
0 Alert and calm  
-1 Awakens to voice (eye opening/contact) > 10 sec  
-2 Light sedation, briefly awakens to voice (eye opening/contact) < 10 sec  
-3 Moderate sedation, movement or eye opening. No eye contact  
-4 Deep sedation, no response to voice, but movement or eye opening to physical stimulation  
-5 Unarousable, no response to voice or physical stimulation  
 
-  Goal RASS is 0 to -1, unless otherwise specified by providers order. 
- Nursing staff should conduct the RASS every 4 hours and as needed to maintain goal RASS of 0 to -1. 
- If RASS is outside of goal range, discuss treatment options with provider. 
- A RASS score of +2 to +4 requires further assessment by the nurse. Causes of agitation/anxiety that should be considered include: 
a. Pulmonary -   endotracheal tube malposition or patency, mode of ventilation, pneumothorax, hypoxemia, hypercarbia 
b. Metabolic  hypoglycemia, hyponatremia, acute renal or hepatic failure 
c. Emotional upset  with information and awareness of critical condition, prognosis, need for surgical or invasive procedures, other interventions or complications, family or personal stressors 
- C. Sedation Assessment while using Neuromusclar Blocking Agents 
- D. Delirium Assessment 
a. the ICU ([facility]  ICU) 3. Analgesia The incidence of significant pain has been reported to be 50% or higher in both medical and surgical ICU patients. These patients also experience discomfort during routine/procedural ICU care and at rest.  However, patients may be unable to self-report their pain (either verbally or with other signs) because of an altered level of consciousness, the use of mechanical ventilation, or high doses of sedative agents or neuromuscular blocking agents.  
The short and long term consequences of unrelieved or inadequately treated pain are significant and include patient discomfort, decreased satisfaction with care by family and patient, delirium, agitation/anxiety, post traumatic stress disorder and depression. Therefore, routine assessment and treatment of pain should occur in all ICU patients. Causes and Treatment of Pain in the ICU 
a. Acute pain (post-operative, procedural pain, discomfort with usual ICU care or other acute episodes of pain-related to underlying disease) 1. Consider use of PCA for alert and oriented patients with pain needs not met by PRN dosing or opoids. 2. Preemptive analgesia should occur prior to chest tube removal, and should be considered for other procedural pain such as turning and repositioning, would drain removal, wound dressing change, tracheal suctioning, femoral catheter removal or place of central venous catheter. 3. Appropriate analgesic medications for preemptive analgesia are short acting intravenous (IV) agents (i.e. fentanyl, morphine, hydromorphone) a. Administration of analgesia before patient experiences noxious stimuli prevents amplification and hyperexcitability of the central nervous system. b. Analgesia for Mechanically Ventilated Patients: 
1. The approach to sedation and analgesia management for mechanically ventilated patients favors use of analgesia first sedation. The primary goal of this strategy is to address pain and discomfort first, and then if necessary, add anxiolytic agent. 2. Analgesia first sedation reduces dose escalation of medications, decreases the duration of mechanical ventilation and the incidence of VAP, improves the probability of successful extubation, and ultimately shortens ICU length of stay. 3. For pain management, analgesic medications are determined by the provider.    Intermittent dosing of the analgesic should be attempted first.   
If the patient requires more than 3 doses within 1 hour then provider should be contacted to consider continuous infusion. 4.  Analgesic options for mechanically ventilated patients include: 
a. Fentanyl which is considered the drug of choice for patients requiring continuous infusion. b.Morphine may be considered for those patients without renal dysfunction who are hemodynamically stable and require intermittent pain medication. Continuous infusions of morphine may be used for patientl who are receiving comfort care as part of end of life care. c.Hydromorphone is reserved for patients who are refractory to fentanyl or morphine and is typically admininstered by intermittent dosing. 4.  Agitation/Anxiety Background Agitation and anxiety frequently occur in ICU patients. Anxiolytic/sedation agents may be indicated to help relieve discomfort, improve synchrony with mechanical ventilation, and decrease the overall work of breathing. Pain control alone may be sufficient to make patients comfortable enough to require no anxiolytic/sedative agent. In addition, non-pharmacologic interventions such as repositioning or verbal assurance may be helpful to comfort or redirect an agitated patient. If these methods are unsuccessful, then anxiolytic/sedative medications such as propofol, dexmedetomidine, or benzodiazepines can be used. Selection of an anxiolytic should be based on the pharmacokinetic properties of the medication, patient specific characteristics, and sedation goal.   However, nonbenzodiazepine sedatives (ie propofol or dexmedetomidine) may be preferable over benzodiazepines (ie midazolam or larazepam) due to more favorable outcomes such as delirum. Causes and Treatment of Agitation/Anxiety 
a. Possible underlying causes of agitation and anxiety include pain, delirium, hypoxemia, hypoglycemia, hypotension, or withdrawal from alcohol  and other drugs.  
b. Analgesia first sedation should be attempted initially to manage pain and provide sedation in appropriate patients. Analgesia alone may be adequate to reach RASS goal of 0 to -1. If patient remains agitated or anxious despite adequate analgesia (ie RASS +2 to +4) then anxiolytic/sedative should be considered. c. The choice of anxiolytic should be based on desired levels of sedation (ie light sedation or deep sedation) with preference for the use of nonbenzodiazepines such as propofol or dexmedetomidine if appropriate. While light sedation (ie RASS 0 to -1) is preferred for most patients, there are instances when deep sedation (ie RASS -4 to -5) is desired. For example, in the setting of ventilator dysynchrony due to ARDS or for patients receiving NMB agents. d. Medications to maintain light sedation (ie RASS 0 to -1) include 1. Propofol continuous infusion can be considered for hemodynamically stable (ie SBP = 100, MAP = 65 and/or not requiring vasopressor support) patients requiring light sedation. Propofol has a quick onset (1-2 minutes) and offset of action, making it a good agent to assess neurological status and facilitate liberation from the mechanical ventilator. 2.Dexmedetomidine continuous infusion is a good option for hemodynamically stable patients requiring light sedation as it allows for a more awake, interactive patient is associated with less delirium. It has an intermediate onset of action (5-10 min). Therefore, abrupt titrations should be avoided, but use of prn haloperidol or benzodiazepine may be useful to manage agitation until the medication takes effect. 3. Antipsychotics are another option. In particular, haloperidol intermittently dosed may be useful for patients with symptoms of agitation/anxiety and delirium. 4.Benzodiazapines can also be considered for light sedation, but should be intermittently doses. Midazolam is an option for patients without renal dysfunction.   It has a short onset of action (2-5 minutes) making it a good agent for acute agitation/anxiety, but short duration of action resulting in frequent dosing. Lorazepam is another option. It has a longer onset of action (15-20 minutes) in comparison to midazolam, but longer duration of action. e. Medications to maintain deep sedation (RASS -4 to -5) include: 
1) Propofol continuous infusion should be considered as a first line option for hemodynamically stable patients. 2)Benzodiazepines can be considered as second line options for deep sedation. Studies comparing these agents to other sedatives have shown that they lead to worse outcomes including delirium, oversedation, delayed extubation, and longer time to discharge. Midazolam is one option for patients without renal dysfunction and lorazepam is another options. If patient requires more than 3 doses within 1 hour then contact provider  to consider initiation of continuous infusion. 5.  Daily Sedation Awakening Trial (SAT) from IV Continuous Analgesia/Sedation 
a. Patients are to have daily awakening from sedation while on continuous IV analgesia and/or sedation in the ICU. Continuous analgesia infusions may be maintained only if needed for active pain and RASS is at goal 0 - -1. Unit guideline is for the SAT to occur following ICP rounds each morning.   
b. The sedation awakening trial (SAT) is done regardless if the patient meets criteria for spontaneous breathing trial (SBT). c. SAT safety screen is assessed and SAT should not be performed if sedation is being used for active seizures, alcohol withdrawal, hemodynamically unstable or requiring support of vasoactive medications , in conjunction with NMB agents, if ICP is greater than 
20mmHg or if sedation is being used to control ICP, patients RASS is +3 or +4 (very agitated or combative).   Other exclusion criteria are:  if there is documentation of myocardial ischemia in the past 24 hours; or patient is receiving high frequency oscillator ventilation (HFOV) , if the patient has an open chest /abdomen or is receiving comfort care. d. Criteria for passing the SAT are the patient opened their eyes to verbal stimuli or tolerated sedative interruption without exhibiting failure criteria. 
e. Patients fail the SAT if the develop sustained anxiety, agitation, or pain; a respiratory rate of 35 per minutes for 5 minutes or longer, an SpO2 less than 88% for 5 minutes or longer; an acute cardiac dysrhythmia; two or more signs of respiratory distress including tachycardia, bradycardia; use of accessory muscles; diaphoresis; marked dyspnea; or myocardial ischemia. f. Respiratory therapy staff must verify with the nurse that continuous IV analgesia (unless being use  for active pain) and sedation (unless patient is receiving dexmedetomidine) is off prior to placing patient on SBT. g. DO NOT interrupt infusion of analgesia and sedation medications if patient is receiving neuromuscular blockade. 
h. Monitor level of wakefulness unless patient is awake and follows commands (RASS 0 to -1) or patient becomes uncomfortable or agitated (RASS +3 to +4) 
i. If agitation prevents successful awakening , administer bolus of analgesia and/or sedation then resume infusion of the medication at ½ previous dose and titrate as needed. 
j. If oversedation prevents successful awakening, hole infusion until at goal and resume ½ of prior infusion rate/dose if clinically indicated. 6. Delirium Background Delirium is characterized by the acute onset of cerebral dysfunction with a change or fluctuation baseline 
mental status, inattention, and either disorganized thinking or an altered level of consciousness. It affects up to 80% of mechanically ventilated adult ICU patients, and is associated with increased mortality,  
and treatment is important and may in turn allow for a patient to be conscious yet cooperative enough to participate in ventilator weaning trials and early mobilization efforts. Delirium can only be assessed in patients who are able to sufficiently interact and communicate with bedside 
clinicians (ie RASS -3 to +4). IV. Procedure: A. Assessment: The following criteria must be assessed prior to the initiation of weaning from mechanical ventilation. Note: The criteria are general guidelines and must be individualized for each patient. The patients primary nurse will be responsible, in coordination with the RT, the Spontaneous Awakening Trial). The RT will perform the SBT. B. Spontaneous Awakening Trials (SATs  also referred to as Sedation Vacation) and Spontaneous Breathing Trials (SBTs) performed to determine readiness to wean. 1. For patients who meet established criteria, such as those without active seizures, alcohol withdrawal and agitation, myocardial ischemia or those requiring cardiac support devices, without increased intracranial pressure and those not receiving neuromuscular blockade, the nurse will reduce the infusions of sedative by 50% of current used for sedation that was used to achieve a level of light sedation (Cedillo Score 2 or RASS score of 0 to -1) and evaluate patient response to reduction of sedation. Analgesics required for pain control are continued during the test.  Obtain MD order to cover no SAT for that time period if patient has any exclusion criteria as described above. 2. Failure of the spontaneous awakening trial occurs when the patient shows symptoms such as increased agitation, anxiety, pain or signs of respiratory distress including respiratory rate >35/min or oxygen saturation <88% as well as development of acute cardiac arrhythmias. If these symptoms develop during the SAT, the nurse then restarts sedation at 75% of the previous dose and titrates the medications until the patient is comfortable and/or symptoms have abated. 3.  If the patient passes the SAT then the patient moves on to the Spontaneous Breathing Trials as performed by the RT. The SBT Safety Screen included the following:  No agitation, oxygen saturation > 88%, FIO2 < 50%, PEEP < 7.5 cm H20, no myocardial ischemia, no vasopressor use, and with inspiratory efforts. 4. Patients who pass the spontaneous awakening trial but fail the spontaneous breathing trial are placed back on full ventilator support and reassessed the next day. 5. Failure of the SBT (spontaneous breathing trail) includes any of the following:  Respiratory rate > 35/min, respiratory rate < 8/min, oxygen saturation < 88%, respiratory distress, mental status change, acute cardiac arrhythmia. 6. Extubation is considered for patients who tolerate the spontaneous awakening trial and pass the spontaneous breathing trial.   
C. Can the cause of respiratory failure be reversed (i.e. absence of high spinal cord injury or advanced ALS)? D. Is gas exchange adequate? 1. PaO2/FIO2 ratio > 150  200, 2. PEEP < 8 cm H20 
3. FIO2 < 50 
4. pH > 7.30 
5. Rapid shallow breathing index (f/VT) < 105 
E. Is patient hemodynamically stable? 1. Absence of clinically significant hypotension (minimal vasopressors such as Dopamine < 5mcg/kg/minute)? F. Is there evidence of intact respiratory drive (NIP/NIF >-28 [id number], stable VC02)? G. Does patient have an adequate cough, airway clearance ability? H. Is there absence of excessive secretions? V. Initiation: A. The therapist shall consult with RN to determine if sedation can be discontinued or significantly decreased. If this can be achieved, the therapist shall implement the  
                  followin. Identify patient and verify name and account number via ID bracelet. 2. Perform hand hygiene per hospital policy utilizing Standard Precautions for all patients and following transmission-based isolation as indicated per hospital policy. 3. Perform a ONE-MINUTE SPONTANEOUS TRIAL AND ASSESSMENT. 4. Measure Rapid Shallow Breathing Index (RSBI) and monitor SpO2 and cardiovascular parameters during the spontaneous breathing assessment. 5. If SpO2 and cardiovascular parameters are stable, continue spontaneous breathing trial for at least 30 minutes and up to 120 minutes, as patient tolerates. 6. Monitor ventilatory status, SpO2, and cardiovascular status during spontaneous breathing trial. 
7. If patient has a successful trial, consider patient as a candidate for extubation and obtain order. 8. If patient fails the weaning trial, place back on ventilator and adjust settings to provide a non-fatiguing form of ventilatory support for the remainder of the day and night. 9. One attempt at weaning shall be performed each day until successful weaning occurs. The RCP will make every attempt to begin the spontaneous breathing trials between 0500 and 0600 to provide documentation of the trial when the pulmonologist makes rounds. B.  Assessment of SBT or PST: 
1. Is gas exchange acceptable? 2. PaO2 > 60 mm Hg. 3. PH > 7.30 
4. Increase in PaCO2  < 10 mm Hg C. Is patient hemodynamically stable? 1. HR < 120 beats/minute 2. HR  < 20% 3. Systolic BP < 120 and > 90 mmHg 4. BP  < 20%, no vasopressors required D. Does patient have stable ventilatory pattern? 1. Sustained RR < 30 breaths per minute 2. Normal and stable VCO2 3. Patient is not demonstrating any signs of increased work of breathing, such as increased use of accessory muscles. E. Mental status stable throughout trial? 
1. Absence of changes such as somnolence, excessive agitation or anxiety 2. Absence of diaphoresis during trial? 
IV. Safety: A. The RCP shall monitor patient according to the above guidelines.  If at any time during the weaning process, the respiratory therapist or nurse feels that the patient is not tolerating weaning, the therapist shall place patient back on previous ventilator settings. B. The patient shall be reassessed and the weaning process should be continued the following day. V. Reportable Conditions: A. The therapist shall notify the physician, as appropriate, for any of the following         conditions: 1. FIO2 increase (sustained) at 10% or greater 2. Poor patient/ventilator interface in spite of adjustments 3. Need for increased sedation for respiratory distress 4. Need for increasing ventilating pressures (i.e. PEEP, PIP, MAP) 5. ABG results meeting panic value criteria or other clinical signs indicating deterioration of patients condition. 6. Unplanned extubation. 7. Unexplained sustained increase in PIP greater than 10 cm H2O. 
8. Assessment results regarding ventilator discontinuance process. VI. Ventilator protocol management A. The following items should be maintained for patients who are being mechanically           ventilated. 1. Obtain STAT Chest X-Ray for ET tube placement after insertion. 2. Chest X-Ray q a.m. while on ventilator. 3. ABGs 30 - 60 minutes after being stable on the ventilator. 4. ABG's q a.m. while on ventilator and prn. 
5. Do spontaneous breathing trials when patient is hemodynamically stable, responsive, and without fever. 6. Terminate trials if patient exhibits signs of respiratory distress. 7. Therapists should maintain ABG s as follows: 
    a. pH -  7.30 - 7.50              
    b. PaO2 -   60  100  
     8. Racemic Epinephrine (0.5cc) for post extubation stridor (2 UA treatments max.) 
 
VII. Early Mobilization Mobility Level Criteria Start at Level 1 if:  
PaO2/FIO2 <250 Positive end-expiratory Pressure (PEEP) >=10 cm H2O  
O2 saturation <90% Respiratory Rate (RR) Not within 10-30 per min Cardiac arrhythmias or ischemia New onset Heart Rate  (HR) <60 or >120 beats per min Mean arterial pressure (MAP) <55 or >140 mmHg Systolic blood pressure (SBP) <90 or > 180 mmHg Vasopressor infusion New or increasing Moy Agitation Scale (RASS) < - 3 Level I:   Breathe  (Rass -5 to -3) HOB Angle  improve VAP protocol compliance ? Visually confirm the [facility] is elevated >= 30 degrees to comply with VAP prevention protocols ? The Centers for Disease Control and Prevention recommends an HOB angle of 30-45 degrees , unless contraindicated Additional activities to be implemented ? Every 2 hour turning ? Passive range of motion ? Up to 20 degrees reverse trendelenburg with lower extremity exercise/retracting footboard ? Continuous lateral rotation therapy can be considered part of early mobility therapy in patients who are at high risk for pulmonary complications Move to Level 2 when the patient 
- Has acceptable oxygenation/hemodynamics - Tolerates q 2 turning - Tolerates HOB > 30 degrees or up to 20 degrees reverse trendelenburg Level 2 :Tilt  Patient Assessment Rass > -3  (eg, opens eyes, may have profound weakness) Up to 20 degrees Reverse Trendelenburg position and 10 degrees minimum HOB 
- Reverse Trendelenburg positioning allows for orthostatic position in fragile patients - If available , use in conjunction with retracting foot section to allow for partial weight bearing prior to sitting up in the bed or getting out of bed Additional activities to be implemented -  Maintain HOB >/= 30 degrees - Q 2 hour turning - Passive/active range of motion - Legs dependent - PT consultation Move to Level 3 when the patient . Shawn Lizama -Tolerates active- assistance exercises 2 times per day 
  -Tolerates lower extremity exercises against footboard/Up to 20 degrees Reverse Trendelenburg 
  -Tolerates legs dependent /HOB 45 degrees Level 3 :  SIT  (Rass >- 1 (eg , weak but may move arms/legs independently) Full chair position (footboard on) ?  Full upright positioning allows for diaphragmatic excursion and lung expansion ? Sitting with legs in a dependent position facilitates gas exchange Additional activities to be implemented - Maintain HOB >= 30 degrees - Q 2 hour turning (assisted) - Active range of motion  PT/OT actively involved - Encourage activities of daily living 
- Dangling, if patient can move arm against gravity Move to Level 4 when the patient . Ariela Never - Tolerates increasing active exercise in bed - Actively assists with every- 2- hour turning or turns independently - Tolerates full chair position 3 times/day Level 4:  Stand ( RASS >0 (eg, weak but may tolerate increased activity) Stand Attempts ? Full chair position (footboard off/feet on the floor) ? Consider using a sit-to-stand lift ? Pivot to chair, it tolerates partial weight bearing Additional activities to be implemented - Maintain head of bed >= 30 degrees - Q 2 hr turning (self/assisted) - Active range of motion - Encourage activities of daily living 
- PT/OT actively involved Move to Level 5 when the patient . 
- Can successfully comply with all activities - Tolerates trial periods of full chair position (footboard off/feet on floor) 3 times per day - Tolerates partial weight-bearing stand and pivots to chair Level 5 :  Move  (RASS > 0    (eg, weak but may tolerate increased activity) Achieve out of bed ? Utilize mobile floor life to ambulate to bedside chair Additional activities to be implemented - Maintain HOB > = 30 degrees - Q 2 hour turning (self/assisted) - Active range of motion - Patient stands/bears weight > 1 minute - Patient marches in place 
- PT/OT actively involved Patient continues to ambulate progressively longer distances as tolerated until they consistently participate and move independently. E Approved by Critical Care Committee 2-19-09 [facility] Clinical Guidelines ABCDE DOC Flowsheet Content Variables to select when addressing section Comments ABCDE Initiated ? Yes/No  RN to address minimum q 24 hours (day shift) Target RASS ? 0 = alert and oriented ? -1 = drowsy ? -2 = light sedation ? -3= moderate sedation ? -4= deep sedation Target on standard ventilator setting should be -2; -4 with oscillator CAM -ICU ? Positive ? Negative ? Unable to assess Delirium assessment SAT Safety Screen Passed ? Yes 
? No Select yes if proceeding on to the sedation vacation (reduction of continuous sedative drip by directed by MD) Select no if your patient has any of the below reasons for not proceeding on to the daily awakening sedation vacation trial  
SAT Screen for Failure ? Active seizures ? Acute delirium tremors ? Agitation that threatens accidental line/tube removal 
? On paralytics ? MI (24-48hr) ? Abnormal ICP ? Open abdomen Select one of the options when the patient will not undergo the sedation vacation  ALSO MUST OBTAIN AN ORDER FOR no Debbe No written under nursing miscellaneous for now by either the NP or Intensivist  
Daily sedation Vacation/assessment of  ? Yes 
? No 
? Not applicable If yes, MUST see the reduction in sedation on the [facility] and please place in the comment section of the sedative sedation vacation started SBT Safety Screen Passed ? Yes 
? No Select Yes if the patient has none of the below listed reasons for not proceeding on to the SBT following reduction of sedation SBT Screen Reason for Failure ? Agitation ? O2 Sat < or = 88% 
? FIO2 > 50% ? PEEP >7 
? MI 
? Vasopressor Use 
? Bilevel setting on Vent ? Oscillator in use ? Increased resp effort Select reason as appropriate for NOT proceeding on to the SBT Wake Up and Breathe Protocol

## 2019-08-02 NOTE — PROGRESS NOTES
Ventilator check complete; patient has a #8. 0 ET tube secured at the 26 at the lip. Patient is not able to follow commands. Breath sounds are coarse. Trachea is midline, Negative for subcutaneous air, and chest excursion is symmetric. Patient is also Negative for cyanosis and is Negative for pitting edema. All alarms are set and audible. Resuscitation bag is at the head of the bed. Ventilator Settings Mode FIO2 Rate Tidal Volume Pressure PEEP I:E Ratio PRVC  75 %    500 ml  0 cm H2O  10 cm H20  1:2.8 Peak airway pressure: 26 cm H2O Minute ventilation: 12.4 l/min Satnam Campos, RT

## 2019-08-02 NOTE — PROGRESS NOTES
Patient was intubated with a number 8.0 ET Tube. Tube placement verified by auscultation. ET Tube is secured at the 26 cm angel luis at the lip and on the right side. Patient was intubated by Dr. Benja Richmond on the 1 attempt. Breath sounds are coarse. Patient is Negative for subcutaneous air and chest excursion is symmetric. Trachea is midline. Patient is also Negative for cyanosis and is Negative for pitting edema. Patient placed on ventilator on documented settings. All alarms are set and audible. Resuscitation bag is at the head of the bed. Ventilator Settings Mode FIO2 Rate Tidal Volume Pressure PEEP I:E Ratio PRVC  100 %   20 500 ml  0 cm H2O  12 cm H20  1:2.8 Peak airway pressure: 29 cm H2O Minute ventilation: 9.9 l/min ABG: No results for input(s): PH, PCO2, PO2, HCO3 in the last 72 hours.

## 2019-08-02 NOTE — PROGRESS NOTES
Dr. Radha Amador notified of pt's decreasing blood pressure and holding fentanyl drip for now. Decreasing the precedex drip. RT decreased pt's peep.

## 2019-08-02 NOTE — PROGRESS NOTES
A follow up visit was made to the patient. Emotional support and spiritual presence were provided. The patient's sister was present. She telephoned her nephew so the patient's nurse could communicate that there is a need for the patient's daughter to come to the hospital so the doctor and nurse can share information with her. Niya Edmondson

## 2019-08-02 NOTE — PROGRESS NOTES
Palliative Care Progress Note Patient: Reyna Hogan MRN: [id number]  SSN: [id number] YOB: 1965  Age: 53 y.o. Sex: male Assessment/Plan: Chief Complaint/Interval History: sedated, intubated and ventilated Principal Diagnosis: · Debility, Unspecified  R53.81 Additional Diagnoses: · Acute Respiratory Failure, Unspecified  J96.00 · Anorexia  R63.0 
· Cachexia  R64 · Edema  R60.9 · Fatigue, Lethargy  R53.83 
· Encounter for Palliative Care  Z51.5 Palliative Performance Scale (PPS) Medical Decision Making:  
Reviewed and summarized notes over last 24 hours Discussed case with appropriate providers- ICU IDT; Dr Laz Orlando Reviewed laboratory and x-ray data-  CBC, CMP Pt sedated, recently intubated and ventilated due to progressive respiratory failure. No family at bedside. Discussed with Dr Laz Orlando. He was unable to reach pt's daughter via phone this morning, and unable to leave a VM. Pt had expressed his desire for full code in previous conversations. Will need to discuss goals of care with daughter when she comes to the hospital, as this is likely a terminal situation. He is not a candidate for cancer directed therapies at present. Will continue to reach out to daughter. Will continue to follow. Will discuss findings with members of the interdisciplinary team.   
 
  
More than 50% of this 25 minute visit was spent counseling and coordination of care as outlined above. Subjective:  
 
Review of Systems: 
Review of systems not obtained due to patient factors- sedated, intubated and ventilated Objective:  
 
Visit Vitals /69 Pulse 84 Temp 97.7 °F (36.5 °C) Resp (!) 58 Ht 6' 5\" (1.956 m) Wt 122 lb 5.7 oz (55.5 kg) SpO2 90% BMI 14.51 kg/m² Physical Exam: 
 
General:  Sedated, intubated and ventilated. Eyes:  Conjunctivae/corneas clear Nose: Nares normal. Septum midline. NG tube. Neck: Supple, symmetrical, trachea midline Lungs:   Coarse bilaterally Heart:  Regular rate and rhythm Abdomen:   Soft, non-tender, non-distended Extremities: Normal, atraumatic, no cyanosis. BLE and BUE edema. Skin: Skin color, texture, turgor normal.   
Neurologic: Sedated Psych: Sedated Signed By: Shira Holt NP August 2, 2019

## 2019-08-02 NOTE — PROGRESS NOTES
Nutrition F/U: 
TF Management for [facility] Pulmonary. Assessment: 
Weight 55.5 kg (ICU bed 7/29/19), edema - 2+ pitting BUEs, trace BLEs. 
TF (Osmolite 1.2) was restarted yesterday at 20 ml/hr with a 20 ml/hr water flush. The rate and water flush was adjusted slightly to 25 ml/hr each and this morning the TF rate was increased further to 35 ml/hr. No vomiting reported. Respiratory status has continued to decline and he is now intubated, ventilated and sedated. He continues on Reglan. Enteral Access: 
           NGT. Macronutrient Needs (60.4 kg): FOF:  1584-8759 QHAV /day (30-35 kcal/kg listed BW) ULV:   PFWMN protein/day (1.2-2 grams/kg CBW) Max CHO: 291 gm CHO/day (55% kcal) Fluid: 1ml/kcal or per MD 
Intake/Comparative Standards: 
Current intake of TF (Osmolite 1.2 @ 25 ml/hr with a 25 ml/hr water flush) provides 720 calories/day (38% calorie goal), 34 grams protein/day (39% protein goal), 95 grams CHO/day (does not exceed max CHO limit) and 1092 ml water/day (58% fluid goal). Intervention:  
Meals and Snacks: NPO. Enteral Nutrition: Continue to advance TF as tolerated  to the goal rate of Osmolite 1.2 @ 65 ml/hr -1872 kcal/day (100% of needs), 87 grams protein/day (100% of needs), 246 grams CHO/day (does not exceed max CHO),  and ~ 1879 ml free water/day (100% of needs). Mineral Supplement Therapy: Nutrition Support Orders/Electrolyte Management Replacement Protocols are active on the MAR. Coordination of Nutrition Care by a Nutrition Professional: AM ICU/CCU rounds, collaboration with Martha Perez RN. Nutrition Discharge Plan: Too soon to determine. Marisel Rudolph. [employer] 
[phone]

## 2019-08-02 NOTE — PROGRESS NOTES
Pt re-intubated and remains in ICU. Palliative continues to follow.  CM following for any assist.

## 2019-08-02 NOTE — PROGRESS NOTES
Bedside shift change report given to Radha Sommers RN (oncoming nurse) by Joselyn Arteaga RN (offgoing nurse). Report included the following information SBAR, Kardex, Procedure Summary, Intake/Output, MAR, Recent Results, Med Rec Status, Cardiac Rhythm NSR, Alarm Parameters  and Quality Measures. Heparin GTT signed off.

## 2019-08-02 NOTE — PROGRESS NOTES
Critical Care Daily Progress Note: 8/2/2019 Jeffrey Hernandez Admission Date: 7/21/2019 The patient's chart is reviewed and the patient is discussed with the staff. 53 y. o. AAM evaluated at the request of Dr. Carlin Hickman. Was admitted 7/21 with shortness of breath, nausea, epigastric pain with known pancreatic cancer. Was diagnosis with pneumonia.  In ER  with narrow complex tachycardia and cardiology consulted. CXR with developing diffuse alveolar infiltrates bilaterally.  Started on Rocephin and Azithromax.  
  
Chronic medical:  chronic tobacco abuse and cocaine abuse.   
  
He was seen in consult by Dr. Amado Botello on 7/12/19. Karyle Chiquito that time, he presented with c/o progressive severe abd pain x 3 days and wt loss over the past several months.  Bili 2.6, Lipase 6000.  CT CAP revealed RLL segmental PE, mediastinal LAD, 4 cm pleurally based cavitary mass in L apex, 2cm pancreatic mass obstructing pancreatic and CBD with associated biliary duct dilation and GB distention and gastric wall thickening.  He is s/p ERCP with stent placement and FNA of pancreatic mass/periportal LAD on 7/8.  Biliary brushing cytology +rare atypical cells.  Path on periportal/precarinal  nodes +poorly differentiated metastatic carcinoma. [facility] non-specific as to origin, however a primary tumor in upper GI or pancreatobiliary tree cannot be excluded.  During that consult, EBUS with possible navigational biopsy to the lung was recommended as there may be a possible 2nd primary.   
   Palliative care following. We have been consulted for consideration of EBUS and possible navigation bronchoscopy. Seen last on 7/12 by our service at the time [facility] was not completed yet. O2 sat dropped to 75% on 15L HFNC. Required intubation for respiratory support. Noted with bilateral upper and lower extremity DVTs--Heparin drip infusing. Extubated 7/31 to Opti-flow but worsening hypoxia and changed to BIPAP. Subjective: Has required BIPAP for nearly 48 hours now since extubation. Worsening O2 requirement and now worsening hypercapneic respiratory failure on blood gas. He is moving extremities, but not following commands. Does nod to questions, but doesn't seem to understand. Called daughter Santino Zamarripa, but voicemail was full and could not leave a message. Current Facility-Administered Medications Medication Dose Route Frequency  succinylcholine chloride 200 mg/10 ml syringe 56 mg  1 mg/kg IntraVENous NOW  midazolam (VERSED) injection 4 mg  4 mg IntraVENous ONCE  
 fentaNYL citrate (PF) injection 50 mcg  50 mcg IntraVENous ONCE  
 dexmedeTOMidine (PRECEDEX) 400 mcg in 0.9% sodium chloride 100 mL infusion  0.2-0.7 mcg/kg/hr IntraVENous TITRATE  senna-docusate (PERICOLACE) 8.6-50 mg per tablet 2 Tab  2 Tab Per NG tube DAILY PRN  promethazine (PHENERGAN) with saline injection 25 mg  25 mg IntraVENous Q6H PRN  
 metoclopramide HCl (REGLAN) injection 5 mg  5 mg IntraVENous Q6H  
 albumin human 25% (BUMINATE) solution 25 g  25 g IntraVENous Q6H  
 furosemide (LASIX) injection 40 mg  40 mg IntraVENous Q12H  
 acetaminophen (TYLENOL) tablet 650 mg  650 mg Per NG tube Q4H PRN  
 HYDROcodone-acetaminophen (NORCO)  mg tablet 1 Tab  1 Tab Per NG tube Q4H PRN  
 HYDROcodone-acetaminophen (NORCO) 5-325 mg per tablet 1 Tab  1 Tab Per NG tube Q4H PRN  
 LORazepam (ATIVAN) tablet 0.5 mg  0.5 mg Per NG tube Q6H PRN  
 zolpidem (AMBIEN) tablet 5 mg  5 mg Per NG tube QHS PRN  
 famotidine (PF) (PEPCID) 20 mg in sodium chloride 0.9% 10 mL injection  20 mg IntraVENous DAILY  amiodarone (CORDARONE) tablet 400 mg  400 mg Per NG tube Q12H  
 heparin 25,000 units in dextrose 500 mL infusion  18-36 Units/kg/hr IntraVENous TITRATE  gabapentin (NEURONTIN) capsule 100 mg  100 mg Per NG tube BID  polyethylene glycol (MIRALAX) packet 17 g  17 g Per NG tube DAILY  NUTRITIONAL SUPPORT ELECTROLYTE PRN ORDERS   Does Not Apply PRN  
 fentaNYL in normal saline (pf) 25 mcg/mL infusion  0-200 mcg/hr IntraVENous TITRATE  LORazepam (ATIVAN) injection 2 mg  2 mg IntraVENous Q2H PRN  
 fentaNYL citrate (PF) injection 50 mcg  50 mcg IntraVENous Q1H PRN  
 sodium chloride (NS) flush 5-40 mL  5-40 mL IntraVENous PRN  
 sodium chloride (NS) flush 5-40 mL  5-40 mL IntraVENous Q8H  
 sodium chloride (NS) flush 5-40 mL  5-40 mL IntraVENous PRN  
 naloxone (NARCAN) injection 0.4 mg  0.4 mg IntraVENous PRN  
 albuterol (PROVENTIL VENTOLIN) nebulizer solution 2.5 mg  2.5 mg Nebulization Q4H PRN  
 hydrALAZINE (APRESOLINE) 20 mg/mL injection 20 mg  20 mg IntraVENous Q4H PRN Review of Systems Unobtainable due to patient status. Objective:  
 
Vitals:  
 08/02/19 0447 08/02/19 0452 08/02/19 0600 08/02/19 5883 BP:  113/69 Pulse:  91 90 Resp:  26 23 Temp:      
SpO2: 92% 92% 93% 98% Weight:      
Height:      
 
Intake and Output:  
07/31 1901 - 08/02 0700 In: 3444.1 [I.V.:2277.1] Out: Jonathan Bun [[id number]] No intake/output data recorded. Physical Exam:         
Constitutional:  the patient is thin and in no acute distress, BIPAP 70%,sat 86% EENMT:  Sclera clear, pupils equal, oral mucosa moist 
Respiratory: few anterior crackles, no wheezing Cardiovascular:  RRR without M,G,R 
Gastrointestinal: flat, with positive bowel sounds. Musculoskeletal: warm without cyanosis. There is bilateral upper lower extremity edema, no LE edema. Skin:  no jaundice or rashes, no wounds Neurologic: no gross neuro deficits Psychiatric:  [place] and did follow some commands, no words spoken LINES:   
Right IJ quad lumen 7/28/19 NG 7/27/19 [place] 7/25/19 DRIPS:    
Heparin CXR: 8/2: no change to bilateral infitlrates, R IJ 
 
8/1/19: 
1. Status post extubation.  
2. Bilateral airspace disease, similar to prior exam. 
 
 
LAB 
 No results for input(s): GLUCPOC in the last 72 hours. No lab exists for component: [place] Recent Labs 08/02/19 
9871 08/01/19 
4829 07/31/19 
1569 WBC 9.9 9.3 6.8 HGB 7.0* 7.1* 7.0*  
HCT 24.3* 22.9* 22.6*  
 191 211 Recent Labs 08/02/19 
6619 08/01/19 
1100 08/01/19 
0341 07/31/19 
4152 *  --  145 140  
K 3.5 3.5 3.3* 3.8   --  99 98  
CO2 40*  --  37* 34* *  --  98 103* BUN 51*  --  50* 53* CREA 1.32  --  1.36 1.70* MG 2.3  --  2.4 2.5* PHOS 4.5  --  3.0 3.5 CA 8.5  --  8.5 8.0* ALB 2.8*  --  2.1* 1.7* TBILI 0.6  --  0.8 0.5 ALT 25  --  31 23 SGOT 35  --  54* 41* No results for input(s): LCAD, LAC in the last 72 hours. Recent Labs 08/02/19 
4121 08/01/19 
0304 07/31/19 
3178 PHI 7.285* 7.471* 7.421 PCO2I 90.3* 50.4* 50.5* PO2I 79 60* 82 HCO3I 42.9* 36.7* 32.8* Assessment:  (Medical Decision Making) Patient Active Problem List  
Diagnosis Code  Pancreatitis K85.90  
 Lung mass R91.8  Pulmonary emboli (HCC) I26.99  
 Primary pancreatic cancer with metastasis to other site ([facility] [address] C25.9  Mediastinal adenopathy R59.0  Protein malnutrition ([facility] [address] E46  
 Cocaine abuse ([facility] [address] F14.10  Centrilobular emphysema ([facility] [address] J43.2  Bilateral pulmonary infiltrates on chest x-ray R91.8  SVT (supraventricular tachycardia) (HCC) I47.1  Atrial fibrillation with RVR (HCC) I48.91  
 Acute respiratory failure with hypoxemia (HCC) J96.01  
 Electrolyte abnormality E87.8  Acute bilateral deep vein thrombosis (DVT) of upper extremities ([facility]) I82.623  
 DVT, bilateral lower limbs ([facility]) I82.403 Plan:  (Medical Decision Making) Hospital Problems  Date Reviewed: 8/1/2019 Codes Class Noted POA  
 DVT, bilateral lower limbs ([facility] ICD-10-CM: I82.403 ICD-9-CM: 453.40  7/30/2019 Unknown  Overview Signed 7/30/2019  8:56 AM by Arnaud Woodard NP  
 7/29/19:  There is an occlusive thrombus within the right tibial vein and peroneal vein with a partially occlusive thrombus in the right popliteal vein. There is an 
occlusive thrombus in the left peroneal tibial vein and peroneal vein. Continue Heparin drip Acute bilateral deep vein thrombosis (DVT) of upper extremities (HCC) ICD-10-CM: C58.838 ICD-9-CM: 453.82  7/29/2019 No  
 Overview Signed 7/29/2019  9:10 AM by Lashawn Lucas NP  
  7/28/19:  Findings consistent with extensive bilateral upper extremity deep venous thrombosis. Continue Heparin drip Electrolyte abnormality ICD-10-CM: E87.8 ICD-9-CM: 276.9  7/26/2019 Yes  
 supplement Acute respiratory failure with hypoxemia [facility]) ICD-10-CM: J96.01 
ICD-9-CM: 518.81  7/25/2019 Unknown Extubated--on BIPAP Bilateral pulmonary infiltrates on chest x-ray ICD-10-CM: R91.8 ICD-9-CM: 793.19  7/21/2019 Yes Overview Signed 7/26/2019  8:44 AM by Ronald Sanchez MD  
  Suspect developing ARDS 
  
  
   
 SVT (supraventricular tachycardia) ([facility] ICD-10-CM: I47.1 ICD-9-CM: 427.89  7/21/2019 Yes  
 resolved * (Principal) Atrial fibrillation with RVR ([facility] [address] ICD-10-CM: I48.91 
ICD-9-CM: 427.31  7/21/2019 Yes  
 controlled Centrilobular emphysema ([facility] [address] ICD-10-CM: J43.2 ICD-9-CM: 492.8  7/12/2019 Yes  
 unchanged Protein malnutrition ([address] (Chronic) ICD-10-CM: E46 
ICD-9-CM: 260  7/9/2019 Yes Overview Signed 7/31/2019  4:10 PM by Junie Johnson MD  
  moderate 
  
  
 receiving Albumin--adding low rate TFs Cocaine abuse (HCC) (Chronic) ICD-10-CM: F14.10 ICD-9-CM: 305.60  7/9/2019 Yes  
 chronic Lung mass ICD-10-CM: R91.8 ICD-9-CM: 786.6  7/8/2019 Yes On chest CT Pulmonary emboli [facility]) ICD-10-CM: I26.99 
ICD-9-CM: 415.19  7/8/2019 Yes Continue Heparin drip Primary pancreatic cancer with metastasis to other site [facility]) ICD-10-CM: C25.9 ICD-9-CM: 157.9  7/8/2019 Yes  
 unchanged Mediastinal adenopathy ICD-10-CM: R59.0 ICD-9-CM: 785.6  7/8/2019 Yes  
 unchanged --Vancomycin, Zosyn 8 days completed course 8/1 --Albumin and Lasix IV--urine output 4L(1.1L -), renal fx, BP tolerated 
--Heparin drip:  PE, bilateral UE and LE DVTs  
--Hgb down to 7.0--stable, HR 85 
--Resume low flow TFs. Continue Reglan 
--Palliative Care following. Will need to engage daughter as he cannot and doesn't appear will be able to make decisions of end of life care. --Poorly differentiated metastatic adenocarcinoma. No plan for EBUS at this point given stability and isn't a treatment candidate at this point unless significantly improves which seems unlikely. --Continue attempts at diuresis, seems only intervention that might improve short term outcome 
--long term outcome appears universally poor Unfortunately, will need to reintubate at this time given progressive respiratory failure and unable to discuss with him again his code status nor with his daughter who seems to be not wanting to be a decision maker and is not reachable by phone this morning. Previously did have a DNR, but most currently is full code and will continue with current code status with ongoing discussions with family. The patient is critically ill with respiratory failure, circulatory failure and requires high complexity decision making for assessment and support including frequent ventilator adjustment , frequent evaluation and titration of therapies , application of advanced monitoring technologies and extensive interpretation of multiple databases Time devoted to patient care services described in this note- 15 min face to face/ 20 min total evaluation time. Cumulative time devoted to patient care services by me for day of service -35 min Cheryl Louis MD

## 2019-08-02 NOTE — PROCEDURES
Procedure: Emergent intubation (CPT 31945) Indication: acute respiratory failure with hypoxia and hypercarbia Anesthesia:  Fentanyl 50mcg, Ativan 4mg, Succinylcholine 55mg After assessing the airway, the patient underwent preoxygenation with 100% FiO2 for 5 min. Fentanyl and etomidate were given sequentially. After adequate sedation intubation was performed a Glidescope MAC blade laryngoscope was used to visualize the epiglottis and vocal chords. After positive identification of the vocal chords, a 8.0 ET tube was placed into the trachea with direct visualization. The tube was seen passing through the vocal chords without difficulty. CO2 colorimetry was employed immediately to verify tube in airway with appropriate color change indicating detection of CO2. Water vapor was seen within the ET tube, and auscultation of the abdomen revealed no bubbling sounds. Auscultation and inspection of the chest after intubation showed symmetric chest excursion and symmetric air entry bilaterally. The tube was secured at 26cm at the lip. Chest X-ray has been ordered and is pending. The patient has been placed on a mechanical ventilator. The patient desated to 69% and slowly emmanuel in the mid then high 80s after intubation. His one front upper incisor was very loose and was displaced during intubation Carlotta Emery MD

## 2019-08-02 NOTE — PROGRESS NOTES
Contacted Dr. Lynch due to pt's continued hypotension despite stopping fentanyl and precedex infusions and received orders for normal saline bolus 500 ml over 30 mins.

## 2019-08-02 NOTE — INTERVAL H&P NOTE
H&P Update: 
Glynn Massey was seen and examined. History and physical has been reviewed. The patient has been examined.  There have been no significant clinical changes since the completion of the originally dated History and Physical.

## 2019-08-03 NOTE — PROGRESS NOTES
Ventilator check complete; patient has a #8. 0 ET tube secured at the 25 at the lip. Patient is not able to follow commands. Breath sounds are diminished. Trachea is midline, Negative for subcutaneous air, and chest excursion is symmetric. Patient is also Negative for cyanosis and is Negative for pitting edema. All alarms are set and audible. Resuscitation bag is at the head of the bed. Ventilator Settings Mode FIO2 Rate Tidal Volume Pressure PEEP I:E Ratio PRVC  100 %(was increased to 100% after abg)   20 500 ml    12 cm H2O 1:2   
 
Peak airway pressure: 27.2 cm H2O Minute ventilation: 10 l/min ABG: No results for input(s): PH, PCO2, PO2, HCO3 in the last 72 hours.  
 
 
Jermaine Henry, RT

## 2019-08-03 NOTE — PROGRESS NOTES
Talked to his daughter about patient status and condition and she wants to meet with some more family members before making decisions regarding withdrawal of care but she agrees to DNR status.  
 
Herbert Paulson MD

## 2019-08-03 NOTE — PROGRESS NOTES
Bedside report received from \[facility]\"". Dual sign off on heparin gtt. Skin assessment performed and no skin breakdown was noted, Patient currently on PRVC at 75%, patient does not follow commands.

## 2019-08-03 NOTE — PROGRESS NOTES
Bedside, Verbal and Written shift change report given to Vita Gtz RN (oncoming nurse) by GERARDO Abraham (offgoing nurse). Report included the following information SBAR, Kardex, ED Summary, Procedure Summary, Intake/Output, MAR, Recent Results, Med Rec Status, Cardiac Rhythm ST and Alarm Parameters . Fentanyl gtt signed off.

## 2019-08-03 NOTE — PROGRESS NOTES
Critical Care Daily Progress Note: 8/3/2019 Admission Date: 7/21/2019 The patient's chart is reviewed and the patient is discussed with the staff. He was seen in consult by Dr. Jordi Flynn on 7/12/19. [place] Slight that time, he presented with c/o progressive severe abd pain x 3 days and wt loss over the past several months.  Bili 2.6, Lipase 6000.  CT CAP revealed RLL segmental PE, mediastinal LAD, 4 cm pleurally based cavitary mass in L apex, 2cm pancreatic mass obstructing pancreatic and CBD with associated biliary duct dilation and GB distention and gastric wall thickening.  He is s/p ERCP with stent placement and FNA of pancreatic mass/periportal LAD on 7/8.  Biliary brushing cytology +rare atypical cells.  Path on periportal/precarinal  nodes +poorly differentiated metastatic carcinoma. [facility] non-specific as to origin, however a primary tumor in upper GI or pancreatobiliary tree cannot be excluded.  During that consult, EBUS with possible navigational biopsy to the lung was recommended as there may be a possible 2nd primary.   
   Palliative care following. We have been consulted for consideration of EBUS and possible navigation bronchoscopy. Seen last on 7/12 by our service at the time Located within [facility] was not completed yet. O2 sat dropped to 75% on 15L HFNC.  Required intubation for respiratory support.  Noted with bilateral upper and lower extremity DVTs--Heparin drip infusing. Extubated 7/31 to Opti-flow but worsening hypoxia and changed to BIPAP. re intubated on 8/2/ Subjective: On vent intubated yesterday Current Facility-Administered Medications Medication Dose Route Frequency  0.9% sodium chloride infusion 250 mL  250 mL IntraVENous PRN  
 dexmedeTOMidine (PRECEDEX) 400 mcg in 0.9% sodium chloride 100 mL infusion  0.2-0.7 mcg/kg/hr IntraVENous TITRATE  albuterol (PROVENTIL VENTOLIN) nebulizer solution 2.5 mg  2.5 mg Nebulization Q4H RT  
  senna-docusate (PERICOLACE) 8.6-50 mg per tablet 2 Tab  2 Tab Per NG tube DAILY PRN  promethazine (PHENERGAN) with saline injection 25 mg  25 mg IntraVENous Q6H PRN  
 metoclopramide HCl (REGLAN) injection 5 mg  5 mg IntraVENous Q6H  
 albumin human 25% (BUMINATE) solution 25 g  25 g IntraVENous Q6H  
 furosemide (LASIX) injection 40 mg  40 mg IntraVENous Q12H  
 acetaminophen (TYLENOL) tablet 650 mg  650 mg Per NG tube Q4H PRN  
 HYDROcodone-acetaminophen (NORCO)  mg tablet 1 Tab  1 Tab Per NG tube Q4H PRN  
 HYDROcodone-acetaminophen (NORCO) 5-325 mg per tablet 1 Tab  1 Tab Per NG tube Q4H PRN  
 LORazepam (ATIVAN) tablet 0.5 mg  0.5 mg Per NG tube Q6H PRN  
 zolpidem (AMBIEN) tablet 5 mg  5 mg Per NG tube QHS PRN  
 famotidine (PF) (PEPCID) 20 mg in sodium chloride 0.9% 10 mL injection  20 mg IntraVENous DAILY  amiodarone (CORDARONE) tablet 400 mg  400 mg Per NG tube Q12H  
 heparin 25,000 units in dextrose 500 mL infusion  18-36 Units/kg/hr IntraVENous TITRATE  gabapentin (NEURONTIN) capsule 100 mg  100 mg Per NG tube BID  polyethylene glycol (MIRALAX) packet 17 g  17 g Per NG tube DAILY  NUTRITIONAL SUPPORT ELECTROLYTE PRN ORDERS   Does Not Apply PRN  
 fentaNYL in normal saline (pf) 25 mcg/mL infusion  0-200 mcg/hr IntraVENous TITRATE  LORazepam (ATIVAN) injection 2 mg  2 mg IntraVENous Q2H PRN  
 fentaNYL citrate (PF) injection 50 mcg  50 mcg IntraVENous Q1H PRN  
 sodium chloride (NS) flush 5-40 mL  5-40 mL IntraVENous PRN  
 sodium chloride (NS) flush 5-40 mL  5-40 mL IntraVENous Q8H  
 sodium chloride (NS) flush 5-40 mL  5-40 mL IntraVENous PRN  
 naloxone (NARCAN) injection 0.4 mg  0.4 mg IntraVENous PRN  
 albuterol (PROVENTIL VENTOLIN) nebulizer solution 2.5 mg  2.5 mg Nebulization Q4H PRN  
 hydrALAZINE (APRESOLINE) 20 mg/mL injection 20 mg  20 mg IntraVENous Q4H PRN Review of Systems Unobtainable due to patient status. Objective:  
 
Vitals: 08/03/19 7833 08/03/19 0989 08/03/19 2864 08/03/19 9266 BP:    110/65 Pulse: 100 97 99 97 Resp: (!) 52 21 20 Temp:   97.9 °F (36.6 °C) SpO2: (!) 88% 92% 93% Weight:      
Height:      
 
 
Intake and Output:  
08/01 1901 - 08/03 0700 In: 2964 [I.V.:425] Out: 9404 [GXQUY:6688] 08/03 0701 - 08/03 1900 In: 380.5 Out: - Physical Exam:         
Constitutional:  intubated and mechanically ventilated. EENMT:  Sclera clear, pupils equal, oral mucosa moist 
Respiratory: crackles Cardiovascular:  RRR with no M,G,R; 
Gastrointestinal:  soft with no tenderness; positive bowel sounds present Musculoskeletal:  warm with no cyanosis, no lower extremity edema Skin:  no jaundice or ecchymosis Neurologic: no gross neuro deficits Psychiatric:  On vent, unresponsive LINES:   
Right IJ quad lumen 7/28/19 NG 7/27/19 Voss 7/25/19  
 ETT- 8/2/19 DRIPS:    
Heparin CXR:   
 
 
Ventilator Settings Mode FIO2 Rate Tidal Volume Pressure PEEP PRVC  100 %(was increased to 100% after abg)    500 ml  0 cm H2O  12 cm H20(increased peep to 12. MD Pedro Alonzo aware. autopeep 2) Peak airway pressure: 27.2 cm H2O Minute ventilation: 10 l/min ABG:  
Recent Labs 08/03/19 
0321 08/02/19 
2111 08/02/19 733 162 319 PHI 7.404 7.418 7.429 PCO2I 62.8* 61.6* 61.1*  
PO2I 63* 51* 73* HCO3I 39.4* 39.8* 40.4* Recent Labs 08/03/19 
5436 08/02/19 
9555 08/01/19 
5447 WBC 8.6 9.9 9.3 HGB 6.3* 7.0* 7.1*  
HCT 22.2* 24.3* 22.9*  
 194 191 Recent Labs 08/03/19 
7763 08/02/19 
0332 08/01/19 
1100 08/01/19 
0341 * 146*  --  145  
K 3.3* 3.5 3.5 3.3*  
 103  --  99  
CO2 38* 40*  --  37* * 119*  --  98  
BUN 57* 51*  --  50* CREA 1.32 1.32  --  1.36  
MG 2.4 2.3  --  2.4 PHOS 3.7 4.5  --  3.0  
CA 8.6 8.5  --  8.5 ALB 3.1* 2.8*  --  2.1*  
SGOT 27 35  --  54* Assessment:  (Medical Decision Making) Hospital Problems  Date Reviewed: 8/1/2019 Codes Class Noted POA  
 DVT, bilateral lower limbs ([facility] ICD-10-CM: I82.403 ICD-9-CM: 453.40  7/30/2019 Unknown Overview Signed 7/30/2019  8:56 AM by Flakito Pugh NP  
  7/29/19:  There is an occlusive thrombus within the right tibial vein and peroneal vein with a partially occlusive thrombus in the right popliteal vein. There is an 
occlusive thrombus in the left peroneal tibial vein and peroneal vein. Acute bilateral deep vein thrombosis (DVT) of upper extremities (HCC) ICD-10-CM: Q50.060 ICD-9-CM: 453.82  7/29/2019 No  
 Overview Signed 7/29/2019  9:10 AM by Flakito Pugh NP  
  7/28/19:  Findings consistent with extensive bilateral upper extremity deep venous thrombosis. Acute respiratory failure with hypoxemia [facility]) 
 re intubated yesterday ICD-10-CM: J96.01 
ICD-9-CM: 518.81  7/25/2019 Unknown Bilateral pulmonary infiltrates on chest x-ray ICD-10-CM: R91.8 ICD-9-CM: 793.19  7/21/2019 Yes Overview Signed 7/26/2019  8:44 AM by Cristal Mauricio MD  
  Suspect developing ARDS 
  
  
   
 SVT (supraventricular tachycardia) ([facility] ICD-10-CM: I47.1 ICD-9-CM: 427.89  7/21/2019 Yes * (Principal) Atrial fibrillation with RVR ([facility] ICD-10-CM: I48.91 
ICD-9-CM: 427.31  7/21/2019 Yes Centrilobular emphysema ([facility] ICD-10-CM: J43.2 ICD-9-CM: 492.8  7/12/2019 Yes Protein malnutrition ([facility] (Chronic) ICD-10-CM: E46 
ICD-9-CM: 260  7/9/2019 Yes Overview Signed 7/31/2019  4:10 PM by Lauryn Panchal MD  
  moderate Cocaine abuse (HCC) (Chronic) chronic  ICD-10-CM: F14.10 ICD-9-CM: 305.60  7/9/2019 Yes Lung mass ICD-10-CM: R91.8 ICD-9-CM: 786.6  7/8/2019 Yes Pulmonary emboli ([address] on heparin gtt ICD-10-CM: I26.99 
ICD-9-CM: 415.19  7/8/2019 Yes Primary pancreatic cancer with metastasis to other site [facility]) ICD-10-CM: C25.9 ICD-9-CM: 157.9  7/8/2019 Yes Mediastinal adenopathy ICD-10-CM: R59.0 ICD-9-CM: 785.6  7/8/2019 Yes Plan:  (Medical Decision Making) --continue heparin gtt 
- hg down -may 
-Poorly differentiated metastatic adenocarcinoma. No plan for EBUS at this point given stability and isn't a treatment candidate at this point unless significantly improves which seems unlikely. -unable to reach daughter which apparently did not want to make decisions before 
- this is unfortunate situation as patient is terminal and comfort care would be most appropriate and ethical decision  
  -remains critically ill, spent 35 min More than 50% of the time documented was spent in face-to-face contact with the patient and in the care of the patient on the floor/unit where the patient is located.  
 
Twin Irving MD

## 2019-08-04 NOTE — PROGRESS NOTES
Bedside, Verbal and Written shift change report given to Bridget Ohara RN (oncoming nurse) by Martha Perez RN (offgoing nurse). Report included the following information SBAR, Kardex, ED Summary, Procedure Summary, Intake/Output, MAR, Recent Results, Med Rec Status, Cardiac Rhythm ST and Alarm Parameters . Heparin and fentanyl gtts signed off. Pt is drowsy, responds to voice. Moves all extremities spontaneously. Afebrile. Pt intubated and sedated with fentanyl gtt. Heparin gtt at 24 units/kg/hr. Next ptt due on AM labs.

## 2019-08-04 NOTE — PROGRESS NOTES
Bedside shift change report given to GERARDO Abraham (oncoming nurse) by Bettie Feng RN (offgoing nurse). Report included the following information SBAR, Kardex, ED Summary, Intake/Output, MAR, Recent Results, Med Rec Status, Cardiac Rhythm Sinus Tachycardia and Alarm Parameters . Dual sign off on Heparin and Fentanyl gtts.

## 2019-08-04 NOTE — PROGRESS NOTES
Ventilator check complete; patient has a #8. 0 ET tube secured at the 25 at the lip. Patient is not able to follow commands. Breath sounds are coarse. Trachea is midline, Negative for subcutaneous air, and chest excursion is symmetric. Patient is also Negative for cyanosis and is Negative for pitting edema. All alarms are set and audible. Resuscitation bag is at the head of the bed. Ventilator Settings Mode FIO2 Rate Tidal Volume Pressure PEEP I:E Ratio PRVC  100 %   30 500 ml  0 cm H2O  12 cm H20  1:1.9 Peak airway pressure: 34.1 cm H2O Minute ventilation: 15 l/min ABG: No results for input(s): PH, PCO2, PO2, HCO3 in the last 72 hours.  
 
 
Edison Henry, RT

## 2019-08-04 NOTE — PROGRESS NOTES
Notified Dr. Eden Hanson of Fecal Occult Blood results and pt's blood streaked oral secretions. No changes to heparin gtt at this time and will continue monitoring.

## 2019-08-04 NOTE — PROGRESS NOTES
Critical Care Daily Progress Note: 8/4/2019 Admission Date: 7/21/2019 The patient's chart is reviewed and the patient is discussed with the staff. He was seen in consult by Dr. Ivan Granger on 7/12/19. [place] Go that time, he presented with c/o progressive severe abd pain x 3 days and wt loss over the past several months.  Bili 2.6, Lipase 6000.  CT CAP revealed RLL segmental PE, mediastinal LAD, 4 cm pleurally based cavitary mass in L apex, 2cm pancreatic mass obstructing pancreatic and CBD with associated biliary duct dilation and GB distention and gastric wall thickening.  He is s/p ERCP with stent placement and FNA of pancreatic mass/periportal LAD on 7/8.  Biliary brushing cytology +rare atypical cells.  Path on periportal/precarinal  nodes +poorly differentiated metastatic carcinoma. [facility] non-specific as to origin, however a primary tumor in upper GI or pancreatobiliary tree cannot be excluded.  During that consult, EBUS with possible navigational biopsy to the lung was recommended as there may be a possible 2nd primary.   
   Palliative care following. We have been consulted for consideration of EBUS and possible navigation bronchoscopy. Seen last on 7/12 by our service at the time [facility] was not completed yet. O2 sat dropped to 75% on 15L HFNC.  Required intubation for respiratory support.  Noted with bilateral upper and lower extremity DVTs--Heparin drip infusing. Extubated 7/31 to Opti-flow but worsening hypoxia and changed to BIPAP. re intubated on 8/2/ Subjective: On vent Unchanged Current Facility-Administered Medications Medication Dose Route Frequency  0.9% sodium chloride infusion 250 mL  250 mL IntraVENous PRN  
 NOREPINephrine (LEVOPHED) 4 mg/250 mL (16 mcg/mL) in NS infusion  0-16 mcg/min IntraVENous TITRATE  dexmedeTOMidine (PRECEDEX) 400 mcg in 0.9% sodium chloride 100 mL infusion  0.2-0.7 mcg/kg/hr IntraVENous TITRATE  albuterol (PROVENTIL VENTOLIN) nebulizer solution 2.5 mg  2.5 mg Nebulization Q4H RT  
 senna-docusate (PERICOLACE) 8.6-50 mg per tablet 2 Tab  2 Tab Per NG tube DAILY PRN  promethazine (PHENERGAN) with saline injection 25 mg  25 mg IntraVENous Q6H PRN  
 metoclopramide HCl (REGLAN) injection 5 mg  5 mg IntraVENous Q6H  
 albumin human 25% (BUMINATE) solution 25 g  25 g IntraVENous Q6H  
 furosemide (LASIX) injection 40 mg  40 mg IntraVENous Q12H  
 acetaminophen (TYLENOL) tablet 650 mg  650 mg Per NG tube Q4H PRN  
 HYDROcodone-acetaminophen (NORCO)  mg tablet 1 Tab  1 Tab Per NG tube Q4H PRN  
 HYDROcodone-acetaminophen (NORCO) 5-325 mg per tablet 1 Tab  1 Tab Per NG tube Q4H PRN  
 LORazepam (ATIVAN) tablet 0.5 mg  0.5 mg Per NG tube Q6H PRN  
 zolpidem (AMBIEN) tablet 5 mg  5 mg Per NG tube QHS PRN  
 famotidine (PF) (PEPCID) 20 mg in sodium chloride 0.9% 10 mL injection  20 mg IntraVENous DAILY  amiodarone (CORDARONE) tablet 400 mg  400 mg Per NG tube Q12H  
 heparin 25,000 units in dextrose 500 mL infusion  18-36 Units/kg/hr IntraVENous TITRATE  gabapentin (NEURONTIN) capsule 100 mg  100 mg Per NG tube BID  polyethylene glycol (MIRALAX) packet 17 g  17 g Per NG tube DAILY  NUTRITIONAL SUPPORT ELECTROLYTE PRN ORDERS   Does Not Apply PRN  
 fentaNYL in normal saline (pf) 25 mcg/mL infusion  0-200 mcg/hr IntraVENous TITRATE  LORazepam (ATIVAN) injection 2 mg  2 mg IntraVENous Q2H PRN  
 fentaNYL citrate (PF) injection 50 mcg  50 mcg IntraVENous Q1H PRN  
 sodium chloride (NS) flush 5-40 mL  5-40 mL IntraVENous PRN  
 sodium chloride (NS) flush 5-40 mL  5-40 mL IntraVENous Q8H  
 sodium chloride (NS) flush 5-40 mL  5-40 mL IntraVENous PRN  
 naloxone (NARCAN) injection 0.4 mg  0.4 mg IntraVENous PRN  
 albuterol (PROVENTIL VENTOLIN) nebulizer solution 2.5 mg  2.5 mg Nebulization Q4H PRN  
 hydrALAZINE (APRESOLINE) 20 mg/mL injection 20 mg  20 mg IntraVENous Q4H PRN  
 
 Review of Systems Unobtainable due to patient status. Objective:  
 
Vitals:  
 08/04/19 0929 08/04/19 0945 08/04/19 1000 08/04/19 1014 BP: 97/56 (!) 84/48 95/51 97/55 Pulse: (!) 104 (!) 102 (!) 103 (!) 102 Resp: 30 30 30 30 Temp:      
SpO2: 93% 94% 94% 96% Weight:      
Height:      
 
 
Intake and Output:  
08/02 1901 - 08/04 0700 In: 6447.4 [I.V.:2098.9] Out: 2280 [Urine:2280] No intake/output data recorded. Physical Exam:         
Constitutional:  intubated and mechanically ventilated. EENMT:  Sclera clear, pupils equal, oral mucosa moist 
Respiratory: crackles Cardiovascular:  RRR with no M,G,R; 
Gastrointestinal:  soft with no tenderness; positive bowel sounds present Musculoskeletal:  warm with no cyanosis, no lower extremity edema Skin:  no jaundice or ecchymosis Neurologic: no gross neuro deficits Psychiatric:  On vent, unresponsive LINES:   
Right IJ quad lumen 7/28/19 NG 7/27/19 Voss 7/25/19  
 ETT- 8/2/19 DRIPS:    
Heparin CXR:   
 
 
Ventilator Settings Mode FIO2 Rate Tidal Volume Pressure PEEP PRVC  100 %    500 ml  0 cm H2O  12 cm H20 Peak airway pressure: 34.1 cm H2O Minute ventilation: 15 l/min ABG:  
Recent Labs 08/04/19 
5999 08/04/19 
0320 08/03/19 
0321 PHI 7.326* 7.257* 7.404 PCO2I 69.2* 86.1* 62.8*  
PO2I 69* 83 63* HCO3I 36.1* 37.9* 39.4* Recent Labs 08/04/19 
4317 08/03/19 
1920 08/03/19 
8839 08/02/19 
2519 WBC 4.4  --  8.6 9.9 HGB 7.0* 7.1* 6.3* 7.0*  
HCT 24.7* 24.6* 22.2* 24.3*  
  --  198 194 Recent Labs 08/04/19 
2899 08/03/19 
1206 08/03/19 
4819 08/02/19 
5387   --  147* 146*  
K 4.5 3.9 3.3* 3.5   --  101 103 CO2 38*  --  38* 40* *  --  118* 119* BUN 81*  --  57* 51* CREA 2.66*  --  1.32 1.32  
MG 2.7*  --  2.4 2.3 PHOS 6.1*  --  3.7 4.5  
CA 8.5  --  8.6 8.5 ALB 3.4*  --  3.1* 2.8* SGOT 30  --  27 35 Assessment:  (Medical Decision Making) Hospital Problems  Date Reviewed: 8/1/2019 Codes Class Noted POA  
 DVT, bilateral lower limbs ([facility] ICD-10-CM: I82.403 ICD-9-CM: 453.40  7/30/2019 Unknown Overview Signed 7/30/2019  8:56 AM by Treasure Zapata NP  
  7/29/19:  There is an occlusive thrombus within the right tibial vein and peroneal vein with a partially occlusive thrombus in the right popliteal vein. There is an 
occlusive thrombus in the left peroneal tibial vein and peroneal vein. Acute bilateral deep vein thrombosis (DVT) of upper extremities (HCC) ICD-10-CM: V08.082 ICD-9-CM: 453.82  7/29/2019 No  
 Overview Signed 7/29/2019  9:10 AM by Treasure Zapata NP  
  7/28/19:  Findings consistent with extensive bilateral upper extremity deep venous thrombosis. Acute respiratory failure with hypoxemia [facility]) 
 re intubated yesterday ICD-10-CM: J96.01 
ICD-9-CM: 518.81  7/25/2019 Unknown Bilateral pulmonary infiltrates on chest x-ray ICD-10-CM: R91.8 ICD-9-CM: 793.19  7/21/2019 Yes Overview Signed 7/26/2019  8:44 AM by Jayce Feliciano MD  
  Suspect developing ARDS 
  
  
   
 SVT (supraventricular tachycardia) ([facility] ICD-10-CM: I47.1 ICD-9-CM: 427.89  7/21/2019 Yes * (Principal) Atrial fibrillation with RVR ([facility] ICD-10-CM: I48.91 
ICD-9-CM: 427.31  7/21/2019 Yes Centrilobular emphysema ([facility] ICD-10-CM: J43.2 ICD-9-CM: 492.8  7/12/2019 Yes Protein malnutrition ([facility] (Chronic) ICD-10-CM: E46 
ICD-9-CM: 260  7/9/2019 Yes Overview Signed 7/31/2019  4:10 PM by Irvin Husain MD  
  moderate Cocaine abuse (HCC) (Chronic) chronic  ICD-10-CM: F14.10 ICD-9-CM: 305.60  7/9/2019 Yes Lung mass ICD-10-CM: R91.8 ICD-9-CM: 786.6  7/8/2019 Yes Pulmonary emboli ([address] on heparin gtt ICD-10-CM: I26.99 
ICD-9-CM: 415.19  7/8/2019 Yes Primary pancreatic cancer with metastasis to other site [facility]) ICD-10-CM: C25.9 ICD-9-CM: 157.9  7/8/2019 Yes Mediastinal adenopathy ICD-10-CM: R59.0 ICD-9-CM: 785.6  7/8/2019 Yes Plan:  (Medical Decision Making) --continue heparin gtt 
-Poorly differentiated metastatic adenocarcinoma. No plan for EBUS at this point given stability and isn't a treatment candidate at this point unless significantly improves which seems unlikely.  
-discussed with daughter yesterday and she made him DNR but she aLso wish to talk to her cousin before making more decisons More than 50% of the time documented was spent in face-to-face contact with the patient and in the care of the patient on the floor/unit where the patient is located.  
 
Devan Callejas MD

## 2019-08-04 NOTE — PROGRESS NOTES
Call placed to MD regarding marginal BP's and due meds of amio and lasix. Per MD given amio per ngt and hold IV lasix at this time. Orders also received to start levophed gtt if MAP's drop below 60.

## 2019-08-04 NOTE — PROGRESS NOTES
Vomitus noted in pt's mouth and tube feeding stopped. Pt was suctioned with inline suctioning and thick blood tinged secretions noted. Suctioning of the mouth and nasotracheal suctioning revealed tan and blood streaked secretions as well. Dr. Roe Peters notified.

## 2019-08-05 NOTE — PROGRESS NOTES
Critical Care Daily Progress Note: 8/5/2019 Admission Date: 7/21/2019 The patient's chart is reviewed and the patient is discussed with the staff. He was seen in consult by Dr. Akilah Bergeron on 7/12/19. Nini Kim that time, he presented with c/o progressive severe abd pain x 3 days and wt loss over the past several months.  Bili 2.6, Lipase 6000.  CT CAP revealed RLL segmental PE, mediastinal LAD, 4 cm pleurally based cavitary mass in L apex, 2cm pancreatic mass obstructing pancreatic and CBD with associated biliary duct dilation and GB distention and gastric wall thickening.  He is s/p ERCP with stent placement and FNA of pancreatic mass/periportal LAD on 7/8.  Biliary brushing cytology +rare atypical cells.  Path on periportal/precarinal  nodes +poorly differentiated metastatic carcinoma. [facility] non-specific as to origin, however a primary tumor in upper GI or pancreatobiliary tree cannot be excluded.  During that consult, EBUS with possible navigational biopsy to the lung was recommended as there may be a possible 2nd primary.   
   Palliative care following. We have been consulted for consideration of EBUS and possible navigation bronchoscopy. Seen last on 7/12 by our service at the time [facility] was not completed yet. O2 sat dropped to 75% on 15L HFNC.  Required intubation for respiratory support.  Noted with bilateral upper and lower extremity DVTs--Heparin drip infusing. Extubated 7/31 to Opti-flow but worsening hypoxia and changed to BIPAP. re intubated on 8/2. Subjective:  
 
Remains on vent, orally intubated. Sister at the bedside and states family members will be arriving soon. Unable to follow commands, some spontaneous movements. Current Facility-Administered Medications Medication Dose Route Frequency  0.9% sodium chloride infusion 250 mL  250 mL IntraVENous PRN  
 0.9% sodium chloride infusion 250 mL  250 mL IntraVENous PRN  
  NOREPINephrine (LEVOPHED) 4 mg/250 mL (16 mcg/mL) in NS infusion  0-16 mcg/min IntraVENous TITRATE  dexmedeTOMidine (PRECEDEX) 400 mcg in 0.9% sodium chloride 100 mL infusion  0.2-0.7 mcg/kg/hr IntraVENous TITRATE  albuterol (PROVENTIL VENTOLIN) nebulizer solution 2.5 mg  2.5 mg Nebulization Q4H RT  
 senna-docusate (PERICOLACE) 8.6-50 mg per tablet 2 Tab  2 Tab Per NG tube DAILY PRN  promethazine (PHENERGAN) with saline injection 25 mg  25 mg IntraVENous Q6H PRN  
 metoclopramide HCl (REGLAN) injection 5 mg  5 mg IntraVENous Q6H  
 albumin human 25% (BUMINATE) solution 25 g  25 g IntraVENous Q6H  
 furosemide (LASIX) injection 40 mg  40 mg IntraVENous Q12H  
 acetaminophen (TYLENOL) tablet 650 mg  650 mg Per NG tube Q4H PRN  
 HYDROcodone-acetaminophen (NORCO)  mg tablet 1 Tab  1 Tab Per NG tube Q4H PRN  
 HYDROcodone-acetaminophen (NORCO) 5-325 mg per tablet 1 Tab  1 Tab Per NG tube Q4H PRN  
 LORazepam (ATIVAN) tablet 0.5 mg  0.5 mg Per NG tube Q6H PRN  
 zolpidem (AMBIEN) tablet 5 mg  5 mg Per NG tube QHS PRN  
 famotidine (PF) (PEPCID) 20 mg in sodium chloride 0.9% 10 mL injection  20 mg IntraVENous DAILY  amiodarone (CORDARONE) tablet 400 mg  400 mg Per NG tube Q12H  
 heparin 25,000 units in dextrose 500 mL infusion  18-36 Units/kg/hr IntraVENous TITRATE  gabapentin (NEURONTIN) capsule 100 mg  100 mg Per NG tube BID  polyethylene glycol (MIRALAX) packet 17 g  17 g Per NG tube DAILY  NUTRITIONAL SUPPORT ELECTROLYTE PRN ORDERS   Does Not Apply PRN  
 fentaNYL in normal saline (pf) 25 mcg/mL infusion  0-200 mcg/hr IntraVENous TITRATE  LORazepam (ATIVAN) injection 2 mg  2 mg IntraVENous Q2H PRN  
 fentaNYL citrate (PF) injection 50 mcg  50 mcg IntraVENous Q1H PRN  
 sodium chloride (NS) flush 5-40 mL  5-40 mL IntraVENous PRN  
 sodium chloride (NS) flush 5-40 mL  5-40 mL IntraVENous Q8H  
 sodium chloride (NS) flush 5-40 mL  5-40 mL IntraVENous PRN  
  naloxone (NARCAN) injection 0.4 mg  0.4 mg IntraVENous PRN  
 albuterol (PROVENTIL VENTOLIN) nebulizer solution 2.5 mg  2.5 mg Nebulization Q4H PRN  
 hydrALAZINE (APRESOLINE) 20 mg/mL injection 20 mg  20 mg IntraVENous Q4H PRN Review of Systems Unobtainable due to patient status. Objective:  
 
Vitals:  
 08/05/19 2450 08/05/19 0645 08/05/19 7289 08/05/19 6383 BP:      
Pulse:    (!) 104 Resp:    30 Temp: 98.1 °F (36.7 °C) 98.1 °F (36.7 °C) 98 °F (36.7 °C) SpO2:    94% Weight:      
Height:      
 
 
Intake and Output:  
08/03 1901 - 08/05 0700 In: 3887.1 [I.V.:1584.1] Out: 450 [Urine:450] 08/05 0701 - 08/05 1900 In: 479.2 Out: - Physical Exam:         
Constitutional:  intubated and mechanically ventilated. EENMT:  Sclera clear, pupils equal, oral mucosa moist 
Respiratory: scattered anterior crackles Cardiovascular:  RRR with no M,G,R; 
Gastrointestinal:  soft with no tenderness; positive bowel sounds present Musculoskeletal:  warm with no cyanosis, no lower extremity edema, support boots Skin:  no jaundice or ecchymosis Neurologic: unable to determine, not able to follow commands Psychiatric:  On vent, minimally responsive to suctioning LINES:   
Right IJ quad lumen 7/28/19 NG 7/27/19 Voss 7/25/19 ETT- 8/2/19 DRIPS:    
Fentanyl 75 mcg/hr Heparin drip Levophed 5 mcg/min CXR:   
8/5/19:  Bilateral pneumonia unchanged. Consider ARDS Ventilator Settings Mode FIO2 Rate Tidal Volume Pressure PEEP PRVC  100 %    500 ml  0 cm H2O  12 cm H20 Peak airway pressure: 35 cm H2O Minute ventilation: 15 l/min ABG:  
Recent Labs 08/05/19 
9496 08/04/19 
0040 08/04/19 
0320 PHI 7.342* 7.326* 7.257* PCO2I 63.4* 69.2* 86.1*  
PO2I 94 69* 83 HCO3I 34.3* 36.1* 37.9* Recent Labs 08/05/19 
2750 08/04/19 
0520 08/03/19 
1920 08/03/19 
6614 WBC 4.6 4.4  --  8.6 HGB 6.8* 7.0* 7.1* 6.3* HCT 23.7* 24.7* 24.6* 22.2*  
  203  --  198 Recent Labs 08/05/19 
2740 08/04/19 
3572 08/03/19 
1206 08/03/19 
1642  145  --  147* K 4.7 4.5 3.9 3.3*  
 101  --  101 CO2 33* 38*  --  38* * 117*  --  118* * 81*  --  57* CREA 3.48* 2.66*  --  1.32  
MG 2.8* 2.7*  --  2.4 PHOS 7.3* 6.1*  --  3.7 CA 8.5 8.5  --  8.6 ALB 3.6 3.4*  --  3.1*  
SGOT 55* 30  --  27 Assessment:  (Medical Decision Making) Hospital Problems  Date Reviewed: 8/5/2019 Codes Class Noted POA  
 DVT, bilateral lower limbs ([facility] [address] ICD-10-CM: I82.403 ICD-9-CM: 453.40  7/30/2019 Unknown Overview Signed 7/30/2019  8:56 AM by Elihue Sicard, NP  
  7/29/19:  There is an occlusive thrombus within the right tibial vein and peroneal vein with a partially occlusive thrombus in the right popliteal vein. There is an 
occlusive thrombus in the left peroneal tibial vein and peroneal vein. Unchanged--Heparin drip Acute bilateral deep vein thrombosis (DVT) of upper extremities (HCC) ICD-10-CM: H74.935 ICD-9-CM: 453.82  7/29/2019 No  
 Overview Signed 7/29/2019  9:10 AM by Elihue Sicard, NP  
  7/28/19:  Findings consistent with extensive bilateral upper extremity deep venous thrombosis. Unchanged--Heparin drip Electrolyte abnormality ICD-10-CM: E87.8 ICD-9-CM: 276.9  7/26/2019 Yes Supplement as needed Acute respiratory failure with hypoxemia [facility]) ICD-10-CM: J96.01 
ICD-9-CM: 518.81  7/25/2019 Unknown On full vent support Bilateral pulmonary infiltrates on chest x-ray ICD-10-CM: R91.8 ICD-9-CM: 793.19  7/21/2019 Yes Overview Signed 7/26/2019  8:44 AM by Annette Schultz MD  
  Suspect developing ARDS 
  
  
 unchanged SVT (supraventricular tachycardia) (HCC) ICD-10-CM: I47.1 ICD-9-CM: 427.89  7/21/2019 Yes Rate 100s * (Principal) Atrial fibrillation with RVR ([address] ICD-10-CM: I48.91 
ICD-9-CM: 427.31  7/21/2019 Yes Rate 100s Centrilobular emphysema ([facility] ICD-10-CM: J43.2 ICD-9-CM: 492.8  7/12/2019 Yes  
 chronic Protein malnutrition ([facility] (Chronic) ICD-10-CM: E46 
ICD-9-CM: 260  7/9/2019 Yes Overview Signed 7/31/2019  4:10 PM by Rubi Barron MD  
  moderate Has not tolerated TFs--vomiting Cocaine abuse (HCC) (Chronic) ICD-10-CM: F14.10 ICD-9-CM: 305.60  7/9/2019 Yes  
 chronic Lung mass ICD-10-CM: R91.8 ICD-9-CM: 786.6  7/8/2019 Yes  
 unchanged Pulmonary emboli [facility]) ICD-10-CM: I26.99 
ICD-9-CM: 415.19  7/8/2019 Yes Unchanged--Heparin drip Primary pancreatic cancer with metastasis to other site [facility]) ICD-10-CM: C25.9 ICD-9-CM: 157.9  7/8/2019 Yes  
 unchanged Mediastinal adenopathy ICD-10-CM: R59.0 ICD-9-CM: 785.6  7/8/2019 Yes  
 unchanged Plan:  (Medical Decision Making) --Hgb 6.8, stool occult blood positive 8/4--Transfuse? ?? 
--Continue heparin gtt--PE and DVTs 
--Albuterol 
--Albumin/Lasix--urine output 350 ml last 24 hrs, creat 3.48 
--Poorly differentiated metastatic adenocarcinoma. No plan for EBUS at this point given stability and isn't a treatment candidate at this point unless significantly improves which seems unlikely. --DNR status per daughter request.  Very poor prognosis MSOF. Will update family on arrival. 
 
More than 50% of the time documented was spent in face-to-face contact with the patient and in the care of the patient on the floor/unit where the patient is located. Yonny Vivas, ESAU Lungs: coarse b/l. Heart S1 and S2 audible, no murmers or rubs appreciated Other In multiple organ failure. Dying. Daughter will be here shortly and other family here. Spoke with [place] and palliative care and will meet together to discuss end of life goals with family when here. He will not survive his current critical condition. I have spoken with and examined the patient.  I have reviewed the history, examination, assessment, and plan and agree with the above. Nahun Smith MD 
 
 
This note was signed electronically. Errors are unfortunately her likely due to dictation software.

## 2019-08-05 NOTE — PROGRESS NOTES
Head to toe assessment completed on pt this AM. Neuro: Pt eyes open to voice/ stimulus. No command following - GONZALEZ spontaneously. Grimaces and withdraws to pain. Strong gag with suction. PERRLA - 2 mm and sluggish to react. Resp: PRVC FiO2 100%. Diminished lung sounds throughout. Cardiac: ST - slight hypotension. Levo gtt at 5. GI: NGT ([place]obhoff) patent and infusing. Informed of emesis yesterday - TF at 25 ml/hr. Hypoactive bowel sounds. : Voss - draining and patent with oliguria. Skin: Skin is thin and fragile - scattered scarring present on BLEs and feet. BUEs with 4+ pitting edema. BLEs 2+ pitting edema. Allevyn intact, Rooke boots in place.

## 2019-08-05 NOTE — PROGRESS NOTES
Chart reviewed and pt discussed in am IDR. Remains ICU, intubated on 100% FIO2. Poor prognosis per MD notes. Palliative care following. Daughter made DNR yesterday, but wants to discuss with other family before making more decisions per notes.  CM continues to follow for any assist.

## 2019-08-05 NOTE — PROGRESS NOTES
Nutrition F/U: 
TF Management for [facility] Pulmonary. Assessment: 
Weight 54.8 kg (ICU bed 8/5/19), edema - 4+ pitting to BUEs, 2+ pitting to BLEs. The patient has been tolerating TF well at goal rate. Family is currently making decisions regarding goals of care. There have been 0 ml of residual noted over the past 24 hours. Labs are remarkable for creatinine trending up to 3.48. He continues to be ventilated, intubated and sedated. Enteral Access: 
           NGT. Macronutrient Needs (60.4 kg): MYR:  4690-9025 MLTS /day (30-35 kcal/kg listed BW) IFN:   CWZUF protein/day (1.2-2 grams/kg CBW) Max CHO: 291 gm CHO/day (55% kcal) Fluid: 1ml/kcal or per MD 
Intake/Comparative Standards: 
Current intake of TF 65 ml/hr  With 25 ml water flush q hourly-1872 kcal/day (100% of needs), 87 grams protein/day (100% of needs), 246 grams CHO/day (does not exceed max CHO),  and ~ 1879 ml free water/day (100% of needs). Intervention:  
Meals and Snacks: NPO. Enteral Nutrition: Continue current TF. Mineral Supplement Therapy: Nutrition Support Orders/Electrolyte Management Replacement Protocols are active on the MAR. Coordination of Nutrition Care by a Nutrition Professional: AM ICU/CCU rounds Nutrition Discharge Plan: Too soon to determine. [address], [address] [place] [address], LD  
[phone]

## 2019-08-05 NOTE — PROGRESS NOTES
Pt O2sat mid 80's. Pt suctioned, respositioned, HOB 45 degrees with no improvement. RT informed. Pt maxed on vent settings. Call placed to [place] [phone]. No answer, mailbox full. Will continue to attempt to get in touch with family.

## 2019-08-05 NOTE — PROGRESS NOTES
Ventilator check complete; patient has a #8. 0 ET tube secured at the 26 at the lip. Patient is sedated. Patient is not able to follow commands. Breath sounds are coarse. Trachea is midline, Negative for subcutaneous air, and chest excursion is symmetric. Patient is also Negative for cyanosis and is Negative for pitting edema. All alarms are set and audible. Resuscitation bag is at the head of the bed. Ventilator Settings Mode FIO2 Rate Tidal Volume Pressure PEEP I:E Ratio PRVC  100 %  30  500 ml    12 cm H20  1:1.5 Peak airway pressure: 35 cm H2O Minute ventilation: 15 l/min ABG: No results for input(s): PH, PCO2, PO2, HCO3 in the last 72 hours. Violeta Blackman

## 2019-08-05 NOTE — PROGRESS NOTES
Bedside and verbal shift report received from Pallavi Hankins, [address] [facility]. Dual signed fentanyl and Heparin gtt.

## 2019-08-05 NOTE — PROGRESS NOTES
A follow up visit was made to the patient. Emotional support, spiritual presence and  
prayer were provided. The patient is not alert. Simeon Edmondson

## 2019-08-05 NOTE — PROGRESS NOTES
Ventilator check complete; patient has a #8. 0 ET tube secured at the 26 at the lip. Patient is sedated. Patient is not able to follow commands. Breath sounds are coarse. Trachea is midline, Negative for subcutaneous air, and chest excursion is symmetric. Patient is also Negative for cyanosis and is Negative for pitting edema. All alarms are set and audible. Resuscitation bag is at the head of the bed. Ventilator Settings Mode FIO2 Rate Tidal Volume Pressure PEEP I:E Ratio PRVC  100 % 30 500 ml   12 cm H20  1:1.85 Peak airway pressure: 37 cm H2O Minute ventilation: 14.9 l/min ABG: No results for input(s): PH, PCO2, PO2, HCO3 in the last 72 hours.  
 
 
Allie Ryan, RT

## 2019-08-05 NOTE — PROGRESS NOTES
Palliative Care Progress Note Patient: Kristofer Garcia MRN: [id number]  SSN: [id number] YOB: 1965  Age: 53 y.o. Sex: male Assessment/Plan: Chief Complaint/Interval History: sedated, intubated and ventilated Principal Diagnosis: · Debility, Unspecified  R53.81 Additional Diagnoses: · Acute Respiratory Failure, Unspecified  J96.00 · Anorexia  R63.0 
· Cachexia  R64 · Edema  R60.9 · Fatigue, Lethargy  R53.83 
· Encounter for Palliative Care  Z51.5 Palliative Performance Scale (PPS) Medical Decision Making:  
Reviewed and summarized notes from last palliative care visit to present. Discussed case with appropriate providers: Dr Pat Avila, [facility] Reviewed lab and X-ray data from last palliative care visit to present. Pt sedated, intubated and ventilated. Unresponsive. Family meeting held with,dtr Santino Rand, nephew, and sisters, Dr. London Vargas, [address] [place] [address], [facility], and myself. Dr London Vargas clarified that Mr Harini Blair is dying. He explained that there are two options: to keep the pt on current care until he passes, or to wean him from the vent and drips. The family expressed their understanding, and said that they would like to talk other family before making a decision. Will address tomorrow. Afterwards, I reviewed with family that, in weaning, the pt will continue to have oxygen via nasal cannula, and will have morphine and medications as needed. Assured them of our ongoing support. Will continue to follow. Will discuss findings with members of the interdisciplinary team.   
 
  
More than 50% of this 25 minute visit was spent counseling and coordination of care as outlined above. Subjective:  
 
Review of Systems: 
Review of systems not obtained due to patient factors- sedated, intubated and ventilated Objective:  
 
Visit Vitals /63 Pulse (!) 109 Temp 100.1 °F (37.8 °C) Resp (!) 31  
 Ht 6' 5\" (1.956 m) Wt 120 lb 13 oz (54.8 kg) SpO2 92% BMI 14.33 kg/m² Physical Exam: 
 
General:  Sedated, intubated and ventilated. Eyes:  Conjunctivae/corneas clear Nose: Nares normal. Septum midline. NG tube. Neck: Supple, symmetrical, trachea midline Lungs:   Coarse bilaterally Heart:  Regular rate and rhythm Abdomen:   Soft, non-tender, non-distended Extremities: Normal, atraumatic, no cyanosis. BUE edema. Skin: Skin color, texture, turgor normal.   
Neurologic: Sedated Psych: Sedated Signed By: Priti Meza NP August 5, 2019

## 2019-08-05 NOTE — ROUTINE PROCESS
Ventilator check complete; patient has a #8. 0 ET tube secured at the 24 at the teeth. Patient is not sedated. Patient is not able to follow commands. Breath sounds are coarse. Trachea is midline, Negative for subcutaneous air, and chest excursion is symmetric. Patient is also Negative for cyanosis and is Positive for pitting edema. All alarms are set and audible. Resuscitation bag is at the head of the bed. Ventilator Settings Mode FIO2 Rate Tidal Volume Pressure PEEP I:E Ratio PRVC  100 %   30 500 ml    12 cm H20  1:1.49 Peak airway pressure: 38 cm H2O Minute ventilation: 14.6 l/min Linnea Luann, RT

## 2019-08-05 NOTE — PROGRESS NOTES
Had family meeting and gave them update. Aware he is dying and going to have multiple organ failure. Aware options are to continue currently care or purse comfort measures. Told to have family and friends come see patient. They seems likely to purse comfort care. Told to make decision early. [facility] and Palliative care in the room today. Time spent was 20 minutes now Time spent by me earlier was 15 minutes Total time spent today by me was 35 minutes.  
Condition is critical. 
 
 
Daisha Quinn MD

## 2019-08-06 NOTE — PROGRESS NOTES
Bedside and verbal shift report received from Saima Honeycutt, [address] [facility]. Dual signed fentanyl and heparin gtt.

## 2019-08-06 NOTE — PROGRESS NOTES
was called to come and give bereavement support to the patient's family members upon his death. Family members were at bedside and were grieving appropriately. Yves Banner [facility]

## 2019-08-06 NOTE — PROGRESS NOTES
Asystole. TOD 1025. Dr. Tatianna Schrader to pronounce.  and house supervisor informed. Pt extubated, removed NGT. Dr. Justin Tang at bedside.

## 2019-08-06 NOTE — PROGRESS NOTES
Critical Care Daily Progress Note: 8/6/2019 Admission Date: 7/21/2019 The patient's chart is reviewed and the patient is discussed with the staff. He was seen in consult by Dr. Alex Donovan on 7/12/19. Jonathan Santos that time, he presented with c/o progressive severe abd pain x 3 days and wt loss over the past several months.  Bili 2.6, Lipase 6000.  CT CAP revealed RLL segmental PE, mediastinal LAD, 4 cm pleurally based cavitary mass in L apex, 2cm pancreatic mass obstructing pancreatic and CBD with associated biliary duct dilation and GB distention and gastric wall thickening.  He is s/p ERCP with stent placement and FNA of pancreatic mass/periportal LAD on 7/8.  Biliary brushing cytology +rare atypical cells.  Path on periportal/precarinal  nodes +poorly differentiated metastatic carcinoma. [facility] non-specific as to origin, however a primary tumor in upper GI or pancreatobiliary tree cannot be excluded.  During that consult, EBUS with possible navigational biopsy to the lung was recommended as there may be a possible 2nd primary.   
   Palliative care following. We have been consulted for consideration of EBUS and possible navigation bronchoscopy. Seen last on 7/12 by our service at the time [facility] was not completed yet. O2 sat dropped to 75% on 15L HFNC.  Required intubation for respiratory support.  Noted with bilateral upper and lower extremity DVTs--Heparin drip infusing. Extubated 7/31 to Opti-flow but worsening hypoxia and changed to BIPAP. re intubated on 8/2. Subjective:  
 
Remains on vent, orally intubated. Overall decline last night, unable to maintain O2 sat and now on max Levophed. Multiple attempts to contact family last night with no success. Current Facility-Administered Medications Medication Dose Route Frequency  0.9% sodium chloride infusion 250 mL  250 mL IntraVENous PRN  
 atracurium (TRACRIUM) 1,000 mg in 0.9% sodium chloride 250 mL infusion 0-20 mcg/kg/min IntraVENous TITRATE  
 0.9% sodium chloride infusion 250 mL  250 mL IntraVENous PRN  
 0.9% sodium chloride infusion 250 mL  250 mL IntraVENous PRN  
 NOREPINephrine (LEVOPHED) 4 mg/250 mL (16 mcg/mL) in NS infusion  0-16 mcg/min IntraVENous TITRATE  dexmedeTOMidine (PRECEDEX) 400 mcg in 0.9% sodium chloride 100 mL infusion  0.2-0.7 mcg/kg/hr IntraVENous TITRATE  albuterol (PROVENTIL VENTOLIN) nebulizer solution 2.5 mg  2.5 mg Nebulization Q4H RT  
 senna-docusate (PERICOLACE) 8.6-50 mg per tablet 2 Tab  2 Tab Per NG tube DAILY PRN  promethazine (PHENERGAN) with saline injection 25 mg  25 mg IntraVENous Q6H PRN  
 metoclopramide HCl (REGLAN) injection 5 mg  5 mg IntraVENous Q6H  
 albumin human 25% (BUMINATE) solution 25 g  25 g IntraVENous Q6H  
 furosemide (LASIX) injection 40 mg  40 mg IntraVENous Q12H  
 acetaminophen (TYLENOL) tablet 650 mg  650 mg Per NG tube Q4H PRN  
 HYDROcodone-acetaminophen (NORCO)  mg tablet 1 Tab  1 Tab Per NG tube Q4H PRN  
 HYDROcodone-acetaminophen (NORCO) 5-325 mg per tablet 1 Tab  1 Tab Per NG tube Q4H PRN  
 LORazepam (ATIVAN) tablet 0.5 mg  0.5 mg Per NG tube Q6H PRN  
 zolpidem (AMBIEN) tablet 5 mg  5 mg Per NG tube QHS PRN  
 famotidine (PF) (PEPCID) 20 mg in sodium chloride 0.9% 10 mL injection  20 mg IntraVENous DAILY  amiodarone (CORDARONE) tablet 400 mg  400 mg Per NG tube Q12H  
 heparin 25,000 units in dextrose 500 mL infusion  18-36 Units/kg/hr IntraVENous TITRATE  gabapentin (NEURONTIN) capsule 100 mg  100 mg Per NG tube BID  polyethylene glycol (MIRALAX) packet 17 g  17 g Per NG tube DAILY  NUTRITIONAL SUPPORT ELECTROLYTE PRN ORDERS   Does Not Apply PRN  
 fentaNYL in normal saline (pf) 25 mcg/mL infusion  0-200 mcg/hr IntraVENous TITRATE  LORazepam (ATIVAN) injection 2 mg  2 mg IntraVENous Q2H PRN  
 fentaNYL citrate (PF) injection 50 mcg  50 mcg IntraVENous Q1H PRN  
  sodium chloride (NS) flush 5-40 mL  5-40 mL IntraVENous PRN  
 sodium chloride (NS) flush 5-40 mL  5-40 mL IntraVENous Q8H  
 sodium chloride (NS) flush 5-40 mL  5-40 mL IntraVENous PRN  
 naloxone (NARCAN) injection 0.4 mg  0.4 mg IntraVENous PRN  
 albuterol (PROVENTIL VENTOLIN) nebulizer solution 2.5 mg  2.5 mg Nebulization Q4H PRN  
 hydrALAZINE (APRESOLINE) 20 mg/mL injection 20 mg  20 mg IntraVENous Q4H PRN Review of Systems Unobtainable due to patient status. Objective:  
 
Vitals:  
 08/06/19 8786 08/06/19 7059 08/06/19 0384 08/06/19 4951 BP: (!) 81/43 99/53 Pulse: 96 99 (!) 101 100 Resp: (!) 33 28 28 20 Temp:      
SpO2: (!) 77% (!) 79% (!) 77% (!) 82% Weight:      
Height:      
 
 
Intake and Output:  
08/04 0701 - 08/05 1900 In: 4213.9 [I.V.:1688.7] Out: 375 [Urine:375] 08/05 1901 - 08/06 0700 In: 1702.2 [I.V.:755.2] Out: 75 [Urine:75] Physical Exam:         
Constitutional:  intubated and mechanically ventilated. EENMT:  Sclera clear, pupils equal, oral mucosa moist 
Respiratory: scattered anterior crackles Cardiovascular:  RRR with no M,G,R; 
Gastrointestinal:  flat with hypoactive bowel sounds present Musculoskeletal:  warm with no cyanosis, no lower extremity edema, support boots Skin:  no jaundice or ecchymosis Neurologic: unable to determine, not able to follow commands Psychiatric:  unresponsive LINES:   
Right IJ quad lumen 7/28/19 NG 7/27/19--clamped [place] 7/25/19 ETT- 8/2/19 DRIPS:    
Fentanyl 25 mcg/hr Heparin drip Levophed 16 mcg/min CXR:   
8/6/19:  Bilateral pneumonia versus ARDS unchanged. 8/5/19:  Bilateral pneumonia unchanged. Consider ARDS Ventilator Settings Mode FIO2 Rate Tidal Volume Pressure PEEP PRVC  100 %    500 ml  0 cm H2O  14 cm H20 Peak airway pressure: 33.6 cm H2O Minute ventilation: 6.8 l/min ABG:  
Recent Labs 08/06/19 
0327 08/05/19 
8297 08/04/19 
4533 PHI 7.244* 7.342* 7.326* PCO2I 65.1* 63.4* 69.2*  
PO2I 64* 94 69* HCO3I 28.1* 34.3* 36.1* Recent Labs 08/06/19 
0401 08/05/19 
1139 08/05/19 
0328 08/04/19 
5607 WBC 4.0*  --  4.6 4.4 HGB 6.9* 7.7* 6.8* 7.0*  
HCT 24.1* 26.6* 23.7* 24.7*  
  --  184 203 Recent Labs 08/06/19 
0401 08/05/19 
9818 08/04/19 
3812  144 145  
K 5.0 4.7 4.5  
CL 98 100 101 CO2 29 33* 38* * 107* 117* * 112* 81* CREA 4.99* 3.48* 2.66* MG 2.9* 2.8* 2.7* PHOS 8.6* 7.3* 6.1*  
CA 8.3 8.5 8.5 ALB 3.4* 3.6 3.4* SGOT 50* 55* 30 Assessment:  (Medical Decision Making) Hospital Problems  Date Reviewed: 8/6/2019 Codes Class Noted POA  
 D[facility], bilateral lower limbs [facility] ICD-10-CM: I82.403 ICD-9-CM: 453.40  7/30/2019 Unknown Overview Signed 7/30/2019  8:56 AM by Arnaud Woodard NP  
  7/29/19:  There is an occlusive thrombus within the right tibial vein and peroneal vein with a partially occlusive thrombus in the right popliteal vein. There is an 
occlusive thrombus in the left peroneal tibial vein and peroneal vein. Unchanged--Heparin drip Acute bilateral deep vein thrombosis (DVT) of upper extremities (HCC) ICD-10-CM: S27.629 ICD-9-CM: 453.82  7/29/2019 No  
 Overview Signed 7/29/2019  9:10 AM by Arnaud Woodard NP  
  7/28/19:  Findings consistent with extensive bilateral upper extremity deep venous thrombosis. Unchanged--Heparin drip Electrolyte abnormality ICD-10-CM: E87.8 ICD-9-CM: 276.9  7/26/2019 Yes Supplement as needed Acute respiratory failure with hypoxemia [facility]) ICD-10-CM: J96.01 
ICD-9-CM: 518.81  7/25/2019 Unknown On full vent support Bilateral pulmonary infiltrates on chest x-ray ICD-10-CM: R91.8 ICD-9-CM: 793.19  7/21/2019 Yes Overview Signed 7/26/2019  8:44 AM by Neelima Harp MD  
  Suspect developing ARDS 
  
  
 unchanged SVT (supraventricular tachycardia) (HCC) ICD-10-CM: I47.1 ICD-9-CM: 427.89  7/21/2019 Yes Rate 90-100s * (Principal) Atrial fibrillation with RVR ([facility] ICD-10-CM: I48.91 
ICD-9-CM: 427.31  2019 Yes Rate 100s Centrilobular emphysema ([facility] ICD-10-CM: J43.2 ICD-9-CM: 492.8  2019 Yes  
 chronic Protein malnutrition ([facility] (Chronic) ICD-10-CM: E46 
ICD-9-CM: 260  2019 Yes Overview Signed 2019  4:10 PM by Elfego Gardner MD  
  moderate TFs on hold Cocaine abuse (HCC) (Chronic) ICD-10-CM: F14.10 ICD-9-CM: 305.60  2019 Yes  
 chronic Lung mass ICD-10-CM: R91.8 ICD-9-CM: 786.6  2019 Yes  
 unchanged Pulmonary emboli [facility]) ICD-10-CM: I26.99 
ICD-9-CM: 415.19  2019 Yes Unchanged--Heparin drip Primary pancreatic cancer with metastasis to other site [facility]) ICD-10-CM: C25.9 ICD-9-CM: 157.9  2019 Yes  
 unchanged Mediastinal adenopathy ICD-10-CM: R59.0 ICD-9-CM: 785.6  2019 Yes  
 unchanged Plan:  (Medical Decision Making) --Hgb 6.9, stool occult blood positive  
--Continue heparin gtt--PE and DVTs 
--Albuterol 
--Albumin/Lasix--urine output 100 ml last 24 hrs, creat up to 4.99 
--Poorly differentiated metastatic adenocarcinoma. No plan for EBUS at this point given stability and isn't a treatment candidate at this point unless significantly improves which seems unlikely. --DNR status per daughter request.  Very poor prognosis MSOF. More than 50% of the time documented was spent in face-to-face contact with the patient and in the care of the patient on the floor/unit where the patient is located. Michael Doris, NP Lungs: coarse b/l. Heart S1 and S2 audible, no murmers or rubs appreciated Other Dying and saturation in 80's on 100% vent and now on max levophed. Prognosis is grim and terminal soon. Awaiting to talk to family today, but may  prior to their arrival. Is DNR I have spoken with and examined the patient.  I have reviewed the history, examination, assessment, and plan and agree with the above. Amanda Javier MD 
 
 
This note was signed electronically. Errors are unfortunately her likely due to dictation software.

## 2019-08-06 NOTE — PROGRESS NOTES
Family has arrived - Dr. Adurey Zendejas discussed withdrawing care. Waiting for one other family member to arrive.

## 2019-08-06 NOTE — PROGRESS NOTES
The  was asked by the patient's nurse to pray a prayer for the patient and the family before he was extubated and put on comfort measures. The patient's daughter, sister and a friend were present.  provided a spiritual presence and emotional support to the family and friend. Glen Edmondson

## 2019-08-06 NOTE — PROGRESS NOTES
Settings changed per Dr. Alli Spence PC of 20, RR of 20  
 
 08/06/19 0558 Patient Observations Pulse (Heart Rate) 100 Resp Rate 20  
O2 Sat (%) (!) 82 % Vent Settings FIO2 (%) 100 % CMV Rate Set 20 PC Set 20  
PEEP/VENT (cm H2O) 14 cm H20  
I:E Ratio 1:2 Insp Time (sec) 1 sec Insp Rise Time (sec) 0 Flow Trigger 2 Ventilator Measurements Resp Rate Observed 20 Vt Exhaled (Machine Breath) (ml) 355 ml Ve Observed (l/min) 6.8 l/min PIP Observed (cm H2O) 33.6 cm H2O  
MAP (cm H2O) 20.8 I:E Ratio Actual 1:1.96 Dynamic Compliance (ml/cm H20) 18.5 ml/cm H20 Safety & Alarms Pressure Max 55 cm H2O Pressure Min 2 cm H2O Ve Min 2 Ve Max 22 RR Min 5  
RR Max 50 Vent Method/Mode Ventilator Mode ID 2

## 2019-08-06 NOTE — PROGRESS NOTES
Spoke with Dr. Bryant Catching, O2 sats in 70s after multiple vent changes by RT. Versed 1mg and atracurium bolus given per orders. Melia called, unable to reach, will reattempt.

## 2019-08-06 NOTE — PROGRESS NOTES
Dr. Leeann Whalen discussed withdrawal of care process with the family - Levo gtt and Heparin gtt stopped. Family at bedside with pt.

## 2019-08-06 NOTE — PROGRESS NOTES
Called for persistent desaturation into low 70s despite mode changes. Ultimately he is on pressure control mode and now paralyzed with maximum o2 sat of 82%. Multiple attempts to call family have been made because etiology is thought to be pulmonary edema vs ARDS with worsening renal failure and comfort measures are likely appropriate. We haven't been able to reach anyone, however. tracrium added and will continue efforts to reach family to update them on progressive declining status and likely impending death of this patient.  
Stacey Ordaz MD

## 2019-08-06 NOTE — PROGRESS NOTES
Palliative Care Progress Note Patient: Ronna Camargo MRN: [id number]  SSN: [id number] YOB: 1965  Age: 53 y.o. Sex: male Assessment/Plan: Chief Complaint/Interval History: pt with continued decline overnight. Now on vasopressors and 100% FIO2. Principal Diagnosis:   
· Encephalopathy, Unspecified  G93.40 Additional Diagnoses: · Acute Respiratory Failure, Unspecified  J96.00 · Debility, Unspecified  R53.81 
· Frailty  R54 
· Encounter for Palliative Care  Z51.5 Palliative Performance Scale (PPS) 10% Medical Decision Making:  
Reviewed and summarized labs and imaging. I met with daughter, nephews, brother and sister of pt. We discussed continued decline and the need for high levels of life support. Explained that pt was entering active dying phase of life and that he will not survive. Family expressed their understanding. They are waiting on additional family members to arrive. We discussed withdrawal of care and comfort measures. Will move in that direction once family ready Addendum:   
Family arrived. Proceeded with comfort measures. Vasopressors stopped. Pt passed quickly after stopping. Discussed with  and intensivist  
 
Will discuss findings with members of the interdisciplinary team.   
 
More than 50% of this 60 minute visit was spent counseling and coordination of care as outlined above. Subjective:  
 
Review of Systems unable to obtain due to mentation Objective:  
 
Visit Vitals BP (!) 85/46 Pulse 94 Temp 99.8 °F (37.7 °C) Resp 22 Ht 6' 5\" (1.956 m) Wt 120 lb 13 oz (54.8 kg) SpO2 (!) 83% BMI 14.33 kg/m² Physical Exam: 
 
General:  Unresponsive on vent Eyes:  Conjunctivae/corneas clear Nose: Nares normal. Septum midline. Neck: Supple, symmetrical, trachea midline, no JVD Lungs:   Coarse bilateral BS Heart:  Regular rate and rhythm, no murmur Abdomen:   Soft, non-tender, non-distended Extremities: Normal, atraumatic,muscle wasting evident Skin: Skin color, texture, turgor normal. No rash or lesions. Neurologic: unresponsive Psych: unresponsive Signed By: Sebastian Schultz MD   
 August 6, 2019

## 2019-08-06 NOTE — DISCHARGE SUMMARY
Death Summary Ronna Camargo Admission date:  7/21/2019 Discharge date:  8/6/2019 Admitting Diagnosis:  Pneumonia [J18.9] Pneumonia [J18.9] Pneumonia [J18.9] Discharge Diagnosis:   
Problem List as of 8/6/2019 Date Reviewed: 8/6/2019 Codes Class Noted - Resolved DVT, bilateral lower limbs (HCC) ICD-10-CM: I82.403 ICD-9-CM: 453.40  7/30/2019 - Present Overview Signed 7/30/2019  8:56 AM by Kelly Simms NP  
  7/29/19:  There is an occlusive thrombus within the right tibial vein and peroneal vein with a partially occlusive thrombus in the right popliteal vein. There is an 
occlusive thrombus in the left peroneal tibial vein and peroneal vein. Acute bilateral deep vein thrombosis (DVT) of upper extremities (HCC) ICD-10-CM: J66.391 ICD-9-CM: 453.82  7/29/2019 - Present Overview Signed 7/29/2019  9:10 AM by Kelly Simms NP  
  7/28/19:  Findings consistent with extensive bilateral upper extremity deep venous thrombosis. Electrolyte abnormality ICD-10-CM: E87.8 ICD-9-CM: 276.9  7/26/2019 - Present Acute respiratory failure with hypoxemia [facility]) ICD-10-CM: J96.01 
ICD-9-CM: 518.81  7/25/2019 - Present Bilateral pulmonary infiltrates on chest x-ray ICD-10-CM: R91.8 ICD-9-CM: 793.19  7/21/2019 - Present Overview Signed 7/26/2019  8:44 AM by Caden Rangel MD  
  Suspect developing ARDS 
  
  
   
 SVT (supraventricular tachycardia) ([facility] ICD-10-CM: I47.1 ICD-9-CM: 427.89  7/21/2019 - Present * (Principal) Atrial fibrillation with RVR ([facility] ICD-10-CM: I48.91 
ICD-9-CM: 427.31  7/21/2019 - Present Centrilobular emphysema ([facility] ICD-10-CM: J43.2 ICD-9-CM: 492.8  7/12/2019 - Present Protein malnutrition ([address] (Chronic) ICD-10-CM: E46 
ICD-9-CM: 260  7/9/2019 - Present Overview Signed 7/31/2019  4:10 PM by Ayden Jerome MD  
  moderate Cocaine abuse (HCC) (Chronic) ICD-10-CM: F14.10 ICD-9-CM: 305.60  7/9/2019 - Present Pancreatitis ICD-10-CM: K85.90 ICD-9-CM: 396.3  7/8/2019 - Present Lung mass ICD-10-CM: R91.8 ICD-9-CM: 786.6  7/8/2019 - Present Pulmonary emboli [facility]) ICD-10-CM: I26.99 
ICD-9-CM: 415.19  7/8/2019 - Present Primary pancreatic cancer with metastasis to other site [facility]) ICD-10-CM: C25.9 ICD-9-CM: 157.9  7/8/2019 - Present Mediastinal adenopathy ICD-10-CM: R59.0 ICD-9-CM: 785.6  7/8/2019 - Present Consultants: 
Palliative Care Pulmonary Oncology Studies/Procedures: 
ECHO Multiple CXRs LE duplex Upper extremity duplex Hospital course:  He was seen in consult by Dr. Keena Morse on 7/12/19. Marea Heading that time, he presented with c/o progressive severe abd pain x 3 days and wt loss over the past several months.  Bili 2.6, Lipase 6000.  CT CAP revealed RLL segmental PE, mediastinal LAD, 4 cm pleurally based cavitary mass in L apex, 2cm pancreatic mass obstructing pancreatic and CBD with associated biliary duct dilation and GB distention and gastric wall thickening.  He is s/p ERCP with stent placement and FNA of pancreatic mass/periportal LAD on 7/8.  Biliary brushing cytology +rare atypical cells.  Path on periportal/precarinal  nodes +poorly differentiated metastatic carcinoma. [facility] non-specific as to origin, however a primary tumor in upper GI or pancreatobiliary tree cannot be excluded.  During that consult, EBUS with possible navigational biopsy to the lung was recommended as there may be a possible 2nd primary.   
   Palliative care following. Pulmonary consulted to consider EBUS and possible navigation bronchoscopy. Seen last on 7/12 by our service at the time [facility] was not completed yet. O2 sat dropped to 75% on 15L HFNC.  Required intubation for respiratory support and cared for in the ICU.  Noted with bilateral upper and lower extremity DVTs--Heparin drip infusing. Chest CT confirmed PE.   Was extubated  to Opti-flow for brief period. Had worsening hypoxia and changed to BIPAP but required re-intubation on . Pressor support added for hemodynamic stability. Going to multiple organ failure. Palliative Care followed and long discussions with family members regarding his over all decline. Decision made to proceed with compassionate extubation. Patient . Final: 
--Pronounced dead at 10:25 AM 
--Total discharge greater than 30 minutes in duration. Web death completed Yovana Haywood MD

## 2019-08-06 NOTE — PROGRESS NOTES
Ordered to increase Levo dose to max 30 - pt maxed on Levo. MAP 50-60. Pt unresponsive. Attempted to contact daughter and nephew  - no answers and voicemail left. Pt actively satting 50-60% - no new orders received Dr. Katherine Balderas. Removed restraints.

## 2019-08-06 NOTE — PROGRESS NOTES
A follow up visit was made to the patient. Emotional support, spiritual presence and  
prayer were provided. Four family members were present, including her daughter, Skye Dsouza.  participated in a family conference for this patient yesterday. The conference was led by Dr. Tyree Nelson. Sury Edmondson

## 2019-08-06 NOTE — PROGRESS NOTES
Switched [facility] to [facility] on documented settings; tried various different settings as well as bagging with no change; can not keep SpO2 above 80%

## 2019-08-07 LAB
ABO + RH BLD: NORMAL
BLD PROD TYP BPU: NORMAL
BLOOD GROUP ANTIBODIES SERPL: NORMAL
BPU ID: NORMAL
CROSSMATCH RESULT,%XM: NORMAL
SPECIMEN EXP DATE BLD: NORMAL
STATUS OF UNIT,%ST: NORMAL
UNIT DIVISION, %UDIV: 0

## 2019-09-03 LAB
ACID FAST STN SPEC: NEGATIVE
MYCOBACTERIUM SPEC QL CULT: NEGATIVE
SPECIMEN PREPARATION: NORMAL
SPECIMEN SOURCE: NORMAL

## 2021-04-25 NOTE — OP NOTES
- 2 lacerations closed in ER 4/24  - f/u for suture removal  - local wound care Gastroenterology Procedure Note Procedure:  Endoscopic ultrasound with Doppler and Fine needle aspiration Date of Procedure:  7/8/2019 Patient:  [place]     1965 Indication:  Pancreatic mass, biliary obstruction, mediastinal lymphadenopathy Sedation:  General anesthesia Pre-Procedure Physical Exam: 
 
Mental status:  alert and oriented Airway:  normal oropharyngeal airway and neck mobility CV:  regular rate and rhythm Respiratory:  clear to auscultation Procedure: A History and Physical has been performed, and patient medication allergies have been reviewed. Risks of perforation, hemorrhage, adverse drug reaction, and aspiration were discussed. Informed consent was obtained for the procedure, including sedation. Patient was intubated and remained on mechanical ventilation throughout the procedure for airway protection. The patient was placed in the left lateral decubitus position. The heart rate, oxygen saturations, blood pressure, and response to care were monitored throughout the procedure. The linear echoendoscope was passed through the mouth and advanced under direct vision to the distal duodenum. As the scope was slowly withdrawn, detailed endoscopic images were obtained from the surrounding organs. The patient tolerated the procedure well. Findings:  
 
ENDOSCOPIC FINDINGS:  Limited views of the mucosa with the echoendoscope reveals no gross abnormalities of the stomach or proximal duodenum. The ampulla is well-visualized endoscopically and appears normal. 
 
PANCREAS:  The pancreas is well-visualized from head to tail. There is no evidence of chronic pancreatitis. In the head of the pancreas, there is a 24 x 21 mm, hypoechoic, homogenous mass. The mass compresses the common bile duct and main pancreatic duct. There was no vascular involvement.  The pancreatic duct is compressed with upstream dilation to 3 mm maximally in the body and 2 mm and the tail. Fine needle aspiration of the mass was performed using a 25-gauge [employer] [place] [employer] Ac[employer] needle. Three passes were made, yielding core specimens. A 9 x 9 mm round, hypoechoic lymph node is seen adjacent to the body of the pancreas. A 16 x 10 mm hypoechoic, oval-shaped node with smooth borders is seen in the periportal area. This is an extrinsically compressing the common bile duct. Fine-needle aspiration was performed using a 22-gauge [employer] [place] [employer] needle. Two passes were made, yielding core specimens. BILIARY TREE: The gallbladder is distended. The common bile duct is well-visualized from its insertion in the ampulla to the bifurcation of right and left hepatic ducts. It is dilated, measuring up to 14 mm maximally and remains dilated within the intrapancreatic portion to the level of previously the described mass. There is echogenic debris without associated acoustic shadowing in the distal duct. The ampulla appears normal endosonographically. OTHER ORGANS:  Views of the left lobe of the liver demonstrate intrahepatic biliary dilatation but no solid mass lesions. There is no ascites in the upper abdomen. The left adrenal gland contains a 23 x 80 mm homogeneous, hypoechoic mass. The major vascular structures including the portal vein, splenic vessels and celiac artery appear unremarkable. Multiple pathologically enlarged nodes her seen in the mediastinum. 1 enlarged precarinal node is oval shaped, hypoechoic, heterogeneous and with smooth margins. This measures 25 x 14 mm. Fine-needle aspiration was performed using a 22-gauge [employer] [place] [employer] Ac[employer] needle. Two passes were made, yielding core specimens. Specimen:  Yes 
 
Estimated Blood Loss:  5 ml Implant:  None Impression: 1. Pancreatic mass. This is compressing the common bile duct and pancreatic duct without vascular involvement.  While this may represent adenocarcinoma, the differential diagnosis includes lymphoma or metastatic disease. FNA was performed. 2. Periportal lymphadenopathy. FNA was performed. 3. Left adrenal mass. 4. Biliary obstruction with echogenic debris in the distal common duct. 5. Mediastinal lymphadenopathy. FNA was performed. Plan: 1. Follow up results of pathology. 2. Proceed with ERCP today as planned. 3. Will need to resume heparin protocol following ERCP.   
 
Signed: 
Faustino Richey MD 
7/8/2019 
5:03 PM

## 2023-01-08 NOTE — PROGRESS NOTES
Bedside shift change report given to GERARDO Abraham (oncoming nurse) by Queta Gerard RN (offgoing nurse). Report included the following information SBAR, Kardex, ED Summary, Procedure Summary, Intake/Output, MAR, Recent Results, Med Rec Status, Cardiac Rhythm Sinus Tach, Alarm Parameters  and Quality Measures. Dual sign off on Heparin gtt. <-- Click to add NO significant Past Surgical History

## 2024-05-08 NOTE — PROGRESS NOTES
was requested to comfort and pray for this family and the patient by Dr. Nadeen Rachel. The family members have decided to have the patient put on comfort measures. The patient's daughter, nephew, sister and another relative were present.  extended comfort, a spiritual presence, empathy and emotional support, as well as prayer to the patient's family.  prayed for God's care upon the patient. Sebas Edmondson declines
